# Patient Record
Sex: FEMALE | Race: OTHER | HISPANIC OR LATINO | Employment: UNEMPLOYED | URBAN - METROPOLITAN AREA
[De-identification: names, ages, dates, MRNs, and addresses within clinical notes are randomized per-mention and may not be internally consistent; named-entity substitution may affect disease eponyms.]

---

## 2017-05-26 ENCOUNTER — HOSPITAL ENCOUNTER (EMERGENCY)
Facility: HOSPITAL | Age: 37
Discharge: HOME | End: 2017-05-27
Attending: EMERGENCY MEDICINE

## 2017-05-26 DIAGNOSIS — R10.13 EPIGASTRIC PAIN: Primary | ICD-10-CM

## 2017-05-26 PROCEDURE — 81025 URINE PREGNANCY TEST: CPT | Performed by: EMERGENCY MEDICINE

## 2017-05-26 RX ORDER — ONDANSETRON 2 MG/ML
4 INJECTION INTRAMUSCULAR; INTRAVENOUS ONCE
Status: COMPLETED | OUTPATIENT
Start: 2017-05-27 | End: 2017-05-27

## 2017-05-26 RX ORDER — MAGNESIUM HYDROXIDE/ALUMINUM HYDROXICE/SIMETHICONE 120; 1200; 1200 MG/30ML; MG/30ML; MG/30ML
30 SUSPENSION ORAL ONCE
Status: COMPLETED | OUTPATIENT
Start: 2017-05-27 | End: 2017-05-27

## 2017-05-26 RX ORDER — PANTOPRAZOLE SODIUM 40 MG/1
40 INJECTION, POWDER, FOR SOLUTION INTRAVENOUS ONCE
Status: COMPLETED | OUTPATIENT
Start: 2017-05-27 | End: 2017-05-27

## 2017-05-27 VITALS
SYSTOLIC BLOOD PRESSURE: 113 MMHG | BODY MASS INDEX: 27.38 KG/M2 | HEIGHT: 61 IN | RESPIRATION RATE: 18 BRPM | TEMPERATURE: 98.7 F | OXYGEN SATURATION: 98 % | DIASTOLIC BLOOD PRESSURE: 74 MMHG | WEIGHT: 145 LBS | HEART RATE: 66 BPM

## 2017-05-27 LAB
ALBUMIN SERPL BCP-MCNC: 3.5 G/DL (ref 3.5–5)
ALP SERPL-CCNC: 97 U/L (ref 46–116)
ALT SERPL W P-5'-P-CCNC: 45 U/L (ref 12–78)
ANION GAP SERPL CALCULATED.3IONS-SCNC: 8 MMOL/L (ref 4–13)
AST SERPL W P-5'-P-CCNC: 37 U/L (ref 5–45)
BASOPHILS # BLD AUTO: 0.02 THOUSANDS/ΜL (ref 0–0.1)
BASOPHILS NFR BLD AUTO: 1 % (ref 0–1)
BILIRUB SERPL-MCNC: 0.2 MG/DL (ref 0.2–1)
BILIRUB UR QL STRIP: NEGATIVE
BUN SERPL-MCNC: 15 MG/DL (ref 5–25)
CALCIUM SERPL-MCNC: 8.5 MG/DL (ref 8.3–10.1)
CHLORIDE SERPL-SCNC: 104 MMOL/L (ref 100–108)
CLARITY UR: CLEAR
CO2 SERPL-SCNC: 28 MMOL/L (ref 21–32)
COLOR UR: YELLOW
CREAT SERPL-MCNC: 0.66 MG/DL (ref 0.6–1.3)
EOSINOPHIL # BLD AUTO: 0.27 THOUSAND/ΜL (ref 0–0.61)
EOSINOPHIL NFR BLD AUTO: 6 % (ref 0–6)
ERYTHROCYTE [DISTWIDTH] IN BLOOD BY AUTOMATED COUNT: 19.2 % (ref 11.6–15.1)
GFR SERPL CREATININE-BSD FRML MDRD: >60 ML/MIN/1.73SQ M
GLUCOSE SERPL-MCNC: 101 MG/DL (ref 65–140)
GLUCOSE UR STRIP-MCNC: NEGATIVE MG/DL
HCG UR QL: NEGATIVE
HCT VFR BLD AUTO: 34.4 % (ref 34.8–46.1)
HGB BLD-MCNC: 10.4 G/DL (ref 11.5–15.4)
HGB UR QL STRIP.AUTO: NEGATIVE
KETONES UR STRIP-MCNC: NEGATIVE MG/DL
LEUKOCYTE ESTERASE UR QL STRIP: NEGATIVE
LIPASE SERPL-CCNC: 228 U/L (ref 73–393)
LYMPHOCYTES # BLD AUTO: 1.77 THOUSANDS/ΜL (ref 0.6–4.47)
LYMPHOCYTES NFR BLD AUTO: 42 % (ref 14–44)
MCH RBC QN AUTO: 20.8 PG (ref 26.8–34.3)
MCHC RBC AUTO-ENTMCNC: 30.2 G/DL (ref 31.4–37.4)
MCV RBC AUTO: 69 FL (ref 82–98)
MONOCYTES # BLD AUTO: 0.35 THOUSAND/ΜL (ref 0.17–1.22)
MONOCYTES NFR BLD AUTO: 8 % (ref 4–12)
NEUTROPHILS # BLD AUTO: 1.79 THOUSANDS/ΜL (ref 1.85–7.62)
NEUTS SEG NFR BLD AUTO: 43 % (ref 43–75)
NITRITE UR QL STRIP: NEGATIVE
PH UR STRIP.AUTO: 6.5 [PH] (ref 4.5–8)
PLATELET # BLD AUTO: 365 THOUSANDS/UL (ref 149–390)
PMV BLD AUTO: 9.4 FL (ref 8.9–12.7)
POTASSIUM SERPL-SCNC: 3.5 MMOL/L (ref 3.5–5.3)
PROT SERPL-MCNC: 7.4 G/DL (ref 6.4–8.2)
PROT UR STRIP-MCNC: NEGATIVE MG/DL
RBC # BLD AUTO: 5 MILLION/UL (ref 3.81–5.12)
SODIUM SERPL-SCNC: 140 MMOL/L (ref 136–145)
SP GR UR STRIP.AUTO: 1.02 (ref 1–1.03)
UROBILINOGEN UR QL STRIP.AUTO: 1 E.U./DL
WBC # BLD AUTO: 4.2 THOUSAND/UL (ref 4.31–10.16)

## 2017-05-27 PROCEDURE — 96374 THER/PROPH/DIAG INJ IV PUSH: CPT

## 2017-05-27 PROCEDURE — 83690 ASSAY OF LIPASE: CPT | Performed by: EMERGENCY MEDICINE

## 2017-05-27 PROCEDURE — 80053 COMPREHEN METABOLIC PANEL: CPT | Performed by: EMERGENCY MEDICINE

## 2017-05-27 PROCEDURE — 81003 URINALYSIS AUTO W/O SCOPE: CPT | Performed by: EMERGENCY MEDICINE

## 2017-05-27 PROCEDURE — C9113 INJ PANTOPRAZOLE SODIUM, VIA: HCPCS | Performed by: EMERGENCY MEDICINE

## 2017-05-27 PROCEDURE — 99284 EMERGENCY DEPT VISIT MOD MDM: CPT

## 2017-05-27 PROCEDURE — 36415 COLL VENOUS BLD VENIPUNCTURE: CPT | Performed by: EMERGENCY MEDICINE

## 2017-05-27 PROCEDURE — 85025 COMPLETE CBC W/AUTO DIFF WBC: CPT | Performed by: EMERGENCY MEDICINE

## 2017-05-27 PROCEDURE — 96375 TX/PRO/DX INJ NEW DRUG ADDON: CPT

## 2017-05-27 RX ORDER — ONDANSETRON 4 MG/1
4 TABLET, FILM COATED ORAL EVERY 8 HOURS PRN
Qty: 12 TABLET | Refills: 0 | Status: SHIPPED | OUTPATIENT
Start: 2017-05-27 | End: 2020-02-05

## 2017-05-27 RX ORDER — KETOROLAC TROMETHAMINE 30 MG/ML
30 INJECTION, SOLUTION INTRAMUSCULAR; INTRAVENOUS ONCE
Status: COMPLETED | OUTPATIENT
Start: 2017-05-27 | End: 2017-05-27

## 2017-05-27 RX ORDER — OMEPRAZOLE 20 MG/1
40 CAPSULE, DELAYED RELEASE ORAL DAILY
Qty: 40 CAPSULE | Refills: 0 | Status: SHIPPED | OUTPATIENT
Start: 2017-05-27 | End: 2019-11-26

## 2017-05-27 RX ADMIN — KETOROLAC TROMETHAMINE 30 MG: 30 INJECTION, SOLUTION INTRAMUSCULAR at 02:25

## 2017-05-27 RX ADMIN — ALUMINUM HYDROXIDE, MAGNESIUM HYDROXIDE, AND SIMETHICONE 30 ML: 200; 200; 20 SUSPENSION ORAL at 00:32

## 2017-05-27 RX ADMIN — PANTOPRAZOLE SODIUM 40 MG: 40 INJECTION, POWDER, FOR SOLUTION INTRAVENOUS at 00:34

## 2017-05-27 RX ADMIN — ONDANSETRON 4 MG: 2 INJECTION INTRAMUSCULAR; INTRAVENOUS at 00:33

## 2019-10-21 ENCOUNTER — ULTRASOUND (OUTPATIENT)
Dept: OBGYN CLINIC | Facility: CLINIC | Age: 39
End: 2019-10-21

## 2019-10-21 VITALS
DIASTOLIC BLOOD PRESSURE: 80 MMHG | WEIGHT: 145 LBS | SYSTOLIC BLOOD PRESSURE: 121 MMHG | BODY MASS INDEX: 26.68 KG/M2 | HEIGHT: 62 IN | HEART RATE: 60 BPM

## 2019-10-21 DIAGNOSIS — O21.9 NAUSEA AND VOMITING IN PREGNANCY: ICD-10-CM

## 2019-10-21 DIAGNOSIS — N91.2 AMENORRHEA: Primary | ICD-10-CM

## 2019-10-21 PROCEDURE — 99203 OFFICE O/P NEW LOW 30 MIN: CPT | Performed by: OBSTETRICS & GYNECOLOGY

## 2019-10-21 RX ORDER — PYRIDOXINE HCL (VITAMIN B6) 25 MG
25 TABLET ORAL EVERY 8 HOURS
Qty: 30 TABLET | Refills: 4 | Status: SHIPPED | OUTPATIENT
Start: 2019-10-21 | End: 2019-11-04 | Stop reason: SDUPTHER

## 2019-10-21 NOTE — PROGRESS NOTES
S: 39 y o y o  [id number] who presents for viability scan with LMP of mid-August  She reports feeling well  She denies cramping or vaginal bleeding  This is an unplanned but welcomed pregnancy   She has had 4 previous uncomplicated vaginal deliveries         O:  Vitals:    10/21/19 1019   BP: 121/80   Pulse: 60   Weight: 65 8 kg (145 lb)   Height: 5' 2" (1 575 m)       TVUS: hilton IUP; gestational sac + yolk sac + fetal pole seen; cardiac flicker visualized; unable to assess gestational age due to small size; R corpus luteum        A/P:  - RTC in 2 weeks for repeat viability scan  - Nausea/vomiting: unisom + B6  - Start prenatal vitamins      Carlos Dey MD  OB/GYN PGY-2  10/22/2019  9:05 AM

## 2019-10-21 NOTE — PROGRESS NOTES
Note on use of High Level Disinfection Process (Trophon) for transvaginal probe:     Valentina Lyons    REF: [id number]   SN: [id number]     [facility] [place] #[id number]

## 2019-10-21 NOTE — PATIENT INSTRUCTIONS
[place] y vómito en el embarazo   CUIDADO AMBULATORIO:   La náusea y el vómito en el embarazo  pueden suceder a cualquier hora del día  Estos síntomas usualmente comienzan antes de la semana 9 del embarazo y terminan para la semana 14 (salvador trimestre)  Algunas mujeres pueden tener náusea o vómito por un tiempo prolongado  Estos síntomas pueden afectar a algunas mujeres valeri el embarazo  La náusea y el vómito no dañan a garcia bebé  Estos síntomas pueden dificultarle breanna actividades diarias  Busque atención médica de inmediato si:   · Usted presenta signos de deshidratación  Por ejemplo, orina de color amarillo oscuro, boca y labios resecos, piel reseca, latido cardíaco acelerado y orinar menos de lo normal     · Usted tiene dolor abdominal intenso  · Usted se siente demasiado débil o mareado joseph para ponerse de pie  · Usted nota carmita en garcia vómito o en breanna deposiciones  Pregúntele a garcia Lurene Ruder vitaminas y minerales son adecuados para usted  · Usted vomita más de 4 veces en 1 día  · Usted no ha podido retener líquidos en el estómago por más de 1 día  · Usted pierde más de 2 libras  · Usted tiene fiebre  · Breanna náuseas y vómito continúan por más de 14 semanas  · Usted tiene preguntas o inquietudes acerca de garcia condición o cuidado  El tratamiento  para la náusea y el vómito en el embarazo generalmente no es necesario  Usted puede [employer] alimentos que come y en breanna actividades para ayudar a controlar breanna síntomas  Es posible que usted necesite probar varias cosas para determinar qué funciona mejor para usted  Hable con garcia médico si breanna síntomas no mejoran con los cambios que se recomiendan a continuación  Es posible que usted necesite vitamina B6 y medicamento si estos cambios no ayudan o si breanna síntomas se vuelven graves     Cambios de nutrición que usted puede realizar para controlar la náusea y el vómito:   · Coma porciones pequeñas valeri el día en vez de 3 comidas con porciones grandes  Es más probable que usted tenga náusea y vómito cuando garcia estómago está vacío  Consuma alimentos bajos en grasa y ricos en proteínas  Ejemplos son Avivaamanda Jarquin, frijoles, pavo y danie sin flora Morales, joseph galletas saladas, cereal seco o un sandwich chico antes de WEDGECARRUP  · Coma galletas saladas o pan martha antes de levantarse de garcia cama por la mañana  Levántese de la cama lentamente  Los movimientos repentinos podrían provocarle mareos y [place]  · Consuma alimentos blandos cuando se sienta con náuseas  Ejemplos de alimentos blandos son el pan martha, cereal seco, pasta sin Margauxcathie Le y olivares  Otros alimentos blandos incluyen a las [employer], plátanos, gelatina y pretzels  Evite los alimentos condimentados, grasosos y fritos  Evite otros alimentos que le provoquen náuseas  · [place] líquidos que contengan gengibre  [place] refresco de gengibre hecho con gengibre real o té de gengibre hecho con gengibre fresco rallado  Las [employer] o dulces de gengibre también podrían ayudar a aliviar la náusea y el vómito  · [place] líquidos entre alimentos en vez de tomarlos con los alimentos  [place] al menos 30 minutos después de comer para karen líquidos  [place] cantidades pequeñas de líquidos con frecuencia valeri el día para evitar la deshidratación  Consulte cuál es la cantidad de líquido que usted debería consumir al día  Otros cambios que usted puede realizar para controlar la náusea y el vómito:   · Evite los olores que la [place]  Los olores ruddy podrían provocar que [employer] náuseas y el vómito, o podrían empeorarlo  Camine un poco, prenda un ventilador o trate de dormir con la ventana abierta para respirar aire fresco  Cuando esté cocinando, chucho las ventanas para eliminar el olor que podría provocarle náuseas  · No se cepille antwon dientes inmediatamente después de comer  si eso le provoca náuseas  · Descanse cuando lo necesite    Comience jael actividad lentamente y vuelva a landeros rutina normal conforme se empiece a sentir mejor  · Hable con ladneros médico acerca de las vitaminas prenatales  Las vitaminas prenatales pueden provocar náuseas a algunas mujeres  Trate de tomárselas por la noche o con un bocadillo  Si demetrice cambio no le [facility], landeros médico podría recomendarle un tipo de vitamina diferente  · No use ningún medicamento, vitamina o suplemento para controlar antwon síntomas sin antes consultarlo con landeros médico   Varios medicamentos pueden dañar a landeros bebé que no ha nacido  · El ejercicio de ligero a moderado  podría ayudar a aliviar antwon síntomas  También podría ayudarla a dormir mejor por la noche  Pregunte a landeros médico acerca del mejor plan de ejercicio para usted  Acuda a antwon consultas de control con landeros médico según le indicaron  Anote antwon preguntas para que se acuerde de hacerlas valeri antwon visitas  © 2017 [address] Information is for End User's use only and may not be sold, redistributed or otherwise used for commercial purposes  All illustrations and images included in CareNotes® are the copyrighted property of A D A M , Inc  or Josh Stanford  Esta información es sólo para uso en educación  Landeros intención no es darle un consejo médico sobre enfermedades o tratamientos  Colsulte con landeros Alaina Juwan farmacéutico antes de seguir cualquier régimen médico para saber si es seguro y efectivo para usted

## 2019-11-04 ENCOUNTER — ULTRASOUND (OUTPATIENT)
Dept: OBGYN CLINIC | Facility: CLINIC | Age: 39
End: 2019-11-04

## 2019-11-04 DIAGNOSIS — N91.2 AMENORRHEA: Primary | ICD-10-CM

## 2019-11-04 DIAGNOSIS — O21.9 NAUSEA AND VOMITING IN PREGNANCY: ICD-10-CM

## 2019-11-04 PROCEDURE — 76801 OB US < 14 WKS SINGLE FETUS: CPT | Performed by: OBSTETRICS & GYNECOLOGY

## 2019-11-04 PROCEDURE — 99213 OFFICE O/P EST LOW 20 MIN: CPT | Performed by: OBSTETRICS & GYNECOLOGY

## 2019-11-04 RX ORDER — PYRIDOXINE HCL (VITAMIN B6) 25 MG
25 TABLET ORAL EVERY 8 HOURS
Qty: 30 TABLET | Refills: 4 | Status: SHIPPED | OUTPATIENT
Start: 2019-11-04 | End: 2020-03-03 | Stop reason: ALTCHOICE

## 2019-11-04 NOTE — PROGRESS NOTES
FIRST TRIMESTER OBSTETRIC ULTRASOUND      Rupa Nielsen is a 46yo [id number] who present today for a viability ultrasound  This is an unplanned but welcomed pregnancy  History of irregular menses with LMP "around 8/16/19"  INDICATION: Amenorrhea, viability    TECHNIQUE:  Transvaginal imaging was performed to assess the gestation, myometrial/endometrial architecture and ovarian parenchymal detail  The study includes volumetric sweeps and traditional still imaging technique  FINDINGS:     A single intrauterine gestation is identified  Cardiac activity is detected at 164bpm     YOLK SAC:  Present and normal in size and appearance  GESTATIONAL SAC: Present and normal in size and appearance  MEAN CROWN RUMP LENGTH:  1 54-1  61cm = 8 weeks 0 days   AMNIOTIC FLUID/SAC SHAPE:  Within expected normal range  UTERUS/ADNEXA:   No adnexal mass or pathologic cyst   No free fluid identified  IMPRESSION:     Single intrauterine pregnancy of 8 weeks 0 days gestational age  Fetal cardiac activity detected at 164bpm  Right corpus luteum cyst, otherwise normal appearing adnexa bilaterally  Final [facility], based on today's ultrasound, 6/15/2020  Dr Maykel Zuleta present    - Unisom + B6 sent to the patient's pharmacy for nausea/vomiting  Precautions reviewed  - Prenatal vitamins sent to the patient's pharmacy  Prenatal panel ordered  - Follow up for intake and H&P visit  1st trimester precautions reviewed

## 2019-11-04 NOTE — PATIENT INSTRUCTIONS
Embarazo de la semana 7 a la 10   LO QUE NECESITA SABER:   La hormonas del embarazo podrían provocar que garcia cuerpo pase por varios cambios valeri esta etapa del embarazo  Es posible que usted se sienta más cansado de lo normal y que tenga cambios de humor, náuseas y vómitos, y karishma de [place]  Breanna senos podrían sentirse sensibles e inflamados y usted podría orinar con más frecuencia  INSTRUCCIONES SOBRE EL CYDNEY HOSPITALARIA:   Busque atención médica de inmediato si:   · Usted tiene dolor o cólicos en el abdomen o la parte baja de la espalda  · Usted tiene sangrado vaginal abundante o coágulos  · Le sale un material que parece tejido o coágulos grandes  Recolecte el material y tráigalo con usted  Pregúntele a garcia Vi Kobs vitaminas y minerales son adecuados para usted  · Usted tiene un sangrado leve  · Usted tiene escalofríos o fiebre  · Usted tiene comezón, ardor o dolor vaginal      · Usted tiene jael secreción vaginal amarillenta, verdosa, hawa o de [employer]  · Usted tiene dolor o ardor al Marinell Dredge, orina menos de lo habitual o tiene [place] rosada o sanguinolenta  · Usted tiene preguntas o inquietudes acerca de garcia condición o cuidado  Cómo cuidarse en esta etapa de garcia embarazo:   · Controle la náusea y el vómito  Evite los alimentos grasosos y picantes  Coma comidas pequeñas valeri el día en vez de porciones grandes  El jengibre puede ayudar a [employer]  Consulte con garcia médico acerca de otras formas para disminuir las náuseas y el vómito  · Consuma alimentos saludables y variados  Alimentos saludables incluyen frutas, verduras, panes de kayla integral, alimentos lácteos bajos en grasa, frijoles, deni magras y pescado  [place] líquidos joseph se le haya indicado  Pregunte cuánto líquido debe karen cada día y cuáles líquidos son los más adecuados para usted  Limite el consumo de cafeína a menos de [place] [address]   Limite el consumo de pescado a 2 porciones cada semana  Escoja pescado con concentraciones bajas de meme joseph atún al natural enlatado, camarón, salmón, bacalao o tilapia  No  coma pescado con concentraciones altas de meme joseph pez alan, caballa gigante, pargo rayado y tiburón  · [address]  Garcia necesidad de ciertas vitaminas y [address], joseph el ácido fólico, aumenta valeri el [facility]  Las vitaminas prenatales proporcionan algunas de las vitaminas y minerales adicionales que usted necesita  Las vitaminas prenatales también podrían ayudar a disminuir el riesgo de ciertos defectos de nacimiento  · Pregunte cuánto peso usted debería aumentar cada mes  Demasiado aumento de peso o muy poco puede ser poco saludable para usted y garcia bebé  · No fume  Si usted fuma, nunca es demasiado tarde para dejar de hacerlo  Fumar aumenta el riesgo de aborto espontáneo y otros problemas de savannah valeri garcia [facility]  Fumar puede causar que garcia bebé nazca antes de tiempo o que pese menos al nacer  Solicite información a garcia médico si usted necesita ayuda para dejar de fumar  · No consuma alcohol  El alcohol pasa de garcia cuerpo al bebé a través de la placenta  Puede afectar el desarrollo del cerebro de garcia bebé y provocar el síndrome de alcoholismo fetal (SAF)  FAS es un pacheco de condiciones que causan [address], de comportamiento y de crecimiento  · Consulte con garcia médico antes de karen cualquier medicamento  Muchos medicamentos pueden perjudicar a garcia bebé si usted los tosin [facility] [address]  No tome ningún medicamento, vitaminas, hierbas o suplementos sin david consultar con garcia Kain Dole  use drogas ilegales o de la tena (joseph marihuana o cocaína) mientras está embarazada  Consejos de seguridad valeri el embarazo:   · Evite jacuzzis y saunas  No use un jacuzzi o un sauna mientras usted está embarazada, especialmente valeri el primer trimestre   Los [facility] y los saunas [facility] temperatura de garcia bebé y el riesgo de defectos de nacimiento  · Evite la toxoplasmosis  [place] es jael infección causada por comer carne cruda o estar cerca del excremento de un bebeto infectado  [place] puede causar malformaciones congénitas, aborto espontáneo y [place]  Lávese las ashley después de tocar carne cruda  Asegúrese de que la carne esté monica cocida antes de comerla  Evite los huevos crudos y la Neha Gulling  Use guantes o pida que alguien la ayude a limpiar la caja de arena del bebeto mientras usted Clemon Janes  Cambios que están ocurriendo con garcia bebé:  Para las 10 semanas, garcia bebé medirá alrededor de 2 ½ pulgadas desde la xavier hasta la rabadilla (parte inferior o cóccix del bebé)  Garcia bebé pesa alrededor de ½ onza  Los [employer] del cuerpo, joseph el cerebro, corazón y pulmones se están formando  Las características faciales de garcia bebé también están comenzando a formarse  Lo que necesita saber acerca del cuidado prenatal:  El cuidado prenatal se trata de jael serie de visitas con garcia médico a lo zenon del embarazo  Valeri las primeras 28 semanas de garcia temi Andino tendrá citas mensuales con garcia médico  El cuidado prenatal puede ayudar a evitar problemas valeri el [place] y [place]  Garcia médico le hará preguntas acerca de garcia savannah y de cualquier embarazo previo que usted haya tenido  Él también le preguntará acerca de cualquier medicamento que esté tomando  Es posible que también necesite alguno de los siguientes tratamientos:  · Jael prueba de [place]  se realiza para revisar si garcia shanelle uterino tiene células anormales  El shanelle uterino es la apertura angosta que está en la parte inferior de [place]  El shanelle uterino se junta con la parte superior de la vagina  · Un examen pélvico  le permite a garcia médico observar garcia shanelle uterino (la parte inferior de garcia [place])  Garcia médico utiliza un espéculo para abrir garcia vagina suavemente   Él examinará el tamaño y forma de garcia [place]      · Los análisis de carmita:  podrían realizarse para revisar signos de anemia o el tipo de [place]  Landeros médico también podría ordenarle otros exámenes de carmita para revisar si usted es inmune a ciertas enfermedades joseph la Hepatitis B  También podría recomendarle un examen del VIH  · Análisis de orina  también podrían realizarse para revisar si hay signos de infección  · Landeros presión arterial y peso  será revisado  © 2017 [address] Information is for End User's use only and may not be sold, redistributed or otherwise used for commercial purposes  All illustrations and images included in CareNotes® are the copyrighted property of A D A M , Inc  or Josh Stanford  Esta información es sólo para uso en educación  Landeros intención no es darle un consejo médico sobre enfermedades o tratamientos  Colsulte con landeros Mukund Panchal farmacéutico antes de seguir cualquier régimen médico para saber si es seguro y efectivo para usted  Náusea y vómito en el embarazo   LO QUE NECESITA SABER:   La náusea y el vómito pueden suceder a cualquier hora del día  Estos síntomas usualmente comienzan antes de la semana 9 del embarazo y terminan para la semana 14 (salvador trimestre)  Algunas mujeres pueden tener náusea o vómito por un tiempo prolongado  Estos síntomas pueden afectar a algunas mujeres valeri el embarazo  La náusea y el vómito no dañan a landeros bebé  Estos síntomas pueden dificultarle antwon actividades diarias  INSTRUCCIONES SOBRE EL CYDNEY HOSPITALARIA:   Regrese a la ned de emergencias si:   · Usted presenta signos de deshidratación  Por ejemplo, orina de color amarillo oscuro, boca y labios resecos, piel reseca, latido cardíaco acelerado y orinar menos de lo normal     · Usted tiene dolor abdominal intenso  · Usted se siente demasiado débil o mareado joseph para ponerse de pie  · Usted nota acrmita en landeros vómito o en antwon deposiciones    Pregúntele a landeros médico qué vitaminas y minerales son adecuados para usted  · Usted vomita más de 4 veces en 1 día  · Usted no ha podido retener líquidos en el estómago por más de 1 día  · Usted pierde más de 2 libras  · Usted tiene fiebre  · Breanna náuseas y vómito continúan por más de 14 semanas  · Usted tiene preguntas o inquietudes acerca de garcia condición o cuidado  Cambios de nutrición que usted puede realizar para controlar la náusea y el vómito:   · Coma porciones pequeñas valeri el día en vez de 3 comidas con porciones grandes  Es más probable que usted tenga náusea y vómito cuando garcia estómago está vacío  Consuma alimentos bajos en grasa y ricos en proteínas  Ejemplos son Eric Brenda, frijoles, pavo y danie sin flora Morales, joseph galletas saladas, cereal seco o un sandwich chico antes de WEDG[place]ARRUP  · Coma galletas saladas o pan martha antes de levantarse de garcia cama por la mañana  [place] de la cama lentamente  Los movimientos repentinos podrían provocarle mareos y [place]  · Consuma alimentos blandos cuando se sienta con náuseas  Ejemplos de alimentos blandos son el pan martha, cereal seco, pasta sin Minda Boss y olivares  Otros alimentos blandos incluyen a las [employer], plátanos, gelatina y pretzels  Evite los alimentos condimentados, grasosos y fritos  Evite otros alimentos que le provoquen náuseas  · [place] líquidos que contengan gengibre  [place] refresco de gengibre hecho con gengibre real o té de gengibre hecho con gengibre fresco rallado  Las [employer] o dulces de gengibre también podrían ayudar a aliviar la náusea y el vómito  · [place] líquidos entre alimentos en vez de tomarlos con los alimentos  [place] al menos 30 minutos después de comer para karen líquidos  [place] cantidades pequeñas de líquidos con frecuencia valeri el día para evitar la deshidratación  Consulte cuál es la cantidad de líquido que usted debería consumir al día    Otros cambios que usted puede realizar para controlar la náusea y el vómito:   · Evite los olores que la [place]  Los olores ruddy podrían provocar que [employer] náuseas y el vómito, o podrían empeorarlo  Camine un poco, prenda un ventilador o trate de dormir con la ventana abierta para respirar aire fresco  Cuando esté cocinando, chucho las ventanas para eliminar el olor que podría provocarle náuseas  · No se cepille antwon dientes inmediatamente después de comer  si eso le provoca náuseas  · Descanse cuando lo necesite  Comience jael actividad lentamente y vuelva a landeros rutina normal conforme se empiece a sentir mejor  · Hable con landeros médico acerca de las vitaminas prenatales  Las vitaminas prenatales pueden provocar náuseas a algunas mujeres  Trate de tomárselas por la noche o con un bocadillo  Si demetrice cambio no le [place], landeros médico podría recomendarle un tipo de vitamina diferente  · No use ningún medicamento, vitamina o suplemento para controlar antwon síntomas sin antes consultarlo con landeros médico   Varios medicamentos pueden dañar a landeros bebé que no ha nacido  · El ejercicio de ligero a moderado  podría ayudar a aliviar antwon síntomas  También podría ayudarla a dormir mejor por la noche  Pregunte a landeros médico acerca del mejor plan de ejercicio para usted  Acuda a antwon consultas de control con landeros médico según le indicaron  Anote antwon preguntas para que se acuerde de hacerlas valeri antwon visitas  © 2017 [address] Information is for End User's use only and may not be sold, redistributed or otherwise used for commercial purposes  All illustrations and images included in CareNotes® are the copyrighted property of A D A M , Inc  or Josh Stanford  Esta información es sólo para uso en educación  Landeros intención no es darle un consejo médico sobre enfermedades o tratamientos  Colsulte con landeros Pablo Abdi farmacéutico antes de seguir cualquier régimen médico para saber si es seguro y efectivo para usted

## 2019-11-18 ENCOUNTER — INITIAL PRENATAL (OUTPATIENT)
Dept: OBGYN CLINIC | Facility: CLINIC | Age: 39
End: 2019-11-18

## 2019-11-18 ENCOUNTER — PATIENT OUTREACH (OUTPATIENT)
Dept: OBGYN CLINIC | Facility: CLINIC | Age: 39
End: 2019-11-18

## 2019-11-18 VITALS
DIASTOLIC BLOOD PRESSURE: 76 MMHG | HEART RATE: 72 BPM | WEIGHT: 145 LBS | HEIGHT: 63 IN | SYSTOLIC BLOOD PRESSURE: 114 MMHG | BODY MASS INDEX: 25.69 KG/M2

## 2019-11-18 DIAGNOSIS — O09.521 AMA (ADVANCED MATERNAL AGE) MULTIGRAVIDA 35+, FIRST TRIMESTER: ICD-10-CM

## 2019-11-18 DIAGNOSIS — Z34.91 PRENATAL CARE IN FIRST TRIMESTER: Primary | ICD-10-CM

## 2019-11-18 PROCEDURE — 99211 OFF/OP EST MAY X REQ PHY/QHP: CPT

## 2019-11-18 NOTE — PROGRESS NOTES
RAFA met with 43 y/o- S- G5:P4-  Welsh speaking woman for psychosocial assess  Pt came accompanied by [facility] ( [facility]) and he was present with Pt's permission  Couple resides together with Pt's 4 kids  Both happy and reported pregnancy was planned  Pt denies any usage of drug, alcohol or smoking  No mental health history or DV issues  Pt has Financial Assistance and need to call [phone] for appointment  Pt reported FOB is the only wage earner  Pt states transportation can be a challenge, she most get pn appointment on tuesday  Pt was advice to request appointments on Tuesday at registration  Pt denies financial concerns  FOB denies concern at this time, verbalized is very excited with hs first baby  Pt denies concern at this time  SW explained her role and encouraged Pt to contact her as needed  Pt verbalized understanding

## 2019-11-18 NOTE — LETTER
Proof of Pregnancy Letter    Rupa Avendaño  1980  [address] [facility] [id number]        11/18/19      Rupa Avendaño is a patient at our facility  Rupa Avendaño Estimated Date of Delivery: None noted  Any questions or concerns, please feel free to contact our office      Sincerely,     [facility]

## 2019-11-18 NOTE — PROGRESS NOTES
OB INTAKE INTERVIEW  Pt presents for OB intake  [id number]  OB History    Para Term  AB Living   5 4 3     4   SAB TAB Ectopic Multiple Live Births           4      # Outcome Date GA Lbr Brandt/2nd Weight Sex Delivery Anes PTL Lv   5 Current            4 Para 14 40w0d   M Vag-Spont EPI N CLEO   3 Term 06/10/08 39w0d   M Vag-Spont  N CLEO   2 Term 06 40w0d   F Vag-Spont None N CLEO   1 Term 00 40w0d   M Vag-Spont  N CLEO       Last Menstrual Period:    No LMP recorded (lmp unknown)  Patient is pregnant  Estimated Date of Delivery: 6/15/2020 confirmed by US    H&P visit scheduled  2020  Last pap smear: unknown    Current Issues:  Constipation :   no  Headaches :   no  Cramping:  no  Spotting :   no  PICA cravings :  no  FOB Involved:   yes  Planned pregnancy:  yes    Interview education   [facility] Pregnancy Essentials Book reviewed and discussed    Baby and Me Handout   Baby and Me [facility] Handout   [facility] MFM Handout   Discussed genetic testing, patient declined   Prenatal care in first trimester  - Prenatal Panel  - Glucose, 1H PG  - Hemoglobin Electrophoresis  - Ambulatory referral to social work care management program      - Glucose, 1H PG; Future   Influenza vaccine declined     Discussed Tdap vaccine  Immunizations:   Immunization History   Administered Date(s) Administered    Influenza TIV (IM) 01/15/2014     Depression Screening Follow-up Plan: Patient's depression screening was negative  BMI Counseling: Body mass index is 25 69 kg/m²  Discussed the patient's BMI with her  Substance Abuse Screening 4Ps         Presently:  No         Past:   No         Partner:   No         Parents/Family:  No  Infection Screening: Does the pt have a hx of MRSA?no  Explained to patient how to contact OB Physician during after office hours  The patient was oriented to our practice and all questions were answered    Interviewed by: Aixa Torres RN 19

## 2019-11-18 NOTE — PATIENT INSTRUCTIONS
El Primer Trimestre  (hasta 14 semanas)   CHEEK BEBÉ   · Cheek bebé comienza a desarrollarse cuando el óvulo y un espermatozoide se unen  · Cheek bebé crece dentro de cheek útero (también llamado tu vientre)  Flota dentro de jael bolsa de agua - llamado el saco amniótico  El bebé está conectado a ti por un cordón umbilical que va desde el vientre del bebé a la placenta  La placenta se une a cheek útero y la placenta es donde [place], oxígeno y alimentos cruzan encima de usted a cheek bebé  · Cosas joseph drogas, alcohol y tabaco pueden también cruzar de usted a cheek bebé a través de la placenta  Es importante evitar estas cosas, joseph pueden ser perjudiciales para cómo tu bebé se desarrolla y crece  · Al final del primer mes, late el corazón de cheek bebé  El bebé es aproximadamente del tamaño de un trozo de arroz  · Al final del salvador mes, todos los órganos del bebé (corazón, pulmones, cerebro, cráneo) andres formado completamente  Ahora sólo tienen que seguir madurando y creciendo  El bebé es aproximadamente del tamaño de [place]  · Al final del tercer mes, cheek bebé es de aproximadamente 4 pulgadas de zenon! TU CUERPO   · Cheek período se detiene - esto puede ser la primera señal que tienes que está [place]   · Tus pechos pueden volverse dolorido y [place]  · Se puede sentir muy cansada  · Usted puede tener un malestar estomacal con náuseas o vómitos que pueden ocurrir en cualquier momento del día - o a veces valeri todo el día  · Usted puede sentirse malhumorado y gruñón  También puede sentir miedo o rosales  Rotan es normal y natural    · Usted puede perder o ganar peso  Rotan está monica, siempre y cuando usted no está perdiendo o ganando [employer]          [place] Trimestre  (14-28 semanas)   CHEEK BEBÉ   · 4to mes   · cheek bebé tiene pestañas y rosette   · cheek bebé patea, se mueve y se traga   · cheek bebé es 6 a 7 pulgadas de zenon   · 5to mes   · cheek bebé tiene uñas   · cheek bebé Alek Ce a dormir y despertar ciclos   · bebé Ely Clement a ser Onel Hodgkins Sharion Mark (aunque no siempre sientes lo) girando de lado a lado y [place]   · garcia bebé es de 8 a 12 pulgadas de zenon y pesa en cualquier lugar de ½ a 1 loyda   · 6to mes   · los ojos de garcia bebé están teddy completamente formados y pronto comenzarán a abrir y cerrar   · la piel del bebé es [place] y [place] y [place] con pelo rita y [place] llamado "lanugo"   · bebé sigue siendo muy activo y que debe estar sintiendo muy monica y muy a menudo   · garcia bebé es de 11 a 14 pulgadas de zenon     TU CUERPO   · Nauseas del embarazo usualmente comienza a desaparecer y las mujeres generalmente comienzan a subir de peso más rápidamente  · Puede comenzar a tener estrías en el vientre o senos que pueden ser "picores"  Frotarlas loción sobre ellos para ayudar a aliviar la Nevelyn Grams  · Tu flujo vaginal puede llegar a ser de color blanquecino  · Usted puede ser estreñido  Intente aumentar la [employer], o puede karen jael pastilla de suavizante de heces (joseph Colace) para aliviar el malestar  · Puede comenzar a tener ardor de estómago  Medicamentos joseph Tums o Maalox pueden ayudar a aliviar esto  Trate de permanecer en jael posición sentada por lo menos jael hora después de las comidas para disminuir la acidez estomacal    · Deberías probar a 8 horas de sueño en la noche, además de jael siesta valeri el día si es posible  · No deberías sentarte valeri más de dos horas sin karen un descanso para estirar las piernas  El Tercer Trimestre  (28-42 semanas)   GARCIA BEBÉ   · garcia bebé chupa garcia dedo ahora! · garcia bebé puede oír voces y responder al tacto    habla con Gerrianne Jaxson!!   · el cerebro de garcia bebé crece y se desarrolla más en los últimos 2 meses de embarazo   · princess y [place] del bebé son Linda Bebo y flexibles para que quepan por el canal del parto   · los movimientos del bebé cambian hacia el final del embarazo porque hay menos espacio para patear y estiramientos en tu vientre   · los pulmones del bebé no están completamente desarrollados y totalmente preparados para respirar por antwon propios hasta el último 3-4 semanas antes de garcia fecha de vencimiento     TU CUERPO   · garcia vientre está creciendo mucho ahora   · será más difícil dormir monica de noche o ser tan activos joseph eres generalmente   · puede sudar más de lo habitual   · serás más desequilibrado    tenga cuidado de no caer! · Usted puede desarrollar hemorroides (qué pueden ser dolorosas y hacen difícil sentarse)   · los dos últimos meses del embarazo puede ser muy incómodos con karishma de [place], karishma de [place] y ardor de estómago   · Puedes empezar a tener contracciones    mientras son irregulares y [place] de 5 por hora, esto es jael parte normal de garcia cuerpo a punto de tener un bebé   · el shanelle uterino puede empezar a dilatar y abrir [facility]    para estar listo para el parto   · Usted puede encontrarse que necesitan hacer orinar muy a menudo    porque el bebé está presionando mucho la vejiga   · Usted puede quedarse corta de respiracion más rápido de lo cuate          Mi bebé está desarrollando normalmente? ESTUDIOS GENETICO      Jael de las preocupaciones que muchas mujeres tienen acerca de garcia embarazo es si garcia bebé se está desarrollando normalmente  Existen varias pruebas diferentes que podemos usar para tratar de ayudar y determinar si los bebés están desarrollando normalmente o no  Algunas mujeres tienen un riesgo más alto para que garcia bebé se desarrollan anormalmente (a veces llamado un defecto de nacimiento), kenzie le puede pasar a CUALQUIER  Es por eso que ofrecemos estas pruebas a TODAS las mujeres valeri antwon [place]  Ninguno de [place] exámenes son requerido  Es garcia decisión cuáles puede elegir o NO eligir ninguno valeri garcia embarazo  Algunas de estas pruebas sólo están disponibles en un determinado momento valeri garcia embarazo, así que es importante karen decisiones sobre las pruebas qué desea temprano en el [facility]   Aquí hay información sobre estas pruebas diferentes para ayudarle a karen decisiones sobre si alguna de estas pruebas son [place] para usted  * ANATOMIA ULTRASONIDO : esta ultrasonido se realiza entre 18 a 25 semanas y Mary Alice de cerca a todas partes de garcia bebé y piezas para buscar signos de cualquier desarrollo anormal; el ultrasonido no es perfecto y NO PUEDE detectar TODOS los tipos de defectos de nacimiento (especialmente síndrome de Down) - kenzie es jael prueba [place]  * PROYECCIÓN SECUENCIAL: esta es realmente jael combinación de pruebas que incluyen un ultrasonido que mide la parte posterior del shanelle de garcia bebé [employer] semanas 11-13 y análisis de carmita de usted en que jael y otra vez entre las semanas 15-22; Demetrice examen puede ayudar a decirnos el riesgo para garcia bebé se ve afectada por ciertas anomalías cromosómicas o defectos congénitos  * CUÁDRUPLE PANTALLA: demetrice examen se hace con análisis de carmiat sólo de usted entre 15-22 semanas de [facility]; puede ayudar a decirnos el riesgo para garcia bebé se ve afectada por ciertas anomalías cromosómicas o defectos congénitos  * CELULAR-NIDIA DE ADN : esta prueba se realiza con análisis de carmita sólo de usted cualquier momento después de 10 semanas de embarazo; es muy preciso al decirnos si garcia bebé tiene jael merivn probabilidad de síndrome de Down u otras anomalías del cromosoma kenzie es generalmente reservado para las mujeres que tienen un factor de alto riesgo para un bebé con jael anormalidad del cromosoma  * BRI MATERNAL correction-FETO PROTEINA PANTALLA: demetrice examen se hace con el solo análisis de [place] de entre 15-22 semanas de Thu, nos ayuda a Lassiter Cinnamon idea si garcia ritika esta desarrollando un defecto de nacimiento joseph juancarlos bifida       * AMNIOCENTESIS : Esta prueba implica el uso de jael aguja muy paul que se inserta en el útero para sacar un poco de líquido alrededor de garcia bebé para la prueba; se puede realizar cualquier momento después de 16 semanas de embarazo; [facility] nos dice con certeza si garcia bebé tiene defectos de nacimiento particular (sobre todo anormalidades del cromosoma); hay un pequeño riesgo de aborto espontáneo que [place] cuando temi tiene esta prueba realizada; esta prueba es generalmente reservada para las mujeres que tienen un factor de alto riesgo para un bebé con jael anormalidad  Las mujeres que tienen un riesgo más alto para los bebés que se desarrollan anormalmente (a veces llamado un defecto de nacimiento) incluir a las mujeres con los siguientes:   · 30 años de edad o mayores   · Obesidad en el momento de la shelly   · Diabetes en el momento de la shelly   · Un shane anterior con un defecto de nacimiento   · Tomando medicamentos que pueden causar un defecto de nacimiento     Aquí están algunas preguntas importantes para hacerse al decidir que (eventualmente) de pruebas quiero tener  · ¿Quiero saber antes de nacer si mi bebé tiene un defecto de nacimiento? · ¿Qué haré con esta información si el resultado muestra jael gran oportunidad para un defecto de nacimiento? · ¿Cómo aprender acerca de un defecto de nacimiento me ayudaría a karen decisiones sobre mi embarazo? · ¿Estas pruebas me ayudará sentir más tranquilos o más ansiedad y miedo?              [address] está jael lista de [place] que son seguros para karen valeri el embarazo sin tener que discutir con el médico o enfermera:     ·        Tylenol/Acetaminophen (dolor de princess Uday Vides)   ·        Benadryl/Diphenhydramine (alergias, congestión nasal, insomnio, picazon)   ·        Claritin/Loratidine (alergias, congestión nasal)   ·        Zyrtec/Cetirizine (alergias, congestión nasal)   ·        Robitussin/Guaifenesin regular (tos)   ·        Sudafed/Pseudoephedrine (congestión nasal)   ·        Colace/Docusate sodium (estreñimiento)   ·        Maalox/Magnesium hydroxide (acidez, indigestión)   ·        Zantac/Ranitidine (acidez, indigestión)   · Pepcid/Famotidine (acidez, indigestión)   ·        Tums/Calcium carbonate Elane December, náuseas)   ·        Kaopectate/Bismuth subsalicylate (diarrea)   ·        Immodium/Loperamide (diarrea)   ·        Vitamina B6/Pyrodoxine (náuseas)   ·        Doxylamine/Unisom (náuseas, insomnio)     Debe discutir con nuestra enfermera practicante o chayito de nuestros médicos antes de karen cualquier otro medicamento  NUTRICION EN EL EMBARAZO  [place] nutrición es jael parte importante de tener un embarazo saludable y un bebé carlos  Usted debe seguir jael dieta saludable que incluyen los siguientes:   · Verduras (que son oscuros ashlee y frondoso): al menos 2 raciones cada día   · Proteína (carne, huevos, frijoles, nueces, mantequilla de Emmanuel): 3-4 raciones cada día   · Granos panes/todo (pan, pasta, arroz, tortillas, julia): 3 porciones cada día   · Productos lácteos ([place], yogur, [place]-barre): 3-4 raciones cada día   · De agua: 6-8 vasos por día   · Calorías: aproximadamente 2000 a 2200 calorías por día    AUMENTO DE PESO   Aumento de peso recomendado para usted valeri garcia embarazo se basa en el índice de masa corporal ([address]) en el momento en que usted Darin Alysha  Aumento de peso recomendado de [address] antes del embarazo   Bajo peso [facility] inferior a 18,5) 28 a 40 libras   Normal (IMC 18 5-24 9) de peso 25 a 35 libras   Sobrepeso (IMC 25-29 9) 15 a 25 libras   Misha ([address] de 30 o mayor) 11 a [address] LOS ALIMENTOS   Es muy importante comer sólo alimentos preparados en forma gleason valeri el embarazo joseph usted y garcia bebé tienen un riesgo más alto de lo habitual para ser afectados por enfermedades transmitidas por los alimentos   Siga estos pasos para asegurarse de que usted y garcia bebé estén a alejandro de las enfermedades transmitidas por los alimentos valeri el embarazo:   · porfirio monica las ashley con agua tibia y jabón antes y después de manipular cualquier alimento   · Anup Cocking de cortar, platos, utensilios y mostradores con [place] y jabón antes y después de preparar cualquier alimento   · enjuague de frutas crudas y verduras minuciosamente bajo chorro de agua antes de comer   · [employer] y la carne cruda separada de otros alimentos y usar tableros/utensilios de radha diferentes para manejar carne cruda de otros alimentos   · poner alimentos cocidos en un plato recién limpio   · Cocine todos los alimentos monica   · Deseche los alimentos que se deleon dejado hacia fuera para más de 2 horas   · Refrigere o congele alimentos que pueden echar a perder     Hay desiree alimentarias particulares riesgos que tener en cuenta y evitar ya que pueden causar grave dañan a garcia shane harman  * Listeria (jael bacteria perjudicial)   · no comer salchichas o embutidos (a menos que esté recalentados hasta cocer al vapor caliente)   · no coma quesos blandos (tales joseph Feta, Brie, Camembert) a menos que específicamente se etiquetan joseph siendo "hecho con Mandi Minion"   · no tc leche cruda (sin pasteurizar)   · no comer patés refrigerados o se separa de la carne   · no coma mariscos ahumados refrigerados a menos que sea en un plato cocinado joseph jael cacerola    * Daniela (un metal que se encuentra en ciertos pescados en niveles altos)   · no coma tiburón, lofolátilo, caballa o pez alan   · no coma más de 12 onzas por semana de camarón, salmón, abadejo o bagre   · al comer atún, puede tener hasta 6 onzas por semana de atún enlatado o WATZING a 12 onzas de atún enlatado    * Toxoplasma (parásito dañino)   · carne de cook o antes de comer   · usar guantes jardinería o manipulación de arena del arenero infantil   · si tienes un bebeto, pídale a alguien que cambie la caja de arena valeri el [facility]  Si tienes que limpiar usted mismo, asegúrese de [facility] ashley después con agua tibia y Fatmata     · no conseguir un bebeto nuevo valeri el emb[facility]orence Community Healthcare            EJERCICIO Y [facility]    El ejercicio tiene muchos benficios para ustedHarvy Chau:   · Mejora tu postura y apariencia   · Lucero el dolor de espalda   · Mejora la circulacion   · Aumenta la flexibilidad   · Disminuye el estres   · Maurita Early ayuda a sentirse monica   · Te da energia   · Ayuda a que pueda dormir   · Christobal Meckel y estira tus músculos, que pueden ayudar a aliviar los malestares del embarazo   · Aumenta garcia resistencia y flexibilidad     Más carlos que va en mano de obra, el más rápido y más fácil que será garcia recuperación después del nacimiento  El ejercicio puede ser diaz para garcia bebé [place]  Tanto de se siente tranquilo y rosales después de un entrenamiento  Además, garcia bebé le gusta el movimiento  Cuanto tiempo de ejercicio   Consulte con garcia proveedor de [place] antes de comenzar y asegurar de que usted esta lo suficientemente clint joseph para empezar hacer ejercicio  Si haces ejercicios antes del embarazo, es aceptable para hacer ejercicio moderado de 30 minutos o más cada día  Si no, empezar con 20 minutos al día, 3 veces por semana  Si se activan antes de garcia embaraza, es seguro continuar  Si no estas acustombrada a correr, el embarazo no es un buen momento para empezar  El Primer Trimestre   En los primeros desiree meses puede sentirse cansada y enferma de garcia estómago  Ejercicio sólo cuando se sienta mejor  Sin embargo, incluso un poco de [place] puede aumentar garcia energía  Considere jael caminata, estiramiento o poco de yoga  El OTROUZA Trimestre   Podría tener más [place], así que aprovechar los tiempos cuando se siente monica para hacer actividades para disfrutar  Seguros ejercicios son aerobicos de bajo impacto (que elevan garcia ritmo cardiaco), fácil de bailar, carminar, nadar, ciclismo estacionario, esquí fácil, y yoga  Ir fácil en deportes de "alto impacto" joseph correr, tenis, o deportes que pueden causar caída, joseph el esquí alpino, o paseos a autumn  El Tercer Trimestre   Sentirse mas cansada y Agia Thekla karishma de espalda por el peso extra que Yudi Landon    Tu tercer trimestre es un momento importante para utilizar jael buena postura, doble las rodillas para recoger cosas, y no levante objetos pesados  Normas de Seguridad   · [employer] siempre antes y despues del ejercicio  · Nunca entrenamiento tan deric que no puedes hablar al [employer]  · No ejercer en tiempo muy caliente o muy frio  · Beber mucha agua antes, valeri, y despues del ejercicio  · Ser conscientes de garcia nivel de comodidad, dejar lo que estas hacienda si tienes dolor, si se sientes debil, o si estas agotada  · Evitar acostarse sobre garcia espalda despues de garcia primer trimester, por que esta posicion puede disminuir el flujo de carmita a garcia utero  NÁUSEAS Y VÓMITOS EN EL EMBARAZO    1  comer comidas y meriendas poco a poco   2  comer cada 1-2 horas para evitar el estómago lleno  3  no saltarse las comidas, evitar el estómago vacío  4  comer un bocado antes de levantarse de la cama  5  Evite alimentos y bebidas con un Delfmems  6  evitar la deshidratación: beber suficiente líquido para mantener garcia orina de color amarillo pálido  7  beber líquidos antes de jael comida para minimizar el efecto de un estómago lleno  8  limitar la cantidad de café y bebidas que contienen cafeína  9  eliminar picante, olor, alto de grasa (alimentos fritos), ácido (productos de [place]) y alimentos dulces  10  líquido que contiene kassandra, menta o naranja puede ser generalmente monica tolerado  11  snacks y comidas que contienen proteína baja en grasa (deni magras, pescado, aves de [place] y [place]) junto con comer carbohidratos fácilmente digeribles (frutas, arroz, pan martha, galletas y cereal seco) pueden ser mejor toleradas  12  los alimentos con el jengibre pueden ser [employer]  Trate de usar polvo de raíz de jengibre, [place], o extraer (hasta 1000mg por día)  13  beber líquidos en pequeñas cantidades    14  trate de karen vitamina B6 (50mg al acostarse, 25mg en la mañana y 25mg en la tarde) con Unisom/doxilamina (25mg al [place])  [address] y los vómitos persisten, póngase en contacto con la oftalaa  Jane    1  Es Algie Leap? Las relaciones sexuales regularmente son perfectamente Nam Herter y no le hacen louie a garcia ritika que esta creciendo  Lionel Pancoast y los orgasmos estan monica a menos que usted tenga algun problema medico   Si usted esta preocupada, discutalo con garcia proveedor de sav[place]  2  Jesika Alexx sanabria seguido esta monica tener relaciones sexuales? Tener relaciones sexuales frecuentemente no le hace louie a garcia ritika si usted esta teniendo un embarazo saludable  3  Tener relaciones sexuales puede provocar un aborto involuntario? No hay ninguna informacion que relacione tener un orgasmo con tener un aborto involuntario  Sin embargo, si usted tiene historia de donald tenido un aborto involuntario, garcia proveedor de [place] le puede recomendar que tenga cuidado y que no tenga relaciones sexuales y orgasmos valeri el primer trimestre del [place]  4  Que puede pasar si siento que el ritika se mueve despues de tener relaciones sexuales? No  El ritika esta muy monica protegido en el utero  Y, usualmente el ritika se mueve mucho sin importar lo que usted annika  5  Esta monica que mi yumiko ponga peso sobre mi estomago? No, especialmente cuando garcia estomago empiece a crecer mas  Encuentren otras posiciones para tender relaciones sexuales valeri el embarazo  Traten de tenerrelaciones sexuales acostados de lado o usted puede ponerse encima de garcia yumiko  No se acueste boca arriba  6  Tener relaciones sexuales puede causare un parto prematuro? Normalmente no  Sin embarago, los orgasmos causan que el utero tenga contracciones ligeras si usted esta cerca al [place] de rena a mindy    Si usted tiene historia de haver tenido un parto prematuro, garcia proveedor de [employer] a recomendar que no tenga relaciones sexuales y [place]  Tambien la estimulacion de los senos causa que el utero se contraiga si usted esta cerca del [place] de rena a mindy  Preguentele a garcia proveedor de savannah si esto es seguro para usted  [address] son seguras scott el [facility] No   Nunca deje que garcia yumiko le sople en garcia vagina  No ponga ninjun objeto en garcia vagina que le pueda hacer louie o causar jael infeccion  Si usted tiene sangrado excesivo o si garcia dung se rompe mientras esta teniendo [employer], detengase y llame a garcia proveedor de savannah inmediatamente  8  Que pasa si yo no tengo ganas de tener relaciones sexuales? Hable francamente con garcia yumiko  Al comienzo del [facility] usted puede estar muy cansada o puede sentirse mal del estomago  En las ultimas semanas del [facility] cambio fisicos que usted esta teniendo pueden hacerla sentir muy grand o muy incomoda para tener relaciones sexuales  Para neisha tiempo puede que usted demetrice pensando mas en la maternidad que en tener relaciones sexuales  9  Hay algunas ocasiones en las que debiera evitar tener relaciones sexuales? Si, si:  · tener relaciones sexuales es doloroso  · usted tiene sangrado vaginal  · usted tiene parto prematuro  · Garcia dung se rompio  · usted esta embarazada con gemelos o mas  · usted o garcia yumiko tienen cualquier otra enfermedad venera o jael enfermedad transmitida por relaciones sexuales  · usted no puede encontrar jael posicion comoda          Hugo SCOTT EL EMBARAZO  Llame a Verenice Woo al [phone] para cualquiera de los siguientes:    1  sangrado vaginal  2  Dolor abdominal layla que no desaparece  3  Fiebre (más de 100 4 y no se rubens con Tylenol)  4  Vómitos persistentes que san más de 24 horas  5  Dolor de pecho  6  Dolor o ardor al orinar  7  Dolor de princess severo que no se resuelve con Tylenol  8  Visión borrosa o pedro puntos en garcia visión    9  Hinchazón repentina de garcia elly o ashley  10  Enrojecimiento, hinchazón o dolor en jael pierna  11  Un aumento de peso repentino en pocos días  12  Contar los movimientos fetales del bebé  (después de 28 semanas o el sexto mes de embarazo)  15  Jael pérdida de líquido acuoso de la vagina: puede ser un chorro, un goteo o jael humedad continua  14  Después de 20 semanas de embarazo, calambres rítmicos (más de 4 por hora) o menstruales joseph dolor bajo / Brenden Linker MATERNA     BENEFICIOS PARA LOS BEBÉS   · sistemas inmunológicos más ruddy (menos alergias, eczema, asma y cáncer infantil)   · menos diarrea y estreñimiento u otras enfermedades GI   · menos resfriados e infecciones del oído   · mejor visión y dientes (menos cavidades)   · mejora el IQ   · menores tasas de diabetes y obesidad en la infancia     BENEFICIOS PARA LAS MADRES   · promueve la pérdida de peso más rápida después del parto   · my riesgo para la depresión postparto   · my riesgo para los cánceres Briana, útero y ovario   · my riesgo para la osteoporosis en desarrollo con la edad   · siempre es más fácil que fórmula - correcto, limpio, y la temperatura adecuada   · menos costosa que la fórmula es gratis!!!     CLAVES PARA JAEL LACTANCIA EXITOSA   · mantener bebé piel a piel hasta después de la primera alimentación evento   · tener a bebé en garcia cuarto con usted valeri garcia estadía en el hospital después del parto   · evitar cualquier botella de alimentación (a menos que sea médicamente necesario)   · limitar el uso de chupones y pañales   · Pida ayuda si usted está teniendo problemas    consultores de lactancia (que se especializan en la lactancia) están disponibles para ayudarle a   · jael dieta saludable para mamá    comiendo jael variedad de alimentos y raciones con moderación     COSAS QUE DEBES SABER SOBRE LA LACTANCIA   · mayoría de los medicamentos se considera compatible con la lactancia materna por [address] [facility] consultarlo con garcia médico o en lactancia antes de karen un medicamento nuevo    para estar seguro es seguro   · alcohol (cerveza, vino, licor) puede transmitirse de la madre al bebé a través de la Celi    un ocasional, social bebida es considerado aceptable por [address]    más que eso deben evitarse   · la lactancia materna es NO es un método para evitar embarazo   · nicotina (cigarrillos) puede pasar de la madre al bebé a través de la Celi    sin embargo, para las [employer], es aún más saludable para amamantar que use la fórmula   · cafeína debería limitarse a 1-3 tazas por día    incluye café, refrescos, bebidas energéticas           VACUNAS SCOTT EL EMBARAZO    TDAP  La tos ferina (o tos [place]) puede ser grave para cualquier persona, kenzie para garcia recién nacido, puede ser mortal  Hasta 20 recién nacidos mueren cada año en los [place] debido a la tos [place]  Aproximadamente la mitad de los bebés menores de 1 año de edad que contraen tos ferina necesitan tratamiento en el hospital  Cuanto más joven es el bebé cuando él o sirisha son la tos [place] probable es que él o sirisha tendrá que ser tratado en un hospital  Cuando usted recibe la vacuna contra la tos ferina (Tdap) scott garcia embarazo, garcia cuerpo creará anticuerpos protectores y algunos de ellos pasan a garcia bebé antes de nacer  Estos anticuerpos pueden ayudar a proteger a garcia bebé contraigan la tos ferina hasta que tienen edad suficiente para recibir la vacuna ellos mismos (generalmente alrededor de 6 meses de edad)  INFLUENZA  Cambios en las funciones inmunológica, del corazón y pulmón scott el embarazo te hacen más susceptible a padecer seriamente enfermo de la gripe  Coger la gripe también aumenta las posibilidades de problemas graves para garcia bebé en desarrollo, incluyendo la entrega y el trabajo de [place]   Se recomienda que todas las mujeres que están embarazadas scott la temporada de gripe deben recibir jael vacuna contra la influenza  USO DE CINTURÓN DE SEGURIDAD EN EL EMBARAZO    Si usted está embarazada o no, deben usar el cinturón de seguridad cada vez que estás en un coche  El cinturón de seguridad es la mejor protección para ambos usted y garcia shane harman  Para utilizar correctamente el cinturón de seguridad valeri el embarazo, puede que deba ajustar el asiento delantero varias veces joseph crece de modo que siempre hay por lo menos 10 pulgadas entre el centro de garcia pecho y el reji de manejar o del panel de control, y llegar cómodamente a los pedales  La hadley del hombro deben colocar sobre el pecho (entre antwon senos) y lejos de garcia shanelle  El cinturón de seguridad debe fijarse por debajo de garcia vientre para que se ajuste cómodamente a través de las caderas y la pelvis ósea  NO debe desactivar la bolsas de aire de garcia vehículo  Bolsas de aire y cinturones de seguridad funcionan mejor cuando se utilizan juntos para proteger a garcia shane del unborn

## 2019-11-19 ENCOUNTER — APPOINTMENT (OUTPATIENT)
Dept: LAB | Facility: HOSPITAL | Age: 39
End: 2019-11-19

## 2019-11-19 DIAGNOSIS — O99.810 ABNORMAL GLUCOSE TOLERANCE TEST (GTT) DURING PREGNANCY, ANTEPARTUM: Primary | ICD-10-CM

## 2019-11-19 DIAGNOSIS — O09.521 AMA (ADVANCED MATERNAL AGE) MULTIGRAVIDA 35+, FIRST TRIMESTER: ICD-10-CM

## 2019-11-19 DIAGNOSIS — Z34.91 PRENATAL CARE IN FIRST TRIMESTER: ICD-10-CM

## 2019-11-19 DIAGNOSIS — N91.2 AMENORRHEA: ICD-10-CM

## 2019-11-19 LAB
ABO GROUP BLD: NORMAL
BACTERIA UR QL AUTO: ABNORMAL /HPF
BASOPHILS # BLD AUTO: 0.03 THOUSANDS/ΜL (ref 0–0.1)
BASOPHILS NFR BLD AUTO: 1 % (ref 0–1)
BILIRUB UR QL STRIP: NEGATIVE
BLD GP AB SCN SERPL QL: NEGATIVE
CLARITY UR: ABNORMAL
COLOR UR: YELLOW
EOSINOPHIL # BLD AUTO: 0.2 THOUSAND/ΜL (ref 0–0.61)
EOSINOPHIL NFR BLD AUTO: 3 % (ref 0–6)
ERYTHROCYTE [DISTWIDTH] IN BLOOD BY AUTOMATED COUNT: 19.8 % (ref 11.6–15.1)
GLUCOSE 1H P 50 G GLC PO SERPL-MCNC: 151 MG/DL
GLUCOSE UR STRIP-MCNC: ABNORMAL MG/DL
HBV SURFACE AG SER QL: NORMAL
HCT VFR BLD AUTO: 30.3 % (ref 34.8–46.1)
HGB BLD-MCNC: 8.4 G/DL (ref 11.5–15.4)
HGB UR QL STRIP.AUTO: NEGATIVE
IMM GRANULOCYTES # BLD AUTO: 0.02 THOUSAND/UL (ref 0–0.2)
IMM GRANULOCYTES NFR BLD AUTO: 0 % (ref 0–2)
KETONES UR STRIP-MCNC: NEGATIVE MG/DL
LEUKOCYTE ESTERASE UR QL STRIP: ABNORMAL
LYMPHOCYTES # BLD AUTO: 1.51 THOUSANDS/ΜL (ref 0.6–4.47)
LYMPHOCYTES NFR BLD AUTO: 25 % (ref 14–44)
MCH RBC QN AUTO: 18.4 PG (ref 26.8–34.3)
MCHC RBC AUTO-ENTMCNC: 27.7 G/DL (ref 31.4–37.4)
MCV RBC AUTO: 66 FL (ref 82–98)
MONOCYTES # BLD AUTO: 0.31 THOUSAND/ΜL (ref 0.17–1.22)
MONOCYTES NFR BLD AUTO: 5 % (ref 4–12)
MUCOUS THREADS UR QL AUTO: ABNORMAL
NEUTROPHILS # BLD AUTO: 3.95 THOUSANDS/ΜL (ref 1.85–7.62)
NEUTS SEG NFR BLD AUTO: 66 % (ref 43–75)
NITRITE UR QL STRIP: NEGATIVE
NON-SQ EPI CELLS URNS QL MICRO: ABNORMAL /HPF
NRBC BLD AUTO-RTO: 0 /100 WBCS
PH UR STRIP.AUTO: 5.5 [PH]
PLATELET # BLD AUTO: 426 THOUSANDS/UL (ref 149–390)
PMV BLD AUTO: 8.7 FL (ref 8.9–12.7)
PROT UR STRIP-MCNC: NEGATIVE MG/DL
RBC # BLD AUTO: 4.57 MILLION/UL (ref 3.81–5.12)
RBC #/AREA URNS AUTO: ABNORMAL /HPF
RH BLD: POSITIVE
RUBV IGG SERPL IA-ACNC: >175 IU/ML
SP GR UR STRIP.AUTO: 1.03 (ref 1–1.03)
SPECIMEN EXPIRATION DATE: NORMAL
UROBILINOGEN UR QL STRIP.AUTO: 0.2 E.U./DL
WBC # BLD AUTO: 6.02 THOUSAND/UL (ref 4.31–10.16)
WBC #/AREA URNS AUTO: ABNORMAL /HPF

## 2019-11-19 PROCEDURE — 87147 CULTURE TYPE IMMUNOLOGIC: CPT

## 2019-11-19 PROCEDURE — 87086 URINE CULTURE/COLONY COUNT: CPT

## 2019-11-19 PROCEDURE — 80081 OBSTETRIC PANEL INC HIV TSTG: CPT

## 2019-11-19 PROCEDURE — 83020 HEMOGLOBIN ELECTROPHORESIS: CPT

## 2019-11-19 PROCEDURE — 36415 COLL VENOUS BLD VENIPUNCTURE: CPT

## 2019-11-19 PROCEDURE — 82950 GLUCOSE TEST: CPT

## 2019-11-19 PROCEDURE — 81001 URINALYSIS AUTO W/SCOPE: CPT

## 2019-11-20 ENCOUNTER — PATIENT OUTREACH (OUTPATIENT)
Dept: OBGYN CLINIC | Facility: CLINIC | Age: 39
End: 2019-11-20

## 2019-11-20 ENCOUNTER — APPOINTMENT (OUTPATIENT)
Dept: LAB | Facility: HOSPITAL | Age: 39
End: 2019-11-20

## 2019-11-20 DIAGNOSIS — Z71.89 COMPLEX CARE COORDINATION: Primary | ICD-10-CM

## 2019-11-20 DIAGNOSIS — Z34.91 PRENATAL CARE IN FIRST TRIMESTER: ICD-10-CM

## 2019-11-20 DIAGNOSIS — O99.810 ABNORMAL GLUCOSE TOLERANCE TEST (GTT) DURING PREGNANCY, ANTEPARTUM: Primary | ICD-10-CM

## 2019-11-20 DIAGNOSIS — O99.810 ABNORMAL GLUCOSE TOLERANCE TEST (GTT) DURING PREGNANCY, ANTEPARTUM: ICD-10-CM

## 2019-11-20 DIAGNOSIS — O09.521 AMA (ADVANCED MATERNAL AGE) MULTIGRAVIDA 35+, FIRST TRIMESTER: Primary | ICD-10-CM

## 2019-11-20 LAB
BACTERIA UR CULT: ABNORMAL
GLUCOSE P FAST SERPL-MCNC: 97 MG/DL (ref 65–99)
HIV 1+2 AB+HIV1 P24 AG SERPL QL IA: NORMAL
RPR SER QL: NORMAL

## 2019-11-20 PROCEDURE — 36415 COLL VENOUS BLD VENIPUNCTURE: CPT

## 2019-11-20 PROCEDURE — 82951 GLUCOSE TOLERANCE TEST (GTT): CPT

## 2019-11-20 NOTE — PROGRESS NOTES
Pt is a 42year old  Pt HILLARY is 6/15/2020 but needs to have ultrasound to confirm dates  Pt had a one hour glucose of 151 and pt needs to get 3 hour glucose test  I will follow for results and assist with diabetic education and blood sugar reporting if needed   I call pt next week

## 2019-11-21 LAB
DEPRECATED HGB OTHER BLD-IMP: 0 %
HGB A MFR BLD: 1.6 % (ref 1.8–3.2)
HGB A MFR BLD: 98.4 % (ref 96.4–98.8)
HGB C MFR BLD: 0 %
HGB F MFR BLD: 0 % (ref 0–2)
HGB FRACT BLD-IMP: ABNORMAL
HGB S BLD QL SOLY: NEGATIVE
HGB S MFR BLD: 0 %

## 2019-11-25 NOTE — PROGRESS NOTES
[facility] Clinical             Hi   Thank you for the referral for Diabetes during pregnancy    Pt is setup for tomorrow for GDM testing and education  However she will need a referral for MFM for an ultrasound  Thank you! Order placed

## 2019-11-26 ENCOUNTER — OFFICE VISIT (OUTPATIENT)
Dept: PERINATAL CARE | Facility: CLINIC | Age: 39
End: 2019-11-26

## 2019-11-26 ENCOUNTER — ROUTINE PRENATAL (OUTPATIENT)
Dept: OBGYN CLINIC | Facility: CLINIC | Age: 39
End: 2019-11-26

## 2019-11-26 ENCOUNTER — PATIENT OUTREACH (OUTPATIENT)
Dept: OBGYN CLINIC | Facility: CLINIC | Age: 39
End: 2019-11-26

## 2019-11-26 ENCOUNTER — OFFICE VISIT (OUTPATIENT)
Dept: PERINATAL CARE | Facility: CLINIC | Age: 39
End: 2019-11-26
Payer: COMMERCIAL

## 2019-11-26 VITALS
SYSTOLIC BLOOD PRESSURE: 114 MMHG | HEIGHT: 62 IN | HEART RATE: 71 BPM | BODY MASS INDEX: 26.87 KG/M2 | DIASTOLIC BLOOD PRESSURE: 64 MMHG | WEIGHT: 146 LBS

## 2019-11-26 VITALS
WEIGHT: 146.4 LBS | BODY MASS INDEX: 26.94 KG/M2 | HEART RATE: 90 BPM | HEIGHT: 62 IN | DIASTOLIC BLOOD PRESSURE: 70 MMHG | SYSTOLIC BLOOD PRESSURE: 107 MMHG

## 2019-11-26 VITALS
HEART RATE: 90 BPM | SYSTOLIC BLOOD PRESSURE: 107 MMHG | BODY MASS INDEX: 26.94 KG/M2 | DIASTOLIC BLOOD PRESSURE: 70 MMHG | WEIGHT: 146.39 LBS | HEIGHT: 62 IN

## 2019-11-26 DIAGNOSIS — D50.9 IRON DEFICIENCY ANEMIA, UNSPECIFIED IRON DEFICIENCY ANEMIA TYPE: Primary | ICD-10-CM

## 2019-11-26 DIAGNOSIS — D50.9 IRON DEFICIENCY ANEMIA, UNSPECIFIED IRON DEFICIENCY ANEMIA TYPE: ICD-10-CM

## 2019-11-26 DIAGNOSIS — Z3A.11 11 WEEKS GESTATION OF PREGNANCY: ICD-10-CM

## 2019-11-26 DIAGNOSIS — R73.03 PREDIABETES: ICD-10-CM

## 2019-11-26 DIAGNOSIS — R82.71 GBS BACTERIURIA: ICD-10-CM

## 2019-11-26 DIAGNOSIS — O24.419 GESTATIONAL DIABETES MELLITUS (GDM) IN FIRST TRIMESTER, GESTATIONAL DIABETES METHOD OF CONTROL UNSPECIFIED: ICD-10-CM

## 2019-11-26 DIAGNOSIS — Z11.3 SCREEN FOR STD (SEXUALLY TRANSMITTED DISEASE): ICD-10-CM

## 2019-11-26 DIAGNOSIS — Z01.419 ENCOUNTER FOR GYNECOLOGICAL EXAMINATION WITH PAPANICOLAOU SMEAR OF CERVIX: ICD-10-CM

## 2019-11-26 DIAGNOSIS — E66.3 OVERWEIGHT (BMI 25.0-29.9): ICD-10-CM

## 2019-11-26 DIAGNOSIS — O24.410 DIET CONTROLLED GESTATIONAL DIABETES MELLITUS (GDM) IN FIRST TRIMESTER: Primary | ICD-10-CM

## 2019-11-26 DIAGNOSIS — O99.810 ABNORMAL GLUCOSE TOLERANCE TEST (GTT) DURING PREGNANCY, ANTEPARTUM: Primary | ICD-10-CM

## 2019-11-26 PROCEDURE — 99213 OFFICE O/P EST LOW 20 MIN: CPT | Performed by: OBSTETRICS & GYNECOLOGY

## 2019-11-26 PROCEDURE — 87491 CHLMYD TRACH DNA AMP PROBE: CPT | Performed by: OBSTETRICS & GYNECOLOGY

## 2019-11-26 PROCEDURE — 99214 OFFICE O/P EST MOD 30 MIN: CPT | Performed by: NURSE PRACTITIONER

## 2019-11-26 PROCEDURE — G0145 SCR C/V CYTO,THINLAYER,RESCR: HCPCS | Performed by: OBSTETRICS & GYNECOLOGY

## 2019-11-26 PROCEDURE — 87591 N.GONORRHOEAE DNA AMP PROB: CPT | Performed by: OBSTETRICS & GYNECOLOGY

## 2019-11-26 PROCEDURE — 87624 HPV HI-RISK TYP POOLED RSLT: CPT | Performed by: OBSTETRICS & GYNECOLOGY

## 2019-11-26 PROCEDURE — G0108 DIAB MANAGE TRN  PER INDIV: HCPCS | Performed by: DIETITIAN, REGISTERED

## 2019-11-26 RX ORDER — LANCETS
EACH MISCELLANEOUS
Qty: 102 EACH | Refills: 7 | Status: SHIPPED | OUTPATIENT
Start: 2019-11-26 | End: 2019-12-04 | Stop reason: SDUPTHER

## 2019-11-26 RX ORDER — BLOOD SUGAR DIAGNOSTIC
STRIP MISCELLANEOUS
Qty: 300 EACH | Refills: 3 | Status: SHIPPED | OUTPATIENT
Start: 2019-11-26 | End: 2019-12-04 | Stop reason: SDUPTHER

## 2019-11-26 RX ORDER — AMOXICILLIN 875 MG/1
875 TABLET, COATED ORAL 2 TIMES DAILY
Qty: 14 TABLET | Refills: 0 | Status: SHIPPED | OUTPATIENT
Start: 2019-11-26 | End: 2019-12-03

## 2019-11-26 RX ORDER — BLOOD SUGAR DIAGNOSTIC
STRIP MISCELLANEOUS
Qty: 100 EACH | Refills: 7 | Status: SHIPPED | OUTPATIENT
Start: 2019-11-26 | End: 2019-11-26 | Stop reason: SDUPTHER

## 2019-11-26 NOTE — PROGRESS NOTES
Assessment/Plan:     Diagnoses and all orders for this visit:    Abnormal glucose tolerance test (GTT) during pregnancy, antepartum  -     Ambulatory referral to Diabetic Education  -     ACCU-CHEK GUIDE test strip; Test 4 times a day and as instructed  -     ACCU-CHEK FASTCLIX LANCETS MISC; Use 4 a day and as directed  -     Hemoglobin A1C; Standing  -     Comprehensive metabolic panel; Future  -     TSH, 3rd generation with Free T4 reflex; Future    Gestational diabetes mellitus (GDM) in first trimester, gestational diabetes method of control unspecified  -     ACCU-CHEK GUIDE test strip; Test 4 times a day and as instructed  -     ACCU-CHEK FASTCLIX LANCETS MISC; Use 4 a day and as directed  -     Hemoglobin A1C; Standing  -     Comprehensive metabolic panel; Future  -     TSH, 3rd generation with Free T4 reflex; Future    11 weeks gestation of pregnancy  -     ACCU-CHEK GUIDE test strip; Test 4 times a day and as instructed  -     ACCU-CHEK FASTCLIX LANCETS MISC; Use 4 a day and as directed  -     Hemoglobin A1C; Standing  -     Comprehensive metabolic panel; Future  -     TSH, 3rd generation with Free T4 reflex; Future    Prediabetes    Iron deficiency anemia, unspecified iron deficiency anemia type        Due to anemia, A1c canceled since results may be altered due to iron deficiency anemia  Will rely on SMBG to guide GDM treatment  1  Start self monitoring blood glucose fasting and 2 hours after start of each meal  Keep glucose log  Glucose goals: fasting 60-90 mg/dL, 135 mg/dL or less 1 hour post meals, and 120 mg/dL or less 2 hours post meal    2  Report glucose readings weekly via [employer]  3  Start GDM diet with 3 meals and 3 snacks including recommended combination of carb, protein and fat per meal/snack  4  Please eat meal or snack every 2-3 5 hours while awake  5  No more than 8 to 10 hours of fasting overnight  6  Stay active if no restriction from your OB, walk up to 30 minutes a day    7  Always have glucose available to treat hypoglycemia  Use 15:15 rule  Refer to hypoglycemia patient education sheet  Test blood sugar when experiencing signs and symptoms of hypoglycemia and prior to driving  8  Continue prenatal vitamin as recommended  9  Continue follow-up with your OB and MFM as recommended  10  Follow up in: 1 month  Insulin requirements during pregnancy and basal/bolus concept reviewed  Importance of tight glucose control during pregnancy  Discussed with patient diagnosis of GDM, including review of pertinent lab results, risk factors, pathophysiology, maternal and fetal complications associated with poorly controlled glucose during pregnancy  Complications including fetal macrosomia,  hypoglycemia, polyhydramnios, increased in blood pressure and stillbirth  Discussed with patient the importance of blood glucose monitoring, nutrition, and medication (if necessary) in achieving blood glucose goals  Importance of diabetes screening 6 weeks post delivery  Subjective:      Patient ID: Edvin Lara is a 42 y o  female  [id number], HILLARY 6/15/20, currently 11 1/7 weeks gestation  History of diet controlled GDM in , vaginal term boy, wt  7 lbs 14 oz  Issues with anemia   vag term boy, wt  Unknown;  vag term boy;  vag term girl  Bahamian speaking only  Referred for diabetes management during pregnancy  Diagnosed with pre-diabetes   History of anemia and reports needing to go for iron infusions, followed by OBGYN  SO present during visit  Rupa has no insurance and will be paying for testing supplies out of pocket, Accu-chek discount given         The following portions of the patient's history were reviewed and updated as appropriate: allergies, current medications, past family history, past medical history, past social history, past surgical history and problem list     No Known Allergies  Current Outpatient Medications on File Prior to Visit   Medication Sig Dispense Refill    amoxicillin (AMOXIL) 875 mg tablet Take 1 tablet (875 mg total) by mouth 2 (two) times a day for 7 days 14 tablet 0    doxylamine (UNISON) 25 MG tablet Take 0 5 tablets (12 5 mg total) by mouth daily at bedtime as needed for nausea 30 tablet 2    ondansetron (ZOFRAN) 4 mg tablet Take 1 tablet by mouth every 8 (eight) hours as needed for nausea or vomiting (Patient not taking: Reported on 10/21/2019) 12 tablet 0    Prenatal Multivit-Min-Fe-FA (PRENATAL VITAMINS) 0 8 MG tablet Take 1 tablet by mouth daily 30 tablet 6    Prenatal MV-Min-Fe Fum-FA-DHA (PRENATAL 1) 30-0 975-200 MG CAPS Take 1 tablet by mouth daily 60 capsule 4    pyridoxine (B-6) 25 MG tablet Take 1 tablet (25 mg total) by mouth every 8 (eight) hours 30 tablet 4    [DISCONTINUED] omeprazole (PriLOSEC) 20 mg delayed release capsule Take 2 capsules by mouth daily for 20 days 40 capsule 0     No current facility-administered medications on file prior to visit  Review of Systems   Constitutional: Positive for fatigue  Negative for fever  HENT: Negative for congestion and trouble swallowing  Eyes: Positive for visual disturbance  Respiratory: Negative for cough, chest tightness and shortness of breath  Cardiovascular: Negative for chest pain, palpitations and leg swelling  Gastrointestinal: Positive for nausea and vomiting  Endocrine: Positive for polydipsia and polyuria  Negative for cold intolerance, heat intolerance and polyphagia  Genitourinary: Negative for difficulty urinating and vaginal bleeding  Musculoskeletal: Negative for arthralgias and myalgias  Skin: Negative for rash  Neurological: Positive for light-headedness and headaches  Negative for dizziness and numbness  Psychiatric/Behavioral: Negative for sleep disturbance  The patient is nervous/anxious  Objective:  1 hour   FBS 97        Component Value Date/Time    HGBA1C 6 1 (H) 03/31/2015 1040      /70   Pulse 90 Ht 5' 2" (1 575 m)   Wt 66 4 kg (146 lb 6 2 oz)   LMP  (LMP Unknown)   BMI 26 77 kg/m²      Physical Exam   Constitutional: She is oriented to person, place, and time  She appears well-developed and well-nourished  She is cooperative  HENT:   Head: Normocephalic  Eyes: Conjunctivae and lids are normal    Neck: No thyromegaly present  Cardiovascular: Normal rate, regular rhythm, S1 normal and S2 normal    Pulmonary/Chest: Effort normal and breath sounds normal    Musculoskeletal: Normal range of motion  Neurological: She is alert and oriented to person, place, and time  Skin: Skin is warm, dry and intact  Psychiatric: She has a normal mood and affect   Her speech is normal and behavior is normal  Thought content normal          Time in:2:05 PM  Time out:3:00 PM

## 2019-11-26 NOTE — PATIENT INSTRUCTIONS
1  Start self monitoring blood glucose fasting and 2 hours after start of each meal  Keep glucose log  Glucose goals: fasting 60-90 mg/dL, 135 mg/dL or less 1 hour post meals, and 120 mg/dL or less 2 hours post meal    2  Report glucose readings weekly via [employer]  3  Start GDM diet with 3 meals and 3 snacks including recommended combination of carb, protein and fat per meal/snack  4  Please eat meal or snack every 2-3 5 hours while awake  5  No more than 8 to 10 hours of fasting overnight  6  Stay active if no restriction from your OB, walk up to 30 minutes a day  7  Always have glucose available to treat hypoglycemia  Use 15:15 rule  Refer to hypoglycemia patient education sheet  Test blood sugar when experiencing signs and symptoms of hypoglycemia and prior to driving  8  Continue prenatal vitamin as recommended  9  Continue follow-up with your OB and MFM as recommended  10  Follow up in: 1 month

## 2019-11-26 NOTE — PROGRESS NOTES
Assessment     42year old  with a Pregnancy at 6 and 1/7 weeks      Plan    Prenatal panel reviewed  -significant for GBS bacteriuria, anemia with Hb of 8 4, and failure of both 1 hour glucola and 3 hour GTT  =patienrt will need PCN in labor, please do not collect GBS swab at 36 weeks   =patient requires treatment for asymptomatic GBS bacteriuria of >100,000  =patient has diabetes education today with Radha Julian  =patient to be sent for anemia studies and if ferritin is low, would recommend IV iron  Pap and GC chlamydia collected today  Prenatal vitamins  Problem list reviewed and updated  AFP3 discussed: declined  Role of ultrasound in pregnancy discussed; fetal survey: declined  Amniocentesis discussed: not indicated  Follow up in 4 weeks  Subjective     Rupa Avendaño is being seen today for her first obstetrical visit  This is a planned pregnancy  She is at 11w1d gestation  Her obstetrical history is significant for advanced maternal age, group B strep colonizer and GDM, anemia  Relationship with FOB: spouse, living together  Patient does intend to breast feed  Pregnancy history fully reviewed  4 prior term SVDs, last of which was in     Menstrual History:  OB History        5    Para   4    Term   4            AB        Living   4       SAB        TAB        Ectopic        Multiple        Live Births   4                  No LMP recorded (lmp unknown)  Patient is pregnant  The following portions of the patient's history were reviewed and updated as appropriate: allergies, current medications, past family history, past medical history, past social history, past surgical history and problem list     Review of Systems  Pertinent items are noted in HPI        Objective     /64 (BP Location: Left arm, Patient Position: Sitting, Cuff Size: Standard)   Pulse 71   Ht 5' 2" (1 575 m)   Wt 66 2 kg (146 lb)   LMP  (LMP Unknown)   BMI 26 70 kg/m²   General appearance: alert and oriented, in no acute distress  Breasts: normal appearance, no masses or tenderness  Heart: regular rate and rhythm, S1, S2 normal, no murmur, click, rub or gallop  Abdomen: soft, non-tender; bowel sounds normal; no masses,  no organomegaly  Pelvic: external genitalia normal, vagina normal without discharge, uterus normal size, shape, and consistency, no cervical motion tenderness, cervix normal in appearance, no adnexal masses or tenderness and urethral meatus      FHR: 160bpm

## 2019-11-26 NOTE — PROGRESS NOTES
19  Rupa DONIS Jarocho   1980  Estimated Date of Delivery: 6/15/20   EGA: 11w1d    Dear Drs at [facility]:    Thank you for referring your patient to the Diabetes and Pregnancy Program at [address]  The patient attended class 1 and patient received the following education in Hebrew with SO translating:     Pathophysiology of diabetes and pregnancy  This includes maternal-fetal complications such as fetal macrosomia,  hypoglycemia, polyhydramnios, increased incidence of  section, pre-term labor and in severe cases, fetal demise and stillbirth   Instruction on diet and glucometer use was provided  Self-monitoring of blood glucose levels: fasting (goal 60mg/dl to 90mg/dl) and two hours after the start of the meal less (goal less than 120mg/dl)  The patient was provided with an  Accu-Chek Guide blood glucose meter and supplies  Blood glucose during demonstration was 171 after just eating of a high carbohydrate meal    Medical Nutrition Therapy for diabetes and pregnancy  The patient was provided with a 1700 calorie for 1st trimester & 1900 calories for 2nd/3rd trimesters meal plan and the following was reviewed:     o Basic review of macronutrients   o Meal pattern should consist of three small meals and three snacks daily  Meal plan was adapted to her present eating habits  Reports she likes to eat 5-6 torilla at 1 time  o Carbohydrate gram amounts per meal   o Appropriate serving sizes for carbohydrates and proteins  o Incorporating protein at each meal and snack  o Maintain a three day food diary and bring to class 2    Report blood glucose levels to the [address] [place] [address] Way weekly or as directed:  o Phone : [phone]  If no response in 24 hours, call [phone]   o Fax: [phone]  o Email: [email or web address]  The patient will be scheduled to attend class 2 in 1 month    Additionally, fetal ultrasound evaluation by the Perinatologist has been scheduled to assure continuity of care  Please contact the Diabetes and Pregnancy Program at [phone] if you have any questions  Time spent with patient 12:55-2 PM; time spent face to face counseling greater than 50% of the appointment      Sincerely,   Mary Sherman  Diabetes Educator   Diabetes and Pregnancy Program

## 2019-11-26 NOTE — PROGRESS NOTES
RAFA met with Pt and FOB for follow up  Pt new gestational diabetic  Pt cooping well and has appointment with diabetic team today  Pt has good support from FOB  SW assisted with interpretation during prenatal visit  Pt was referred to apply for HR MA  Emergency Medical Verification Form completed and provider signed  Pt will contact RAFA as needed

## 2019-11-27 LAB
C TRACH DNA SPEC QL NAA+PROBE: NEGATIVE
N GONORRHOEA DNA SPEC QL NAA+PROBE: NEGATIVE

## 2019-12-01 LAB
HPV HR 12 DNA CVX QL NAA+PROBE: NEGATIVE
HPV16 DNA CVX QL NAA+PROBE: NEGATIVE
HPV18 DNA CVX QL NAA+PROBE: NEGATIVE

## 2019-12-02 ENCOUNTER — TELEPHONE (OUTPATIENT)
Dept: PERINATAL CARE | Facility: CLINIC | Age: 39
End: 2019-12-02

## 2019-12-02 LAB
LAB AP GYN PRIMARY INTERPRETATION: NORMAL
Lab: NORMAL

## 2019-12-02 NOTE — TELEPHONE ENCOUNTER
----- Message from Jenna Burdick sent at 11/26/2019  2:32 PM EST -----  Regarding: Schedule Class 2  Hi Nerissa,  Please call this lady to schedule Class 2/FU with this lady in 1 month  Thanks!   Pipo Tyson

## 2019-12-03 ENCOUNTER — TELEPHONE (OUTPATIENT)
Dept: PERINATAL CARE | Facility: CLINIC | Age: 39
End: 2019-12-03

## 2019-12-03 ENCOUNTER — OFFICE VISIT (OUTPATIENT)
Dept: PERINATAL CARE | Facility: CLINIC | Age: 39
End: 2019-12-03

## 2019-12-03 ENCOUNTER — DOCUMENTATION (OUTPATIENT)
Dept: PERINATAL CARE | Facility: CLINIC | Age: 39
End: 2019-12-03

## 2019-12-03 VITALS
SYSTOLIC BLOOD PRESSURE: 125 MMHG | DIASTOLIC BLOOD PRESSURE: 71 MMHG | HEIGHT: 62 IN | BODY MASS INDEX: 26.98 KG/M2 | WEIGHT: 146.6 LBS | HEART RATE: 98 BPM

## 2019-12-03 DIAGNOSIS — O24.410 DIET CONTROLLED GESTATIONAL DIABETES MELLITUS (GDM) IN FIRST TRIMESTER: Primary | ICD-10-CM

## 2019-12-03 DIAGNOSIS — Z3A.12 12 WEEKS GESTATION OF PREGNANCY: ICD-10-CM

## 2019-12-03 PROCEDURE — G0108 DIAB MANAGE TRN  PER INDIV: HCPCS

## 2019-12-03 NOTE — PROGRESS NOTES
Date:  19  RE: Moshe Avendaño    : 1980  Estimated Date of Delivery: 6/15/20  EGA: 12w1d  OB/GYN: [facility]      Date Fasting Post-  breakfast Post-  lunch Post-  dinner Before bedtime Carbs Comments    102 135 125 142       100 102 148        100 130 103 133       99 120 157       11/30          12/1          12/2          12/3              Current regimen:  1700 calorie GDM diet; 3 meals and 3 snacks (1st Trimester); 1900 calorie GDM diet; 3 meals and 3 snacks (2nd & 3rd Trimester)  SMBG 4 times per day with an Accu-Chek Guide blood glucose meter; FBG and 2 hrs pp  Class 2 appointment completed today;patient's  preferred to translate education  Patient reporting poor appetite due to nausea early pregnancy; skipping snacks and last meal of the day is at 3 PM  FBG check is typically 9 PM  Patient has not been testing 2 hrs pp but approximately 15-30 minutes pp  Plan:  Reviewed SMBG 4 x per day (Fasting, 2 hour after start of each meal) using Accu-chek Guide glucose meter  Patient and  have an improved understanding of testing 4 times per day as well as diet following Class 2 today  Reinforced FBG check should follow no longer than a 8-10 hr fast   Continue meal plan consisting of 3 meals and 3 snacks, including protein at each  Patient is agreeable to include a 3rd meal at 8 PM  Patient refuses snacks at present time due to nausea  Patient was encouraged to trial CIB sugar free supplement with cheese cubes for additional protein if unable to eat a meal      If okay by OB, walk 20-30 minutes daily  Patient is aware that medication may need to be added to regimen if FBG continues to be > target range  Patient  reported that they have begun to apply for access card  Date due to report next:  Tuesday, 12/10 at Nurse Practitioner follow up appointment scheduled today      [address]  Diabetes Educator   Diabetes and Pregnancy Program

## 2019-12-03 NOTE — PROGRESS NOTES
DATE:  19  RE: Vivien Avendaño    : 1980    HILLARY: Estimated Date of Delivery: 6/15/20    EGA: Roxie NYE:  Michael  :    Thank you for referring your patient to the Diabetes and Pregnancy Program at [address] [place] [address]  The patient is Tajik speaking only  Patient's  prefers to translate for the patient  The patient's  reported patient is feeling overwhelmed with the diet, which can make her feel anxious about her blood sugars  She is also experiencing nausea affecting her food intake  She does not eat after 3 PM and typically will check her fasting blood sugar around 9 AM  She is taking an antibiotic recently for GBS bacteriuria  Patient has not been completing SMBG  4 times per day testing at correct times  Patient has been testing FBS and some premeal bg values as well as about 15 minutes pp values  Therefore bg monitoring guidelines were again reviewed  The patient attended Class 2 received the following education:    Weight gain during in pregnancy  Based on the patients height of 5' 2" (1 575 m) inches, pre-pregnancy weight of 65 8 kg (145 lb) pounds 26 5 BMI we would recommend a total weight gain of 15-25 pounds for the pregnancy   The patients current weight is 66 5 kg (146 lb 9 6 oz) pounds, and her weight gain to date is 1 96 pounds  Based on this, we recommend a weight gain of approximately 23 pounds for the remainder of the pregnancy   Medical Nutrition Therapy for diabetes and pregnancy  The patients three day food diary was reviewed and discussed  The patient was instructed on the following:  o Individualized meal plan    o Use of food diary to maintain a meal plan    o Importance of protein as it relates to blood glucose control   Review of blood glucose log  Reinforcement of blood glucose goals and reporting guidelines   Ultrasounds every four weeks in the [address] to evaluate fetal growth       Exercise Guidelines:   o Walking up to thirty minutes daily can reduce blood glucose levels  o Monitor for greater than four contractions per hour     o The patient has been instructed not to begin physical activity if she has been instructed not to exercise by your office   Due to language barrier as well as patient requiring review of diet as well as blood glucose testing the following will need to be reviewed at next follow up:   Sick day guidelines and hypoglycemia with treatment   Post-partum guidelines:  o Completion of a 75 gram glucose tolerance test at 6 weeks post-partum to check for type 2 diabetes  o 20% weight loss and 30 minutes of exercise 5 times per week reduces the risk of type 2 diabetes   Breastfeeding guidelines   Report blood glucose levels to [address] weekly or as directed  o Phone: [phone]  If no response in 24 hours, call [phone]    o Fax: [phone]  o Email: [email or web address]    Please contact the Diabetes and Pregnancy Program at [phone] if you have questions  The patient was also scheduled for a follow up appointment for Saundra Conti for review of blood glucose values and possible need of medication initiation  Time spent with patient 10:30-11:30 AM; time spent face to face counseling greater than 50% of the appointment        Robb Zapata RD,LDN,CDE  Diabetes Educator  Diabetes and Pregnancy Program

## 2019-12-04 DIAGNOSIS — Z3A.11 11 WEEKS GESTATION OF PREGNANCY: ICD-10-CM

## 2019-12-04 DIAGNOSIS — O24.419 GESTATIONAL DIABETES MELLITUS (GDM) IN FIRST TRIMESTER, GESTATIONAL DIABETES METHOD OF CONTROL UNSPECIFIED: ICD-10-CM

## 2019-12-04 DIAGNOSIS — O99.810 ABNORMAL GLUCOSE TOLERANCE TEST (GTT) DURING PREGNANCY, ANTEPARTUM: ICD-10-CM

## 2019-12-04 RX ORDER — LANCETS
EACH MISCELLANEOUS
Qty: 102 EACH | Refills: 6 | Status: SHIPPED | OUTPATIENT
Start: 2019-12-04 | End: 2019-12-26 | Stop reason: SDUPTHER

## 2019-12-04 RX ORDER — BLOOD SUGAR DIAGNOSTIC
STRIP MISCELLANEOUS
Qty: 300 EACH | Refills: 6 | Status: SHIPPED | OUTPATIENT
Start: 2019-12-04 | End: 2019-12-26 | Stop reason: SDUPTHER

## 2019-12-04 NOTE — RESULT ENCOUNTER NOTE
Please call patient to let her know that her pap was normal  She won't need another pap for 5 years   Czech speaking    thanks

## 2019-12-10 ENCOUNTER — TELEPHONE (OUTPATIENT)
Dept: PERINATAL CARE | Facility: CLINIC | Age: 39
End: 2019-12-10

## 2019-12-10 NOTE — TELEPHONE ENCOUNTER
----- Message from January Richardson sent at 12/10/2019  9:24 AM EST -----  Regarding: reschedule patient please  Maninder Multani,  Patient's significant other called that he is in [place] and won't be able to bring Iris to her appointments today  His number is [phone]  He would like you to call him to reschedule    Thanks  Natalya

## 2019-12-17 ENCOUNTER — OFFICE VISIT (OUTPATIENT)
Dept: PERINATAL CARE | Facility: CLINIC | Age: 39
End: 2019-12-17

## 2019-12-17 VITALS
SYSTOLIC BLOOD PRESSURE: 108 MMHG | WEIGHT: 148 LBS | BODY MASS INDEX: 27.23 KG/M2 | HEART RATE: 86 BPM | HEIGHT: 62 IN | DIASTOLIC BLOOD PRESSURE: 68 MMHG

## 2019-12-17 DIAGNOSIS — O24.410 DIET CONTROLLED GESTATIONAL DIABETES MELLITUS (GDM) IN FIRST TRIMESTER: Primary | ICD-10-CM

## 2019-12-17 DIAGNOSIS — R11.0 NAUSEA: ICD-10-CM

## 2019-12-17 DIAGNOSIS — Z3A.14 14 WEEKS GESTATION OF PREGNANCY: ICD-10-CM

## 2019-12-17 DIAGNOSIS — D50.9 IRON DEFICIENCY ANEMIA, UNSPECIFIED IRON DEFICIENCY ANEMIA TYPE: ICD-10-CM

## 2019-12-17 DIAGNOSIS — O24.410 DIET CONTROLLED GESTATIONAL DIABETES MELLITUS (GDM) IN SECOND TRIMESTER: Primary | ICD-10-CM

## 2019-12-17 DIAGNOSIS — R82.71 GBS BACTERIURIA: ICD-10-CM

## 2019-12-17 PROCEDURE — G0108 DIAB MANAGE TRN  PER INDIV: HCPCS | Performed by: DIETITIAN, REGISTERED

## 2019-12-17 PROCEDURE — 99214 OFFICE O/P EST MOD 30 MIN: CPT | Performed by: NURSE PRACTITIONER

## 2019-12-17 NOTE — PATIENT INSTRUCTIONS
1  Continue self monitoring blood glucose fasting and 2 hours after start of each meal  Keep glucose log  Glucose goals: fasting 60-90 mg/dL, 135 mg/dL or less 1 hour post meals, and 120 mg/dL or less 2 hours post meal    2  Report glucose readings weekly via [employer] every Tuesday or as recommended  Please send in up to date glucose readings  3  Try to follow GDM diet with 3 meals and 3 snacks including recommended combination of carb, protein and fat per meal/snack  If unable to eat a snack, please try sugar free [employer] Instant Breakfast    4  Please eat meal or snack every 2-3 5 hours while awake  5  No more than 8 to 10 hours of fasting overnight  6  Stay active if no restriction from your OB, walk up to 30 minutes a day  7  Always have glucose available to treat hypoglycemia  Use 15:15 rule  Refer to hypoglycemia patient education sheet  Test blood sugar when experiencing signs and symptoms of hypoglycemia and prior to driving  8  Continue prenatal vitamin as recommended  9  Continue follow-up with MFM as recommended  10  Continue anti-nausea medications as ordered by your OBGYN    11  Follow up OBGYN regarding anemia  12  Please check CMP, TSH with free T4 and ferritin as ordered by OBGYN    13  Follow up in: 1-2 weeks  14  Schedule ultrasound with MFM

## 2019-12-17 NOTE — PROGRESS NOTES
DATE: 19   RE: Westley Avendaño   : 1980  HILLARY: Estimated Date of Delivery: 6/15/20  EGA:  14w1d    Dear Drs  At [facility] THE VINTAGE:     Thank you for referring your patient to the Diabetes and Pregnancy Program at [address] [place] [address]  The patient& SO was seen today for medical nutrition therapy re-evaluation for the treatment of gestational diabetes during pregnancy  In addition to diabetes, the nutrition status is complicated by nausea, vomiting with pregnancy & language barrier  The following was reviewed with the patient via the SO translating for the patient (Romansh speaking only):      Weight gain during in pregnancy  Based on the patients height of 62  inches, pre-pregnancy weight of 145  pounds (BMI 26 5), we would recommend a total weight gain of 15-25 pounds for the pregnancy  o The patients current weight is 148  pounds, and her weight gain to date is 3 pounds   Basic review of macronutrients   Meal pattern should consist of three small meals and three snacks daily  Patient is eating only 3 meals & 1 snack  States she cannot eat after 6 PM dinner or vomits   Carbohydrate gram amounts per meal    Appropriate serving size of foods   Incorporating protein at each meal and snack in the importance of protein in relationship to blood glucose control  Missing protein at her 1 snack   Individualized meal plan: 1700 calories gestational diabetes diet  Diet recall indicates she is under eating by at least 400 calories daily  Agreed to try a supplement such as [place] Instant Breakfast Light Start or Glucerna or Boost Glucose Control  Recommend at least 4 oz at between meal snacks & 8 oz at bedtime   Use of food diary to maintain a meal plan   Report blood glucose levels to [address] weekly or as directed  o Phone: [phone]  If no response in 24 hours, call [phone]   o Fax: [phone]  o Email: [email or web address]   Follow up: 1 month    Thank you for the opportunity to participate in the care of this patient  I can be reached at [phone] should you have any questions  Time spent with patient 11:30-12 Noon; time spent face to face counseling greater than 50% of the appointment      Sincerely,     Genesis Chang  Diabetes Educator  Diabetes and Pregnancy Program

## 2019-12-17 NOTE — PROGRESS NOTES
Assessment/Plan:     Diagnoses and all orders for this visit:    Diet controlled gestational diabetes mellitus (GDM) in second trimester  -     [place] glucose flowsheet; Standing    14 weeks gestation of pregnancy    Iron deficiency anemia, unspecified iron deficiency anemia type    Nausea      1  Continue self monitoring blood glucose fasting and 2 hours after start of each meal  Keep glucose log  Glucose goals: fasting 60-90 mg/dL, 135 mg/dL or less 1 hour post meals, and 120 mg/dL or less 2 hours post meal    2  Report glucose readings weekly via [employer] every Tuesday or as recommended  Please send in up to date glucose readings  3  Try to follow GDM diet with 3 meals and 3 snacks including recommended combination of carb, protein and fat per meal/snack  If unable to eat a snack, please try sugar free [place] Instant Breakfast    4  Please eat meal or snack every 2-3 5 hours while awake  5  No more than 8 to 10 hours of fasting overnight  6  Stay active if no restriction from your OB, walk up to 30 minutes a day  7  Always have glucose available to treat hypoglycemia  Use 15:15 rule  Refer to hypoglycemia patient education sheet  Test blood sugar when experiencing signs and symptoms of hypoglycemia and prior to driving  8  Continue prenatal vitamin as recommended  9  Continue follow-up with MFM as recommended  10  Continue anti-nausea medications as ordered by your OBGYN    11  Follow up OBGYN regarding anemia  12  Please check CMP, TSH with free T4 and ferritin as ordered by OBGYN    13  Follow up in: 1-2 weeks  14  Schedule ultrasound with MFM  May need to start bolus insulin pending follow up visit post further dietary recommendations  Subjective:      Patient ID: Hector Nobles is a 42 y o  female  Hungarian speaking and accompanied by significant other today who speaks [place]  Here for follow up GDM, did not bring glucose readings or glucose meter to office visit   Reports testing fasting and 2 hours post start of meals  Fasting glucose ranges from 93 to 102 and 2 hours post start of meal 120 or less  Still having an issue with nausea and vomiting, is on Unisom, B6 and Zofran  Reports only eating 2 to 3 meals a day and some snacks  Iris is unable to eat bedtime snack as recommended  Labs have not been completed and encouraged to have lab work completed  Has dietitian appointment today for further diet recommendations  The following portions of the patient's history were reviewed and updated as appropriate: allergies, current medications, past family history, past medical history, past social history, past surgical history and problem list     No Known Allergies  Current Outpatient Medications on File Prior to Visit   Medication Sig Dispense Refill    ACCU-CHEK FASTCLIX LANCETS MISC Use 4 a day and as directed  102 each 6    ACCU-CHEK GUIDE test strip Test 4 times a day and as instructed 300 each 6    doxylamine (UNISON) 25 MG tablet Take 0 5 tablets (12 5 mg total) by mouth daily at bedtime as needed for nausea 30 tablet 2    ondansetron (ZOFRAN) 4 mg tablet Take 1 tablet by mouth every 8 (eight) hours as needed for nausea or vomiting 12 tablet 0    Prenatal Multivit-Min-Fe-FA (PRENATAL VITAMINS) 0 8 MG tablet Take 1 tablet by mouth daily 30 tablet 6    pyridoxine (B-6) 25 MG tablet Take 1 tablet (25 mg total) by mouth every 8 (eight) hours 30 tablet 4    [DISCONTINUED] Prenatal MV-Min-Fe Fum-FA-DHA (PRENATAL 1) 30-0 975-200 MG CAPS Take 1 tablet by mouth daily (Patient not taking: Reported on 12/3/2019) 60 capsule 4     No current facility-administered medications on file prior to visit  Review of Systems   Constitutional: Positive for fatigue  Negative for fever  HENT: Negative for trouble swallowing  Eyes: Positive for visual disturbance  Respiratory: Positive for shortness of breath (with activity)  Negative for cough      Cardiovascular: Negative for chest pain, palpitations and leg swelling  Gastrointestinal: Positive for nausea and vomiting  Endocrine: Positive for polydipsia and polyuria  Negative for polyphagia  Genitourinary: Negative for difficulty urinating and vaginal bleeding  Skin: Negative for rash  Neurological: Positive for headaches  Psychiatric/Behavioral: The patient is nervous/anxious  Objective:        Component Value Date/Time    HGBA1C 6 1 (H) 03/31/2015 1040      /68 (BP Location: Right arm, Patient Position: Sitting, Cuff Size: Standard)   Pulse 86   Ht 5' 2" (1 575 m)   Wt 67 1 kg (148 lb)   LMP  (LMP Unknown)   BMI 27 07 kg/m²      Physical Exam   Constitutional: She is oriented to person, place, and time  She appears well-developed and well-nourished  She is cooperative  HENT:   Head: Normocephalic  Eyes: Conjunctivae and lids are normal    Neck: No thyromegaly present  Cardiovascular: Normal rate, regular rhythm, S1 normal and S2 normal    Murmur heard  Pulmonary/Chest: Effort normal and breath sounds normal    Musculoskeletal: Normal range of motion  Neurological: She is alert and oriented to person, place, and time  Skin: Skin is warm and dry  Psychiatric: She has a normal mood and affect   Her speech is normal and behavior is normal  Thought content normal          Time in:11:00 AM  Time out:11:27 AM

## 2019-12-24 ENCOUNTER — ROUTINE PRENATAL (OUTPATIENT)
Dept: OBGYN CLINIC | Facility: CLINIC | Age: 39
End: 2019-12-24

## 2019-12-24 ENCOUNTER — APPOINTMENT (OUTPATIENT)
Dept: LAB | Facility: HOSPITAL | Age: 39
End: 2019-12-24

## 2019-12-24 VITALS
BODY MASS INDEX: 26.68 KG/M2 | SYSTOLIC BLOOD PRESSURE: 112 MMHG | DIASTOLIC BLOOD PRESSURE: 74 MMHG | HEIGHT: 62 IN | HEART RATE: 76 BPM | WEIGHT: 145 LBS

## 2019-12-24 DIAGNOSIS — O24.410 DIET CONTROLLED GESTATIONAL DIABETES MELLITUS (GDM) IN FIRST TRIMESTER: ICD-10-CM

## 2019-12-24 DIAGNOSIS — Z3A.11 11 WEEKS GESTATION OF PREGNANCY: ICD-10-CM

## 2019-12-24 DIAGNOSIS — D50.9 IRON DEFICIENCY ANEMIA, UNSPECIFIED IRON DEFICIENCY ANEMIA TYPE: ICD-10-CM

## 2019-12-24 DIAGNOSIS — Z23 NEED FOR INFLUENZA VACCINATION: ICD-10-CM

## 2019-12-24 DIAGNOSIS — O99.810 ABNORMAL GLUCOSE TOLERANCE TEST (GTT) DURING PREGNANCY, ANTEPARTUM: ICD-10-CM

## 2019-12-24 DIAGNOSIS — O24.419 GESTATIONAL DIABETES MELLITUS (GDM) IN FIRST TRIMESTER, GESTATIONAL DIABETES METHOD OF CONTROL UNSPECIFIED: ICD-10-CM

## 2019-12-24 DIAGNOSIS — Z3A.15 15 WEEKS GESTATION OF PREGNANCY: Primary | ICD-10-CM

## 2019-12-24 DIAGNOSIS — R82.71 GBS BACTERIURIA: ICD-10-CM

## 2019-12-24 LAB
ALBUMIN SERPL BCP-MCNC: 3 G/DL (ref 3.5–5)
ALP SERPL-CCNC: 57 U/L (ref 46–116)
ALT SERPL W P-5'-P-CCNC: 17 U/L (ref 12–78)
ANION GAP SERPL CALCULATED.3IONS-SCNC: 5 MMOL/L (ref 4–13)
AST SERPL W P-5'-P-CCNC: 7 U/L (ref 5–45)
BILIRUB SERPL-MCNC: 0.22 MG/DL (ref 0.2–1)
BUN SERPL-MCNC: 11 MG/DL (ref 5–25)
CALCIUM SERPL-MCNC: 8.5 MG/DL (ref 8.3–10.1)
CHLORIDE SERPL-SCNC: 108 MMOL/L (ref 100–108)
CO2 SERPL-SCNC: 25 MMOL/L (ref 21–32)
CREAT SERPL-MCNC: 0.47 MG/DL (ref 0.6–1.3)
FERRITIN SERPL-MCNC: 3 NG/ML (ref 8–388)
GFR SERPL CREATININE-BSD FRML MDRD: 127 ML/MIN/1.73SQ M
GLUCOSE P FAST SERPL-MCNC: 90 MG/DL (ref 65–99)
POTASSIUM SERPL-SCNC: 3.3 MMOL/L (ref 3.5–5.3)
PROT SERPL-MCNC: 7 G/DL (ref 6.4–8.2)
SL AMB  POCT GLUCOSE, UA: NORMAL
SL AMB POCT KETONES,UA: NORMAL
SL AMB POCT URINE PROTEIN: NORMAL
SODIUM SERPL-SCNC: 138 MMOL/L (ref 136–145)
TSH SERPL DL<=0.05 MIU/L-ACNC: 0.68 UIU/ML (ref 0.36–3.74)

## 2019-12-24 PROCEDURE — 36415 COLL VENOUS BLD VENIPUNCTURE: CPT

## 2019-12-24 PROCEDURE — 99213 OFFICE O/P EST LOW 20 MIN: CPT | Performed by: OBSTETRICS & GYNECOLOGY

## 2019-12-24 PROCEDURE — 81002 URINALYSIS NONAUTO W/O SCOPE: CPT | Performed by: OBSTETRICS & GYNECOLOGY

## 2019-12-24 PROCEDURE — 90471 IMMUNIZATION ADMIN: CPT | Performed by: OBSTETRICS & GYNECOLOGY

## 2019-12-24 PROCEDURE — 80053 COMPREHEN METABOLIC PANEL: CPT

## 2019-12-24 PROCEDURE — 84443 ASSAY THYROID STIM HORMONE: CPT

## 2019-12-24 PROCEDURE — 90686 IIV4 VACC NO PRSV 0.5 ML IM: CPT | Performed by: OBSTETRICS & GYNECOLOGY

## 2019-12-24 PROCEDURE — 82728 ASSAY OF FERRITIN: CPT

## 2019-12-24 NOTE — PROGRESS NOTES
OB/GYN  PN Visit  Lopez Guajardo Argos  [id number]  2019  10:53 AM       S: 42 y o  [id number] 15w1d here for PN visit  She denies contractions  She denies leakage of fluid and vaginal bleeding  She reports feeling some fetal movement Her pregnancy is complicated by: GDM, anemia, GBS bacteruria  Has been following w/  and diabetic educator  O:  Vitals:    19 0900   BP: 112/74   Pulse: 76     Physical Exam   Constitutional: She is oriented to person, place, and time  She appears well-developed and well-nourished  Cardiovascular: Normal rate  Pulmonary/Chest: Effort normal  No respiratory distress  Musculoskeletal: She exhibits no edema  Neurological: She is alert and oriented to person, place, and time  Skin: Skin is warm and dry  FHT: 145 by TAUS       A/P:  Problem List Items Addressed This Visit        Endocrine    GDM (gestational diabetes mellitus)     Reports BGs ranging 90s-120s  Follows up w/  and diabetic educator  Glucose goals: fasting 60-90 mg/dL, 135 mg/dL or less 1 hour post meals, and 120 mg/dL or less 2 hours post meal              Other    15 weeks gestation of pregnancy - Primary     No OB complaints  RTC in 2 weeks  Relevant Orders    POCT urine dip (Completed)    GBS bacteriuria     Will need PCN in labor, patient aware  Iron deficiency anemia     Getting lab work drawn today  Declines PO iron, says she does not tolerate  RTC in 2 weeks to set up venofer infusions             Other Visit Diagnoses     Need for influenza vaccination        Relevant Orders    FLUZONE: influenza vaccine, quadrivalent, 0 5 mL (Completed)            Future Appointments   Date Time Provider Chris Pike   2020 10:00 AM Shari Wmoack MD  [facility]   1/15/2020  9:30 AM   [address]         Musa Simpson MD  OB/GYN PGY-2  2019  10:53 AM

## 2019-12-24 NOTE — PATIENT INSTRUCTIONS
El embarazo de la semana 15 a la 18   CUIDADO AMBULATORIO:   Qué cambios están ocurriendo en garcia cuerpo: Ahora que usted está en garcia salvador trimestre, tiene [place]  Es posible que también sienta más [place] de lo normal  Usted podría comenzar a experimentar otros síntomas, joseph acidez o mareos  Es posible que usted esté aumentando de ½ a 1 loyda por semana, y que garcia embarazo se empiece a notar  Posiblemente usted necesite comenzar a usar ropa de maternidad  Busque atención médica de inmediato si:   · Usted tiene dolor o cólicos en el abdomen o la parte baja de la espalda  · Usted tiene sangrado vaginal abundante o coágulos  · Le sale un material que parece tejido o coágulos grandes  Recolecte el material y tráigalo con usted  Pregúntele a garcia Jerelyn Jimbo vitaminas y minerales son adecuados para usted  · Usted no puede retener alimentos ni líquidos y está perdiendo [place]  · Usted tiene un sangrado leve  · Usted tiene escalofríos o fiebre  · Usted tiene comezón, ardor o dolor vaginal      · Usted tiene jael secreción vaginal amarillenta, verdosa, hawa o de [employer]  · Usted tiene dolor o ardor al [place], orina menos de lo habitual o tiene [place] rosada o sanguinolenta  · Usted tiene preguntas o inquietudes acerca de garcia condición o cuidado  Cómo cuidarse en esta etapa de garcia embarazo:   · Controle la acidez  comiendo 4 o 5 comidas pequeñas cada día en vez de comidas grandes  Evite los alimentos picantes  Evite comer jean antes de irse a la cama  · Controle la náusea y el vómito  Evite los alimentos grasosos y picantes  Coma comidas pequeñas valeri el día en vez de porciones grandes  El jengibre puede ayudar a [employer]  Consulte con garcia médico acerca de otras formas para disminuir las náuseas y el vómito  · Consuma alimentos saludables y variados    Alimentos saludables incluyen frutas, verduras, panes de kayla integral, alimentos lácteos bajos en grasa, frijoles, Cecilia Eliazar y pescado  [place] líquidos joseph se le haya indicado  Pregunte cuánto líquido debe karen cada día y cuáles líquidos son los más adecuados para usted  Limite el consumo de cafeína a menos de [place] [address]  Limite el consumo de pescado a 2 porciones cada semana  Escoja pescado con concentraciones bajas de meme joseph atún al natural enlatado, camarón, salmón, bacalao o tilapia  No  coma pescado con concentraciones altas de meme joseph pez alan, caballa gigante, pargo rayado y tiburón  · [address] [place]  Garcia necesidad de ciertas vitaminas y [facility], joseph el ácido fólico, aumenta valeri el [facility]  Las vitaminas prenatales proporcionan algunas de las vitaminas y minerales adicionales que usted necesita  Las vitaminas prenatales también podrían ayudar a disminuir el riesgo de ciertos defectos de nacimiento  · No fume  Si usted fuma, nunca es demasiado tarde para dejar de hacerlo  Fumar aumenta el riesgo de aborto espontáneo y otros problemas de savannah valeri garcia [facility]  Fumar puede causar que garcia bebé nazca antes de tiempo o que pese menos al nacer  Solicite información a garcia médico si usted necesita ayuda para dejar de fumar  · No consuma alcohol  El alcohol pasa de garcia cuerpo al bebé a través de la placenta  Puede afectar el desarrollo del cerebro de garcia bebé y provocar el síndrome de alcoholismo fetal (SAF)  FAS es un pacheco de condiciones que causan [address], de comportamiento y de crecimiento  · Consulte con garcia médico antes de karen cualquier medicamento  Muchos medicamentos pueden perjudicar a garcia bebé si usted los tosin [facility]  No tome ningún medicamento, vitaminas, hierbas o suplementos sin david consultar con garcia Marisol Robes  use drogas ilegales o de la tena (joseph marihuana o cocaína) mientras está embarazada  Consejos de seguridad valeri el embarazo:   · Evite jacuzzis y saunas    No use un jacuzzi o un sauna mientras usted está embarazada, especialmente valeri el primer trimestre  Los [employer] y los saunas aumentan la temperatura de garcia bebé y el riesgo de defectos de nacimiento  · Evite la toxoplasmosis  [place] es jael infección causada por comer carne cruda o estar cerca del excremento de un bebeto infectado  [place] puede causar malformaciones congénitas, aborto espontáneo y Go Schein  Lávese las ashley después de tocar carne cruda  Asegúrese de que la carne esté monica cocida antes de comerla  Evite los huevos crudos y la Laverda Cosier  Use guantes o pida que alguien la ayude a limpiar la caja de arena del bebeto mientras usted Mariely Knows  Cambios que están ocurriendo con garcia bebé:  Para las 18 semanas, garcia bebé podría medir alrededor de 6 pulgadas de zenon desde la xavier hasta la rabadilla (el cóccix)  Es posible que garcia bebé pese alrededor de 11 onzas  Usted podría sentir los movimientos de garcia bebé aproximadamente a las 18 semanas o después  Podría ser que los primeros movimientos no mohit tan notorios  Los movimientos podrían sentirse joseph jael sensación de revoloteo  Garcia bebé también hace movimientos de succión y puede escuchar ciertos sonidos  Lo que necesita saber acerca del cuidado prenatal:  Valeri las primeras 29 semanas de garcia embarazo, usted tendrá citas mensuales con garcia médico  Garcia médico le revisará garcia presión arterial y peso  Es posible que también necesite alguno de los siguientes tratamientos:  · Un examen de orina  también podría realizarse para revisarle el azúcar y la proteína  Estas son señales de diabetes gestacional o de infección  · Los análisis de carmita  se pueden realizar para revisar si tiene signos de anemia (nivel bajo del jacqui)  · La revisión de la altura del fondo uterino  es jael medición del útero para controlar el desarrollo de garcia bebé  [employer] por lo general es igual al número de [address] que usted tiene de embarazo      · Jael ecografía  podría realizarse para revisar el desarrollo de landeros bebé  Es posible que landeros médico pueda decirle cuál es el sexo de landeros bebé valeri la ecografía  · El ritmo cardíaco de landeros bebé  será revisado  © 2017 [address] Information is for End User's use only and may not be sold, redistributed or otherwise used for commercial purposes  All illustrations and images included in CareNotes® are the copyrighted property of A D A M , Inc  or Josh Stanford  Esta información es sólo para uso en educación  Landeros intención no es darle un consejo médico sobre enfermedades o tratamientos  Colsulte con landeros Viva Hint farmacéutico antes de seguir cualquier régimen médico para saber si es seguro y efectivo para usted

## 2019-12-24 NOTE — ASSESSMENT & PLAN NOTE
Reports BGs ranging 90s-120s  Follows up w/  and diabetic educator    Glucose goals: fasting 60-90 mg/dL, 135 mg/dL or less 1 hour post meals, and 120 mg/dL or less 2 hours post meal

## 2019-12-24 NOTE — ASSESSMENT & PLAN NOTE
Getting lab work drawn today  Declines PO iron, says she does not tolerate  RTC in 2 weeks to set up venofer infusions

## 2019-12-26 DIAGNOSIS — O24.419 GESTATIONAL DIABETES MELLITUS (GDM) IN FIRST TRIMESTER, GESTATIONAL DIABETES METHOD OF CONTROL UNSPECIFIED: ICD-10-CM

## 2019-12-26 DIAGNOSIS — D50.9 IRON DEFICIENCY ANEMIA: Primary | ICD-10-CM

## 2019-12-26 DIAGNOSIS — Z3A.11 11 WEEKS GESTATION OF PREGNANCY: ICD-10-CM

## 2019-12-26 DIAGNOSIS — O99.810 ABNORMAL GLUCOSE TOLERANCE TEST (GTT) DURING PREGNANCY, ANTEPARTUM: ICD-10-CM

## 2019-12-26 RX ORDER — FERROUS SULFATE TAB EC 324 MG (65 MG FE EQUIVALENT) 324 (65 FE) MG
324 TABLET DELAYED RESPONSE ORAL
Qty: 60 TABLET | Refills: 0 | Status: SHIPPED | OUTPATIENT
Start: 2019-12-26 | End: 2020-04-06 | Stop reason: SDUPTHER

## 2019-12-30 RX ORDER — LANCETS
EACH MISCELLANEOUS
Qty: 102 EACH | Refills: 3 | Status: SHIPPED | OUTPATIENT
Start: 2019-12-30 | End: 2020-03-03 | Stop reason: SDUPTHER

## 2019-12-30 RX ORDER — BLOOD SUGAR DIAGNOSTIC
STRIP MISCELLANEOUS
Qty: 300 EACH | Refills: 1 | Status: SHIPPED | OUTPATIENT
Start: 2019-12-30 | End: 2020-03-03 | Stop reason: SDUPTHER

## 2020-01-08 ENCOUNTER — TELEPHONE (OUTPATIENT)
Dept: OBGYN CLINIC | Facility: CLINIC | Age: 40
End: 2020-01-08

## 2020-01-08 NOTE — TELEPHONE ENCOUNTER
----- Message from Gerry Wills MD sent at 1/8/2020  4:07 PM EST -----  Hello! Patient did not show up for her appointment yesterday  Please help patient reschedule  Thank you!      ----- Message -----  From: Yovanny Wilkes MD  Sent: 12/26/2019   7:46 AM EST  To: Leatha Veloz RN, Gerry Wills MD    Please call Iris and let her know that her ferritin was low, which confirms anemia from iron deficiency  I sent iron to her pharmacy to take daily  She will likely need IV iron throughout the pregnancy  Her next appointment is with Karina Danielson, so maybe she can start that process after speaking to her in the office      Thanks  Nancy Michelle

## 2020-01-15 ENCOUNTER — ROUTINE PRENATAL (OUTPATIENT)
Dept: PERINATAL CARE | Facility: CLINIC | Age: 40
End: 2020-01-15

## 2020-01-15 VITALS
BODY MASS INDEX: 27.79 KG/M2 | SYSTOLIC BLOOD PRESSURE: 115 MMHG | HEIGHT: 62 IN | WEIGHT: 151 LBS | DIASTOLIC BLOOD PRESSURE: 74 MMHG | HEART RATE: 81 BPM

## 2020-01-15 DIAGNOSIS — D50.9 MATERNAL IRON DEFICIENCY ANEMIA AFFECTING PREGNANCY, ANTEPARTUM, SECOND TRIMESTER: ICD-10-CM

## 2020-01-15 DIAGNOSIS — O99.012 MATERNAL IRON DEFICIENCY ANEMIA AFFECTING PREGNANCY, ANTEPARTUM, SECOND TRIMESTER: ICD-10-CM

## 2020-01-15 DIAGNOSIS — O09.522 ELDERLY MULTIGRAVIDA, SECOND TRIMESTER: Primary | ICD-10-CM

## 2020-01-15 DIAGNOSIS — Z3A.18 18 WEEKS GESTATION OF PREGNANCY: ICD-10-CM

## 2020-01-15 DIAGNOSIS — Z34.91 PRENATAL CARE IN FIRST TRIMESTER: ICD-10-CM

## 2020-01-15 DIAGNOSIS — O24.410 DIET CONTROLLED GESTATIONAL DIABETES MELLITUS (GDM) IN SECOND TRIMESTER: ICD-10-CM

## 2020-01-15 DIAGNOSIS — Z36.86 ENCOUNTER FOR ANTENATAL SCREENING FOR CERVICAL LENGTH: ICD-10-CM

## 2020-01-15 PROCEDURE — 76811 OB US DETAILED SNGL FETUS: CPT | Performed by: OBSTETRICS & GYNECOLOGY

## 2020-01-15 PROCEDURE — 76817 TRANSVAGINAL US OBSTETRIC: CPT | Performed by: OBSTETRICS & GYNECOLOGY

## 2020-01-15 PROCEDURE — 99241 PR OFFICE CONSULTATION NEW/ESTAB PATIENT 15 MIN: CPT | Performed by: OBSTETRICS & GYNECOLOGY

## 2020-01-15 NOTE — PROGRESS NOTES
A transvaginal ultrasound was performed  Sonographer note on use of High Level Disinfection Process (Trophon) for transvaginal probe# 3 used, serial # [id number]    Josh Conte RDMS

## 2020-01-15 NOTE — PROGRESS NOTES
Thank you very much for your kind referral of Jose Gastelum for fetal ultrasound evaluation and MFM consult in [place] on January 15 2020  Rupa is a 61-year-old  female [id number] who is currently at 25 and 2/7 weeks gestation by an estimated due date of Sienna 15, 2020 which is based upon first-trimester ultrasound dating  She presents for the indication of advanced maternal age  Rupa will be 47years old at her estimated due date  She is Pashto-speaking  Rupa was offered use of the telephone  service for the visit today, which she declined  Instead, she preferred that her partner perform translation  She has no complaints  She reports fetal movement and denies vaginal bleeding  Rupa was recently diagnosed with gestational diabetes, which is currently diet controlled  She did not have genetic testing performed for the indication of advanced maternal age earlier in the pregnancy  Rupa was diagnosed with microcytic, hypochromic anemia in late September, with a hematocrit of 28 5%  A hemoglobin electrophoresis obtained on November 19 revealed normal adult hemoglobin  Further evaluation revealed a very low serum ferritin level of 3 ng/ml in late December  She is currently treated with oral iron replacement therapy   A repeat CBC and indices has not been obtained since late September  Rupa has a history of four prior vaginal deliveries at term between 2002 and 1014  She denies any problems during those pregnancies  Her smallest baby weighed about 7 lb  She is uncertain as to the weight of her largest baby  Each of her children is currently healthy  Her past medical and surgical histories are otherwise unremarkable  She denies tobacco, alcohol, or illicit drug use during the pregnancy  The family genetic history is negative with respect to genetic abnormalities, birth defects, or mental retardation    The family medical history is negative with respect to first-degree relatives with diabetes or hypertension  No fetal structural abnormality or ultrasound marker for aneuploidy is identified on the Level II ultrasound study today  Several anatomic targets are suboptimally imaged secondary to unfavorable fetal position  Fetal growth and amniotic fluid volume are normal   The placenta is normal in appearance  The cervix is normal in appearance by transvaginal sonography  The cervical length is normal   Cervical debris is not present  Cervical funneling is not present  Neither provocative nor dynamic change is appreciated  There is no suspicion of vasa previa using color Doppler evaluation  Today's ultrasound findings and suggested follow-up were discussed in detail with Rupa  We discussed that prenatal ultrasound cannot rule out all congenital abnormalities  At age 47, her risk for a live-born baby with Down syndrome is one in 80, with a risk for a live-born baby with any chromosomal abnormality of one in 80  Definitive prenatal diagnosis at this stage of the pregnancy is possible only with genetic amniocentesis  Genetic screening using cell free DNA analysis is not diagnostic, but has a sensitivity for identification of Down syndrome of 99%  Rupa does not wish to pursue genetic testing at this time  Iris will return in six weeks to assess interval growth and evaluate anatomic targets not imaged well today  Serial interval fetal growth scans are recommended during the second half of the pregnancy for the indications of gestational diabetes, advanced maternal age, and iron deficiency anemia, each associated with an increased risk for fetal growth abnormalities  A repeat CBC and indices should be obtained soon  Iris should maintain contact with the Diabetes and Pregnancy program in the [facility], [facility]  at least once a week for review of her blood glucose values and adjustment of insulin doses   Her diagnosis of gestational diabetes during this pregnancy is associated with an increased risk for the development of overt, Type II diabetes later in her life  Her risk for developing Type II diabetes is as high as 70%  A 75 gram, two-hour, OGTT is recommended as initial follow up 4-12 weeks following delivery  The face to face time, in addition to time spent discussing ultrasound results, was approximately 15 minutes, greater than 50% of which was spent during counseling and coordination of care

## 2020-01-15 NOTE — LETTER
January 15, 2020     [address] PROVIDER    Patient: Zena Soni   YOB: 1980   Date of Visit: 1/15/2020       Dear   Provider: Thank you for referring Rupa Avendaño to me for evaluation  Below are my notes for this consultation  If you have questions, please do not hesitate to call me  I look forward to following your patient along with you           Sincerely,        Drew Rush MD        CC: No Recipients

## 2020-01-21 ENCOUNTER — ROUTINE PRENATAL (OUTPATIENT)
Dept: OBGYN CLINIC | Facility: CLINIC | Age: 40
End: 2020-01-21

## 2020-01-21 VITALS
SYSTOLIC BLOOD PRESSURE: 107 MMHG | HEIGHT: 62 IN | WEIGHT: 151 LBS | BODY MASS INDEX: 27.79 KG/M2 | HEART RATE: 66 BPM | DIASTOLIC BLOOD PRESSURE: 58 MMHG

## 2020-01-21 DIAGNOSIS — Z3A.19 19 WEEKS GESTATION OF PREGNANCY: Primary | ICD-10-CM

## 2020-01-21 DIAGNOSIS — R82.71 GBS BACTERIURIA: ICD-10-CM

## 2020-01-21 DIAGNOSIS — O99.012 MATERNAL IRON DEFICIENCY ANEMIA AFFECTING PREGNANCY, ANTEPARTUM, SECOND TRIMESTER: ICD-10-CM

## 2020-01-21 DIAGNOSIS — D50.9 MATERNAL IRON DEFICIENCY ANEMIA AFFECTING PREGNANCY, ANTEPARTUM, SECOND TRIMESTER: ICD-10-CM

## 2020-01-21 DIAGNOSIS — O99.810 ABNORMAL GLUCOSE TOLERANCE TEST (GTT) DURING PREGNANCY, ANTEPARTUM: ICD-10-CM

## 2020-01-21 PROCEDURE — 99213 OFFICE O/P EST LOW 20 MIN: CPT | Performed by: OBSTETRICS & GYNECOLOGY

## 2020-01-21 RX ORDER — SODIUM CHLORIDE 9 MG/ML
20 INJECTION, SOLUTION INTRAVENOUS ONCE
Status: CANCELLED | OUTPATIENT
Start: 2020-01-28

## 2020-01-21 NOTE — PROGRESS NOTES
Assessment & Plan  42 y o  [id number] at 19w1d presenting for routine prenatal visit  1  IUP at 19w1d   -continue prenatal vitamins   -GBS bacteriuria - will need penicillin in labor   -discussed Quad screening, but patient declined  2  A1GDM   -patient recently ran out of accucheck strip but has refill in pharmacy  Discussed with patient importance of checking sugar and if run out, can call clinic for refill  3  Iron deficiency anemia   -hgb: 8 4   -will recheck repeat CBC per [facility] recommendation   -Patient is set up for IV venofer infusion starting Tuesday 1/28, twice weekly for 6 doses  4  RTO in four weeks    Problem List Items Addressed This Visit        Other    Abnormal glucose tolerance test (GTT) during pregnancy, antepartum    GBS bacteriuria    Maternal iron deficiency anemia affecting pregnancy, antepartum, second trimester    19 weeks gestation of pregnancy - Primary        ____________________________________________________________  Subjective  She is complaining of headache and right abdominal pain  She denies contractions, loss of fluid, or vaginal bleeding  She feels regular fetal movements       Objective  /58 (BP Location: Left arm, Patient Position: Sitting, Cuff Size: Standard)   Pulse 66   Ht 5' 2" (1 575 m)   Wt 68 5 kg (151 lb)   LMP  (LMP Unknown)   BMI 27 62 kg/m²     Fetal Heart Rate: 145    Patient's Active Problem List  Patient Active Problem List   Diagnosis    Abnormal glucose tolerance test (GTT) during pregnancy, antepartum    GBS bacteriuria    Iron deficiency anemia    Prediabetes    Nausea    Maternal iron deficiency anemia affecting pregnancy, antepartum, second trimester    Elderly multigravida, second trimester    19 weeks gestation of pregnancy

## 2020-01-22 ENCOUNTER — TELEPHONE (OUTPATIENT)
Dept: OBGYN CLINIC | Facility: CLINIC | Age: 40
End: 2020-01-22

## 2020-01-22 PROBLEM — Z3A.19 19 WEEKS GESTATION OF PREGNANCY: Status: ACTIVE | Noted: 2020-01-22

## 2020-01-22 NOTE — TELEPHONE ENCOUNTER
----- Message from Marbin Santo MD sent at 1/22/2020  3:24 PM EST -----  Regarding: Needs to get CBC rechecked  Maninder Coombs,     This patient needs to get her CBC rechecked as recommended by MFALEJANDRINA  I discussed with her and her  that she is going to be set up for IV iron infusion this Tuesday, but would like for her to get a CBC in the meantime  She only speaks [address]

## 2020-01-22 NOTE — TELEPHONE ENCOUNTER
Spoke to pt in regards to her outstanding CBC order  Pt agreed that she will go some time this week  Pt understood she must have blood work done prior to her infusion appt

## 2020-01-22 NOTE — PATIENT INSTRUCTIONS
El embarazo de la semana 19 a la 22   CUIDADO AMBULATORIO:   Qué cambios están ocurriendo en garcia cuerpo: Ahora que usted está en garcia salvador trimestre, tiene [place]  Es posible que también sienta más [place] de lo normal  Es posible que usted esté aumentando de ½ a 1 loyda por [place], y que garcia embarazo se empiece a notar  Posiblemente usted necesite comenzar a usar ropa de maternidad  Conforme garcia bebé está creciendo, usted podría presentar otros síntomas  Estos podrían incluir dolor corporal o estrías en garcia abdomen, senos, muslos y glúteos  Busque atención médica de inmediato si:   · Usted presenta un marilia dolor de princess que no desaparece  · Usted tiene cambios en la visión nuevos o en aumento, joseph visión borrosa o con manchas  · Usted tiene inflamación nueva o creciente en garcia elly o ashley  · Usted tiene manchado o sangrado vaginal     · Usted rompe dung o siente un chorro o gotas de agua tibia que le está bajando por garcia vagina  Pregúntele a garcia [place] vitaminas y minerales son adecuados para usted  · Usted tiene calambres, presión o tensión abdominal     · Usted tiene un cambio en la secreción vaginal     · Usted no puede retener alimentos ni líquidos y está perdiendo [place]  · Usted tiene escalofríos o fiebre  · Usted tiene comezón, ardor o dolor vaginal      · Usted tiene jael secreción vaginal amarillenta, verdosa, hawa o de [employer]  · Usted tiene dolor o ardor al Janeasophia Ru, orina menos de lo habitual o tiene [place] rosada o sanguinolenta  · Usted tiene preguntas o inquietudes acerca de garcia condición o cuidado  Cómo cuidarse en esta etapa de garcia embarazo:   · Consuma alimentos saludables y variados  Alimentos saludables incluyen frutas, verduras, panes de kayla integral, alimentos lácteos bajos en grasa, frijoles, deni magras y pescado  [place] líquidos joseph se le haya indicado   Pregunte cuánto líquido debe karen cada día y cuáles líquidos son los más adecuados para usted  Limite el consumo de cafeína a menos de 200 miligramos cada día  Limite el consumo de pescado a 2 porciones cada semana  Escoja pescado con concentraciones bajas de meme joseph atún al natural enlatado, camarón, salmón, bacalao o tilapia  No  coma pescado con concentraciones altas de meme joseph pez alan, caballa gigante, pargo rayado y tiburón  · [id number]  Garcia necesidad de ciertas vitaminas y [address], joseph el ácido fólico, aumenta valeri el [facility]  Las vitaminas prenatales proporcionan algunas de las vitaminas y minerales adicionales que usted necesita  Las vitaminas prenatales también podrían ayudar a disminuir el riesgo de ciertos defectos de nacimiento  · Consulte con garcia médico acerca de hacer ejercicio  El ejercicio moderado puede ayudarla a mantenerse en forma  Garcia médico la ayudará a planear un programa de ejercicios que sea seguro para usted valeri garcia [facility]  · No fume  Si usted fuma, nunca es demasiado tarde para dejar de hacerlo  Fumar aumenta el riesgo de aborto espontáneo y otros problemas de savannah valeri garcia [facility]  Fumar puede causar que garcia bebé nazca antes de tiempo o que pese menos al nacer  Solicite información a garcia médico si usted necesita ayuda para dejar de fumar  · No consuma alcohol  El alcohol pasa de garcia cuerpo al bebé a través de la placenta  Puede afectar el desarrollo del cerebro de garcia bebé y provocar el síndrome de alcoholismo fetal (SAF)  FAS es un pacheco de condiciones que causan [address], de comportamiento y de crecimiento  · Consulte con garcia médico antes de karen cualquier medicamento  Muchos medicamentos pueden perjudicar a garcia bebé si usted los tosin [address]  No tome ningún medicamento, vitaminas, hierbas o suplementos sin david consultar con garcia Kain Dole  use drogas ilegales o de la tena (joseph marihuana o cocaína) mientras está embarazada    Consejos de seguridad valeri el embarazo:   · Evite jacuzzis y saunas  No use un jacuzzi o un sauna mientras usted está embarazada, especialmente valeri el primer trimestre  Los [facility] y los saunas aumentan la temperatura de landeros bebé y el riesgo de defectos de nacimiento  · Evite la toxoplasmosis  [place] es jael infección causada por comer carne cruda o estar cerca del excremento de un bebeto infectado  [place] puede causar malformaciones congénitas, aborto espontáneo y Go Schein  Lávese las ashley después de tocar carne cruda  Asegúrese de que la carne esté monica cocida antes de comerla  Evite los huevos crudos y la [place]  Use guantes o pida que alguien la ayude a limpiar la caja de arena del bebeto mientras usted [place]  Cambios que están ocurriendo con landeros bebé:  Para las 22 semanas, landeros bebé mide alrededor de 8 pulgadas de zenon desde la punta de la princess hasta la rabadilla (parte inferior del bebé)  Landeros bebé también pesa alrededor de 1 loyda  Landeros bebé se está volviendo [address]  Es posible que pueda sentir a landeros bebé moviéndose dentro de usted para [employer]  Podría ser que los primeros movimientos no mohit tan notorios  Los movimientos podrían sentirse joseph jael sensación de revoloteo  Conforme pasa el tiempo, los movimientos de landeros bebé podrían volverse más ruddy y notorios  Lo que necesita saber acerca del cuidado prenatal:  Valeri las primeras 29 semanas de landeros embarazo, usted tendrá citas mensuales con landeros médico  Landeros médico le revisará landeros presión arterial y peso  Es posible que también necesite lo siguiente:  · Un examen de orina  también podría realizarse para revisarle el azúcar y la proteína  Estas son señales de diabetes gestacional o de infección  La proteína en landeros orina también podría ser jael señal de preeclampsia  La preeclampsia es jael condición que puede desarrollarse valeri la semana 21 o después en landeros embarazo   Esta provoca presión arterial mervin y puede provocar problemas con antwon riñones y otros órganos  · La altura uterina  es jael medición del útero para controlar el desarrollo de landeros bebé  [employer] por lo general es igual al número de [employer] que usted tiene de embarazo  · [place] ecografía del feto  [place] imágenes del bebé dentro de landeros [place]  Muestra el desarrollo de landeros bebé  El movimiento y posición del bebé también se pueden observar  Es posible que landeros médico pueda decirle cuál es el sexo de landeros bebé valeri la ecografía  · El ritmo cardíaco de landeros bebé  será revisado  © 2017 [address] Information is for End User's use only and may not be sold, redistributed or otherwise used for commercial purposes  All illustrations and images included in CareNotes® are the copyrighted property of A D A M , Inc  or Josh Stanford  Esta información es sólo para uso en educación  Landeros intención no es darle un consejo médico sobre enfermedades o tratamientos  Colsulte con landeros Geo Gabriel farmacéutico antes de seguir cualquier régimen médico para saber si es seguro y efectivo para usted

## 2020-01-23 ENCOUNTER — APPOINTMENT (OUTPATIENT)
Dept: LAB | Facility: HOSPITAL | Age: 40
End: 2020-01-23

## 2020-01-23 DIAGNOSIS — O99.012 MATERNAL IRON DEFICIENCY ANEMIA AFFECTING PREGNANCY, ANTEPARTUM, SECOND TRIMESTER: ICD-10-CM

## 2020-01-23 DIAGNOSIS — D50.9 MATERNAL IRON DEFICIENCY ANEMIA AFFECTING PREGNANCY, ANTEPARTUM, SECOND TRIMESTER: ICD-10-CM

## 2020-01-23 LAB
BASOPHILS # BLD AUTO: 0.01 THOUSANDS/ΜL (ref 0–0.1)
BASOPHILS NFR BLD AUTO: 0 % (ref 0–1)
EOSINOPHIL # BLD AUTO: 0.14 THOUSAND/ΜL (ref 0–0.61)
EOSINOPHIL NFR BLD AUTO: 2 % (ref 0–6)
ERYTHROCYTE [DISTWIDTH] IN BLOOD BY AUTOMATED COUNT: 20.7 % (ref 11.6–15.1)
HCT VFR BLD AUTO: 27.3 % (ref 34.8–46.1)
HGB BLD-MCNC: 7.4 G/DL (ref 11.5–15.4)
IMM GRANULOCYTES # BLD AUTO: 0.03 THOUSAND/UL (ref 0–0.2)
IMM GRANULOCYTES NFR BLD AUTO: 0 % (ref 0–2)
LYMPHOCYTES # BLD AUTO: 1.45 THOUSANDS/ΜL (ref 0.6–4.47)
LYMPHOCYTES NFR BLD AUTO: 18 % (ref 14–44)
MCH RBC QN AUTO: 18.1 PG (ref 26.8–34.3)
MCHC RBC AUTO-ENTMCNC: 27.1 G/DL (ref 31.4–37.4)
MCV RBC AUTO: 67 FL (ref 82–98)
MONOCYTES # BLD AUTO: 0.6 THOUSAND/ΜL (ref 0.17–1.22)
MONOCYTES NFR BLD AUTO: 7 % (ref 4–12)
NEUTROPHILS # BLD AUTO: 5.83 THOUSANDS/ΜL (ref 1.85–7.62)
NEUTS SEG NFR BLD AUTO: 73 % (ref 43–75)
NRBC BLD AUTO-RTO: 0 /100 WBCS
PLATELET # BLD AUTO: 446 THOUSANDS/UL (ref 149–390)
PMV BLD AUTO: 9.4 FL (ref 8.9–12.7)
RBC # BLD AUTO: 4.08 MILLION/UL (ref 3.81–5.12)
WBC # BLD AUTO: 8.06 THOUSAND/UL (ref 4.31–10.16)

## 2020-01-23 PROCEDURE — 85025 COMPLETE CBC W/AUTO DIFF WBC: CPT

## 2020-01-23 PROCEDURE — 36415 COLL VENOUS BLD VENIPUNCTURE: CPT

## 2020-01-28 ENCOUNTER — HOSPITAL ENCOUNTER (OUTPATIENT)
Dept: INFUSION CENTER | Facility: HOSPITAL | Age: 40
Discharge: HOME | End: 2020-01-28

## 2020-01-28 VITALS
SYSTOLIC BLOOD PRESSURE: 102 MMHG | HEART RATE: 84 BPM | RESPIRATION RATE: 18 BRPM | TEMPERATURE: 97.1 F | DIASTOLIC BLOOD PRESSURE: 56 MMHG

## 2020-01-28 DIAGNOSIS — D50.9 MATERNAL IRON DEFICIENCY ANEMIA AFFECTING PREGNANCY, ANTEPARTUM, SECOND TRIMESTER: Primary | ICD-10-CM

## 2020-01-28 DIAGNOSIS — O99.012 MATERNAL IRON DEFICIENCY ANEMIA AFFECTING PREGNANCY, ANTEPARTUM, SECOND TRIMESTER: Primary | ICD-10-CM

## 2020-01-28 PROCEDURE — 96365 THER/PROPH/DIAG IV INF INIT: CPT

## 2020-01-28 RX ORDER — SODIUM CHLORIDE 9 MG/ML
20 INJECTION, SOLUTION INTRAVENOUS ONCE
Status: COMPLETED | OUTPATIENT
Start: 2020-01-28 | End: 2020-01-28

## 2020-01-28 RX ORDER — SODIUM CHLORIDE 9 MG/ML
20 INJECTION, SOLUTION INTRAVENOUS ONCE
Status: CANCELLED | OUTPATIENT
Start: 2020-01-31

## 2020-01-28 RX ADMIN — IRON SUCROSE 200 MG: 20 INJECTION, SOLUTION INTRAVENOUS at 10:19

## 2020-01-28 RX ADMIN — SODIUM CHLORIDE 20 ML/HR: 0.9 INJECTION, SOLUTION INTRAVENOUS at 10:21

## 2020-01-28 NOTE — PLAN OF CARE
Problem: Potential for Falls  Goal: Patient will remain free of falls  Description  INTERVENTIONS:  - Assess patient frequently for physical needs  -  Identify cognitive and physical deficits and behaviors that affect risk of falls    -  [place] fall precautions as indicated by assessment   - Educate patient/family on patient safety including physical limitations  - Instruct patient to call for assistance with activity based on assessment  - Modify environment to reduce risk of injury  - Consider OT/PT consult to assist with strengthening/mobility  Outcome: Progressing

## 2020-02-04 ENCOUNTER — HOSPITAL ENCOUNTER (OUTPATIENT)
Dept: INFUSION CENTER | Facility: HOSPITAL | Age: 40
Discharge: HOME | End: 2020-02-04

## 2020-02-05 ENCOUNTER — ROUTINE PRENATAL (OUTPATIENT)
Dept: OBGYN CLINIC | Facility: CLINIC | Age: 40
End: 2020-02-05

## 2020-02-05 ENCOUNTER — PATIENT OUTREACH (OUTPATIENT)
Dept: OBGYN CLINIC | Facility: CLINIC | Age: 40
End: 2020-02-05

## 2020-02-05 VITALS
WEIGHT: 151 LBS | TEMPERATURE: 97.2 F | SYSTOLIC BLOOD PRESSURE: 108 MMHG | DIASTOLIC BLOOD PRESSURE: 73 MMHG | HEIGHT: 62 IN | BODY MASS INDEX: 27.79 KG/M2 | HEART RATE: 89 BPM

## 2020-02-05 DIAGNOSIS — J00 COMMON COLD VIRUS: ICD-10-CM

## 2020-02-05 DIAGNOSIS — O24.410 DIET CONTROLLED GESTATIONAL DIABETES MELLITUS (GDM) IN SECOND TRIMESTER: ICD-10-CM

## 2020-02-05 DIAGNOSIS — D50.9 MATERNAL IRON DEFICIENCY ANEMIA AFFECTING PREGNANCY, ANTEPARTUM, SECOND TRIMESTER: Primary | ICD-10-CM

## 2020-02-05 DIAGNOSIS — Z3A.21 21 WEEKS GESTATION OF PREGNANCY: ICD-10-CM

## 2020-02-05 DIAGNOSIS — O99.012 MATERNAL IRON DEFICIENCY ANEMIA AFFECTING PREGNANCY, ANTEPARTUM, SECOND TRIMESTER: Primary | ICD-10-CM

## 2020-02-05 DIAGNOSIS — O09.522 ELDERLY MULTIGRAVIDA, SECOND TRIMESTER: ICD-10-CM

## 2020-02-05 LAB
FLUAV RNA NPH QL NAA+PROBE: NORMAL
FLUBV RNA NPH QL NAA+PROBE: NORMAL
RSV RNA NPH QL NAA+PROBE: NORMAL

## 2020-02-05 PROCEDURE — 99214 OFFICE O/P EST MOD 30 MIN: CPT | Performed by: NURSE PRACTITIONER

## 2020-02-05 PROCEDURE — 87631 RESP VIRUS 3-5 TARGETS: CPT | Performed by: NURSE PRACTITIONER

## 2020-02-05 PROCEDURE — T1015 CLINIC SERVICE: HCPCS | Performed by: NURSE PRACTITIONER

## 2020-02-05 NOTE — PATIENT INSTRUCTIONS
El embarazo de la semana 23 a la 26   LO QUE NECESITA SABER:   ¿Qué cambios están ocurriendo en mi cuerpo? Ahora usted está cerca o al principio del tercer trimestre  El tercer trimestre comienza a las 24 semanas y concluye al momento del [place]  Conforme garcia bebé crece más, usted podría desarrollar ciertos síntomas  Estos podrían incluir dolor en garcia espalda o en la parte inferior de los costados de garcia abdomen  Es posible que también se le formen estrías en garcia abdomen, senos, muslos o glúteos  Usted también podría presentar estreñimiento  ¿Cómo me tulio cuidar en esta etapa de mi embarazo? · Consuma alimentos saludables y variados  Alimentos saludables incluyen frutas, verduras, panes de kayla integral, alimentos lácteos bajos en grasa, frijoles, deni magras y pescado  [place] líquidos joseph se le haya indicado  Pregunte cuánto líquido debe karen cada día y cuáles líquidos son los más adecuados para usted  Limite el consumo de cafeína a menos de [place] [address]  Limite el consumo de pescado a 2 porciones cada semana  Escoja pescado con concentraciones bajas de meme joseph atún al natural enlatado, camarón, salmón, bacalao o tilapia  No  coma pescado con concentraciones altas de meme joseph pez alan, caballa gigante, pargo rayado y tiburón  · Controle garcia dolor de espalda  No esté de pie por largos periodos de tiempo ni levante objetos pesados  Use jael buena postura mientras esté de pie, se agache o se doble  Use zapatos de tacón bajo con un buen soporte  Descansar puede también ayudarla a aliviar el dolor de espalda  Pregunte a garcia médico acerca de ejercicios que usted pueda hacer para fortalecer los músculos de garcia espalda  · [address] [place]  Garcia necesidad de ciertas vitaminas y [address], joseph el ácido fólico, aumenta valeri el [facility]  Las vitaminas prenatales proporcionan algunas de las vitaminas y minerales adicionales que usted necesita   [address] [place] [address] prenatales también podrían ayudar a disminuir el riesgo de ciertos defectos de nacimiento  · Consulte con garcia médico acerca de hacer ejercicio  El ejercicio moderado puede ayudarla a mantenerse en forma  Garcia médico la ayudará a planear un programa de ejercicios que sea seguro para usted valeri garcia [place]  · No fume  Si usted fuma, nunca es demasiado tarde para dejar de hacerlo  Fumar aumenta el riesgo de aborto espontáneo y otros problemas de savannah valeri garcia [place]  Fumar puede causar que garcia bebé nazca antes de tiempo o que pese menos al nacer  Solicite información a garcia médico si usted necesita ayuda para dejar de fumar  · No consuma alcohol  El alcohol pasa de garcia cuerpo al bebé a través de la placenta  Puede afectar el desarrollo del cerebro de garcia bebé y provocar el síndrome de alcoholismo fetal (SAF)  FAS es un pacheco de condiciones que causan [address], de comportamiento y de crecimiento  · Consulte con garcia médico antes de karen cualquier medicamento  Muchos medicamentos pueden perjudicar a garcia bebé si usted los tosin [address]  No tome ningún medicamento, vitaminas, hierbas o suplementos sin david consultar con garcia Delbra Nora  use drogas ilegales o de la tena (joseph marihuana o cocaína) mientras está embarazada  ¿Cuáles son Brody Grumbles consejos de seguridad valeri el embarazo? · Evite jacuzzis y saunas  No use un jacuzzi o un sauna mientras usted está embarazada, especialmente valeri el primer trimestre  [facility] y los saunas aumentan la temperatura de garcia bebé y el riesgo de defectos de nacimiento  · Evite la toxoplasmosis  [place] es jael infección causada por comer carne cruda o estar cerca del excremento de un bebeto infectado  [place] puede causar malformaciones congénitas, aborto espontáneo y Go [place]  Lávese las ashley después de tocar carne cruda  Asegúrese de que la carne esté monica cocida antes de comerla   Evite los huevos crudos y [place] despasteurizada  Use guantes o pida que alguien la ayude a limpiar la caja de arena del bebeto mientras usted Arvel Gasman  ¿Qué cambios están ocurriendo con mi bebé? Para las 26 semanas, garcia bebé pesará alrededor de 2 libras  Garcia bebé medirá alrededor de 10 pulgadas de zenon desde el subhash de la princess hasta la rabadilla (parte inferior del bebé)  Los movimientos de garcia bebé son mucho más ruddy en esta etapa  Los ojos de garcia bebé teddy están completamente formados y pueden abrirse parcialmente  Garcia bebé también duerme y se despierta  ¿Qué necesito saber acerca del cuidado prenatal?  Garcia médico le revisará garcia presión arterial y [place]  Es posible que también necesite lo siguiente:  · Un examen de orina  también podría realizarse para revisarle el azúcar y la proteína  Estas son señales de diabetes gestacional o de infección  La proteína en garcia orina también podría ser jael señal de preeclampsia  La preeclampsia es jael condición que puede desarrollarse valeri la semana 21 o después en garcia embarazo  Esta provoca presión arterial mervin y [employer] con antwon riñones y [place]  · La altura uterina  es jael medición del útero para controlar el desarrollo de garcia bebé  [employer] por lo general es igual al número de [facility] que usted tiene de embarazo  · El ritmo cardíaco de garcia bebé  será revisado  ¿Cuándo tulio buscar atención inmediata? · Usted presenta un marilia dolor de princess que no desaparece  · Usted tiene cambios en la visión nuevos o en aumento, joseph visión borrosa o con manchas  · Usted tiene inflamación nueva o creciente en garcia elly o ashley  · Usted tiene manchado o sangrado vaginal     · Usted rompe dung o siente un chorro o gotas de agua tibia que le está bajando por garcia vagina  ¿Cuándo utlio comunicarme con mi médico?   · Usted tiene calambres, presión o tensión abdominal     · Usted tiene un cambio en la secreción vaginal     · Usted tiene un sangrado leve       · Usted tiene escalofríos o fiebre  · Usted tiene comezón, ardor o dolor vaginal      · Usted tiene jael secreción vaginal amarillenta, verdosa, hawa o de [employer]  · Usted tiene dolor o ardor al [place], orina menos de lo habitual o tiene [place] rosada o sanguinolenta  · Usted tiene preguntas o inquietudes acerca de cheek condición o cuidado  ACUERDOS SOBRE HCEEK CUIDADO:   Usted tiene el derecho de ayudar a planear cheek cuidado  Aprenda todo lo que pueda sobre cheek condición y joseph darle tratamiento  Discuta antwon opciones de tratamiento con antwon médicos para decidir el cuidado que usted desea recibir  Usted siempre tiene el derecho de rechazar el tratamiento  Esta información es sólo para uso en educación  Cheek intención no es darle un consejo médico sobre enfermedades o tratamientos  Colsulte con cheek Kermitt Console farmacéutico antes de seguir cualquier régimen médico para saber si es seguro y efectivo para usted  © 2017 [address] Information is for End User's use only and may not be sold, redistributed or otherwise used for commercial purposes  All illustrations and images included in CareNotes® are the copyrighted property of A ARY A M , Inc  or Josh Stanford

## 2020-02-05 NOTE — PROGRESS NOTES
RN outpatient care keith called patient at [phone] using language line [address] [facility]    RN received in basket message to remind patient to report blood sugars  RN spoke with patient and she was at the doctor today and is not feeling well with cold symptoms   Pt was told to take robitussin and sudafed   Pt states she is feeling dizzy from medication  Pt awaiting flu swab results  I asked patient if she checked her blood sugar and she said it was 101  Pt states her fastings are between  and 2 hour postprandial in the 120's   Pt states she is sick and not eating much   RN [facility]  Pt states understanding     Rn will continue to follow weekly

## 2020-02-05 NOTE — PROGRESS NOTES
Patient is primarily Maori-speaking A  Edin Steiner at bedside to assist translation    Denies loss of fluid, vaginal bleeding and abdominal pain  Confirms fetal movement  Patient complains today of nasal congestion, sore throat, headaches, nonproductive cough and chills for the past 2 weeks  Denies fever, myalgia  Positive sick contacts-her son  Patient has not tried any over-the-counter remedies  Patient had flu vaccine  Fasting blood sugars ; 2 hour postprandial in the 120s  Patient is currently receiving Venofer infusions for anemia infusion 2- Scheduled for tomorrow  PE:  Lungs CTA, no shortness of breath  BP: 108/73  Weight:  No change since last visit  Urine:  Unable to provide specimen  Plan:  1  Encouraged to continue prenatal vitamin daily  2  Anemia-encouraged to continue Venofer infusions as scheduled  3  Gestational diabetes-reviewed goal blood sugars fasting less than 90 and 2 hour postprandial less than 120  Encouraged to call diabetes in pregnancy program   Next appointment is 2020  Encouraged to follow diet  4  Nasal congestion/sore throat/headache/chills/cough-non productive     - encouraged comfort measures including rest, hot liquids     -given onset of symptoms approximately 2 weeks ago will not offer antivirals at this time     - flu swab obtained  Will call with results     - encouraged to call office with worsening symptoms or onset of fever     -approved medications for pregnancy list provided and reviewed  Patient is encouraged to take Robitussin and Sudafed  5  Follow-up  Center as scheduled  6   Common discomforts of pregnancy and precautions reviewed  RTO as scheduled

## 2020-02-17 ENCOUNTER — TELEPHONE (OUTPATIENT)
Dept: PERINATAL CARE | Facility: CLINIC | Age: 40
End: 2020-02-17

## 2020-02-17 NOTE — TELEPHONE ENCOUNTER
Called pt to ask if she's reporting her blood sugars, she said her  has been doing that  I gave her jaydon number so she can call her and schedule appt with us plus report numbers

## 2020-02-18 ENCOUNTER — APPOINTMENT (OUTPATIENT)
Dept: LAB | Facility: HOSPITAL | Age: 40
End: 2020-02-18

## 2020-02-18 ENCOUNTER — HOSPITAL ENCOUNTER (OUTPATIENT)
Dept: INFUSION CENTER | Facility: HOSPITAL | Age: 40
Discharge: HOME | End: 2020-02-18

## 2020-02-18 ENCOUNTER — ROUTINE PRENATAL (OUTPATIENT)
Dept: OBGYN CLINIC | Facility: CLINIC | Age: 40
End: 2020-02-18

## 2020-02-18 VITALS
SYSTOLIC BLOOD PRESSURE: 105 MMHG | WEIGHT: 153 LBS | DIASTOLIC BLOOD PRESSURE: 69 MMHG | HEART RATE: 75 BPM | HEIGHT: 62 IN | BODY MASS INDEX: 28.16 KG/M2

## 2020-02-18 DIAGNOSIS — O99.012 ANEMIA DURING PREGNANCY IN SECOND TRIMESTER: Primary | ICD-10-CM

## 2020-02-18 DIAGNOSIS — Z34.92 PRENATAL CARE IN SECOND TRIMESTER: ICD-10-CM

## 2020-02-18 PROBLEM — Z3A.23 23 WEEKS GESTATION OF PREGNANCY: Status: ACTIVE | Noted: 2020-01-22

## 2020-02-18 LAB
BASOPHILS # BLD AUTO: 0.02 THOUSANDS/ΜL (ref 0–0.1)
BASOPHILS NFR BLD AUTO: 0 % (ref 0–1)
EOSINOPHIL # BLD AUTO: 0.17 THOUSAND/ΜL (ref 0–0.61)
EOSINOPHIL NFR BLD AUTO: 2 % (ref 0–6)
ERYTHROCYTE [DISTWIDTH] IN BLOOD BY AUTOMATED COUNT: 24 % (ref 11.6–15.1)
HCT VFR BLD AUTO: 28.7 % (ref 34.8–46.1)
HGB BLD-MCNC: 8.1 G/DL (ref 11.5–15.4)
IMM GRANULOCYTES # BLD AUTO: 0.04 THOUSAND/UL (ref 0–0.2)
IMM GRANULOCYTES NFR BLD AUTO: 1 % (ref 0–2)
LYMPHOCYTES # BLD AUTO: 1.54 THOUSANDS/ΜL (ref 0.6–4.47)
LYMPHOCYTES NFR BLD AUTO: 21 % (ref 14–44)
MCH RBC QN AUTO: 19.8 PG (ref 26.8–34.3)
MCHC RBC AUTO-ENTMCNC: 28.2 G/DL (ref 31.4–37.4)
MCV RBC AUTO: 70 FL (ref 82–98)
MONOCYTES # BLD AUTO: 0.53 THOUSAND/ΜL (ref 0.17–1.22)
MONOCYTES NFR BLD AUTO: 7 % (ref 4–12)
NEUTROPHILS # BLD AUTO: 5.09 THOUSANDS/ΜL (ref 1.85–7.62)
NEUTS SEG NFR BLD AUTO: 69 % (ref 43–75)
NRBC BLD AUTO-RTO: 0 /100 WBCS
PLATELET # BLD AUTO: 487 THOUSANDS/UL (ref 149–390)
PMV BLD AUTO: 9 FL (ref 8.9–12.7)
RBC # BLD AUTO: 4.1 MILLION/UL (ref 3.81–5.12)
WBC # BLD AUTO: 7.39 THOUSAND/UL (ref 4.31–10.16)

## 2020-02-18 PROCEDURE — 99213 OFFICE O/P EST LOW 20 MIN: CPT | Performed by: OBSTETRICS & GYNECOLOGY

## 2020-02-18 PROCEDURE — T1015 CLINIC SERVICE: HCPCS | Performed by: OBSTETRICS & GYNECOLOGY

## 2020-02-18 PROCEDURE — 36415 COLL VENOUS BLD VENIPUNCTURE: CPT | Performed by: OBSTETRICS & GYNECOLOGY

## 2020-02-18 PROCEDURE — 85025 COMPLETE CBC W/AUTO DIFF WBC: CPT | Performed by: OBSTETRICS & GYNECOLOGY

## 2020-02-18 PROCEDURE — 86592 SYPHILIS TEST NON-TREP QUAL: CPT | Performed by: OBSTETRICS & GYNECOLOGY

## 2020-02-18 NOTE — PROGRESS NOTES
Pt is a 48yo  at 23w1d presenting for routine prenatal visit  She denies vaginal bleeding, loss of fluid, ctx  Reports fetal movement  Pt was complaining of cold symptoms at last visit, flu swab was negative  Pt reports feeling much better  She is going for IV iron infusions  Next infusion on Tuesday  Pt with A1GDM and monitor bood sugars  Reports fasting <95  2hr postprandial <120   She is scheduled to see MFM on 2020 and Kalina    1) Anemia during pregnancy  Last CBC : Hgb 7 2, currently receiving IV venofer infusions  CBC ordered today   Pt asymptomatic    2) A1GDM  Pt reports sugars mostly controlled, aside from one fasting this AM of 95  2hr postprandials have been <120  F/u with diabetic educator and send glucose logs as instructed    3) IUP at 23w1d  AMA: declined genetic screening  RPR ordered   Will need Tdap at next visit  F/u MFM US for fetal growth and missed anatomy on 2020  RTO in 4 weeks    Janelle Ngo MD  OBGYN, PGY-3  2020 11:24 AM

## 2020-02-19 LAB — RPR SER QL: NORMAL

## 2020-02-25 ENCOUNTER — ULTRASOUND (OUTPATIENT)
Dept: PERINATAL CARE | Facility: CLINIC | Age: 40
End: 2020-02-25
Payer: COMMERCIAL

## 2020-02-25 ENCOUNTER — HOSPITAL ENCOUNTER (OUTPATIENT)
Dept: INFUSION CENTER | Facility: HOSPITAL | Age: 40
Discharge: HOME | End: 2020-02-25
Payer: COMMERCIAL

## 2020-02-25 VITALS
HEIGHT: 62 IN | BODY MASS INDEX: 28.89 KG/M2 | SYSTOLIC BLOOD PRESSURE: 121 MMHG | HEART RATE: 89 BPM | WEIGHT: 157 LBS | DIASTOLIC BLOOD PRESSURE: 66 MMHG

## 2020-02-25 DIAGNOSIS — D50.9 MATERNAL IRON DEFICIENCY ANEMIA AFFECTING PREGNANCY, ANTEPARTUM, SECOND TRIMESTER: ICD-10-CM

## 2020-02-25 DIAGNOSIS — O99.012 MATERNAL IRON DEFICIENCY ANEMIA AFFECTING PREGNANCY, ANTEPARTUM, SECOND TRIMESTER: ICD-10-CM

## 2020-02-25 DIAGNOSIS — Z3A.23 23 WEEKS GESTATION OF PREGNANCY: Primary | ICD-10-CM

## 2020-02-25 DIAGNOSIS — O09.522 ELDERLY MULTIGRAVIDA, SECOND TRIMESTER: ICD-10-CM

## 2020-02-25 DIAGNOSIS — Z3A.24 24 WEEKS GESTATION OF PREGNANCY: ICD-10-CM

## 2020-02-25 DIAGNOSIS — O24.410 DIET CONTROLLED GESTATIONAL DIABETES MELLITUS (GDM) IN SECOND TRIMESTER: ICD-10-CM

## 2020-02-25 DIAGNOSIS — O24.410 DIET CONTROLLED GESTATIONAL DIABETES MELLITUS (GDM) IN SECOND TRIMESTER: Primary | ICD-10-CM

## 2020-02-25 PROCEDURE — 76816 OB US FOLLOW-UP PER FETUS: CPT | Performed by: OBSTETRICS & GYNECOLOGY

## 2020-02-25 PROCEDURE — 99213 OFFICE O/P EST LOW 20 MIN: CPT | Performed by: OBSTETRICS & GYNECOLOGY

## 2020-02-25 PROCEDURE — 96365 THER/PROPH/DIAG IV INF INIT: CPT

## 2020-02-25 RX ORDER — SODIUM CHLORIDE 9 MG/ML
20 INJECTION, SOLUTION INTRAVENOUS ONCE
Status: COMPLETED | OUTPATIENT
Start: 2020-02-25 | End: 2020-02-25

## 2020-02-25 RX ORDER — SODIUM CHLORIDE 9 MG/ML
20 INJECTION, SOLUTION INTRAVENOUS ONCE
Status: CANCELLED | OUTPATIENT
Start: 2020-02-28

## 2020-02-25 RX ADMIN — SODIUM CHLORIDE 20 ML/HR: 0.9 INJECTION, SOLUTION INTRAVENOUS at 11:42

## 2020-02-25 RX ADMIN — IRON SUCROSE 200 MG: 20 INJECTION, SOLUTION INTRAVENOUS at 11:41

## 2020-02-25 NOTE — LETTER
February 27, 2020     Zoe Casarez MD  [address] [facility] [address]    Patient: Sherrie Whitney   YOB: 1980   Date of Visit: 2/25/2020       Dear Dr Yadira Chapman: Thank you for referring Rupa Avendaño to me for evaluation  Below are my notes for this consultation  If you have questions, please do not hesitate to call me  I look forward to following your patient along with you  Sincerely,        Gem Polo MD        CC: No Recipients  Gem Polo MD  2/27/2020  2:53 PM  Sign at close encounter  The patient has no complaints today  Partner with her for translation  Offered language line which she declined while partner in the room  She reports fetal movements and denies vaginal bleeding  She is here today for growth scan in missed anatomy of the palate, left ventricular outflow tract and 3 vessel view  Problem list:  1  Advanced maternal age-declined genetic screening  2  GDM- is not reporting her blood sugars  Partner states he takes pictures of her blood sugar log and sends copy by [employer]  I asked to see his phone with the pictures of the blood sugar log book but he states he has a new phone and does not have the prior logs on this phone  Patient did not have her blood sugar book on her today  3  History of microcytic hypochromic anemia currently being treated by IV iron replacement therapy for hgb of 8 1    Ultrasound findings: The ultrasound today shows normal interval fetal growth and fluid, normal cervical length, and no malformations were detected  The prior missed views of the left ventricular outflow tract, three-vessel view and palate were seen today and appear normal     Pregnancy ultrasound has limitations and is unable to detect all forms of fetal congenital abnormalities  Recommend a follow-up ultrasound in 4 weeks for growth along with visit with diabetes education   Encouraged partner to discuss with Diabetes education about difficulty sending in her sugars  Patient brought her son to her appointment so the partner had to stay with her son  He saw pictures of his baby but not live  He will get a  for his son so he can be present for her scan next visit  Stressed the importance of us receiving her blood sugars  The majority of time ( greater than 50 percent) was spent on counseling and coordination of care with the patient and her family member  Approximately physician face-to-face time was 15 minutes      Jeremy Beck MD

## 2020-02-25 NOTE — PLAN OF CARE
Problem: Potential for Falls  Goal: Patient will remain free of falls  Description  INTERVENTIONS:  - Assess patient frequently for physical needs  -  Identify cognitive and physical deficits and behaviors that affect risk of falls    -  [place] fall precautions as indicated by assessment   - Educate patient/family on patient safety including physical limitations  - Instruct patient to call for assistance with activity based on assessment  - Modify environment to reduce risk of injury  - Consider OT/PT consult to assist with strengthening/mobility  Outcome: Progressing

## 2020-02-25 NOTE — PROGRESS NOTES
The patient has no complaints today  Partner with her for translation  Offered language line which she declined while partner in the room  She reports fetal movements and denies vaginal bleeding  She is here today for growth scan in missed anatomy of the palate, left ventricular outflow tract and 3 vessel view  Problem list:  1  Advanced maternal age-declined genetic screening  2  GDM- is not reporting her blood sugars  Partner states he takes pictures of her blood sugar log and sends copy by [employer]  I asked to see his phone with the pictures of the blood sugar log book but he states he has a new phone and does not have the prior logs on this phone  Patient did not have her blood sugar book on her today  3  History of microcytic hypochromic anemia currently being treated by IV iron replacement therapy for hgb of 8 1    Ultrasound findings: The ultrasound today shows normal interval fetal growth and fluid, normal cervical length, and no malformations were detected  The prior missed views of the left ventricular outflow tract, three-vessel view and palate were seen today and appear normal     Pregnancy ultrasound has limitations and is unable to detect all forms of fetal congenital abnormalities  Recommend a follow-up ultrasound in 4 weeks for growth along with visit with diabetes education  Encouraged partner to discuss with Diabetes education about difficulty sending in her sugars  Patient brought her son to her appointment so the partner had to stay with her son  He saw pictures of his baby but not live  He will get a  for his son so he can be present for her scan next visit  Stressed the importance of us receiving her blood sugars  The majority of time ( greater than 50 percent) was spent on counseling and coordination of care with the patient and her family member  Approximately physician face-to-face time was 15 minutes      Jesse Bateman MD

## 2020-03-03 ENCOUNTER — HOSPITAL ENCOUNTER (OUTPATIENT)
Dept: INFUSION CENTER | Facility: HOSPITAL | Age: 40
Discharge: HOME | End: 2020-03-03
Payer: COMMERCIAL

## 2020-03-03 ENCOUNTER — OFFICE VISIT (OUTPATIENT)
Dept: PERINATAL CARE | Facility: CLINIC | Age: 40
End: 2020-03-03
Payer: COMMERCIAL

## 2020-03-03 VITALS
TEMPERATURE: 97.7 F | DIASTOLIC BLOOD PRESSURE: 61 MMHG | RESPIRATION RATE: 18 BRPM | HEART RATE: 86 BPM | SYSTOLIC BLOOD PRESSURE: 120 MMHG

## 2020-03-03 VITALS
BODY MASS INDEX: 27.71 KG/M2 | HEART RATE: 74 BPM | HEIGHT: 63 IN | WEIGHT: 156.4 LBS | SYSTOLIC BLOOD PRESSURE: 110 MMHG | DIASTOLIC BLOOD PRESSURE: 72 MMHG

## 2020-03-03 DIAGNOSIS — O24.414 INSULIN CONTROLLED GESTATIONAL DIABETES MELLITUS (GDM) IN SECOND TRIMESTER: ICD-10-CM

## 2020-03-03 DIAGNOSIS — O24.410 DIET CONTROLLED GESTATIONAL DIABETES MELLITUS (GDM) IN SECOND TRIMESTER: ICD-10-CM

## 2020-03-03 DIAGNOSIS — O99.012 MATERNAL IRON DEFICIENCY ANEMIA AFFECTING PREGNANCY, ANTEPARTUM, SECOND TRIMESTER: ICD-10-CM

## 2020-03-03 DIAGNOSIS — Z3A.25 25 WEEKS GESTATION OF PREGNANCY: ICD-10-CM

## 2020-03-03 DIAGNOSIS — O24.419 GESTATIONAL DIABETES MELLITUS (GDM) IN FIRST TRIMESTER, GESTATIONAL DIABETES METHOD OF CONTROL UNSPECIFIED: ICD-10-CM

## 2020-03-03 DIAGNOSIS — Z3A.11 11 WEEKS GESTATION OF PREGNANCY: ICD-10-CM

## 2020-03-03 DIAGNOSIS — Z71.89 ENCOUNTER FOR INJECTION EDUCATION: ICD-10-CM

## 2020-03-03 DIAGNOSIS — Z3A.23 23 WEEKS GESTATION OF PREGNANCY: Primary | ICD-10-CM

## 2020-03-03 DIAGNOSIS — O99.810 ABNORMAL GLUCOSE TOLERANCE TEST (GTT) DURING PREGNANCY, ANTEPARTUM: ICD-10-CM

## 2020-03-03 DIAGNOSIS — O09.522 ELDERLY MULTIGRAVIDA, SECOND TRIMESTER: ICD-10-CM

## 2020-03-03 DIAGNOSIS — O99.810 HYPERGLYCEMIA DURING PREGNANCY: Primary | ICD-10-CM

## 2020-03-03 DIAGNOSIS — D50.9 MATERNAL IRON DEFICIENCY ANEMIA AFFECTING PREGNANCY, ANTEPARTUM, SECOND TRIMESTER: ICD-10-CM

## 2020-03-03 PROCEDURE — 99214 OFFICE O/P EST MOD 30 MIN: CPT | Performed by: NURSE PRACTITIONER

## 2020-03-03 PROCEDURE — 96365 THER/PROPH/DIAG IV INF INIT: CPT

## 2020-03-03 RX ORDER — SODIUM CHLORIDE 9 MG/ML
20 INJECTION, SOLUTION INTRAVENOUS ONCE
Status: CANCELLED | OUTPATIENT
Start: 2020-03-05

## 2020-03-03 RX ORDER — SODIUM CHLORIDE 9 MG/ML
20 INJECTION, SOLUTION INTRAVENOUS ONCE
Status: COMPLETED | OUTPATIENT
Start: 2020-03-03 | End: 2020-03-03

## 2020-03-03 RX ORDER — BLOOD SUGAR DIAGNOSTIC
STRIP MISCELLANEOUS
Qty: 100 EACH | Refills: 3 | Status: SHIPPED | OUTPATIENT
Start: 2020-03-03 | End: 2020-06-11 | Stop reason: HOSPADM

## 2020-03-03 RX ORDER — LANCETS
EACH MISCELLANEOUS
Qty: 102 EACH | Refills: 3 | Status: SHIPPED | OUTPATIENT
Start: 2020-03-03 | End: 2022-08-04

## 2020-03-03 RX ADMIN — IRON SUCROSE 200 MG: 20 INJECTION, SOLUTION INTRAVENOUS at 13:33

## 2020-03-03 RX ADMIN — SODIUM CHLORIDE 20 ML/HR: 0.9 INJECTION, SOLUTION INTRAVENOUS at 13:23

## 2020-03-03 NOTE — PROGRESS NOTES
Assessment/Plan:       Diagnoses and all orders for this visit:    Hyperglycemia during pregnancy  -     insulin glargine (Lantus SoloStar) 100 units/mL injection pen; Inject 10 Units under the skin daily at bedtime At 10 PM daily  To be titrated  -     Insulin Pen Needle 31G X 5 MM MISC; Inject under the skin daily at bedtime Use one a day or as directed  -     Accu-Chek FastClix Lancets MISC; Use 4 a day and as directed  -     ACCU-CHEK GUIDE test strip; Test 4 times a day and as instructed    Insulin controlled gestational diabetes mellitus (GDM) in second trimester  -     insulin glargine (Lantus SoloStar) 100 units/mL injection pen; Inject 10 Units under the skin daily at bedtime At 10 PM daily  To be titrated  -     Insulin Pen Needle 31G X 5 MM MISC; Inject under the skin daily at bedtime Use one a day or as directed  -     Accu-Chek FastClix Lancets MISC; Use 4 a day and as directed  -     ACCU-CHEK GUIDE test strip; Test 4 times a day and as instructed    25 weeks gestation of pregnancy  -     insulin glargine (Lantus SoloStar) 100 units/mL injection pen; Inject 10 Units under the skin daily at bedtime At 10 PM daily  To be titrated  -     Insulin Pen Needle 31G X 5 MM MISC; Inject under the skin daily at bedtime Use one a day or as directed  -     Accu-Chek FastClix Lancets MISC; Use 4 a day and as directed  -     ACCU-CHEK GUIDE test strip; Test 4 times a day and as instructed    Encounter for injection education  -     insulin glargine (Lantus SoloStar) 100 units/mL injection pen; Inject 10 Units under the skin daily at bedtime At 10 PM daily  To be titrated  -     Insulin Pen Needle 31G X 5 MM MISC; Inject under the skin daily at bedtime Use one a day or as directed  -     Accu-Chek FastClix Lancets MISC; Use 4 a day and as directed  -     ACCU-CHEK GUIDE test strip;  Test 4 times a day and as instructed    Abnormal glucose tolerance test (GTT) during pregnancy, antepartum    Gestational diabetes mellitus (GDM) in first trimester, gestational diabetes method of control unspecified    11 weeks gestation of pregnancy      1  Due to FBS>90 and 2 hours PP>120, start Lantus 10 units at 10 PM daily  Add 3 AM glucose check for next 3 days  2  Continue testing fasting, 2 hours post meal, before driving and with hypoglycemia  3  Always have glucose available to treat hypoglycemia  4  Try to follow GDM diet with 3 meals and 3 snacks including recommended combination of carb, protein and fat per meal/snack  5  No more than 8 to 10 hours of fasting overnight  6  Report on Friday, 3/6/20 or sooner if needed  7  Starting at 32 weeks gestation, NST twice a week and LENORE weekly  8  Continue follow up with your OBGYN and MFM as recommended  9  Follow up in 2 to 3 weeks  In Greenlandic discussed importance of maintaining glucose readings within recommended goals to decrease risk factors such as macrosomia,  hypoglycemia, increase in blood pressure, increase risk of , shoulder dystocia and stillbirth  Insulin pen education provided to  with returned demonstration without difficulty  · Insulin administration times, insulin action  · Hypoglycemia signs, symptoms and treatment  · Increase in maternal-fetal surveillance with insulin initiation        Subjective:      Patient ID: Sherrie Whitney is a 47 y o  female  [id number], HILLARY 6/15/20  Here for follow up GDM and to have glucose meter downloaded for review  Per [employer] is testing 0 to 4 times a day over the last 30 days  Reports trying to eat as recommended but at times having difficulties  Higher glucose readings noted on download  First seen by diabetes education team in November and last glucose report in December  Discussion with Iris and  regarding importance of testing glucose and reporting  Basal insulin discussed and Rupa has a fear of injecting self    will be provided with education per patient request         The following portions of the patient's history were reviewed and updated as appropriate: allergies, current medications and problem list     No Known Allergies  Current Outpatient Medications on File Prior to Visit   Medication Sig Dispense Refill    Prenatal Multivit-Min-Fe-FA (PRENATAL VITAMINS) 0 8 MG tablet Take 1 tablet by mouth daily 30 tablet 6    ferrous sulfate 324 (65 Fe) mg Take 1 tablet (324 mg total) by mouth 2 (two) times a day before meals (Patient not taking: Reported on 3/3/2020) 60 tablet 0     No current facility-administered medications on file prior to visit  Review of Systems   Constitutional: Positive for appetite change and fatigue  HENT: Negative for trouble swallowing  Eyes: Negative for visual disturbance  Respiratory: Negative for cough, shortness of breath and wheezing  Cardiovascular: Negative for chest pain, palpitations and leg swelling  Gastrointestinal: Positive for constipation  Negative for nausea and vomiting  Endocrine: Positive for polydipsia and polyuria  Negative for polyphagia  Genitourinary: Negative for difficulty urinating and vaginal bleeding  Skin: Negative for rash  Psychiatric/Behavioral: Positive for sleep disturbance  Objective:  Refer to attachment for glucose readings  Will need to rely on SMBG to guide treatment given iron deficiency anemia with current IV infusion  Unable to obtain A1c  Component Value Date/Time    HGBA1C 6 1 (H) 03/31/2015 1040      /72   Pulse 74   Ht 5' 3" (1 6 m)   Wt 70 9 kg (156 lb 6 4 oz)   LMP  (LMP Unknown)   Breastfeeding No   BMI 27 71 kg/m²        Physical Exam   Constitutional: She is oriented to person, place, and time  She appears well-developed and well-nourished  HENT:   Head: Normocephalic  Neck: Normal range of motion     Cardiovascular: Normal rate, regular rhythm, S1 normal and S2 normal    Pulmonary/Chest: Effort normal and breath sounds normal  Musculoskeletal: Normal range of motion  Neurological: She is alert and oriented to person, place, and time  Psychiatric: Her speech is normal and behavior is normal  Thought content normal  Her mood appears anxious  Time in:3:00 to 3:15 PM Counseled on importance of tight glucose control during pregnancy, discussed palmer with Dr Alejandro Torres  Time out:3:40 to 4:10 PM Insulin pen education provided

## 2020-03-03 NOTE — PROGRESS NOTES
Pt here for Venofer  Pt states (through ) that she has been getting lower back pain the day after Venofer infusions  No other symptoms noted, suggested pt call doctor tomorrow if she gets back pain again    Pt in agreement

## 2020-03-03 NOTE — PLAN OF CARE
Problem: Potential for Falls  Goal: Patient will remain free of falls  Description  INTERVENTIONS:  - Assess patient frequently for physical needs  -  Identify cognitive and physical deficits and behaviors that affect risk of falls    -  [place] fall precautions as indicated by assessment   - Educate patient/family on patient safety including physical limitations  - Instruct patient to call for assistance with activity based on assessment  - Modify environment to reduce risk of injury  - Consider OT/PT consult to assist with strengthening/mobility  Outcome: Progressing

## 2020-03-03 NOTE — PROGRESS NOTES
Pt tolerated treatment today without incident  Pt states that they come weekly vs what is ordered 3x/week based on work schedule   Appt made for next week, declined AVS

## 2020-03-03 NOTE — PATIENT INSTRUCTIONS
1  Due to FBS>90 and 2 hours PP>120, start Lantus 10 units at 10 PM daily  Add 3 AM glucose check for next 3 days  2  Continue testing fasting, 2 hours post meal, before driving and with hypoglycemia  3  Always have glucose available to treat hypoglycemia  4  Try to follow GDM diet with 3 meals and 3 snacks including recommended combination of carb, protein and fat per meal/snack  5  No more than 8 to 10 hours of fasting overnight  6  Report on Friday, 3/6/20 or sooner if needed  7  Starting at 32 weeks gestation, NST twice a week and LENORE weekly  8  Continue follow up with your OBGYN and MFM as recommended  9  Follow up in 2 to 3 weeks

## 2020-03-06 ENCOUNTER — TELEPHONE (OUTPATIENT)
Dept: PERINATAL CARE | Facility: CLINIC | Age: 40
End: 2020-03-06

## 2020-03-06 DIAGNOSIS — O24.414 INSULIN CONTROLLED GESTATIONAL DIABETES MELLITUS (GDM) IN SECOND TRIMESTER: Primary | ICD-10-CM

## 2020-03-06 NOTE — TELEPHONE ENCOUNTER
Ordered Basaglar insulin for this patient due to insurance unable to cover Lantus  Discontinued Lantus  Script sent to patients preferred pharmacy  Asked patient to go online and apply to [employer] for discounted insulin rate for the Basaglar

## 2020-03-09 DIAGNOSIS — O99.810 HYPERGLYCEMIA DURING PREGNANCY: Primary | ICD-10-CM

## 2020-03-09 DIAGNOSIS — O24.414 INSULIN CONTROLLED GESTATIONAL DIABETES MELLITUS (GDM) IN SECOND TRIMESTER: ICD-10-CM

## 2020-03-10 ENCOUNTER — HOSPITAL ENCOUNTER (OUTPATIENT)
Dept: INFUSION CENTER | Facility: HOSPITAL | Age: 40
Discharge: HOME | End: 2020-03-10
Payer: COMMERCIAL

## 2020-03-10 VITALS
RESPIRATION RATE: 18 BRPM | SYSTOLIC BLOOD PRESSURE: 120 MMHG | DIASTOLIC BLOOD PRESSURE: 70 MMHG | HEART RATE: 68 BPM | TEMPERATURE: 98.4 F

## 2020-03-10 DIAGNOSIS — O99.012 MATERNAL IRON DEFICIENCY ANEMIA AFFECTING PREGNANCY, ANTEPARTUM, SECOND TRIMESTER: ICD-10-CM

## 2020-03-10 DIAGNOSIS — Z71.89 ENCOUNTER FOR INJECTION EDUCATION: ICD-10-CM

## 2020-03-10 DIAGNOSIS — O24.414 INSULIN CONTROLLED GESTATIONAL DIABETES MELLITUS (GDM) IN SECOND TRIMESTER: ICD-10-CM

## 2020-03-10 DIAGNOSIS — O09.522 ELDERLY MULTIGRAVIDA, SECOND TRIMESTER: ICD-10-CM

## 2020-03-10 DIAGNOSIS — D50.9 MATERNAL IRON DEFICIENCY ANEMIA AFFECTING PREGNANCY, ANTEPARTUM, SECOND TRIMESTER: ICD-10-CM

## 2020-03-10 DIAGNOSIS — O99.810 HYPERGLYCEMIA DURING PREGNANCY: ICD-10-CM

## 2020-03-10 DIAGNOSIS — Z3A.25 25 WEEKS GESTATION OF PREGNANCY: Primary | ICD-10-CM

## 2020-03-10 PROCEDURE — 96365 THER/PROPH/DIAG IV INF INIT: CPT

## 2020-03-10 RX ORDER — SODIUM CHLORIDE 9 MG/ML
20 INJECTION, SOLUTION INTRAVENOUS ONCE
Status: CANCELLED | OUTPATIENT
Start: 2020-03-12

## 2020-03-10 RX ORDER — SODIUM CHLORIDE 9 MG/ML
20 INJECTION, SOLUTION INTRAVENOUS ONCE
Status: COMPLETED | OUTPATIENT
Start: 2020-03-10 | End: 2020-03-10

## 2020-03-10 RX ADMIN — SODIUM CHLORIDE 20 ML/HR: 0.9 INJECTION, SOLUTION INTRAVENOUS at 14:12

## 2020-03-10 RX ADMIN — IRON SUCROSE 200 MG: 20 INJECTION, SOLUTION INTRAVENOUS at 14:28

## 2020-03-10 NOTE — PLAN OF CARE
Problem: Potential for Falls  Goal: Patient will remain free of falls  Description  INTERVENTIONS:  - Assess patient frequently for physical needs  -  Identify cognitive and physical deficits and behaviors that affect risk of falls    -  [place] fall precautions as indicated by assessment   - Educate patient/family on patient safety including physical limitations  - Instruct patient to call for assistance with activity based on assessment  - Modify environment to reduce risk of injury  - Consider OT/PT consult to assist with strengthening/mobility  Outcome: Progressing

## 2020-03-10 NOTE — PROGRESS NOTES
Pt tolerated venofer infusion well  Declined AVS, directed to  for future appointment scheduling  Ambulated off unit independently

## 2020-03-17 ENCOUNTER — HOSPITAL ENCOUNTER (OUTPATIENT)
Dept: INFUSION CENTER | Facility: HOSPITAL | Age: 40
Discharge: HOME | End: 2020-03-17
Payer: COMMERCIAL

## 2020-03-17 ENCOUNTER — ROUTINE PRENATAL (OUTPATIENT)
Dept: OBGYN CLINIC | Facility: CLINIC | Age: 40
End: 2020-03-17

## 2020-03-17 VITALS
DIASTOLIC BLOOD PRESSURE: 68 MMHG | HEART RATE: 76 BPM | TEMPERATURE: 97.6 F | SYSTOLIC BLOOD PRESSURE: 102 MMHG | RESPIRATION RATE: 18 BRPM

## 2020-03-17 VITALS
BODY MASS INDEX: 29.44 KG/M2 | HEIGHT: 62 IN | SYSTOLIC BLOOD PRESSURE: 118 MMHG | HEART RATE: 74 BPM | WEIGHT: 160 LBS | DIASTOLIC BLOOD PRESSURE: 77 MMHG

## 2020-03-17 DIAGNOSIS — Z34.92 SECOND TRIMESTER PREGNANCY: ICD-10-CM

## 2020-03-17 DIAGNOSIS — Z71.89 ENCOUNTER FOR INJECTION EDUCATION: ICD-10-CM

## 2020-03-17 DIAGNOSIS — O09.522 ELDERLY MULTIGRAVIDA, SECOND TRIMESTER: ICD-10-CM

## 2020-03-17 DIAGNOSIS — Z3A.27 27 WEEKS GESTATION OF PREGNANCY: ICD-10-CM

## 2020-03-17 DIAGNOSIS — R82.71 GBS BACTERIURIA: Primary | ICD-10-CM

## 2020-03-17 DIAGNOSIS — O09.522 ADVANCED MATERNAL AGE IN MULTIGRAVIDA, SECOND TRIMESTER: ICD-10-CM

## 2020-03-17 DIAGNOSIS — Z3A.25 25 WEEKS GESTATION OF PREGNANCY: Primary | ICD-10-CM

## 2020-03-17 DIAGNOSIS — D50.9 MATERNAL IRON DEFICIENCY ANEMIA AFFECTING PREGNANCY, ANTEPARTUM, SECOND TRIMESTER: ICD-10-CM

## 2020-03-17 DIAGNOSIS — O99.012 ANEMIA AFFECTING PREGNANCY IN SECOND TRIMESTER: ICD-10-CM

## 2020-03-17 DIAGNOSIS — O99.012 MATERNAL IRON DEFICIENCY ANEMIA AFFECTING PREGNANCY, ANTEPARTUM, SECOND TRIMESTER: ICD-10-CM

## 2020-03-17 DIAGNOSIS — O99.810 HYPERGLYCEMIA DURING PREGNANCY: ICD-10-CM

## 2020-03-17 DIAGNOSIS — O24.414 INSULIN CONTROLLED GESTATIONAL DIABETES MELLITUS (GDM) IN SECOND TRIMESTER: ICD-10-CM

## 2020-03-17 PROCEDURE — 99213 OFFICE O/P EST LOW 20 MIN: CPT | Performed by: OBSTETRICS & GYNECOLOGY

## 2020-03-17 PROCEDURE — 96365 THER/PROPH/DIAG IV INF INIT: CPT

## 2020-03-17 RX ORDER — SODIUM CHLORIDE 9 MG/ML
20 INJECTION, SOLUTION INTRAVENOUS ONCE
Status: CANCELLED | OUTPATIENT
Start: 2020-03-19

## 2020-03-17 RX ORDER — SODIUM CHLORIDE 9 MG/ML
20 INJECTION, SOLUTION INTRAVENOUS ONCE
Status: COMPLETED | OUTPATIENT
Start: 2020-03-17 | End: 2020-03-17

## 2020-03-17 RX ADMIN — IRON SUCROSE 200 MG: 20 INJECTION, SOLUTION INTRAVENOUS at 15:16

## 2020-03-17 RX ADMIN — SODIUM CHLORIDE 20 ML/HR: 0.9 INJECTION, SOLUTION INTRAVENOUS at 15:16

## 2020-03-17 NOTE — PLAN OF CARE
Problem: Potential for Falls  Goal: Patient will remain free of falls  Description  INTERVENTIONS:  - Assess patient frequently for physical needs  -  Identify cognitive and physical deficits and behaviors that affect risk of falls    -  [place] fall precautions as indicated by assessment   - Educate patient/family on patient safety including physical limitations  - Instruct patient to call for assistance with activity based on assessment  - Modify environment to reduce risk of injury  - Consider OT/PT consult to assist with strengthening/mobility  Outcome: Progressing

## 2020-03-17 NOTE — PROGRESS NOTES
OB/GYN  PN Visit  Mercedes Carrasco  [id number]  3/17/2020  1:41 PM  Dr Stacie Nogueira MD    S: 47 y o  [id number] 27w1d here for PN visit  She reports that she is doing well  She is here today with her FOB [facility]   She denies regular contractions, loss of fluid, vaginal bleeding or decrease fetal movement  O:  Vitals:    20 1322   BP: 118/77   Pulse: 74       Fundal Height: 28  FHR: 140s    A/P:  1  Pregnancy 27w1d Gestation  -Continue routine prenatal care  -Labor precautions reviewed  -Fetal kick counts reviewed  -RTC in 3 weeks  2  A1GDM   - [facility]   -Fasting BS have been in the 90s, patient states that she will likely be started on insulin, however she can not afford it at this time because she is waiting for insurance approval   -Will need  surveillance between 34-36 weeks  3  Contraception   -patient would like to use OCPs after delivery  She had an IUD previously and states that it came out and she does not want another one  She has also tried Depo but did not like it because of the affects on her menstrual cycle  4  Iron deficiency Anemia   -currently receiving weekly Venofer infusions, last hemoglobin was 8 1 on     Repeat CBC slip provided      Future Appointments   Date Time Provider Chris Pike   3/17/2020  2:00 PM BE INF CHAIR [facility]   3/24/2020  2:00 PM BE INF CHAIR 5 BE Infusion BE [facility]   3/31/2020 11:00 AM   [address]   3/31/2020  2:00 PM BE INF CHAIR 2 [facility] Infusion BE H[facility]           Stacie Nogueira MD  3/17/2020  1:41 PM

## 2020-03-24 ENCOUNTER — HOSPITAL ENCOUNTER (OUTPATIENT)
Dept: INFUSION CENTER | Facility: HOSPITAL | Age: 40
Discharge: HOME | End: 2020-03-24
Payer: COMMERCIAL

## 2020-03-24 VITALS
TEMPERATURE: 98 F | HEART RATE: 82 BPM | SYSTOLIC BLOOD PRESSURE: 110 MMHG | RESPIRATION RATE: 16 BRPM | DIASTOLIC BLOOD PRESSURE: 57 MMHG

## 2020-03-24 DIAGNOSIS — O09.522 ELDERLY MULTIGRAVIDA, SECOND TRIMESTER: ICD-10-CM

## 2020-03-24 DIAGNOSIS — O99.810 HYPERGLYCEMIA DURING PREGNANCY: ICD-10-CM

## 2020-03-24 DIAGNOSIS — O24.414 INSULIN CONTROLLED GESTATIONAL DIABETES MELLITUS (GDM) IN SECOND TRIMESTER: ICD-10-CM

## 2020-03-24 DIAGNOSIS — D50.9 MATERNAL IRON DEFICIENCY ANEMIA AFFECTING PREGNANCY, ANTEPARTUM, SECOND TRIMESTER: ICD-10-CM

## 2020-03-24 DIAGNOSIS — Z3A.25 25 WEEKS GESTATION OF PREGNANCY: Primary | ICD-10-CM

## 2020-03-24 DIAGNOSIS — Z71.89 ENCOUNTER FOR INJECTION EDUCATION: ICD-10-CM

## 2020-03-24 DIAGNOSIS — O99.012 MATERNAL IRON DEFICIENCY ANEMIA AFFECTING PREGNANCY, ANTEPARTUM, SECOND TRIMESTER: ICD-10-CM

## 2020-03-24 PROCEDURE — 96365 THER/PROPH/DIAG IV INF INIT: CPT

## 2020-03-24 RX ORDER — SODIUM CHLORIDE 9 MG/ML
20 INJECTION, SOLUTION INTRAVENOUS ONCE
Status: CANCELLED | OUTPATIENT
Start: 2020-03-26

## 2020-03-24 RX ORDER — SODIUM CHLORIDE 9 MG/ML
20 INJECTION, SOLUTION INTRAVENOUS ONCE
Status: COMPLETED | OUTPATIENT
Start: 2020-03-24 | End: 2020-03-24

## 2020-03-24 RX ADMIN — SODIUM CHLORIDE 20 ML/HR: 0.9 INJECTION, SOLUTION INTRAVENOUS at 14:20

## 2020-03-24 RX ADMIN — IRON SUCROSE 200 MG: 20 INJECTION, SOLUTION INTRAVENOUS at 14:38

## 2020-03-30 ENCOUNTER — TELEPHONE (OUTPATIENT)
Dept: PERINATAL CARE | Facility: CLINIC | Age: 40
End: 2020-03-30

## 2020-03-30 NOTE — TELEPHONE ENCOUNTER
L/M WITH COLLETTE REGARDING APPT TOMORROW  GAVE INSTRUCTIONS AND HE GWENDOLYN HE WILL CALL AND LET US KNOW IF THEY ARE COMING TO THE APPT

## 2020-03-31 ENCOUNTER — ULTRASOUND (OUTPATIENT)
Dept: PERINATAL CARE | Facility: CLINIC | Age: 40
End: 2020-03-31
Payer: COMMERCIAL

## 2020-03-31 ENCOUNTER — DOCUMENTATION (OUTPATIENT)
Dept: PERINATAL CARE | Facility: CLINIC | Age: 40
End: 2020-03-31

## 2020-03-31 VITALS
SYSTOLIC BLOOD PRESSURE: 111 MMHG | HEART RATE: 76 BPM | BODY MASS INDEX: 29.63 KG/M2 | HEIGHT: 62 IN | DIASTOLIC BLOOD PRESSURE: 74 MMHG | WEIGHT: 161 LBS

## 2020-03-31 DIAGNOSIS — Z3A.29 29 WEEKS GESTATION OF PREGNANCY: Primary | ICD-10-CM

## 2020-03-31 DIAGNOSIS — O24.419 GDM, CLASS A2: ICD-10-CM

## 2020-03-31 DIAGNOSIS — O99.810 HYPERGLYCEMIA DURING PREGNANCY: ICD-10-CM

## 2020-03-31 PROCEDURE — 76816 OB US FOLLOW-UP PER FETUS: CPT | Performed by: OBSTETRICS & GYNECOLOGY

## 2020-03-31 PROCEDURE — 99212 OFFICE O/P EST SF 10 MIN: CPT | Performed by: OBSTETRICS & GYNECOLOGY

## 2020-03-31 NOTE — PROGRESS NOTES
[employer] interpretor Camille Carrel # [id number]) used for check in process, including allergies and medication

## 2020-03-31 NOTE — PROGRESS NOTES
Date:  20  RE: Sebastián Avendaño    : 1980  Estimated Date of Delivery: 6/15/20  EGA: 29w1d  OB/GYN: [facility]  Discussed case with Dr Paul Robertson today following ultrasound  Patient has gained 16 pounds to date and was advised to gain no more than 9 pounds based on prepregnancy BMI and IOM guidelines  Patient has not reported blood sugars  Patient stated she left blood sugars at home and did not have her meter to review memory  Overall patient stated her fasting blood glucose was 114 mg/dl this morning following an 8-10 hr fast; and is unsure of after meal blood sugars  She is testing SMBG 4 times per day;fasting and 2 hrs pp  Patient reports she was unable to start insulin due to cost  Patient has applied for insurance but at this point is self-pay  Current regimen:  1900 calorie GDM diet; 3 meals and 3 snacks   SMBG 4 times per day with an Accu-Chek Guide blood glucose meter; FBG and 2 hrs pp  Patient did not initiate Lantus- 10 units at 10 PM as advised on 3/3/20  Noted isnulin order change to Basaglar on 3/6/20  Plan:  Insulin vial and syringe education was provided today with  #[id number]  Education materials were in [address] [facility]  Bengali hypoglycemia s/s and treatment was also reviewed  Insulin dose was not provided today because patient has not reported blood sugars  Advised patient to call Melina Levin to report blood sugars in Azeri  Nurse practitioner to instruct patient of starting dose  Metformin was also discussed  Continue SMBG 4 times per day  Continue meal plan consisting of 3 meals and 3 snacks, including protein at each  Nausea has improved  If okay by OB, walk 20-30 minutes daily  Patient is aware that medication may need to be added to regimen  Patient reported that they have now completed Access paper work and will hand this in today    3/31/20 US: Fetal growth appeared normal; AC 95th%tile for age and EFW 89th% for age, LENORE normal  Patient to be scheduled for follow up US in 3-4 weeks  Patient will also need to complete HbA1c,CMP  Date due to report next:  Patient agreed to report blood sugars in Welsh to ISIDRO Kaufman at [phone] when she returns home today  Patient understands that insulin starting dose will provided following report        [address] [place] [address]  Diabetes Educator   [facility]

## 2020-04-01 ENCOUNTER — TELEPHONE (OUTPATIENT)
Dept: PERINATAL CARE | Facility: CLINIC | Age: 40
End: 2020-04-01

## 2020-04-01 DIAGNOSIS — Z3A.29 29 WEEKS GESTATION OF PREGNANCY: ICD-10-CM

## 2020-04-01 DIAGNOSIS — O99.810 HYPERGLYCEMIA IN PREGNANCY: Primary | ICD-10-CM

## 2020-04-01 RX ORDER — SYRINGE-NEEDLE,INSULIN,0.5 ML 28GX1/2"
SYRINGE, EMPTY DISPOSABLE MISCELLANEOUS
Qty: 100 EACH | Refills: 0 | Status: SHIPPED | OUTPATIENT
Start: 2020-04-01 | End: 2020-06-03 | Stop reason: SDUPTHER

## 2020-04-02 ENCOUNTER — TELEPHONE (OUTPATIENT)
Dept: PERINATAL CARE | Facility: CLINIC | Age: 40
End: 2020-04-02

## 2020-04-06 ENCOUNTER — TELEMEDICINE (OUTPATIENT)
Dept: OBGYN CLINIC | Facility: CLINIC | Age: 40
End: 2020-04-06

## 2020-04-06 DIAGNOSIS — Z3A.30 30 WEEKS GESTATION OF PREGNANCY: Primary | ICD-10-CM

## 2020-04-06 DIAGNOSIS — D50.9 MATERNAL IRON DEFICIENCY ANEMIA AFFECTING PREGNANCY, ANTEPARTUM, SECOND TRIMESTER: ICD-10-CM

## 2020-04-06 DIAGNOSIS — O99.012 MATERNAL IRON DEFICIENCY ANEMIA AFFECTING PREGNANCY, ANTEPARTUM, SECOND TRIMESTER: ICD-10-CM

## 2020-04-06 DIAGNOSIS — O24.419 GDM, CLASS A2: ICD-10-CM

## 2020-04-06 DIAGNOSIS — D50.9 IRON DEFICIENCY ANEMIA, UNSPECIFIED IRON DEFICIENCY ANEMIA TYPE: ICD-10-CM

## 2020-04-06 PROCEDURE — G2012 BRIEF CHECK IN BY MD/QHP: HCPCS | Performed by: OBSTETRICS & GYNECOLOGY

## 2020-04-06 RX ORDER — FERROUS SULFATE TAB EC 324 MG (65 MG FE EQUIVALENT) 324 (65 FE) MG
324 TABLET DELAYED RESPONSE ORAL
Qty: 60 TABLET | Refills: 0 | Status: SHIPPED | OUTPATIENT
Start: 2020-04-06 | End: 2022-06-01 | Stop reason: SDUPTHER

## 2020-04-06 RX ORDER — FERROUS SULFATE TAB EC 324 MG (65 MG FE EQUIVALENT) 324 (65 FE) MG
324 TABLET DELAYED RESPONSE ORAL
Qty: 30 TABLET | Refills: 1 | Status: SHIPPED | OUTPATIENT
Start: 2020-04-06 | End: 2020-04-09 | Stop reason: SDUPTHER

## 2020-04-07 ENCOUNTER — APPOINTMENT (OUTPATIENT)
Dept: LAB | Facility: HOSPITAL | Age: 40
End: 2020-04-07
Payer: COMMERCIAL

## 2020-04-07 LAB
BASOPHILS # BLD AUTO: 0.02 THOUSANDS/ΜL (ref 0–0.1)
BASOPHILS NFR BLD AUTO: 0 % (ref 0–1)
EOSINOPHIL # BLD AUTO: 0.12 THOUSAND/ΜL (ref 0–0.61)
EOSINOPHIL NFR BLD AUTO: 2 % (ref 0–6)
ERYTHROCYTE [DISTWIDTH] IN BLOOD BY AUTOMATED COUNT: 28.6 % (ref 11.6–15.1)
HCT VFR BLD AUTO: 34.6 % (ref 34.8–46.1)
HGB BLD-MCNC: 10.6 G/DL (ref 11.5–15.4)
IMM GRANULOCYTES # BLD AUTO: 0.02 THOUSAND/UL (ref 0–0.2)
IMM GRANULOCYTES NFR BLD AUTO: 0 % (ref 0–2)
LYMPHOCYTES # BLD AUTO: 1.6 THOUSANDS/ΜL (ref 0.6–4.47)
LYMPHOCYTES NFR BLD AUTO: 20 % (ref 14–44)
MCH RBC QN AUTO: 24.1 PG (ref 26.8–34.3)
MCHC RBC AUTO-ENTMCNC: 30.6 G/DL (ref 31.4–37.4)
MCV RBC AUTO: 79 FL (ref 82–98)
MONOCYTES # BLD AUTO: 0.58 THOUSAND/ΜL (ref 0.17–1.22)
MONOCYTES NFR BLD AUTO: 7 % (ref 4–12)
NEUTROPHILS # BLD AUTO: 5.68 THOUSANDS/ΜL (ref 1.85–7.62)
NEUTS SEG NFR BLD AUTO: 71 % (ref 43–75)
NRBC BLD AUTO-RTO: 0 /100 WBCS
PLATELET # BLD AUTO: 391 THOUSANDS/UL (ref 149–390)
PMV BLD AUTO: 9.3 FL (ref 8.9–12.7)
RBC # BLD AUTO: 4.39 MILLION/UL (ref 3.81–5.12)
WBC # BLD AUTO: 8.02 THOUSAND/UL (ref 4.31–10.16)

## 2020-04-07 PROCEDURE — 36415 COLL VENOUS BLD VENIPUNCTURE: CPT | Performed by: OBSTETRICS & GYNECOLOGY

## 2020-04-07 PROCEDURE — 85025 COMPLETE CBC W/AUTO DIFF WBC: CPT | Performed by: OBSTETRICS & GYNECOLOGY

## 2020-04-09 ENCOUNTER — DOCUMENTATION (OUTPATIENT)
Dept: PERINATAL CARE | Facility: CLINIC | Age: 40
End: 2020-04-09

## 2020-04-09 ENCOUNTER — TELEMEDICINE (OUTPATIENT)
Dept: PERINATAL CARE | Facility: CLINIC | Age: 40
End: 2020-04-09

## 2020-04-09 DIAGNOSIS — Z3A.30 30 WEEKS GESTATION OF PREGNANCY: ICD-10-CM

## 2020-04-09 DIAGNOSIS — O99.810 HYPERGLYCEMIA DURING PREGNANCY: Primary | ICD-10-CM

## 2020-04-09 DIAGNOSIS — O99.013 ANEMIA AFFECTING PREGNANCY IN THIRD TRIMESTER: ICD-10-CM

## 2020-04-09 DIAGNOSIS — O09.523 ELDERLY MULTIGRAVIDA IN THIRD TRIMESTER: ICD-10-CM

## 2020-04-09 DIAGNOSIS — O24.414 INSULIN CONTROLLED GESTATIONAL DIABETES MELLITUS (GDM) IN THIRD TRIMESTER: ICD-10-CM

## 2020-04-09 PROCEDURE — G2012 BRIEF CHECK IN BY MD/QHP: HCPCS | Performed by: NURSE PRACTITIONER

## 2020-04-14 ENCOUNTER — DOCUMENTATION (OUTPATIENT)
Dept: PERINATAL CARE | Facility: CLINIC | Age: 40
End: 2020-04-14

## 2020-04-30 ENCOUNTER — ROUTINE PRENATAL (OUTPATIENT)
Dept: OBGYN CLINIC | Facility: CLINIC | Age: 40
End: 2020-04-30

## 2020-04-30 VITALS
TEMPERATURE: 98.1 F | WEIGHT: 165 LBS | DIASTOLIC BLOOD PRESSURE: 79 MMHG | BODY MASS INDEX: 30.36 KG/M2 | SYSTOLIC BLOOD PRESSURE: 118 MMHG | HEIGHT: 62 IN | HEART RATE: 78 BPM

## 2020-04-30 DIAGNOSIS — R82.71 GBS BACTERIURIA: ICD-10-CM

## 2020-04-30 DIAGNOSIS — O99.013 ANEMIA DURING PREGNANCY IN THIRD TRIMESTER: ICD-10-CM

## 2020-04-30 DIAGNOSIS — O24.414 INSULIN CONTROLLED GESTATIONAL DIABETES MELLITUS (GDM) IN THIRD TRIMESTER: Primary | ICD-10-CM

## 2020-04-30 DIAGNOSIS — O09.523 ELDERLY MULTIGRAVIDA IN THIRD TRIMESTER: ICD-10-CM

## 2020-04-30 DIAGNOSIS — Z3A.33 33 WEEKS GESTATION OF PREGNANCY: ICD-10-CM

## 2020-04-30 PROBLEM — Z34.92 SECOND TRIMESTER PREGNANCY: Status: RESOLVED | Noted: 2020-03-17 | Resolved: 2020-04-30

## 2020-04-30 PROBLEM — Z3A.27 27 WEEKS GESTATION OF PREGNANCY: Status: RESOLVED | Noted: 2020-03-17 | Resolved: 2020-04-30

## 2020-04-30 LAB
SL AMB  POCT GLUCOSE, UA: NEGATIVE
SL AMB POCT URINE PROTEIN: NORMAL

## 2020-04-30 PROCEDURE — 81002 URINALYSIS NONAUTO W/O SCOPE: CPT | Performed by: NURSE PRACTITIONER

## 2020-04-30 PROCEDURE — 99213 OFFICE O/P EST LOW 20 MIN: CPT | Performed by: NURSE PRACTITIONER

## 2020-04-30 RX ORDER — SYRINGE-NEEDLE,INSULIN,0.5 ML 31 GX5/16"
SYRINGE, EMPTY DISPOSABLE MISCELLANEOUS
COMMUNITY
Start: 2020-04-01 | End: 2020-06-03 | Stop reason: SDUPTHER

## 2020-05-02 DIAGNOSIS — Z3A.29 29 WEEKS GESTATION OF PREGNANCY: ICD-10-CM

## 2020-05-02 DIAGNOSIS — O99.810 HYPERGLYCEMIA IN PREGNANCY: ICD-10-CM

## 2020-05-04 ENCOUNTER — TELEPHONE (OUTPATIENT)
Dept: PERINATAL CARE | Facility: CLINIC | Age: 40
End: 2020-05-04

## 2020-05-04 DIAGNOSIS — K21.9 GASTROESOPHAGEAL REFLUX DISEASE WITHOUT ESOPHAGITIS: Primary | ICD-10-CM

## 2020-05-04 RX ORDER — FAMOTIDINE 20 MG/1
20 TABLET, FILM COATED ORAL 2 TIMES DAILY
Qty: 60 TABLET | Refills: 1 | Status: SHIPPED | OUTPATIENT
Start: 2020-05-04 | End: 2020-05-07 | Stop reason: SDUPTHER

## 2020-05-05 ENCOUNTER — ULTRASOUND (OUTPATIENT)
Dept: PERINATAL CARE | Facility: CLINIC | Age: 40
End: 2020-05-05
Payer: COMMERCIAL

## 2020-05-05 ENCOUNTER — DOCUMENTATION (OUTPATIENT)
Dept: PERINATAL CARE | Facility: CLINIC | Age: 40
End: 2020-05-05

## 2020-05-05 VITALS
SYSTOLIC BLOOD PRESSURE: 127 MMHG | HEIGHT: 62 IN | DIASTOLIC BLOOD PRESSURE: 78 MMHG | HEART RATE: 85 BPM | BODY MASS INDEX: 31.21 KG/M2 | WEIGHT: 169.6 LBS | TEMPERATURE: 98.3 F

## 2020-05-05 DIAGNOSIS — R11.0 NAUSEA: ICD-10-CM

## 2020-05-05 DIAGNOSIS — Z3A.34 34 WEEKS GESTATION OF PREGNANCY: ICD-10-CM

## 2020-05-05 DIAGNOSIS — D50.9 IRON DEFICIENCY ANEMIA, UNSPECIFIED IRON DEFICIENCY ANEMIA TYPE: ICD-10-CM

## 2020-05-05 DIAGNOSIS — O24.414 INSULIN CONTROLLED GESTATIONAL DIABETES MELLITUS (GDM) IN THIRD TRIMESTER: ICD-10-CM

## 2020-05-05 DIAGNOSIS — Z36.89 ENCOUNTER FOR ULTRASOUND TO ASSESS FETAL GROWTH: Primary | ICD-10-CM

## 2020-05-05 DIAGNOSIS — O09.523 ELDERLY MULTIGRAVIDA IN THIRD TRIMESTER: ICD-10-CM

## 2020-05-05 DIAGNOSIS — R82.71 GBS BACTERIURIA: ICD-10-CM

## 2020-05-05 DIAGNOSIS — Z71.89 ENCOUNTER FOR INJECTION EDUCATION: ICD-10-CM

## 2020-05-05 DIAGNOSIS — O99.810 HYPERGLYCEMIA DURING PREGNANCY: ICD-10-CM

## 2020-05-05 DIAGNOSIS — O24.419 GDM, CLASS A2: ICD-10-CM

## 2020-05-05 PROCEDURE — 59025 FETAL NON-STRESS TEST: CPT | Performed by: OBSTETRICS & GYNECOLOGY

## 2020-05-05 PROCEDURE — 99214 OFFICE O/P EST MOD 30 MIN: CPT | Performed by: OBSTETRICS & GYNECOLOGY

## 2020-05-05 PROCEDURE — 76816 OB US FOLLOW-UP PER FETUS: CPT | Performed by: OBSTETRICS & GYNECOLOGY

## 2020-05-06 ENCOUNTER — DOCUMENTATION (OUTPATIENT)
Dept: PERINATAL CARE | Facility: CLINIC | Age: 40
End: 2020-05-06

## 2020-05-07 DIAGNOSIS — K21.9 GASTROESOPHAGEAL REFLUX DISEASE WITHOUT ESOPHAGITIS: ICD-10-CM

## 2020-05-07 RX ORDER — FAMOTIDINE 20 MG/1
20 TABLET, FILM COATED ORAL 2 TIMES DAILY
Qty: 60 TABLET | Refills: 0 | Status: SHIPPED | OUTPATIENT
Start: 2020-05-07 | End: 2020-06-03 | Stop reason: SDUPTHER

## 2020-05-11 ENCOUNTER — TELEPHONE (OUTPATIENT)
Dept: PERINATAL CARE | Facility: OTHER | Age: 40
End: 2020-05-11

## 2020-05-11 ENCOUNTER — TELEPHONE (OUTPATIENT)
Dept: PERINATAL CARE | Facility: CLINIC | Age: 40
End: 2020-05-11

## 2020-05-12 ENCOUNTER — ROUTINE PRENATAL (OUTPATIENT)
Dept: PERINATAL CARE | Facility: CLINIC | Age: 40
End: 2020-05-12
Payer: COMMERCIAL

## 2020-05-12 ENCOUNTER — ULTRASOUND (OUTPATIENT)
Dept: PERINATAL CARE | Facility: CLINIC | Age: 40
End: 2020-05-12
Payer: COMMERCIAL

## 2020-05-12 ENCOUNTER — DOCUMENTATION (OUTPATIENT)
Dept: PERINATAL CARE | Facility: CLINIC | Age: 40
End: 2020-05-12

## 2020-05-12 VITALS — BODY MASS INDEX: 31.02 KG/M2 | HEIGHT: 62 IN

## 2020-05-12 VITALS
TEMPERATURE: 97.2 F | SYSTOLIC BLOOD PRESSURE: 124 MMHG | HEIGHT: 62 IN | BODY MASS INDEX: 31.27 KG/M2 | DIASTOLIC BLOOD PRESSURE: 72 MMHG | HEART RATE: 87 BPM | WEIGHT: 169.9 LBS

## 2020-05-12 DIAGNOSIS — O09.523 ELDERLY MULTIGRAVIDA IN THIRD TRIMESTER: ICD-10-CM

## 2020-05-12 DIAGNOSIS — Z3A.35 35 WEEKS GESTATION OF PREGNANCY: ICD-10-CM

## 2020-05-12 DIAGNOSIS — O24.414 INSULIN CONTROLLED GESTATIONAL DIABETES MELLITUS (GDM) IN THIRD TRIMESTER: ICD-10-CM

## 2020-05-12 DIAGNOSIS — O99.810 HYPERGLYCEMIA DURING PREGNANCY: Primary | ICD-10-CM

## 2020-05-12 DIAGNOSIS — O24.414 INSULIN CONTROLLED GESTATIONAL DIABETES MELLITUS (GDM) IN THIRD TRIMESTER: Primary | ICD-10-CM

## 2020-05-12 DIAGNOSIS — O24.419 GDM, CLASS A2: Primary | ICD-10-CM

## 2020-05-12 PROCEDURE — 76815 OB US LIMITED FETUS(S): CPT | Performed by: OBSTETRICS & GYNECOLOGY

## 2020-05-12 PROCEDURE — 59025 FETAL NON-STRESS TEST: CPT | Performed by: OBSTETRICS & GYNECOLOGY

## 2020-05-12 PROCEDURE — NC001 PR NO CHARGE: Performed by: OBSTETRICS & GYNECOLOGY

## 2020-05-18 DIAGNOSIS — O99.810 HYPERGLYCEMIA IN PREGNANCY: ICD-10-CM

## 2020-05-18 DIAGNOSIS — Z3A.29 29 WEEKS GESTATION OF PREGNANCY: ICD-10-CM

## 2020-05-19 ENCOUNTER — ULTRASOUND (OUTPATIENT)
Dept: PERINATAL CARE | Facility: CLINIC | Age: 40
End: 2020-05-19
Payer: COMMERCIAL

## 2020-05-19 ENCOUNTER — DOCUMENTATION (OUTPATIENT)
Dept: PERINATAL CARE | Facility: CLINIC | Age: 40
End: 2020-05-19

## 2020-05-19 VITALS
BODY MASS INDEX: 31.47 KG/M2 | HEART RATE: 92 BPM | DIASTOLIC BLOOD PRESSURE: 89 MMHG | HEIGHT: 62 IN | TEMPERATURE: 96.4 F | SYSTOLIC BLOOD PRESSURE: 139 MMHG | WEIGHT: 171 LBS

## 2020-05-19 DIAGNOSIS — Z3A.36 36 WEEKS GESTATION OF PREGNANCY: ICD-10-CM

## 2020-05-19 DIAGNOSIS — D50.8 IRON DEFICIENCY ANEMIA SECONDARY TO INADEQUATE DIETARY IRON INTAKE: ICD-10-CM

## 2020-05-19 DIAGNOSIS — O24.414 INSULIN CONTROLLED GESTATIONAL DIABETES MELLITUS (GDM) IN THIRD TRIMESTER: Primary | ICD-10-CM

## 2020-05-19 DIAGNOSIS — O09.523 ELDERLY MULTIGRAVIDA IN THIRD TRIMESTER: ICD-10-CM

## 2020-05-19 PROCEDURE — 59025 FETAL NON-STRESS TEST: CPT | Performed by: OBSTETRICS & GYNECOLOGY

## 2020-05-19 PROCEDURE — 76815 OB US LIMITED FETUS(S): CPT | Performed by: OBSTETRICS & GYNECOLOGY

## 2020-05-22 ENCOUNTER — TELEPHONE (OUTPATIENT)
Dept: PERINATAL CARE | Facility: CLINIC | Age: 40
End: 2020-05-22

## 2020-05-23 DIAGNOSIS — O99.810 HYPERGLYCEMIA IN PREGNANCY: ICD-10-CM

## 2020-05-23 DIAGNOSIS — Z3A.29 29 WEEKS GESTATION OF PREGNANCY: ICD-10-CM

## 2020-05-25 PROBLEM — Z3A.37 37 WEEKS GESTATION OF PREGNANCY: Status: ACTIVE | Noted: 2020-01-22

## 2020-05-26 ENCOUNTER — ULTRASOUND (OUTPATIENT)
Dept: PERINATAL CARE | Facility: CLINIC | Age: 40
End: 2020-05-26
Payer: COMMERCIAL

## 2020-05-26 ENCOUNTER — ROUTINE PRENATAL (OUTPATIENT)
Dept: OBGYN CLINIC | Facility: CLINIC | Age: 40
End: 2020-05-26

## 2020-05-26 ENCOUNTER — TELEPHONE (OUTPATIENT)
Dept: OBGYN CLINIC | Facility: CLINIC | Age: 40
End: 2020-05-26

## 2020-05-26 ENCOUNTER — DOCUMENTATION (OUTPATIENT)
Dept: PERINATAL CARE | Facility: CLINIC | Age: 40
End: 2020-05-26

## 2020-05-26 VITALS
DIASTOLIC BLOOD PRESSURE: 82 MMHG | WEIGHT: 174.8 LBS | TEMPERATURE: 97.1 F | SYSTOLIC BLOOD PRESSURE: 138 MMHG | BODY MASS INDEX: 31.97 KG/M2 | HEART RATE: 102 BPM

## 2020-05-26 VITALS
HEART RATE: 72 BPM | DIASTOLIC BLOOD PRESSURE: 82 MMHG | WEIGHT: 175 LBS | BODY MASS INDEX: 32.2 KG/M2 | TEMPERATURE: 95.3 F | SYSTOLIC BLOOD PRESSURE: 129 MMHG | HEIGHT: 62 IN

## 2020-05-26 DIAGNOSIS — Z3A.37 37 WEEKS GESTATION OF PREGNANCY: ICD-10-CM

## 2020-05-26 DIAGNOSIS — O24.414 INSULIN CONTROLLED GESTATIONAL DIABETES MELLITUS (GDM) IN THIRD TRIMESTER: Primary | ICD-10-CM

## 2020-05-26 DIAGNOSIS — Z3A.37 37 WEEKS GESTATION OF PREGNANCY: Primary | ICD-10-CM

## 2020-05-26 DIAGNOSIS — R82.71 GBS BACTERIURIA: ICD-10-CM

## 2020-05-26 DIAGNOSIS — K21.9 GASTROESOPHAGEAL REFLUX DISEASE WITHOUT ESOPHAGITIS: ICD-10-CM

## 2020-05-26 DIAGNOSIS — O24.414 INSULIN CONTROLLED GESTATIONAL DIABETES MELLITUS (GDM) IN THIRD TRIMESTER: ICD-10-CM

## 2020-05-26 DIAGNOSIS — O99.810 HYPERGLYCEMIA DURING PREGNANCY: ICD-10-CM

## 2020-05-26 PROCEDURE — 76815 OB US LIMITED FETUS(S): CPT | Performed by: OBSTETRICS & GYNECOLOGY

## 2020-05-26 PROCEDURE — 59025 FETAL NON-STRESS TEST: CPT | Performed by: OBSTETRICS & GYNECOLOGY

## 2020-05-26 PROCEDURE — 99213 OFFICE O/P EST LOW 20 MIN: CPT | Performed by: OBSTETRICS & GYNECOLOGY

## 2020-05-26 RX ORDER — FAMOTIDINE 20 MG/1
20 TABLET, FILM COATED ORAL 2 TIMES DAILY
Qty: 30 TABLET | Refills: 1 | Status: SHIPPED | OUTPATIENT
Start: 2020-05-26 | End: 2020-06-11 | Stop reason: HOSPADM

## 2020-06-02 ENCOUNTER — TELEPHONE (OUTPATIENT)
Dept: OBGYN CLINIC | Facility: CLINIC | Age: 40
End: 2020-06-02

## 2020-06-02 ENCOUNTER — TELEPHONE (OUTPATIENT)
Dept: PERINATAL CARE | Facility: CLINIC | Age: 40
End: 2020-06-02

## 2020-06-03 ENCOUNTER — ROUTINE PRENATAL (OUTPATIENT)
Dept: OBGYN CLINIC | Facility: CLINIC | Age: 40
End: 2020-06-03

## 2020-06-03 ENCOUNTER — ROUTINE PRENATAL (OUTPATIENT)
Dept: PERINATAL CARE | Facility: CLINIC | Age: 40
End: 2020-06-03
Payer: COMMERCIAL

## 2020-06-03 ENCOUNTER — ULTRASOUND (OUTPATIENT)
Dept: PERINATAL CARE | Facility: CLINIC | Age: 40
End: 2020-06-03
Payer: COMMERCIAL

## 2020-06-03 ENCOUNTER — DOCUMENTATION (OUTPATIENT)
Dept: PERINATAL CARE | Facility: CLINIC | Age: 40
End: 2020-06-03

## 2020-06-03 VITALS
BODY MASS INDEX: 31.83 KG/M2 | DIASTOLIC BLOOD PRESSURE: 79 MMHG | WEIGHT: 173 LBS | HEART RATE: 93 BPM | TEMPERATURE: 98 F | SYSTOLIC BLOOD PRESSURE: 129 MMHG | HEIGHT: 62 IN

## 2020-06-03 VITALS
DIASTOLIC BLOOD PRESSURE: 84 MMHG | HEIGHT: 62 IN | WEIGHT: 174.2 LBS | SYSTOLIC BLOOD PRESSURE: 136 MMHG | TEMPERATURE: 97.9 F | HEART RATE: 80 BPM | BODY MASS INDEX: 32.06 KG/M2

## 2020-06-03 DIAGNOSIS — Z33.1 PREGNANT STATE, INCIDENTAL: Primary | ICD-10-CM

## 2020-06-03 DIAGNOSIS — O24.414 INSULIN CONTROLLED GESTATIONAL DIABETES MELLITUS (GDM) IN THIRD TRIMESTER: Primary | ICD-10-CM

## 2020-06-03 DIAGNOSIS — Z3A.38 38 WEEKS GESTATION OF PREGNANCY: Primary | ICD-10-CM

## 2020-06-03 DIAGNOSIS — O99.810 HYPERGLYCEMIA DURING PREGNANCY: Primary | ICD-10-CM

## 2020-06-03 DIAGNOSIS — O09.523 ELDERLY MULTIGRAVIDA IN THIRD TRIMESTER: ICD-10-CM

## 2020-06-03 DIAGNOSIS — O24.414 INSULIN CONTROLLED GESTATIONAL DIABETES MELLITUS (GDM) IN THIRD TRIMESTER: ICD-10-CM

## 2020-06-03 DIAGNOSIS — O99.013 ANEMIA DURING PREGNANCY IN THIRD TRIMESTER: ICD-10-CM

## 2020-06-03 DIAGNOSIS — Z3A.38 38 WEEKS GESTATION OF PREGNANCY: ICD-10-CM

## 2020-06-03 DIAGNOSIS — R82.71 GBS BACTERIURIA: ICD-10-CM

## 2020-06-03 LAB
SL AMB  POCT GLUCOSE, UA: ABNORMAL
SL AMB POCT URINE PROTEIN: 1

## 2020-06-03 PROCEDURE — 99212 OFFICE O/P EST SF 10 MIN: CPT | Performed by: OBSTETRICS & GYNECOLOGY

## 2020-06-03 PROCEDURE — NC001 PR NO CHARGE

## 2020-06-03 PROCEDURE — 76816 OB US FOLLOW-UP PER FETUS: CPT | Performed by: OBSTETRICS & GYNECOLOGY

## 2020-06-03 PROCEDURE — 81002 URINALYSIS NONAUTO W/O SCOPE: CPT | Performed by: NURSE PRACTITIONER

## 2020-06-03 PROCEDURE — 59025 FETAL NON-STRESS TEST: CPT | Performed by: OBSTETRICS & GYNECOLOGY

## 2020-06-03 PROCEDURE — 99213 OFFICE O/P EST LOW 20 MIN: CPT | Performed by: NURSE PRACTITIONER

## 2020-06-03 RX ORDER — SYRINGE-NEEDLE,INSULIN,0.5 ML 31 GX5/16"
SYRINGE, EMPTY DISPOSABLE MISCELLANEOUS 2 TIMES DAILY
Qty: 30 EACH | Refills: 0 | Status: SHIPPED | OUTPATIENT
Start: 2020-06-03 | End: 2022-08-04

## 2020-06-05 ENCOUNTER — NURSE TRIAGE (OUTPATIENT)
Dept: OTHER | Facility: OTHER | Age: 40
End: 2020-06-05

## 2020-06-07 ENCOUNTER — NURSE TRIAGE (OUTPATIENT)
Dept: OTHER | Facility: OTHER | Age: 40
End: 2020-06-07

## 2020-06-08 ENCOUNTER — HOSPITAL ENCOUNTER (INPATIENT)
Facility: HOSPITAL | Age: 40
LOS: 3 days | Discharge: HOME | DRG: 542 | End: 2020-06-11
Attending: OBSTETRICS & GYNECOLOGY | Admitting: OBSTETRICS & GYNECOLOGY
Payer: COMMERCIAL

## 2020-06-08 ENCOUNTER — HOSPITAL ENCOUNTER (OUTPATIENT)
Dept: LABOR AND DELIVERY | Facility: HOSPITAL | Age: 40
Discharge: HOME | DRG: 542 | End: 2020-06-08
Payer: COMMERCIAL

## 2020-06-08 DIAGNOSIS — Z3A.39 39 WEEKS GESTATION OF PREGNANCY: ICD-10-CM

## 2020-06-08 LAB
BASOPHILS # BLD AUTO: 0.02 THOUSANDS/ΜL (ref 0–0.1)
BASOPHILS NFR BLD AUTO: 0 % (ref 0–1)
BILIRUB UR QL STRIP: NEGATIVE
CLARITY UR: ABNORMAL
COLOR UR: ABNORMAL
EOSINOPHIL # BLD AUTO: 0.01 THOUSAND/ΜL (ref 0–0.61)
EOSINOPHIL NFR BLD AUTO: 0 % (ref 0–6)
ERYTHROCYTE [DISTWIDTH] IN BLOOD BY AUTOMATED COUNT: 19.1 % (ref 11.6–15.1)
GLUCOSE SERPL-MCNC: 71 MG/DL (ref 65–140)
GLUCOSE SERPL-MCNC: 72 MG/DL (ref 65–140)
GLUCOSE SERPL-MCNC: 74 MG/DL (ref 65–140)
GLUCOSE UR STRIP-MCNC: NEGATIVE MG/DL
HCT VFR BLD AUTO: 32.3 % (ref 34.8–46.1)
HGB BLD-MCNC: 9.9 G/DL (ref 11.5–15.4)
HGB UR QL STRIP.AUTO: NEGATIVE
IMM GRANULOCYTES # BLD AUTO: 0.03 THOUSAND/UL (ref 0–0.2)
IMM GRANULOCYTES NFR BLD AUTO: 1 % (ref 0–2)
KETONES UR STRIP-MCNC: NEGATIVE MG/DL
LEUKOCYTE ESTERASE UR QL STRIP: NEGATIVE
LYMPHOCYTES # BLD AUTO: 0.93 THOUSANDS/ΜL (ref 0.6–4.47)
LYMPHOCYTES NFR BLD AUTO: 14 % (ref 14–44)
MCH RBC QN AUTO: 23.6 PG (ref 26.8–34.3)
MCHC RBC AUTO-ENTMCNC: 30.7 G/DL (ref 31.4–37.4)
MCV RBC AUTO: 77 FL (ref 82–98)
MONOCYTES # BLD AUTO: 0.69 THOUSAND/ΜL (ref 0.17–1.22)
MONOCYTES NFR BLD AUTO: 11 % (ref 4–12)
NEUTROPHILS # BLD AUTO: 4.8 THOUSANDS/ΜL (ref 1.85–7.62)
NEUTS SEG NFR BLD AUTO: 74 % (ref 43–75)
NITRITE UR QL STRIP: NEGATIVE
NRBC BLD AUTO-RTO: 0 /100 WBCS
PH UR STRIP.AUTO: 6 [PH]
PLATELET # BLD AUTO: 339 THOUSANDS/UL (ref 149–390)
PMV BLD AUTO: 9.4 FL (ref 8.9–12.7)
PROT UR STRIP-MCNC: ABNORMAL MG/DL
RBC # BLD AUTO: 4.2 MILLION/UL (ref 3.81–5.12)
SP GR UR STRIP.AUTO: >=1.03 (ref 1–1.03)
UROBILINOGEN UR QL STRIP.AUTO: 0.2 E.U./DL
WBC # BLD AUTO: 6.48 THOUSAND/UL (ref 4.31–10.16)

## 2020-06-08 PROCEDURE — 82570 ASSAY OF URINE CREATININE: CPT | Performed by: STUDENT IN AN ORGANIZED HEALTH CARE EDUCATION/TRAINING PROGRAM

## 2020-06-08 PROCEDURE — 85025 COMPLETE CBC W/AUTO DIFF WBC: CPT | Performed by: OBSTETRICS & GYNECOLOGY

## 2020-06-08 PROCEDURE — NC001 PR NO CHARGE: Performed by: FAMILY MEDICINE

## 2020-06-08 PROCEDURE — 86592 SYPHILIS TEST NON-TREP QUAL: CPT | Performed by: OBSTETRICS & GYNECOLOGY

## 2020-06-08 PROCEDURE — 86901 BLOOD TYPING SEROLOGIC RH(D): CPT | Performed by: OBSTETRICS & GYNECOLOGY

## 2020-06-08 PROCEDURE — 81001 URINALYSIS AUTO W/SCOPE: CPT | Performed by: STUDENT IN AN ORGANIZED HEALTH CARE EDUCATION/TRAINING PROGRAM

## 2020-06-08 PROCEDURE — 80053 COMPREHEN METABOLIC PANEL: CPT | Performed by: STUDENT IN AN ORGANIZED HEALTH CARE EDUCATION/TRAINING PROGRAM

## 2020-06-08 PROCEDURE — 86900 BLOOD TYPING SEROLOGIC ABO: CPT | Performed by: OBSTETRICS & GYNECOLOGY

## 2020-06-08 PROCEDURE — 82948 REAGENT STRIP/BLOOD GLUCOSE: CPT

## 2020-06-08 PROCEDURE — 84156 ASSAY OF PROTEIN URINE: CPT | Performed by: STUDENT IN AN ORGANIZED HEALTH CARE EDUCATION/TRAINING PROGRAM

## 2020-06-08 PROCEDURE — 86850 RBC ANTIBODY SCREEN: CPT | Performed by: OBSTETRICS & GYNECOLOGY

## 2020-06-08 RX ORDER — ONDANSETRON 2 MG/ML
4 INJECTION INTRAMUSCULAR; INTRAVENOUS EVERY 6 HOURS PRN
Status: DISCONTINUED | OUTPATIENT
Start: 2020-06-08 | End: 2020-06-09 | Stop reason: SDUPTHER

## 2020-06-08 RX ORDER — FAMOTIDINE 20 MG/1
20 TABLET, FILM COATED ORAL 2 TIMES DAILY
Status: DISCONTINUED | OUTPATIENT
Start: 2020-06-08 | End: 2020-06-11 | Stop reason: HOSPADM

## 2020-06-08 RX ORDER — LORATADINE 10 MG/1
10 TABLET ORAL DAILY
Status: DISCONTINUED | OUTPATIENT
Start: 2020-06-08 | End: 2020-06-11 | Stop reason: HOSPADM

## 2020-06-08 RX ORDER — OXYTOCIN/RINGER'S LACTATE 30/500 ML
1-30 PLASTIC BAG, INJECTION (ML) INTRAVENOUS
Status: DISCONTINUED | OUTPATIENT
Start: 2020-06-08 | End: 2020-06-09

## 2020-06-08 RX ORDER — GUAIFENESIN 600 MG
600 TABLET, EXTENDED RELEASE 12 HR ORAL EVERY 12 HOURS SCHEDULED
Status: DISCONTINUED | OUTPATIENT
Start: 2020-06-08 | End: 2020-06-11 | Stop reason: HOSPADM

## 2020-06-08 RX ORDER — SODIUM CHLORIDE 9 MG/ML
50 INJECTION, SOLUTION INTRAVENOUS CONTINUOUS
Status: DISCONTINUED | OUTPATIENT
Start: 2020-06-08 | End: 2020-06-10

## 2020-06-08 RX ADMIN — INSULIN HUMAN 14 UNITS: 100 INJECTION, SUSPENSION SUBCUTANEOUS at 22:29

## 2020-06-08 RX ADMIN — SODIUM CHLORIDE 5 MILLION UNITS: 0.9 INJECTION, SOLUTION INTRAVENOUS at 22:00

## 2020-06-08 RX ADMIN — LORATADINE 10 MG: 10 TABLET ORAL at 22:28

## 2020-06-08 RX ADMIN — GUAIFENESIN 600 MG: 600 TABLET, EXTENDED RELEASE ORAL at 22:29

## 2020-06-08 RX ADMIN — Medication 2 MILLI-UNITS/MIN: at 22:42

## 2020-06-08 RX ADMIN — SODIUM CHLORIDE 125 ML/HR: 0.9 INJECTION, SOLUTION INTRAVENOUS at 21:56

## 2020-06-09 LAB
ABO GROUP BLD: NORMAL
ALBUMIN SERPL BCP-MCNC: 2.2 G/DL (ref 3.5–5)
ALBUMIN SERPL BCP-MCNC: 2.6 G/DL (ref 3.5–5)
ALP SERPL-CCNC: 144 U/L (ref 46–116)
ALP SERPL-CCNC: 172 U/L (ref 46–116)
ALT SERPL W P-5'-P-CCNC: 15 U/L (ref 12–78)
ALT SERPL W P-5'-P-CCNC: 9 U/L (ref 12–78)
ANION GAP SERPL CALCULATED.3IONS-SCNC: 11 MMOL/L (ref 4–13)
ANION GAP SERPL CALCULATED.3IONS-SCNC: 7 MMOL/L (ref 4–13)
AST SERPL W P-5'-P-CCNC: 21 U/L (ref 5–45)
AST SERPL W P-5'-P-CCNC: 24 U/L (ref 5–45)
BACTERIA UR QL AUTO: ABNORMAL /HPF
BASE EXCESS BLDCOA CALC-SCNC: -5 MMOL/L (ref 3–11)
BASE EXCESS BLDCOV CALC-SCNC: -6.2 MMOL/L (ref 1–9)
BASOPHILS # BLD AUTO: 0.01 THOUSANDS/ΜL (ref 0–0.1)
BASOPHILS NFR BLD AUTO: 0 % (ref 0–1)
BILIRUB SERPL-MCNC: 0.18 MG/DL (ref 0.2–1)
BILIRUB SERPL-MCNC: 0.18 MG/DL (ref 0.2–1)
BLD GP AB SCN SERPL QL: NEGATIVE
BUN SERPL-MCNC: 10 MG/DL (ref 5–25)
BUN SERPL-MCNC: 6 MG/DL (ref 5–25)
CALCIUM SERPL-MCNC: 6.1 MG/DL (ref 8.3–10.1)
CALCIUM SERPL-MCNC: 8.1 MG/DL (ref 8.3–10.1)
CHLORIDE SERPL-SCNC: 102 MMOL/L (ref 100–108)
CHLORIDE SERPL-SCNC: 104 MMOL/L (ref 100–108)
CO2 SERPL-SCNC: 21 MMOL/L (ref 21–32)
CO2 SERPL-SCNC: 22 MMOL/L (ref 21–32)
CREAT SERPL-MCNC: 0.56 MG/DL (ref 0.6–1.3)
CREAT SERPL-MCNC: 0.77 MG/DL (ref 0.6–1.3)
CREAT UR-MCNC: 191 MG/DL
EOSINOPHIL # BLD AUTO: 0 THOUSAND/ΜL (ref 0–0.61)
EOSINOPHIL NFR BLD AUTO: 0 % (ref 0–6)
ERYTHROCYTE [DISTWIDTH] IN BLOOD BY AUTOMATED COUNT: 19.1 % (ref 11.6–15.1)
GFR SERPL CREATININE-BSD FRML MDRD: 119 ML/MIN/1.73SQ M
GFR SERPL CREATININE-BSD FRML MDRD: 98 ML/MIN/1.73SQ M
GLUCOSE SERPL-MCNC: 125 MG/DL (ref 65–140)
GLUCOSE SERPL-MCNC: 67 MG/DL (ref 65–140)
GLUCOSE SERPL-MCNC: 70 MG/DL (ref 65–140)
GLUCOSE SERPL-MCNC: 74 MG/DL (ref 65–140)
GLUCOSE SERPL-MCNC: 77 MG/DL (ref 65–140)
GLUCOSE SERPL-MCNC: 81 MG/DL (ref 65–140)
GLUCOSE SERPL-MCNC: 87 MG/DL (ref 65–140)
HCO3 BLDCOA-SCNC: 20.2 MMOL/L (ref 17.3–27.3)
HCO3 BLDCOV-SCNC: 18.4 MMOL/L (ref 12.2–28.6)
HCT VFR BLD AUTO: 27.1 % (ref 34.8–46.1)
HGB BLD-MCNC: 8.4 G/DL (ref 11.5–15.4)
IMM GRANULOCYTES # BLD AUTO: 0.03 THOUSAND/UL (ref 0–0.2)
IMM GRANULOCYTES NFR BLD AUTO: 0 % (ref 0–2)
LYMPHOCYTES # BLD AUTO: 0.86 THOUSANDS/ΜL (ref 0.6–4.47)
LYMPHOCYTES NFR BLD AUTO: 10 % (ref 14–44)
MAGNESIUM SERPL-MCNC: 5.3 MG/DL (ref 1.6–2.6)
MCH RBC QN AUTO: 23.5 PG (ref 26.8–34.3)
MCHC RBC AUTO-ENTMCNC: 31 G/DL (ref 31.4–37.4)
MCV RBC AUTO: 76 FL (ref 82–98)
MONOCYTES # BLD AUTO: 0.51 THOUSAND/ΜL (ref 0.17–1.22)
MONOCYTES NFR BLD AUTO: 6 % (ref 4–12)
MUCOUS THREADS UR QL AUTO: ABNORMAL
NEUTROPHILS # BLD AUTO: 7.4 THOUSANDS/ΜL (ref 1.85–7.62)
NEUTS SEG NFR BLD AUTO: 84 % (ref 43–75)
NON-SQ EPI CELLS URNS QL MICRO: ABNORMAL /HPF
NRBC BLD AUTO-RTO: 0 /100 WBCS
O2 CT VFR BLDCOA CALC: 17.2 ML/DL
OXYHGB MFR BLDCOA: 70.2 %
OXYHGB MFR BLDCOV: 66.6 %
PCO2 BLDCOA: 38.3 MM[HG] (ref 30–60)
PCO2 BLDCOV: 34.6 MM HG (ref 27–43)
PH BLDCOA: 7.34 [PH] (ref 7.23–7.43)
PH BLDCOV: 7.34 [PH] (ref 7.19–7.49)
PLATELET # BLD AUTO: 302 THOUSANDS/UL (ref 149–390)
PMV BLD AUTO: 9 FL (ref 8.9–12.7)
PO2 BLDCOA: 29.2 MM HG (ref 5–25)
PO2 BLDCOV: 28.4 MM HG (ref 15–45)
POTASSIUM SERPL-SCNC: 3.6 MMOL/L (ref 3.5–5.3)
POTASSIUM SERPL-SCNC: 3.8 MMOL/L (ref 3.5–5.3)
PROT SERPL-MCNC: 5.7 G/DL (ref 6.4–8.2)
PROT SERPL-MCNC: 6.6 G/DL (ref 6.4–8.2)
PROT UR-MCNC: 233 MG/DL
PROT/CREAT UR: 1.22 MG/G{CREAT} (ref 0–0.1)
RBC # BLD AUTO: 3.58 MILLION/UL (ref 3.81–5.12)
RBC #/AREA URNS AUTO: ABNORMAL /HPF
RH BLD: POSITIVE
RPR SER QL: NORMAL
SAO2 % BLDCOV: 16 ML/DL
SODIUM SERPL-SCNC: 131 MMOL/L (ref 136–145)
SODIUM SERPL-SCNC: 136 MMOL/L (ref 136–145)
SPECIMEN EXPIRATION DATE: NORMAL
WBC # BLD AUTO: 8.81 THOUSAND/UL (ref 4.31–10.16)
WBC #/AREA URNS AUTO: ABNORMAL /HPF

## 2020-06-09 PROCEDURE — 10907ZC DRAINAGE OF AMNIOTIC FLUID, THERAPEUTIC FROM PRODUCTS OF CONCEPTION, VIA NATURAL OR ARTIFICIAL OPENING: ICD-10-PCS | Performed by: OBSTETRICS & GYNECOLOGY

## 2020-06-09 PROCEDURE — 80053 COMPREHEN METABOLIC PANEL: CPT | Performed by: STUDENT IN AN ORGANIZED HEALTH CARE EDUCATION/TRAINING PROGRAM

## 2020-06-09 PROCEDURE — 10D17Z9 MANUAL EXTRACTION OF PRODUCTS OF CONCEPTION, RETAINED, VIA NATURAL OR ARTIFICIAL OPENING: ICD-10-PCS | Performed by: OBSTETRICS & GYNECOLOGY

## 2020-06-09 PROCEDURE — 99024 POSTOP FOLLOW-UP VISIT: CPT | Performed by: OBSTETRICS & GYNECOLOGY

## 2020-06-09 PROCEDURE — 3E033VJ INTRODUCTION OF OTHER HORMONE INTO PERIPHERAL VEIN, PERCUTANEOUS APPROACH: ICD-10-PCS | Performed by: OBSTETRICS & GYNECOLOGY

## 2020-06-09 PROCEDURE — 83735 ASSAY OF MAGNESIUM: CPT | Performed by: STUDENT IN AN ORGANIZED HEALTH CARE EDUCATION/TRAINING PROGRAM

## 2020-06-09 PROCEDURE — 0W3R7ZZ CONTROL BLEEDING IN GENITOURINARY TRACT, VIA NATURAL OR ARTIFICIAL OPENING: ICD-10-PCS | Performed by: OBSTETRICS & GYNECOLOGY

## 2020-06-09 PROCEDURE — 4A1HXCZ MONITORING OF PRODUCTS OF CONCEPTION, CARDIAC RATE, EXTERNAL APPROACH: ICD-10-PCS | Performed by: OBSTETRICS & GYNECOLOGY

## 2020-06-09 PROCEDURE — 82805 BLOOD GASES W/O2 SATURATION: CPT | Performed by: OBSTETRICS & GYNECOLOGY

## 2020-06-09 PROCEDURE — 88307 TISSUE EXAM BY PATHOLOGIST: CPT | Performed by: PATHOLOGY

## 2020-06-09 PROCEDURE — 82948 REAGENT STRIP/BLOOD GLUCOSE: CPT

## 2020-06-09 PROCEDURE — 85025 COMPLETE CBC W/AUTO DIFF WBC: CPT | Performed by: STUDENT IN AN ORGANIZED HEALTH CARE EDUCATION/TRAINING PROGRAM

## 2020-06-09 PROCEDURE — NC001 PR NO CHARGE: Performed by: OBSTETRICS & GYNECOLOGY

## 2020-06-09 RX ORDER — CARBOPROST TROMETHAMINE 250 UG/ML
INJECTION, SOLUTION INTRAMUSCULAR
Status: DISPENSED
Start: 2020-06-09 | End: 2020-06-09

## 2020-06-09 RX ORDER — ACETAMINOPHEN 325 MG/1
650 TABLET ORAL EVERY 6 HOURS PRN
Status: DISCONTINUED | OUTPATIENT
Start: 2020-06-09 | End: 2020-06-11 | Stop reason: HOSPADM

## 2020-06-09 RX ORDER — BUTORPHANOL TARTRATE 1 MG/ML
1 INJECTION, SOLUTION INTRAMUSCULAR; INTRAVENOUS ONCE
Status: COMPLETED | OUTPATIENT
Start: 2020-06-09 | End: 2020-06-09

## 2020-06-09 RX ORDER — ACETAMINOPHEN 325 MG/1
650 TABLET ORAL EVERY 6 HOURS PRN
Status: DISCONTINUED | OUTPATIENT
Start: 2020-06-09 | End: 2020-06-09

## 2020-06-09 RX ORDER — ECHINACEA PURPUREA EXTRACT 125 MG
1 TABLET ORAL 3 TIMES DAILY
Status: DISCONTINUED | OUTPATIENT
Start: 2020-06-09 | End: 2020-06-09

## 2020-06-09 RX ORDER — IBUPROFEN 600 MG/1
600 TABLET ORAL EVERY 6 HOURS PRN
Status: DISCONTINUED | OUTPATIENT
Start: 2020-06-09 | End: 2020-06-11

## 2020-06-09 RX ORDER — METHYLERGONOVINE MALEATE 0.2 MG/ML
INJECTION INTRAVENOUS
Status: DISCONTINUED
Start: 2020-06-09 | End: 2020-06-09 | Stop reason: WASHOUT

## 2020-06-09 RX ORDER — MISOPROSTOL 200 UG/1
1000 TABLET ORAL ONCE
Status: COMPLETED | OUTPATIENT
Start: 2020-06-09 | End: 2020-06-09

## 2020-06-09 RX ORDER — ONDANSETRON 2 MG/ML
4 INJECTION INTRAMUSCULAR; INTRAVENOUS EVERY 8 HOURS PRN
Status: DISCONTINUED | OUTPATIENT
Start: 2020-06-09 | End: 2020-06-11 | Stop reason: HOSPADM

## 2020-06-09 RX ORDER — HYDROMORPHONE HCL/PF 1 MG/ML
1 SYRINGE (ML) INJECTION ONCE
Status: DISCONTINUED | OUTPATIENT
Start: 2020-06-09 | End: 2020-06-09

## 2020-06-09 RX ORDER — HYDROMORPHONE HCL/PF 1 MG/ML
1 SYRINGE (ML) INJECTION ONCE
Status: COMPLETED | OUTPATIENT
Start: 2020-06-09 | End: 2020-06-09

## 2020-06-09 RX ORDER — CALCIUM CARBONATE 200(500)MG
1000 TABLET,CHEWABLE ORAL DAILY PRN
Status: DISCONTINUED | OUTPATIENT
Start: 2020-06-09 | End: 2020-06-11 | Stop reason: HOSPADM

## 2020-06-09 RX ORDER — MAGNESIUM SULFATE HEPTAHYDRATE 40 MG/ML
4 INJECTION, SOLUTION INTRAVENOUS ONCE
Status: COMPLETED | OUTPATIENT
Start: 2020-06-09 | End: 2020-06-09

## 2020-06-09 RX ORDER — LABETALOL 20 MG/4 ML (5 MG/ML) INTRAVENOUS SYRINGE
20 ONCE
Status: COMPLETED | OUTPATIENT
Start: 2020-06-09 | End: 2020-06-09

## 2020-06-09 RX ORDER — OXYTOCIN/RINGER'S LACTATE 30/500 ML
62.5 PLASTIC BAG, INJECTION (ML) INTRAVENOUS CONTINUOUS
Status: DISPENSED | OUTPATIENT
Start: 2020-06-09 | End: 2020-06-09

## 2020-06-09 RX ORDER — CARBOPROST TROMETHAMINE 250 UG/ML
INJECTION, SOLUTION INTRAMUSCULAR
Status: COMPLETED
Start: 2020-06-09 | End: 2020-06-09

## 2020-06-09 RX ORDER — LOPERAMIDE HYDROCHLORIDE 2 MG/1
2 CAPSULE ORAL ONCE
Status: COMPLETED | OUTPATIENT
Start: 2020-06-09 | End: 2020-06-09

## 2020-06-09 RX ORDER — MAGNESIUM SULFATE HEPTAHYDRATE 40 MG/ML
2 INJECTION, SOLUTION INTRAVENOUS ONCE
Status: COMPLETED | OUTPATIENT
Start: 2020-06-09 | End: 2020-06-09

## 2020-06-09 RX ORDER — DIAPER,BRIEF,INFANT-TODD,DISP
1 EACH MISCELLANEOUS 4 TIMES DAILY PRN
Status: DISCONTINUED | OUTPATIENT
Start: 2020-06-09 | End: 2020-06-11 | Stop reason: HOSPADM

## 2020-06-09 RX ORDER — MAGNESIUM SULFATE HEPTAHYDRATE 40 MG/ML
2 INJECTION, SOLUTION INTRAVENOUS CONTINUOUS
Status: DISCONTINUED | OUTPATIENT
Start: 2020-06-09 | End: 2020-06-11

## 2020-06-09 RX ORDER — DOCUSATE SODIUM 100 MG/1
100 CAPSULE, LIQUID FILLED ORAL 2 TIMES DAILY
Status: DISCONTINUED | OUTPATIENT
Start: 2020-06-09 | End: 2020-06-11 | Stop reason: HOSPADM

## 2020-06-09 RX ORDER — CALCIUM GLUCONATE 94 MG/ML
1 INJECTION, SOLUTION INTRAVENOUS ONCE AS NEEDED
Status: DISCONTINUED | OUTPATIENT
Start: 2020-06-09 | End: 2020-06-11 | Stop reason: HOSPADM

## 2020-06-09 RX ORDER — LABETALOL 20 MG/4 ML (5 MG/ML) INTRAVENOUS SYRINGE
Status: COMPLETED
Start: 2020-06-09 | End: 2020-06-09

## 2020-06-09 RX ADMIN — ACETAMINOPHEN 650 MG: 325 TABLET, FILM COATED ORAL at 21:44

## 2020-06-09 RX ADMIN — LABETALOL 20 MG/4 ML (5 MG/ML) INTRAVENOUS SYRINGE 20 MG: at 09:31

## 2020-06-09 RX ADMIN — FAMOTIDINE 20 MG: 20 TABLET ORAL at 17:16

## 2020-06-09 RX ADMIN — MAGNESIUM SULFATE HEPTAHYDRATE 4 G: 40 INJECTION, SOLUTION INTRAVENOUS at 00:53

## 2020-06-09 RX ADMIN — LABETALOL 20 MG/4 ML (5 MG/ML) INTRAVENOUS SYRINGE 20 MG: at 00:23

## 2020-06-09 RX ADMIN — SALINE NASAL SPRAY 1 SPRAY: 1.5 SOLUTION NASAL at 03:15

## 2020-06-09 RX ADMIN — SODIUM CHLORIDE 2.5 MILLION UNITS: 9 INJECTION, SOLUTION INTRAVENOUS at 06:04

## 2020-06-09 RX ADMIN — MAGNESIUM SULFATE HEPTAHYDRATE 2 G: 40 INJECTION, SOLUTION INTRAVENOUS at 01:20

## 2020-06-09 RX ADMIN — SODIUM CHLORIDE 50 ML/HR: 0.9 INJECTION, SOLUTION INTRAVENOUS at 19:43

## 2020-06-09 RX ADMIN — Medication 1000 MG: at 10:15

## 2020-06-09 RX ADMIN — Medication 62.5 MILLI-UNITS/MIN: at 12:11

## 2020-06-09 RX ADMIN — BUTORPHANOL TARTRATE 1 MG: 1 INJECTION, SOLUTION INTRAMUSCULAR; INTRAVENOUS at 06:11

## 2020-06-09 RX ADMIN — MISOPROSTOL 1000 MCG: 200 TABLET ORAL at 10:12

## 2020-06-09 RX ADMIN — LORATADINE 10 MG: 10 TABLET ORAL at 08:09

## 2020-06-09 RX ADMIN — HYDROMORPHONE HYDROCHLORIDE 1 MG: 1 INJECTION, SOLUTION INTRAMUSCULAR; INTRAVENOUS; SUBCUTANEOUS at 10:16

## 2020-06-09 RX ADMIN — MAGNESIUM SULFATE IN WATER 2 G/HR: 40 INJECTION, SOLUTION INTRAVENOUS at 12:05

## 2020-06-09 RX ADMIN — DOCUSATE SODIUM 100 MG: 100 CAPSULE, LIQUID FILLED ORAL at 17:16

## 2020-06-09 RX ADMIN — IBUPROFEN 600 MG: 600 TABLET ORAL at 21:30

## 2020-06-09 RX ADMIN — GUAIFENESIN 600 MG: 600 TABLET, EXTENDED RELEASE ORAL at 08:09

## 2020-06-09 RX ADMIN — MAGNESIUM SULFATE IN WATER 2 G/HR: 40 INJECTION, SOLUTION INTRAVENOUS at 01:42

## 2020-06-09 RX ADMIN — CARBOPROST TROMETHAMINE 250 MCG: 250 INJECTION, SOLUTION INTRAMUSCULAR at 10:03

## 2020-06-09 RX ADMIN — MAGNESIUM SULFATE IN WATER 2 G/HR: 40 INJECTION, SOLUTION INTRAVENOUS at 19:46

## 2020-06-09 RX ADMIN — SODIUM CHLORIDE 2.5 MILLION UNITS: 9 INJECTION, SOLUTION INTRAVENOUS at 02:02

## 2020-06-09 RX ADMIN — IBUPROFEN 600 MG: 600 TABLET ORAL at 15:40

## 2020-06-09 RX ADMIN — LOPERAMIDE HYDROCHLORIDE 2 MG: 2 CAPSULE ORAL at 10:22

## 2020-06-10 ENCOUNTER — APPOINTMENT (INPATIENT)
Dept: RADIOLOGY | Facility: HOSPITAL | Age: 40
DRG: 542 | End: 2020-06-10
Payer: COMMERCIAL

## 2020-06-10 LAB
ALBUMIN SERPL BCP-MCNC: 2.3 G/DL (ref 3.5–5)
ALP SERPL-CCNC: 136 U/L (ref 46–116)
ALT SERPL W P-5'-P-CCNC: 17 U/L (ref 12–78)
ANION GAP SERPL CALCULATED.3IONS-SCNC: 6 MMOL/L (ref 4–13)
AST SERPL W P-5'-P-CCNC: 25 U/L (ref 5–45)
BACTERIA UR QL AUTO: ABNORMAL /HPF
BASOPHILS # BLD AUTO: 0.01 THOUSANDS/ΜL (ref 0–0.1)
BASOPHILS # BLD AUTO: 0.02 THOUSANDS/ΜL (ref 0–0.1)
BASOPHILS NFR BLD AUTO: 0 % (ref 0–1)
BASOPHILS NFR BLD AUTO: 0 % (ref 0–1)
BILIRUB SERPL-MCNC: 0.16 MG/DL (ref 0.2–1)
BILIRUB UR QL STRIP: NEGATIVE
BUN SERPL-MCNC: 11 MG/DL (ref 5–25)
CALCIUM SERPL-MCNC: 6.9 MG/DL (ref 8.3–10.1)
CHLORIDE SERPL-SCNC: 103 MMOL/L (ref 100–108)
CLARITY UR: CLEAR
CO2 SERPL-SCNC: 24 MMOL/L (ref 21–32)
COLOR UR: YELLOW
CREAT SERPL-MCNC: 0.94 MG/DL (ref 0.6–1.3)
EOSINOPHIL # BLD AUTO: 0 THOUSAND/ΜL (ref 0–0.61)
EOSINOPHIL # BLD AUTO: 0 THOUSAND/ΜL (ref 0–0.61)
EOSINOPHIL NFR BLD AUTO: 0 % (ref 0–6)
EOSINOPHIL NFR BLD AUTO: 0 % (ref 0–6)
ERYTHROCYTE [DISTWIDTH] IN BLOOD BY AUTOMATED COUNT: 19.3 % (ref 11.6–15.1)
ERYTHROCYTE [DISTWIDTH] IN BLOOD BY AUTOMATED COUNT: 19.4 % (ref 11.6–15.1)
GFR SERPL CREATININE-BSD FRML MDRD: 77 ML/MIN/1.73SQ M
GLUCOSE SERPL-MCNC: 118 MG/DL (ref 65–140)
GLUCOSE UR STRIP-MCNC: NEGATIVE MG/DL
HCT VFR BLD AUTO: 24.7 % (ref 34.8–46.1)
HCT VFR BLD AUTO: 28 % (ref 34.8–46.1)
HGB BLD-MCNC: 7.7 G/DL (ref 11.5–15.4)
HGB BLD-MCNC: 8.5 G/DL (ref 11.5–15.4)
HGB UR QL STRIP.AUTO: ABNORMAL
IMM GRANULOCYTES # BLD AUTO: 0.04 THOUSAND/UL (ref 0–0.2)
IMM GRANULOCYTES # BLD AUTO: 0.06 THOUSAND/UL (ref 0–0.2)
IMM GRANULOCYTES NFR BLD AUTO: 1 % (ref 0–2)
IMM GRANULOCYTES NFR BLD AUTO: 1 % (ref 0–2)
KETONES UR STRIP-MCNC: NEGATIVE MG/DL
LEUKOCYTE ESTERASE UR QL STRIP: ABNORMAL
LYMPHOCYTES # BLD AUTO: 0.91 THOUSANDS/ΜL (ref 0.6–4.47)
LYMPHOCYTES # BLD AUTO: 0.91 THOUSANDS/ΜL (ref 0.6–4.47)
LYMPHOCYTES NFR BLD AUTO: 11 % (ref 14–44)
LYMPHOCYTES NFR BLD AUTO: 9 % (ref 14–44)
MCH RBC QN AUTO: 23.3 PG (ref 26.8–34.3)
MCH RBC QN AUTO: 24.1 PG (ref 26.8–34.3)
MCHC RBC AUTO-ENTMCNC: 30.4 G/DL (ref 31.4–37.4)
MCHC RBC AUTO-ENTMCNC: 31.2 G/DL (ref 31.4–37.4)
MCV RBC AUTO: 77 FL (ref 82–98)
MCV RBC AUTO: 77 FL (ref 82–98)
MONOCYTES # BLD AUTO: 0.62 THOUSAND/ΜL (ref 0.17–1.22)
MONOCYTES # BLD AUTO: 0.62 THOUSAND/ΜL (ref 0.17–1.22)
MONOCYTES NFR BLD AUTO: 6 % (ref 4–12)
MONOCYTES NFR BLD AUTO: 8 % (ref 4–12)
NEUTROPHILS # BLD AUTO: 6.58 THOUSANDS/ΜL (ref 1.85–7.62)
NEUTROPHILS # BLD AUTO: 8.55 THOUSANDS/ΜL (ref 1.85–7.62)
NEUTS SEG NFR BLD AUTO: 80 % (ref 43–75)
NEUTS SEG NFR BLD AUTO: 84 % (ref 43–75)
NITRITE UR QL STRIP: NEGATIVE
NON-SQ EPI CELLS URNS QL MICRO: ABNORMAL /HPF
NRBC BLD AUTO-RTO: 0 /100 WBCS
NRBC BLD AUTO-RTO: 0 /100 WBCS
PH UR STRIP.AUTO: 6 [PH]
PLATELET # BLD AUTO: 321 THOUSANDS/UL (ref 149–390)
PLATELET # BLD AUTO: 336 THOUSANDS/UL (ref 149–390)
PMV BLD AUTO: 9.2 FL (ref 8.9–12.7)
PMV BLD AUTO: 9.8 FL (ref 8.9–12.7)
POTASSIUM SERPL-SCNC: 3.6 MMOL/L (ref 3.5–5.3)
PROT SERPL-MCNC: 6 G/DL (ref 6.4–8.2)
PROT UR STRIP-MCNC: ABNORMAL MG/DL
RBC # BLD AUTO: 3.19 MILLION/UL (ref 3.81–5.12)
RBC # BLD AUTO: 3.65 MILLION/UL (ref 3.81–5.12)
RBC #/AREA URNS AUTO: ABNORMAL /HPF
SARS-COV-2 RNA RESP QL NAA+PROBE: POSITIVE
SODIUM SERPL-SCNC: 133 MMOL/L (ref 136–145)
SP GR UR STRIP.AUTO: 1.01 (ref 1–1.03)
UROBILINOGEN UR QL STRIP.AUTO: 0.2 E.U./DL
WBC # BLD AUTO: 10.15 THOUSAND/UL (ref 4.31–10.16)
WBC # BLD AUTO: 8.17 THOUSAND/UL (ref 4.31–10.16)
WBC #/AREA URNS AUTO: ABNORMAL /HPF

## 2020-06-10 PROCEDURE — NC001 PR NO CHARGE: Performed by: OBSTETRICS & GYNECOLOGY

## 2020-06-10 PROCEDURE — 85025 COMPLETE CBC W/AUTO DIFF WBC: CPT | Performed by: STUDENT IN AN ORGANIZED HEALTH CARE EDUCATION/TRAINING PROGRAM

## 2020-06-10 PROCEDURE — 87635 SARS-COV-2 COVID-19 AMP PRB: CPT | Performed by: STUDENT IN AN ORGANIZED HEALTH CARE EDUCATION/TRAINING PROGRAM

## 2020-06-10 PROCEDURE — 87086 URINE CULTURE/COLONY COUNT: CPT | Performed by: STUDENT IN AN ORGANIZED HEALTH CARE EDUCATION/TRAINING PROGRAM

## 2020-06-10 PROCEDURE — 71045 X-RAY EXAM CHEST 1 VIEW: CPT

## 2020-06-10 PROCEDURE — 85025 COMPLETE CBC W/AUTO DIFF WBC: CPT | Performed by: OBSTETRICS & GYNECOLOGY

## 2020-06-10 PROCEDURE — 81001 URINALYSIS AUTO W/SCOPE: CPT | Performed by: STUDENT IN AN ORGANIZED HEALTH CARE EDUCATION/TRAINING PROGRAM

## 2020-06-10 PROCEDURE — 80053 COMPREHEN METABOLIC PANEL: CPT | Performed by: STUDENT IN AN ORGANIZED HEALTH CARE EDUCATION/TRAINING PROGRAM

## 2020-06-10 RX ORDER — SODIUM CHLORIDE 9 MG/ML
100 INJECTION, SOLUTION INTRAVENOUS CONTINUOUS
Status: DISCONTINUED | OUTPATIENT
Start: 2020-06-10 | End: 2020-06-11

## 2020-06-10 RX ORDER — ACETAMINOPHEN AND CODEINE PHOSPHATE 120; 12 MG/5ML; MG/5ML
1 SOLUTION ORAL DAILY
Qty: 90 TABLET | Refills: 1 | Status: SHIPPED | OUTPATIENT
Start: 2020-06-10 | End: 2022-08-04

## 2020-06-10 RX ADMIN — FAMOTIDINE 20 MG: 20 TABLET ORAL at 17:55

## 2020-06-10 RX ADMIN — DOCUSATE SODIUM 100 MG: 100 CAPSULE, LIQUID FILLED ORAL at 17:56

## 2020-06-10 RX ADMIN — SODIUM CHLORIDE 1000 ML: 0.9 INJECTION, SOLUTION INTRAVENOUS at 21:49

## 2020-06-10 RX ADMIN — LORATADINE 10 MG: 10 TABLET ORAL at 09:50

## 2020-06-10 RX ADMIN — ACETAMINOPHEN 650 MG: 325 TABLET, FILM COATED ORAL at 12:24

## 2020-06-10 RX ADMIN — GUAIFENESIN 600 MG: 600 TABLET, EXTENDED RELEASE ORAL at 09:51

## 2020-06-10 RX ADMIN — ACETAMINOPHEN 650 MG: 325 TABLET, FILM COATED ORAL at 20:13

## 2020-06-10 RX ADMIN — FAMOTIDINE 20 MG: 20 TABLET ORAL at 09:48

## 2020-06-10 RX ADMIN — DOCUSATE SODIUM 100 MG: 100 CAPSULE, LIQUID FILLED ORAL at 09:47

## 2020-06-10 RX ADMIN — IBUPROFEN 600 MG: 600 TABLET ORAL at 20:13

## 2020-06-11 ENCOUNTER — PATIENT OUTREACH (OUTPATIENT)
Dept: OBGYN CLINIC | Facility: CLINIC | Age: 40
End: 2020-06-11

## 2020-06-11 VITALS
RESPIRATION RATE: 18 BRPM | OXYGEN SATURATION: 97 % | SYSTOLIC BLOOD PRESSURE: 136 MMHG | HEART RATE: 104 BPM | BODY MASS INDEX: 31.83 KG/M2 | HEIGHT: 62 IN | WEIGHT: 173 LBS | TEMPERATURE: 99.9 F | DIASTOLIC BLOOD PRESSURE: 81 MMHG

## 2020-06-11 PROBLEM — U07.1 COVID-19 VIRUS DETECTED: Status: ACTIVE | Noted: 2020-06-11

## 2020-06-11 LAB
BACTERIA UR QL AUTO: ABNORMAL /HPF
BILIRUB UR QL STRIP: NEGATIVE
CLARITY UR: CLEAR
COLOR UR: YELLOW
GLUCOSE UR STRIP-MCNC: NEGATIVE MG/DL
HGB UR QL STRIP.AUTO: ABNORMAL
KETONES UR STRIP-MCNC: NEGATIVE MG/DL
LEUKOCYTE ESTERASE UR QL STRIP: NEGATIVE
NITRITE UR QL STRIP: NEGATIVE
NON-SQ EPI CELLS URNS QL MICRO: ABNORMAL /HPF
PH UR STRIP.AUTO: 7 [PH]
PROT UR STRIP-MCNC: NEGATIVE MG/DL
RBC #/AREA URNS AUTO: ABNORMAL /HPF
SP GR UR STRIP.AUTO: 1.01 (ref 1–1.03)
UROBILINOGEN UR QL STRIP.AUTO: 0.2 E.U./DL
WBC #/AREA URNS AUTO: ABNORMAL /HPF

## 2020-06-11 PROCEDURE — 81001 URINALYSIS AUTO W/SCOPE: CPT | Performed by: OBSTETRICS & GYNECOLOGY

## 2020-06-11 PROCEDURE — 94762 N-INVAS EAR/PLS OXIMTRY CONT: CPT

## 2020-06-11 PROCEDURE — NC001 PR NO CHARGE: Performed by: OBSTETRICS & GYNECOLOGY

## 2020-06-11 PROCEDURE — 94760 N-INVAS EAR/PLS OXIMETRY 1: CPT

## 2020-06-11 PROCEDURE — 99024 POSTOP FOLLOW-UP VISIT: CPT | Performed by: OBSTETRICS & GYNECOLOGY

## 2020-06-11 RX ORDER — HEPARIN SODIUM 5000 [USP'U]/ML
5000 INJECTION, SOLUTION INTRAVENOUS; SUBCUTANEOUS 3 TIMES DAILY
Status: DISCONTINUED | OUTPATIENT
Start: 2020-06-11 | End: 2020-06-11

## 2020-06-11 RX ORDER — SODIUM CHLORIDE, SODIUM LACTATE, POTASSIUM CHLORIDE, CALCIUM CHLORIDE 600; 310; 30; 20 MG/100ML; MG/100ML; MG/100ML; MG/100ML
125 INJECTION, SOLUTION INTRAVENOUS CONTINUOUS
Status: DISCONTINUED | OUTPATIENT
Start: 2020-06-11 | End: 2020-06-11 | Stop reason: HOSPADM

## 2020-06-11 RX ADMIN — DOCUSATE SODIUM 100 MG: 100 CAPSULE, LIQUID FILLED ORAL at 07:48

## 2020-06-11 RX ADMIN — FAMOTIDINE 20 MG: 20 TABLET ORAL at 07:48

## 2020-06-11 RX ADMIN — SODIUM CHLORIDE 100 ML/HR: 0.9 INJECTION, SOLUTION INTRAVENOUS at 07:46

## 2020-06-11 RX ADMIN — SODIUM CHLORIDE 100 ML/HR: 0.9 INJECTION, SOLUTION INTRAVENOUS at 00:10

## 2020-06-11 RX ADMIN — ACETAMINOPHEN 650 MG: 325 TABLET, FILM COATED ORAL at 01:32

## 2020-06-11 RX ADMIN — SODIUM CHLORIDE 500 ML: 0.9 INJECTION, SOLUTION INTRAVENOUS at 11:20

## 2020-06-12 ENCOUNTER — PATIENT OUTREACH (OUTPATIENT)
Dept: OBGYN CLINIC | Facility: CLINIC | Age: 40
End: 2020-06-12

## 2020-06-12 ENCOUNTER — TELEPHONE (OUTPATIENT)
Dept: LABOR AND DELIVERY | Facility: HOSPITAL | Age: 40
End: 2020-06-12

## 2020-06-12 LAB — BACTERIA UR CULT: NORMAL

## 2020-06-12 RX ORDER — IBUPROFEN 200 MG
600 TABLET ORAL EVERY 6 HOURS PRN
Qty: 30 TABLET | Refills: 0 | Status: SHIPPED | OUTPATIENT
Start: 2020-06-12 | End: 2022-08-04

## 2020-06-12 RX ORDER — ACETAMINOPHEN 325 MG/1
650 TABLET ORAL EVERY 6 HOURS PRN
Qty: 30 TABLET | Refills: 0 | Status: SHIPPED | OUTPATIENT
Start: 2020-06-12 | End: 2022-08-04

## 2020-06-12 RX ORDER — FAMOTIDINE 10 MG
10 TABLET ORAL 2 TIMES DAILY
Qty: 60 TABLET | Refills: 0 | Status: SHIPPED | OUTPATIENT
Start: 2020-06-12 | End: 2022-06-02 | Stop reason: SDUPTHER

## 2020-06-16 ENCOUNTER — TELEMEDICINE (OUTPATIENT)
Dept: OBGYN CLINIC | Facility: CLINIC | Age: 40
End: 2020-06-16

## 2020-06-16 PROCEDURE — 99213 OFFICE O/P EST LOW 20 MIN: CPT | Performed by: OBSTETRICS & GYNECOLOGY

## 2020-06-19 ENCOUNTER — PATIENT OUTREACH (OUTPATIENT)
Dept: OBGYN CLINIC | Facility: CLINIC | Age: 40
End: 2020-06-19

## 2022-05-15 ENCOUNTER — TELEPHONE (OUTPATIENT)
Dept: OTHER | Facility: OTHER | Age: 42
End: 2022-05-15

## 2022-05-19 ENCOUNTER — ULTRASOUND (OUTPATIENT)
Dept: OBGYN CLINIC | Facility: CLINIC | Age: 42
End: 2022-05-19

## 2022-05-19 VITALS — HEIGHT: 62 IN | RESPIRATION RATE: 18 BRPM | WEIGHT: 160 LBS | BODY MASS INDEX: 29.44 KG/M2

## 2022-05-19 DIAGNOSIS — Z32.00 ENCOUNTER FOR PREGNANCY TEST, RESULT UNKNOWN: ICD-10-CM

## 2022-05-19 DIAGNOSIS — Z01.89 ENCOUNTER FOR URINE TEST: Primary | ICD-10-CM

## 2022-05-19 DIAGNOSIS — Z3A.01 LESS THAN 8 WEEKS GESTATION OF PREGNANCY: ICD-10-CM

## 2022-05-19 LAB
AMORPH URATE CRY URNS QL MICRO: ABNORMAL
BACTERIA UR QL AUTO: ABNORMAL /HPF
BILIRUB UR QL STRIP: NEGATIVE
CLARITY UR: ABNORMAL
COLOR UR: YELLOW
GLUCOSE UR STRIP-MCNC: ABNORMAL MG/DL
HGB UR QL STRIP.AUTO: NEGATIVE
HYALINE CASTS #/AREA URNS LPF: ABNORMAL /LPF
KETONES UR STRIP-MCNC: ABNORMAL MG/DL
LEUKOCYTE ESTERASE UR QL STRIP: NEGATIVE
MUCOUS THREADS UR QL AUTO: ABNORMAL
NITRITE UR QL STRIP: NEGATIVE
NON-SQ EPI CELLS URNS QL MICRO: ABNORMAL /HPF
PH UR STRIP.AUTO: 6 [PH]
PROT UR STRIP-MCNC: ABNORMAL MG/DL
RBC #/AREA URNS AUTO: ABNORMAL /HPF
SL AMB POCT URINE HCG: NORMAL
SP GR UR STRIP.AUTO: 1.03 (ref 1–1.03)
UROBILINOGEN UR STRIP-ACNC: <2 MG/DL
WBC #/AREA URNS AUTO: ABNORMAL /HPF

## 2022-05-19 PROCEDURE — 76815 OB US LIMITED FETUS(S): CPT | Performed by: OBSTETRICS & GYNECOLOGY

## 2022-05-19 PROCEDURE — 87086 URINE CULTURE/COLONY COUNT: CPT

## 2022-05-19 PROCEDURE — 81001 URINALYSIS AUTO W/SCOPE: CPT

## 2022-05-19 PROCEDURE — 81025 URINE PREGNANCY TEST: CPT | Performed by: OBSTETRICS & GYNECOLOGY

## 2022-05-19 RX ORDER — PNV NO.95/FERROUS FUM/FOLIC AC 28MG-0.8MG
1 TABLET ORAL DAILY
Qty: 30 TABLET | Refills: 9 | Status: SHIPPED | OUTPATIENT
Start: 2022-05-19 | End: 2022-08-04

## 2022-05-19 NOTE — PROGRESS NOTES
[address] [address]   Viability scan  Name: Dony Burr  MRN: [id number]  : 1980      ASSESSMENT/PLAN:  Problem   Less Than 8 Weeks Gestation of Pregnancy    Viability scan with single IUP identified, GA 6w4d  Cardiac activity detected at 215bpm  Corpus luteum visualized on identification of left ovary  H/o A2GDM and PPH  Prenatal panel and 1h glucose challenge test ordered  Urine incidentally noted to be turbid in character, UA and UCx ordered; pt asymptomatic  Script sent for prenatal vitamins  RTC in 2w for further imaging given discrepancy in dating     Covid-19 Virus Detected (Resolved)    (Spontaneous Vaginal Delivery) (Resolved)   Postpartum Hemorrhage, Delivered, Current Hospitalization (Resolved)   Encounter for Injection Education (Resolved)   Hyperglycemia During Pregnancy (Resolved)   39 Weeks Gestation of Pregnancy (Resolved)    IOL - 2020 @8pm [facility]   Flu vaccine 19     Anemia During Pregnancy in Third Trimester (Resolved)   Elderly Multigravida in Third Trimester (Resolved)   Gbs Bacteriuria (Resolved)    Amoxicillin sent to patient's pharmacy   Will need PCN in labor  DO NOT collect GBS swab at 36 weeks!!     Iron Deficiency Anemia (Resolved)    F/u Ferritin (Hb 8 4)  Will likely need IV iron     Insulin Controlled Gestational Diabetes Mellitus (Gdm) in Third Trimester (Resolved)    1 hour ; 3 hour GTT ordered 19  3 hour stopped fasting 97- referral placed to diabetes in pregnancy program 19           SUBJECTIVE:  Patient presents for viability scan with LMP 3/15/22 with suspected GA 9w2d  Patient denies nausea/vomiting  History significant for A2GDM and postpartum hemorrhage  Patient denies urinary complaints  Urine noted to be turbid and pale yellow in character, sent for testing  OBJECTIVE:  Vitals:    22 1505   Resp: 18       Physical Exam  Constitutional:       General: She is not in acute distress    HENT: Head: Normocephalic  Right Ear: External ear normal       Left Ear: External ear normal    Eyes:      General: No scleral icterus  Right eye: No discharge  Left eye: No discharge  Conjunctiva/sclera: Conjunctivae normal    Cardiovascular:      Rate and Rhythm: Normal rate and regular rhythm  Pulses: Normal pulses  Heart sounds: Normal heart sounds  Pulmonary:      Effort: Pulmonary effort is normal  No respiratory distress  Breath sounds: Normal breath sounds  Abdominal:      General: Abdomen is flat  There is no distension  Palpations: Abdomen is soft  Tenderness: There is no abdominal tenderness  There is no guarding  Genitourinary:     General: Normal vulva  Musculoskeletal:         General: No swelling or tenderness  Normal range of motion  Cervical back: Normal range of motion  Right lower leg: No edema  Left lower leg: No edema  Skin:     General: Skin is warm and dry  Capillary Refill: Capillary refill takes less than 2 seconds  Neurological:      Mental Status: She is alert and oriented to person, place, and time  Mental status is at baseline  Psychiatric:         Mood and Affect: Mood normal          Behavior: Behavior normal            FIRST TRIMESTER OBSTETRIC ULTRASOUND  05/22/22  Lashae Curry MD     INDICATION: Amenorrhea, viability    COMPARISON: None  TECHNIQUE:   Transvaginal imaging was performed to assess the gestation, myometrial/endometrial architecture and ovarian parenchymal detail  The study includes volumetric sweeps and traditional still imaging technique  FINDINGS:     A single intrauterine gestation is identified  Cardiac activity is detected at 215bpm       YOLK SAC:  Present and normal in size and appearance  MEAN CROWN RUMP LENGTH:  0 72 cm = 6 weeks 4 days   AMNIOTIC FLUID/SAC SHAPE:  Within expected normal range       UTERUS/ADNEXA:   No adnexal mass or pathologic cyst   Corpus luteum identified on left ovary  No free fluid identified  IMPRESSION:    According to , will date based off ultrasound  Final HILLARY: 1/8/2023  Gestational age: 7w2d  Fetal cardiac activity detected    Corpus luteum visualized on left ovary  Recommend repeat imaging                    Yuli Herbert MD  OB/GYN PGY-1  5/19/2022  3:47 PM

## 2022-05-21 LAB — BACTERIA UR CULT: NORMAL

## 2022-05-22 PROBLEM — O09.523 ELDERLY MULTIGRAVIDA IN THIRD TRIMESTER: Status: RESOLVED | Noted: 2020-01-15 | Resolved: 2022-05-22

## 2022-05-22 PROBLEM — D50.9 IRON DEFICIENCY ANEMIA: Status: RESOLVED | Noted: 2019-11-26 | Resolved: 2022-05-22

## 2022-05-22 PROBLEM — R82.71 GBS BACTERIURIA: Status: RESOLVED | Noted: 2019-11-26 | Resolved: 2022-05-22

## 2022-05-22 PROBLEM — U07.1 COVID-19 VIRUS DETECTED: Status: RESOLVED | Noted: 2020-06-11 | Resolved: 2022-05-22

## 2022-05-22 PROBLEM — O24.414 INSULIN CONTROLLED GESTATIONAL DIABETES MELLITUS (GDM) IN THIRD TRIMESTER: Status: RESOLVED | Noted: 2019-11-19 | Resolved: 2022-05-22

## 2022-05-22 PROBLEM — Z3A.39 39 WEEKS GESTATION OF PREGNANCY: Status: RESOLVED | Noted: 2020-01-22 | Resolved: 2022-05-22

## 2022-05-22 PROBLEM — O99.810 HYPERGLYCEMIA DURING PREGNANCY: Status: RESOLVED | Noted: 2020-03-03 | Resolved: 2022-05-22

## 2022-05-22 PROBLEM — Z71.89 ENCOUNTER FOR INJECTION EDUCATION: Status: RESOLVED | Noted: 2020-03-03 | Resolved: 2022-05-22

## 2022-05-22 PROBLEM — O99.013 ANEMIA DURING PREGNANCY IN THIRD TRIMESTER: Status: RESOLVED | Noted: 2020-01-15 | Resolved: 2022-05-22

## 2022-05-25 ENCOUNTER — TELEPHONE (OUTPATIENT)
Dept: OBGYN CLINIC | Facility: CLINIC | Age: 42
End: 2022-05-25

## 2022-05-25 NOTE — TELEPHONE ENCOUNTER
----- Message from Robert Posadas MD sent at 5/25/2022  3:16 PM EDT -----  Regarding: Urine results and lab work  Please let patient know we had completed a test on her urine that came back negative for infection  There is concern for excess glucose (sugar) in her urine so she should get the lab work we ordered, the glucola and prenatal panel, done as soon as she can      Thanks,  Silvana Odom  ----- Message -----  From: Srikanth Walker MA  Sent: 5/19/2022   3:09 PM EDT  To: Robert Posadas MD

## 2022-05-25 NOTE — TELEPHONE ENCOUNTER
Spoke to pt's spouse regarding results  Stated understanding the results and that she would be going to the lab again to complete the new order of labs   Given call back number if they had any questions

## 2022-05-26 ENCOUNTER — APPOINTMENT (OUTPATIENT)
Dept: LAB | Facility: HOSPITAL | Age: 42
End: 2022-05-26
Payer: COMMERCIAL

## 2022-05-26 DIAGNOSIS — Z32.00 ENCOUNTER FOR PREGNANCY TEST, RESULT UNKNOWN: ICD-10-CM

## 2022-05-26 LAB
ABO GROUP BLD: NORMAL
BACTERIA UR QL AUTO: ABNORMAL /HPF
BASOPHILS # BLD AUTO: 0.03 THOUSANDS/ΜL (ref 0–0.1)
BASOPHILS NFR BLD AUTO: 1 % (ref 0–1)
BILIRUB UR QL STRIP: NEGATIVE
BLD GP AB SCN SERPL QL: NEGATIVE
CLARITY UR: CLEAR
COLOR UR: ABNORMAL
EOSINOPHIL # BLD AUTO: 0.26 THOUSAND/ΜL (ref 0–0.61)
EOSINOPHIL NFR BLD AUTO: 5 % (ref 0–6)
ERYTHROCYTE [DISTWIDTH] IN BLOOD BY AUTOMATED COUNT: 21.3 % (ref 11.6–15.1)
GLUCOSE 1H P 50 G GLC PO SERPL-MCNC: 100 MG/DL (ref 40–134)
GLUCOSE UR STRIP-MCNC: NEGATIVE MG/DL
HBV SURFACE AG SER QL: NORMAL
HCT VFR BLD AUTO: 28.9 % (ref 34.8–46.1)
HGB BLD-MCNC: 7.6 G/DL (ref 11.5–15.4)
HGB UR QL STRIP.AUTO: NEGATIVE
IMM GRANULOCYTES # BLD AUTO: 0.01 THOUSAND/UL (ref 0–0.2)
IMM GRANULOCYTES NFR BLD AUTO: 0 % (ref 0–2)
KETONES UR STRIP-MCNC: NEGATIVE MG/DL
LEUKOCYTE ESTERASE UR QL STRIP: ABNORMAL
LYMPHOCYTES # BLD AUTO: 2.12 THOUSANDS/ΜL (ref 0.6–4.47)
LYMPHOCYTES NFR BLD AUTO: 38 % (ref 14–44)
MCH RBC QN AUTO: 16.3 PG (ref 26.8–34.3)
MCHC RBC AUTO-ENTMCNC: 26.3 G/DL (ref 31.4–37.4)
MCV RBC AUTO: 62 FL (ref 82–98)
MONOCYTES # BLD AUTO: 0.35 THOUSAND/ΜL (ref 0.17–1.22)
MONOCYTES NFR BLD AUTO: 6 % (ref 4–12)
MUCOUS THREADS UR QL AUTO: ABNORMAL
NEUTROPHILS # BLD AUTO: 2.85 THOUSANDS/ΜL (ref 1.85–7.62)
NEUTS SEG NFR BLD AUTO: 50 % (ref 43–75)
NITRITE UR QL STRIP: NEGATIVE
NON-SQ EPI CELLS URNS QL MICRO: ABNORMAL /HPF
NRBC BLD AUTO-RTO: 0 /100 WBCS
PH UR STRIP.AUTO: 6 [PH]
PLATELET # BLD AUTO: 464 THOUSANDS/UL (ref 149–390)
PMV BLD AUTO: 8.8 FL (ref 8.9–12.7)
PROT UR STRIP-MCNC: NEGATIVE MG/DL
RBC # BLD AUTO: 4.67 MILLION/UL (ref 3.81–5.12)
RBC #/AREA URNS AUTO: ABNORMAL /HPF
RH BLD: POSITIVE
RPR SER QL: NORMAL
RUBV IGG SERPL IA-ACNC: >175 IU/ML
SP GR UR STRIP.AUTO: 1.02 (ref 1–1.03)
SPECIMEN EXPIRATION DATE: NORMAL
UROBILINOGEN UR STRIP-ACNC: <2 MG/DL
WBC # BLD AUTO: 5.62 THOUSAND/UL (ref 4.31–10.16)
WBC #/AREA URNS AUTO: ABNORMAL /HPF

## 2022-05-26 PROCEDURE — 81001 URINALYSIS AUTO W/SCOPE: CPT

## 2022-05-26 PROCEDURE — 87086 URINE CULTURE/COLONY COUNT: CPT

## 2022-05-26 PROCEDURE — 36415 COLL VENOUS BLD VENIPUNCTURE: CPT

## 2022-05-26 PROCEDURE — 82950 GLUCOSE TEST: CPT

## 2022-05-26 PROCEDURE — 80081 OBSTETRIC PANEL INC HIV TSTG: CPT

## 2022-05-27 LAB
BACTERIA UR CULT: NORMAL
HIV 1+2 AB+HIV1 P24 AG SERPL QL IA: NORMAL

## 2022-06-01 DIAGNOSIS — O99.011 ANEMIA AFFECTING PREGNANCY IN FIRST TRIMESTER: Primary | ICD-10-CM

## 2022-06-01 RX ORDER — FERROUS SULFATE TAB EC 324 MG (65 MG FE EQUIVALENT) 324 (65 FE) MG
324 TABLET DELAYED RESPONSE ORAL
Qty: 60 TABLET | Refills: 3 | Status: SHIPPED | OUTPATIENT
Start: 2022-06-01 | End: 2022-08-04 | Stop reason: SDUPTHER

## 2022-06-02 ENCOUNTER — ULTRASOUND (OUTPATIENT)
Dept: OBGYN CLINIC | Facility: CLINIC | Age: 42
End: 2022-06-02

## 2022-06-02 ENCOUNTER — APPOINTMENT (OUTPATIENT)
Dept: LAB | Facility: HOSPITAL | Age: 42
End: 2022-06-02
Payer: COMMERCIAL

## 2022-06-02 VITALS
BODY MASS INDEX: 29.55 KG/M2 | DIASTOLIC BLOOD PRESSURE: 86 MMHG | SYSTOLIC BLOOD PRESSURE: 125 MMHG | HEIGHT: 62 IN | WEIGHT: 160.6 LBS

## 2022-06-02 DIAGNOSIS — Z3A.01 LESS THAN 8 WEEKS GESTATION OF PREGNANCY: ICD-10-CM

## 2022-06-02 DIAGNOSIS — Z3A.01 LESS THAN 8 WEEKS GESTATION OF PREGNANCY: Primary | ICD-10-CM

## 2022-06-02 LAB — B-HCG SERPL-ACNC: ABNORMAL MIU/ML

## 2022-06-02 PROCEDURE — 36415 COLL VENOUS BLD VENIPUNCTURE: CPT

## 2022-06-02 PROCEDURE — 84702 CHORIONIC GONADOTROPIN TEST: CPT

## 2022-06-02 RX ORDER — FAMOTIDINE 10 MG
10 TABLET ORAL 2 TIMES DAILY
Qty: 60 TABLET | Refills: 0 | Status: SHIPPED | OUTPATIENT
Start: 2022-06-02 | End: 2022-07-20 | Stop reason: SDUPTHER

## 2022-06-03 NOTE — PROGRESS NOTES
[address]   Repeat Viability Scan  Name: Francis Larios  MRN: [id number]  : 1980      ASSESSMENT/PLAN:  Problem   Less Than 8 Weeks Gestation of Pregnancy    Repeat viability scan with fetus smaller than expected  Based off of prior dating imaging, should be 8w4d  Fetus measuring with CRL 1 41cm, 7w5d  Cardiac activity detected at 181bpm  Concern for intramural uterine gestation with endometrial stripe visualized anterior to gestational sac  Serial hcg quants ordered  TVUS order placed for further assessment  Pepcid 10mg BID PRN ordered for management of heartburn  RTC in 1w after completion of TVUS           SUBJECTIVE:  Patient presents for repeat viability scan  She denies vaginal bleeding or abdominal pain  Patient reports continued heartburn previously managed with Pepcid  Script refilled for management  No other acute concerns addressed  OBJECTIVE:  Vitals:    22 1351   BP: 125/86       Physical Exam  Vitals reviewed  Exam conducted with a chaperone present  Constitutional:       General: She is not in acute distress  HENT:      Head: Normocephalic  Right Ear: External ear normal       Left Ear: External ear normal    Eyes:      General: No scleral icterus  Right eye: No discharge  Left eye: No discharge  Conjunctiva/sclera: Conjunctivae normal    Cardiovascular:      Rate and Rhythm: Normal rate and regular rhythm  Pulses: Normal pulses  Heart sounds: Normal heart sounds  Pulmonary:      Effort: Pulmonary effort is normal  No respiratory distress  Breath sounds: Normal breath sounds  Abdominal:      General: Abdomen is flat  There is no distension  Palpations: Abdomen is soft  Tenderness: There is no abdominal tenderness  There is no guarding  Genitourinary:     General: Normal vulva  Musculoskeletal:         General: No swelling or tenderness  Normal range of motion        Cervical back: Normal range of motion  Right lower leg: No edema  Left lower leg: No edema  Skin:     General: Skin is warm and dry  Capillary Refill: Capillary refill takes less than 2 seconds  Neurological:      Mental Status: She is alert and oriented to person, place, and time  Mental status is at baseline  Psychiatric:         Mood and Affect: Mood normal          Behavior: Behavior normal          FIRST TRIMESTER OBSTETRIC ULTRASOUND  6/2/2022  Taras Opitz, MD     INDICATION: Repeat viability scan    COMPARISON: Transvaginal ultrasound performed 5/19/22     TECHNIQUE:   Transvaginal imaging was performed to assess the gestation, myometrial/endometrial architecture and ovarian parenchymal detail  The study includes volumetric sweeps and traditional still imaging technique  FINDINGS:     A single uterine gestation is identified  Concern for gestational sac lying posterior to the endometrial cavity with endometrial stripe visualized anterior to the gestational sac  Cardiac activity is detected at 181 bpm       YOLK SAC:  Present and normal in size and appearance  MEAN CROWN RUMP LENGTH:  1 41 cm = 7 weeks 5 days   AMNIOTIC FLUID/SAC SHAPE:  Within expected normal range  UTERUS/ADNEXA:   Stable left ovarian cyst identified consistent with corpus luteum  No free fluid identified  IMPRESSION:  Gestational age: 5w11d  Fetal cardiac activity detected    Left ovarian corpus luteal cyst  Smaller than expected fetal growth with concern for intramural uterine gestation  Recommend transvaginal ultrasound imaging with radiology for further assessment                         Taras Opitz, MD  OB/GYN PGY-1  6/2/2022  9:31 PM

## 2022-06-04 ENCOUNTER — APPOINTMENT (OUTPATIENT)
Dept: LAB | Facility: HOSPITAL | Age: 42
End: 2022-06-04
Payer: COMMERCIAL

## 2022-06-04 DIAGNOSIS — Z3A.01 LESS THAN 8 WEEKS GESTATION OF PREGNANCY: ICD-10-CM

## 2022-06-04 LAB — B-HCG SERPL-ACNC: ABNORMAL MIU/ML

## 2022-06-04 PROCEDURE — 84702 CHORIONIC GONADOTROPIN TEST: CPT

## 2022-06-04 PROCEDURE — 36415 COLL VENOUS BLD VENIPUNCTURE: CPT

## 2022-06-06 ENCOUNTER — HOSPITAL ENCOUNTER (OUTPATIENT)
Dept: RADIOLOGY | Age: 42
Discharge: HOME | End: 2022-06-06
Payer: COMMERCIAL

## 2022-06-06 DIAGNOSIS — Z3A.01 LESS THAN 8 WEEKS GESTATION OF PREGNANCY: ICD-10-CM

## 2022-06-06 PROCEDURE — 76801 OB US < 14 WKS SINGLE FETUS: CPT

## 2022-06-23 ENCOUNTER — ULTRASOUND (OUTPATIENT)
Dept: OBGYN CLINIC | Facility: CLINIC | Age: 42
End: 2022-06-23

## 2022-06-23 VITALS
HEART RATE: 62 BPM | BODY MASS INDEX: 29.26 KG/M2 | HEIGHT: 62 IN | SYSTOLIC BLOOD PRESSURE: 122 MMHG | WEIGHT: 159 LBS | DIASTOLIC BLOOD PRESSURE: 70 MMHG

## 2022-06-23 DIAGNOSIS — Z3A.01 LESS THAN 8 WEEKS GESTATION OF PREGNANCY: Primary | ICD-10-CM

## 2022-06-23 DIAGNOSIS — Z3A.10 10 WEEKS GESTATION OF PREGNANCY: ICD-10-CM

## 2022-06-23 PROCEDURE — 99213 OFFICE O/P EST LOW 20 MIN: CPT | Performed by: STUDENT IN AN ORGANIZED HEALTH CARE EDUCATION/TRAINING PROGRAM

## 2022-06-23 NOTE — PROGRESS NOTES
Subjective    Rupa Hamm is a 38year old  who presents today to discuss her ultrasound dating  There was a concern with her earlier dating ultrasounds for being inconsistent with her LMP and possibly implanting intramurally  However, on a formal dating ultrasound on 22, the fetus measured 8w3d by [address] with a normal heart rate  She denies any cramping, bleeding, or other complaints  OB History        6    Para   5    Term   5            AB        Living   5       SAB        IAB        Ectopic        Multiple   0    Live Births   5                    Objective    Vitals:    22 1018   BP: 122/70   Pulse: 62   Weight: 72 1 kg (159 lb)   Height: 5' 2" (1 575 m)        Physical Exam  Constitutional:       Appearance: Normal appearance  HENT:      Head: Normocephalic and atraumatic  Cardiovascular:      Rate and Rhythm: Normal rate  Pulmonary:      Effort: Pulmonary effort is normal  No respiratory distress  Abdominal:      Palpations: Abdomen is soft  Tenderness: There is no abdominal tenderness  Musculoskeletal:         General: Normal range of motion  Neurological:      Mental Status: She is alert  Skin:     General: Skin is warm and dry           bpm today by TAUS  Patient declined TVUS today    Assessment    Patient Active Problem List   Diagnosis    10 weeks gestation of pregnancy        Plan  - Due date by first trimester ultrsound on 22 = 23, she is 10w6d today  - Schedule prenatal H and P, nurse intake  - Referral to [facility] for first trimester genetic screening

## 2022-06-28 ENCOUNTER — TELEPHONE (OUTPATIENT)
Dept: PERINATAL CARE | Facility: CLINIC | Age: 42
End: 2022-06-28

## 2022-06-29 NOTE — PROGRESS NOTES
Pt  Offers no complaints 
Venofer given without incident   AVS declined
I have personally seen and examined the patient. I have collaborated with and supervised the

## 2022-07-11 ENCOUNTER — PATIENT OUTREACH (OUTPATIENT)
Dept: OBGYN CLINIC | Facility: CLINIC | Age: 42
End: 2022-07-11

## 2022-07-13 ENCOUNTER — PATIENT OUTREACH (OUTPATIENT)
Dept: OBGYN CLINIC | Facility: CLINIC | Age: 42
End: 2022-07-13

## 2022-07-13 NOTE — PROGRESS NOTES
RAFA SOTO spoke with 37 y/o- S- G6:P5-  Irish speaking woman for pre  assessment  Pt resides with her 5 kids  Reported was not intended but welcome  FOB is her 3 y/o daughter's dad and is very supportive  Pt states they are looking to move in together in the near future  Pt denies any usage of drug, alcohol or smoking  No mental health or domestic violence history  Pt has MA and need to call [facility] for appointment  Pt is a   Pt denies transportation issues  Pt claimed her main support come from FOB and oldest daughter  Pt was upset with practice stating was not sure why they did so many US and was seen by different doctors  RAFA SOTO was able to read chart to patient and she verbalized understanding  Pt was reminded of pre  appointment needed  Pt was transfer to make it  Pt denies other concern at this  RAFA SOTO reminded Pt of her role and gave phone number again  Pt will call at any time needed

## 2022-07-15 ENCOUNTER — INITIAL PRENATAL (OUTPATIENT)
Dept: OBGYN CLINIC | Facility: CLINIC | Age: 42
End: 2022-07-15

## 2022-07-15 DIAGNOSIS — Z34.92 PRENATAL CARE IN SECOND TRIMESTER: Primary | ICD-10-CM

## 2022-07-15 PROCEDURE — T1001 NURSING ASSESSMENT/EVALUATN: HCPCS

## 2022-07-15 NOTE — PROGRESS NOTES
OB INTAKE INTERVIEW  Pt presents for OB intake  [id number]  OB History    Para Term  AB Living   6 5 5     5   SAB IAB Ectopic Multiple Live Births         0 5      # Outcome Date GA Lbr Brandt/2nd Weight Sex Delivery Anes PTL Lv   6 Current            5 Term 20 39w1d / 00:03 3525 g (7 lb 12 3 oz) F Vag-Spont None N CLEO      Complications: Other Excessive Bleeding   4 Term 14 40w0d   M Vag-Spont EPI N CLEO      Birth Comments: GDM noncompliant    3 Term 06/10/08 39w0d  3118 g (6 lb 14 oz) M Vag-Spont  N CLEO      Birth Comments: oligo induced   2 Term 06 40w0d  3374 g (7 lb 7 oz) F Vag-Spont None N CLEO   1 Term 00 40w0d   M Vag-Spont  N CLEO     Hx of  delivery prior to 36 weeks 6 days:  No   If yes, place a referral for cervical surveillance at 16 weeks  Last Menstrual Period:    Patient's last menstrual period was 2022  Ultrasound date: 2022  8 weeks 3 days (Maternal Fetal Medicine)  Estimated Date of Delivery: 23   by US  H&P visit scheduled  2022 @ 0900  with Dr Alyssa Bedoya     Last pap smear: 2019  Findings; lab pap smear results: no abnormalities    Current Issues:  Constipation :   No  Headaches :   No  Cramping:  No  Spotting :   No  PICA cravings :  Yes, Ice  FOB Involved:   Yes  Planned pregnancy:  Yes  I have these concerns about this prenatal patient:   Belarusian speaking, pt opted to use partner as  for today's visit  Advanced maternal age  Low iron  Hemoglobin 7 6 on 2022  Reports she is not taking an iron supplement or the prenatal vitamin because she throws them up  Additionally reports nose bleeds and occasional episodes of dizziness  Hx of gestational diabetes and postpartum hemorrhage in prior pregnancies  Referral for dentist placed  Pt reports a prior issue with dental health in pregnancy causing her to have several teeth removed   Minor disagreement in office between pt and partner regarding partner's alcohol use   Pt is concerned, but partner states he only drinks 2-3/day  Interview education   Marcelina Gomeztommy's Pregnancy Essentials reviewed and discussed    [facility] Handout   [facility] M[facility] Handout   Discussed genetic testing   Prenatal lab work: Scripts printed and given to pt   Influenza vaccine given today: No   Discussed Tdap vaccine  Immunizations:   Immunization History   Administered Date(s) Administered    Influenza, injectable, quadrivalent, preservative free 0 5 mL 12/24/2019    Influenza, seasonal, injectable 01/15/2014     Depression Screening Follow-up Plan: Patient's depression screening was negative with an [place] score of  5  Clinically patient does not have depression  No treatment is required     Nurse/Family Partnership- referral placed:  No   If yes, place referral for nurse family partnership  BMI Counseling:  Tobacco Cessation Counseling: non-smoker  Infection Screening: Does the pt have a hx of MRSA? No  If yes- please follow MRSA protocol and obtain a nasal swab for MRSA culture  The patient was oriented to our practice and all questions were answered    Interviewed by: Anny Hendrickson RN 07/15/22

## 2022-07-15 NOTE — LETTER
[facility] Letter    Jeannie Bruce  1980  [address]  [address] [address]       07/15/22          Rupa Castillokristina is a patient and under our care in our office  Rupa Yung Estimated Date of Delivery: 1/13/23  Any questions or concerns feel free to contact our office       Thank you,  [address]  [id number] [facility]/Dominga Shahid [address]  [address] [facility]/Michael [address]  [facility]/[address]  [phone]

## 2022-07-15 NOTE — LETTER
Work Letter    Rupa Perryores Certain  1980  [address] [address]    Dear Froy Willson,      07/15/22        Your employee is a patient at [facility]  We recommend that all pregnant women:    1  Have a well-ventilated workspace  2  Wear low-heeled shoes  3  Work no more than 40 hours per week  4  Have a 15 minute break every 2 hours and at least 30 minutes for a meal break  5  Use good body mechanics by bending at your knees to avoid back strain and lift no more than 20 pounds without assistance  Will need assistance with lifting over 20 lbs  6  Have ready access to bathrooms and water  She may continue to work until her due date unless medical complications arise  We anticipate she may return to work in 6-8 weeks after delivery       Sincerely,  1 Olinda Lambert

## 2022-07-15 NOTE — LETTER
Dentist Letter    Genesis Benítez  1980  [address]  [address] [address]          07/15/22    We have had several requests from local dentist requesting permission to perform procedures on our patients who are pregnant  We wish to respond with this letter regarding some of the more routine procedures that we have been asked about  The following procedures may be performed on our obstetric patients:   1  Administration of local anesthesia   2  Administration of antibiotics such as PCN, Ampicillin, and Erythromycin  3  Administration of pain medications such as Tylenol, Tylenol with codeine, and if needed Percocet  4  Shielded X-rays    Should you have any questions, please do not hesitate to contact at [phone]          Sincerely,    [facility]    [phone]

## 2022-07-15 NOTE — LETTER
Proof of Pregnancy Letter    Carlos Tyson  1980  [address]  [address] [place] [id number]        07/15/22      Rupa Calix is a patient at our facility  Rupa Calix Estimated Date of Delivery: 1/13/23       Any questions or concerns, please feel free to contact our office  Sincerely,    1 [facility], [facility]

## 2022-07-20 DIAGNOSIS — O21.9 NAUSEA AND VOMITING DURING PREGNANCY: Primary | ICD-10-CM

## 2022-07-20 RX ORDER — PYRIDOXINE HCL (VITAMIN B6) 25 MG
25 TABLET ORAL 3 TIMES DAILY
Qty: 90 TABLET | Refills: 3 | Status: SHIPPED | OUTPATIENT
Start: 2022-07-20 | End: 2022-08-04

## 2022-07-20 RX ORDER — FAMOTIDINE 10 MG
10 TABLET ORAL 2 TIMES DAILY
Qty: 60 TABLET | Refills: 0 | Status: SHIPPED | OUTPATIENT
Start: 2022-07-20 | End: 2022-08-04 | Stop reason: SDUPTHER

## 2022-07-27 PROBLEM — Z86.32 HISTORY OF INSULIN CONTROLLED GESTATIONAL DIABETES MELLITUS (GDM): Status: ACTIVE | Noted: 2019-11-19

## 2022-07-27 PROBLEM — O09.42 GRAND MULTIPARITY WITH CURRENT PREGNANCY IN SECOND TRIMESTER: Status: ACTIVE | Noted: 2022-07-27

## 2022-07-27 PROBLEM — Z87.59 HISTORY OF SEVERE PRE-ECLAMPSIA: Status: ACTIVE | Noted: 2022-07-27

## 2022-07-27 PROBLEM — O09.299 HISTORY OF POSTPARTUM HEMORRHAGE, CURRENTLY PREGNANT: Status: ACTIVE | Noted: 2022-07-27

## 2022-07-27 PROBLEM — Z3A.15 15 WEEKS GESTATION OF PREGNANCY: Status: ACTIVE | Noted: 2022-05-19

## 2022-08-03 ENCOUNTER — TELEPHONE (OUTPATIENT)
Dept: PERINATAL CARE | Facility: CLINIC | Age: 42
End: 2022-08-03

## 2022-08-03 NOTE — TELEPHONE ENCOUNTER
WANG to confirm pt's appt with Maryann for genetics on 8/4  Told pt to call us back if she does prefer an  for [place] so we can reschedule her to an individual consult

## 2022-08-03 NOTE — PROGRESS NOTES
OB/GYN  PRENATAL H&P VISIT  Elvia Flight  2022  Dr Geo Thornton MD    Patient presents for initial OB H&P  Accompanied by: her  and 2 of her children   Unplanned but desired pregnancy, FOB involved and supportive  Occupation: stay at home mom  [id number]    Hx of  delivery prior to 36 weeks 6 days: no  Hx of hypertension:  Yes, preeclampsia with SF in last pregnancy  Patient's last menstrual period was 2022  Estimated Date of Delivery: 23  Hx of STD/STI: no  Hx of recent travel or travel planned in the near future: no    Signs and Symptoms of pregnancy:  - fatigue, morning sickness and nausea  - Constipation: no  - Headaches: no  - Cramping/spotting: no  - PICA cravings: yes  - Diabetes: If you answer yes, please order 1 hr gtt testing, 50grams   History of gestational diabetes yes   BMI >35  no   Advance maternal age >35 yes   First degree relative with type 2 diabetes yes   History of PCOS no   Current metformin use no   Prior history of macrosomia or LGA no      Prenatal labs collected including: prenatal panel, varicella immunity yes  Last Pap:  2019, normal  Tdap - counseled to be given after 27 weeks  Rh negative (RhoGam needed): no  Influenza vaccine discussed and information sheet given  Vaccinated: no  Immunization Record  Immunization History   Administered Date(s) Administered    Influenza, injectable, quadrivalent, preservative free 0 5 mL 2019    Influenza, seasonal, injectable 01/15/2014     Hx of MRSA: no  Dental visit with last 6 months - yes  If no, recommendations discussed  Depression: history or current symptoms: no  Depression Screening: Patient's depression screening was negative with a PHQ-2 score of 1  Alcohol use in pregnancy: no  Drug use in past 12 months: no  Use of nicotine or recreational drug use: no    Review of Systems   Constitutional: Positive for fatigue  Respiratory: Negative  Cardiovascular: Negative  Gastrointestinal: Positive for constipation  Genitourinary: Negative  Musculoskeletal: Negative  Neurological: Negative  Psychiatric/Behavioral: Negative  All other systems reviewed and are negative        Past Medical History:   Diagnosis Date    39 weeks gestation of pregnancy 2020    IOL - 2020 @8pm [address] [employer]  Flu vaccine 19    Anemia     Anemia during pregnancy in third trimester 1/15/2020    COVID-19 06/10/2020    COVID-19 virus detected 2020    Elderly multigravida in third trimester 1/15/2020    Encounter for injection education 3/3/2020    GBS bacteriuria 2019    Amoxicillin sent to patient's pharmacy  Will need PCN in labor DO NOT collect GBS swab at 36 weeks!!    Hyperglycemia during pregnancy 3/3/2020    Iron deficiency anemia 2019    F/u Ferritin (Hb 8 4) Will likely need IV iron    Postpartum hemorrhage, delivered, current hospitalization 2020    Prediabetes 2015     (spontaneous vaginal delivery) 2020       Past Surgical History:   Procedure Laterality Date    TOOTH EXTRACTION         Social History     Socioeconomic History    Marital status: Single     Spouse name: Not on file    Number of children: 11    Years of education: 9    Highest education level: 9th grade   Occupational History    Occupation: Unemployed   Tobacco Use    Smoking status: Never Smoker    Smokeless tobacco: Never Used   Vaping Use    Vaping Use: Never used   Substance and Sexual Activity    Alcohol use: No     Comment: pt reports social drinking once per month prior to pregnancy    Drug use: No    Sexual activity: Yes     Partners: Male     Birth control/protection: None   Other Topics Concern    Not on file   Social History Narrative    Not on file     Social Determinants of Health     Financial Resource Strain: Low Risk     Difficulty of Paying Living Expenses: Not hard at all   Food Insecurity: No Food Insecurity    Worried About Running Out of Food in the Last Year: Never true    Ran Out of Food in the Last Year: Never true   Transportation Needs: No Transportation Needs    Lack of Transportation (Medical): No    Lack of Transportation (Non-Medical): No   Physical Activity: Inactive    Days of Exercise per Week: 0 days    Minutes of Exercise per Session: 0 min   Stress: No Stress Concern Present    Feeling of Stress : Not at all   Social Connections: Moderately Integrated    Frequency of Communication with Friends and Family: More than three times a week    Frequency of Social Gatherings with Friends and Family: More than three times a week    Attends Buddhism Services: More than 4 times per year    Active Member of [employer] or Organizations: No    Attends Club or Organization Meetings: Never    Marital Status: Living with partner   Intimate Partner Violence: Not At Risk    Fear of Current or Ex-Partner: No    Emotionally Abused: No    Physically Abused: No    Sexually Abused: No   Housing Stability: Unknown    Unable to Pay for Housing in the Last Year: No    Number of [place] in the Last Year: Not on file    Unstable Housing in the Last Year: No       OBJECTIVE  Vitals:    08/04/22 0855   BP: 117/71   Pulse: 89     FHR 140s    Physical Exam  Constitutional:       Appearance: Normal appearance  She is normal weight  Genitourinary:      Right Labia: No rash or tenderness  Left Labia: No tenderness or rash  No vaginal discharge or bleeding  Right Adnexa: not tender and no mass present  Left Adnexa: not tender and no mass present  Cervical exam comments: Multiparous, normal appearing  HENT:      Mouth/Throat:      Mouth: Mucous membranes are moist       Pharynx: Oropharynx is clear  Eyes:      Conjunctiva/sclera: Conjunctivae normal       Pupils: Pupils are equal, round, and reactive to light  Cardiovascular:      Rate and Rhythm: Normal rate and regular rhythm        Pulses: Normal pulses  Heart sounds: Normal heart sounds  Pulmonary:      Effort: Pulmonary effort is normal       Breath sounds: Normal breath sounds  Abdominal:      Tenderness: There is no abdominal tenderness  Comments: gravid   Neurological:      General: No focal deficit present  Mental Status: She is alert and oriented to person, place, and time  Mental status is at baseline  Skin:     General: Skin is warm and dry  Vitals reviewed  Exam conducted with a chaperone present  ASSESSMENT AND PLAN    38 y o , [id number], with /71   Pulse 89   Ht 5' 2" (1 575 m)   Wt 70 8 kg (156 lb)   LMP 03/31/2022 , at 16w6d here for her prenatal H&P  Problem List        Other    History of insulin controlled gestational diabetes mellitus (GDM)    Overview     Induced at 39 weeks for this         Current Assessment & Plan     Early GTT wnl on 5/26/22         16 weeks gestation of pregnancy    Overview     Concern for intramural uterine gestation with endometrial stripe visualized anterior to gestational sac -> resolved on repeat TAUS  Pepcid 10mg BID PRN ordered for management of heartburn  Prenatal labs [x]  NIPT [ ]   28 week labs [ ]  Tdap [ ]   Contraception: possibly interval IUD         Current Assessment & Plan     No OB complaints  RTC in 1 month         Padmini Nieto multiparity with current pregnancy in second trimester    History of postpartum hemorrhage, currently pregnant    Overview     1 4 L QBL 2 2 retained placental membranes   Hgb 9 9 --> 8 5  Received cytotec, hemabate, pitocin, TXA         History of severe pre-eclampsia    Current Assessment & Plan     Diagnosed intrapartum, received IV magnesium  Continue to monitor BP closely  Will order baseline preE labs with repeat CBC          Anemia    Current Assessment & Plan     Hgb 7 6 on prenatal panel  Patient reports fatigue and craving ice frequently  Outpatient venofer treatment ordered         Anemia affecting pregnancy in second trimester Other Visit Diagnoses     Nausea and vomiting during pregnancy        Spontaneous vaginal delivery        Encounter for pregnancy test, result unknown        Screening for STD (sexually transmitted disease)              1  Pregnancy: PN Labs reviewed today  Final dating via ultrasound on 22  2  Screening: Pap smear due in November, will collect postpartum  GC/CT collected  Patient has telemedicine appt with the  office later today to discuss genetic testing options  3  Postpartum contraception: Different methods of contraception were discussed with patient, including progesterone only oral pills, depo provera, nexplanon, mirena, and paragard  Patient might want to use IUD during postpartum phase  4  Follow up: RTC in 4 weeks  Precautions regarding labor, leakage, bleeding, and fetal movement reviewed      D/w Dr Ashley Rodríguez MD  2022  12:48 PM

## 2022-08-04 ENCOUNTER — TELEMEDICINE (OUTPATIENT)
Dept: PERINATAL CARE | Facility: CLINIC | Age: 42
End: 2022-08-04
Payer: COMMERCIAL

## 2022-08-04 ENCOUNTER — ROUTINE PRENATAL (OUTPATIENT)
Dept: OBGYN CLINIC | Facility: CLINIC | Age: 42
End: 2022-08-04

## 2022-08-04 VITALS
SYSTOLIC BLOOD PRESSURE: 117 MMHG | HEIGHT: 62 IN | WEIGHT: 156 LBS | HEART RATE: 89 BPM | BODY MASS INDEX: 28.71 KG/M2 | DIASTOLIC BLOOD PRESSURE: 71 MMHG

## 2022-08-04 DIAGNOSIS — Z32.00 ENCOUNTER FOR PREGNANCY TEST, RESULT UNKNOWN: ICD-10-CM

## 2022-08-04 DIAGNOSIS — O99.012 ANEMIA AFFECTING PREGNANCY IN SECOND TRIMESTER: ICD-10-CM

## 2022-08-04 DIAGNOSIS — Z87.59 HISTORY OF SEVERE PRE-ECLAMPSIA: ICD-10-CM

## 2022-08-04 DIAGNOSIS — O09.529 ANTEPARTUM MULTIGRAVIDA OF ADVANCED MATERNAL AGE: Primary | ICD-10-CM

## 2022-08-04 DIAGNOSIS — O09.299 HISTORY OF POSTPARTUM HEMORRHAGE, CURRENTLY PREGNANT: ICD-10-CM

## 2022-08-04 DIAGNOSIS — Z31.5 ENCOUNTER FOR PROCREATIVE GENETIC COUNSELING: ICD-10-CM

## 2022-08-04 DIAGNOSIS — D50.9 IRON DEFICIENCY ANEMIA, UNSPECIFIED IRON DEFICIENCY ANEMIA TYPE: ICD-10-CM

## 2022-08-04 DIAGNOSIS — O09.42 GRAND MULTIPARITY WITH CURRENT PREGNANCY IN SECOND TRIMESTER: ICD-10-CM

## 2022-08-04 DIAGNOSIS — Z3A.16 16 WEEKS GESTATION OF PREGNANCY: Primary | ICD-10-CM

## 2022-08-04 DIAGNOSIS — Z86.32 HISTORY OF INSULIN CONTROLLED GESTATIONAL DIABETES MELLITUS (GDM): ICD-10-CM

## 2022-08-04 DIAGNOSIS — Z11.3 SCREENING FOR STD (SEXUALLY TRANSMITTED DISEASE): ICD-10-CM

## 2022-08-04 DIAGNOSIS — O21.9 NAUSEA AND VOMITING DURING PREGNANCY: ICD-10-CM

## 2022-08-04 PROCEDURE — 87491 CHLMYD TRACH DNA AMP PROBE: CPT

## 2022-08-04 PROCEDURE — 99214 OFFICE O/P EST MOD 30 MIN: CPT | Performed by: OBSTETRICS & GYNECOLOGY

## 2022-08-04 PROCEDURE — 87591 N.GONORRHOEAE DNA AMP PROB: CPT

## 2022-08-04 PROCEDURE — NC001 PR NO CHARGE: Performed by: GENETIC COUNSELOR, MS

## 2022-08-04 RX ORDER — FERROUS SULFATE TAB EC 324 MG (65 MG FE EQUIVALENT) 324 (65 FE) MG
324 TABLET DELAYED RESPONSE ORAL
Qty: 60 TABLET | Refills: 3 | Status: SHIPPED | OUTPATIENT
Start: 2022-08-04 | End: 2022-08-04

## 2022-08-04 RX ORDER — FAMOTIDINE 10 MG
10 TABLET ORAL 2 TIMES DAILY
Qty: 60 TABLET | Refills: 0 | Status: SHIPPED | OUTPATIENT
Start: 2022-08-04 | End: 2022-09-03

## 2022-08-04 RX ORDER — PYRIDOXINE HCL (VITAMIN B6) 25 MG
25 TABLET ORAL DAILY
Qty: 90 TABLET | Refills: 1 | Status: SHIPPED | OUTPATIENT
Start: 2022-08-04

## 2022-08-04 RX ORDER — PNV NO.95/FERROUS FUM/FOLIC AC 28MG-0.8MG
1 TABLET ORAL DAILY
Qty: 30 TABLET | Refills: 9 | Status: SHIPPED | OUTPATIENT
Start: 2022-08-04

## 2022-08-04 RX ORDER — FERROUS SULFATE TAB EC 324 MG (65 MG FE EQUIVALENT) 324 (65 FE) MG
324 TABLET DELAYED RESPONSE ORAL
Qty: 60 TABLET | Refills: 3 | Status: SHIPPED | OUTPATIENT
Start: 2022-08-04

## 2022-08-04 RX ORDER — ONDANSETRON 4 MG/1
4 TABLET, FILM COATED ORAL EVERY 8 HOURS PRN
Qty: 20 TABLET | Refills: 0 | Status: SHIPPED | OUTPATIENT
Start: 2022-08-04

## 2022-08-04 RX ORDER — SODIUM CHLORIDE 9 MG/ML
20 INJECTION, SOLUTION INTRAVENOUS ONCE
Status: CANCELLED | OUTPATIENT
Start: 2022-08-08

## 2022-08-04 NOTE — PROGRESS NOTES
Patient attended the Genetic Screening Education Session via [employer]  The group reviewed basic chromosome information and the increasing risk for aneuploidy based on patient age  Copy Number variants (microdeletions/duplications) were also reviewed with a general population risk of 0 4% in any pregnancy  The group discussed the options for prenatal screening and diagnosis for chromosomal abnormalities  The benefits and limitations of fetal anatomy ultrasound at 20 weeks gestation were reviewed  Quad screening consists of second trimester biochemical analysis  Results provide a numerical risk for Down syndrome, Trisomy 18, and open neural tube defects  Group attendees were instructed to contact their OB office if they desired Quad screen  Cell-free fetal DNA (NIPT) screening analyzes placental DNA in maternal serum and can assess the risk for Down syndrome, trisomy 25, trisomy 15, and sex chromosome anomalies  Results report as screen negative or screen positive, and fetal sex information is provided  The possibility of no-result and increased risk result was addressed  Maternal serum AFP screening is recommended at 16-18 weeks to screen for open neural tube defects  The benefits and limitations of these screens, including detection rates and false positive rates, were reviewed  Prenatal diagnostic testing is available via amniocentesis  The timing, risks (including their associated risks of miscarriage) and benefits were reviewed  Lastly, we reviewed that all pregnancies have a 3-6% risk of birth defect, genetic condition, or intellectual disability regardless of age or personal/family history  There is no available testing to rule out all conditions  We reveiwed potential costs associated with cell-free fetal DNA (NIPT) screen and provided resources, including information to check out of pocket cost with their insurance  The self pay price of $99 was also discussed    Group attendees were instructed to contact [phone] [phone] (phone number provided) if they were interested in pursuing NIPT

## 2022-08-05 LAB
C TRACH DNA SPEC QL NAA+PROBE: NEGATIVE
N GONORRHOEA DNA SPEC QL NAA+PROBE: NEGATIVE

## 2022-08-05 NOTE — ASSESSMENT & PLAN NOTE
Hgb 7 6 on prenatal panel  Patient reports fatigue and craving ice frequently  Outpatient venofer treatment ordered

## 2022-08-05 NOTE — ASSESSMENT & PLAN NOTE
Diagnosed intrapartum, received IV magnesium  Continue to monitor BP closely  Will order baseline preE labs with repeat CBC

## 2022-08-17 ENCOUNTER — TELEPHONE (OUTPATIENT)
Dept: OBGYN CLINIC | Facility: CLINIC | Age: 42
End: 2022-08-17

## 2022-08-17 NOTE — TELEPHONE ENCOUNTER
Spoke with pt to help schedule infusions ordered by provider  She is now scheduled for 8/25/22 at 1:30 at the [address]  Pt was advised that next appointment will be made following that visit to complete the series ordered  Pt stated understanding and had no further questions

## 2022-08-23 DIAGNOSIS — O99.012 ANEMIA AFFECTING PREGNANCY IN SECOND TRIMESTER: Primary | ICD-10-CM

## 2022-08-23 RX ORDER — SODIUM CHLORIDE 9 MG/ML
20 INJECTION, SOLUTION INTRAVENOUS ONCE
Status: CANCELLED | OUTPATIENT
Start: 2022-08-25

## 2022-08-25 ENCOUNTER — HOSPITAL ENCOUNTER (OUTPATIENT)
Dept: INFUSION CENTER | Facility: HOSPITAL | Age: 42
Discharge: HOME | End: 2022-08-25
Payer: COMMERCIAL

## 2022-08-25 VITALS
TEMPERATURE: 97.6 F | DIASTOLIC BLOOD PRESSURE: 63 MMHG | RESPIRATION RATE: 18 BRPM | SYSTOLIC BLOOD PRESSURE: 104 MMHG | HEART RATE: 69 BPM

## 2022-08-25 DIAGNOSIS — O99.012 ANEMIA AFFECTING PREGNANCY IN SECOND TRIMESTER: Primary | ICD-10-CM

## 2022-08-25 PROCEDURE — 96365 THER/PROPH/DIAG IV INF INIT: CPT

## 2022-08-25 RX ORDER — SODIUM CHLORIDE 9 MG/ML
20 INJECTION, SOLUTION INTRAVENOUS ONCE
Status: COMPLETED | OUTPATIENT
Start: 2022-08-25 | End: 2022-08-25

## 2022-08-25 RX ORDER — SODIUM CHLORIDE 9 MG/ML
20 INJECTION, SOLUTION INTRAVENOUS ONCE
Status: CANCELLED | OUTPATIENT
Start: 2022-09-02

## 2022-08-25 RX ADMIN — IRON SUCROSE 200 MG: 20 INJECTION, SOLUTION INTRAVENOUS at 13:50

## 2022-08-25 RX ADMIN — SODIUM CHLORIDE 20 ML/HR: 0.9 INJECTION, SOLUTION INTRAVENOUS at 13:50

## 2022-09-01 ENCOUNTER — ROUTINE PRENATAL (OUTPATIENT)
Dept: OBGYN CLINIC | Facility: CLINIC | Age: 42
End: 2022-09-01

## 2022-09-01 ENCOUNTER — ROUTINE PRENATAL (OUTPATIENT)
Dept: PERINATAL CARE | Facility: CLINIC | Age: 42
End: 2022-09-01
Payer: COMMERCIAL

## 2022-09-01 VITALS
SYSTOLIC BLOOD PRESSURE: 112 MMHG | HEART RATE: 72 BPM | HEIGHT: 62 IN | BODY MASS INDEX: 30.4 KG/M2 | DIASTOLIC BLOOD PRESSURE: 66 MMHG | WEIGHT: 165.2 LBS

## 2022-09-01 VITALS
BODY MASS INDEX: 30.18 KG/M2 | HEIGHT: 62 IN | HEART RATE: 61 BPM | SYSTOLIC BLOOD PRESSURE: 124 MMHG | WEIGHT: 164 LBS | DIASTOLIC BLOOD PRESSURE: 58 MMHG

## 2022-09-01 DIAGNOSIS — O99.012 ANEMIA AFFECTING PREGNANCY IN SECOND TRIMESTER: ICD-10-CM

## 2022-09-01 DIAGNOSIS — Z3A.20 20 WEEKS GESTATION OF PREGNANCY: Primary | ICD-10-CM

## 2022-09-01 DIAGNOSIS — Z86.32 HISTORY OF INSULIN CONTROLLED GESTATIONAL DIABETES MELLITUS (GDM): ICD-10-CM

## 2022-09-01 DIAGNOSIS — O09.522 MULTIGRAVIDA OF ADVANCED MATERNAL AGE IN SECOND TRIMESTER: Primary | ICD-10-CM

## 2022-09-01 DIAGNOSIS — O09.299 HISTORY OF POSTPARTUM HEMORRHAGE, CURRENTLY PREGNANT: ICD-10-CM

## 2022-09-01 DIAGNOSIS — Z36.3 ENCOUNTER FOR ANTENATAL SCREENING FOR MALFORMATIONS: ICD-10-CM

## 2022-09-01 DIAGNOSIS — O09.42 GRAND MULTIPARITY WITH CURRENT PREGNANCY IN SECOND TRIMESTER: ICD-10-CM

## 2022-09-01 DIAGNOSIS — Z3A.01 LESS THAN 8 WEEKS GESTATION OF PREGNANCY: ICD-10-CM

## 2022-09-01 DIAGNOSIS — Z36.86 ENCOUNTER FOR ANTENATAL SCREENING FOR CERVICAL LENGTH: ICD-10-CM

## 2022-09-01 DIAGNOSIS — Z87.59 HISTORY OF SEVERE PRE-ECLAMPSIA: ICD-10-CM

## 2022-09-01 DIAGNOSIS — Z3A.20 20 WEEKS GESTATION OF PREGNANCY: ICD-10-CM

## 2022-09-01 DIAGNOSIS — O09.292 HISTORY OF GESTATIONAL DIABETES IN PRIOR PREGNANCY, CURRENTLY PREGNANT IN SECOND TRIMESTER: ICD-10-CM

## 2022-09-01 DIAGNOSIS — Z86.32 HISTORY OF GESTATIONAL DIABETES IN PRIOR PREGNANCY, CURRENTLY PREGNANT IN SECOND TRIMESTER: ICD-10-CM

## 2022-09-01 DIAGNOSIS — O09.292 HX OF PREECLAMPSIA, PRIOR PREGNANCY, CURRENTLY PREGNANT, SECOND TRIMESTER: ICD-10-CM

## 2022-09-01 PROCEDURE — 76811 OB US DETAILED SNGL FETUS: CPT | Performed by: OBSTETRICS & GYNECOLOGY

## 2022-09-01 PROCEDURE — 76817 TRANSVAGINAL US OBSTETRIC: CPT | Performed by: OBSTETRICS & GYNECOLOGY

## 2022-09-01 PROCEDURE — 99213 OFFICE O/P EST LOW 20 MIN: CPT | Performed by: OBSTETRICS & GYNECOLOGY

## 2022-09-01 RX ORDER — ASPIRIN 81 MG/1
162 TABLET, CHEWABLE ORAL DAILY
Qty: 180 TABLET | Refills: 3 | Status: CANCELLED | OUTPATIENT
Start: 2022-09-01

## 2022-09-01 NOTE — LETTER
September 1, 2022     [id number] [facility] PROVIDER    Patient: Carlos Robin   YOB: 1980   Date of Visit: 9/1/2022       Dear   Provider: Thank you for referring Roque Felix to me for evaluation  Below are my notes for this consultation  If you have questions, please do not hesitate to call me  I look forward to following your patient along with you  Sincerely,        Wing Ho MD        CC: No Recipients  Wing Ho MD  9/1/2022  6:09 PM  Sign when Signing Visit  Please refer to the [facility] ultrasound report in Ob Procedures for additional information regarding today's visit

## 2022-09-01 NOTE — PROGRESS NOTES
Please refer to the [facility] ultrasound report in Ob Procedures for additional information regarding today's visit

## 2022-09-01 NOTE — PROGRESS NOTES
[address]   PRENATAL VISIT  Francisco Robbins  [id number]  1980        A/P:  Problem List        Other    History of insulin controlled gestational diabetes mellitus (GDM)    Overview     Induced at 39 weeks for this         Current Assessment & Plan     Early GTT wnl on 5/26/22         20 weeks gestation of pregnancy    Overview     Concern for intramural uterine gestation with endometrial stripe visualized anterior to gestational sac -> resolved on repeat TAUS  Pepcid 10mg BID PRN ordered for management of heartburn  Prenatal labs [x]  NIPT declined  28 week labs [ ]  Tdap [ ]   Contraception: possibly interval IUD         Current Assessment & Plan     RTO in 4 weeks         [address] multiparity with current pregnancy in second trimester    History of postpartum hemorrhage, currently pregnant    Overview     1 4 L QBL 2 2 retained placental membranes  Hgb 9 9 --> 8 5  Received cytotec, hemabate, pitocin, TXA  Will plan to have 2u pRBCs on hold when admitted for delivery         History of severe pre-eclampsia    Current Assessment & Plan     Normotensive today         Anemia affecting pregnancy in second trimester    Current Assessment & Plan     Hgb 7 6 on prenatal panel  Patient reports fatigue and craving ice frequently  Outpatient venofer infusions ongoing                   S: 38 y o  [id number] 20w6d here for PN visit  She denies contractions  She denies leakage of fluid and vaginal bleeding  She has felt good fetal movement  O:  Vitals:    09/01/22 0900   BP: 124/58   Pulse: 61     Physical Exam  GEN: The patient was alert and oriented x3, pleasant well-appearing female in no acute distress     CV: Regular rate  PULM: Non-labored respirations  MSK: Normal gait  Skin: Warm, dry  Neuro: No focal deficits  Psych: Normal affect and judgement, cooperative    Fetal Heart Rate: 155       D/w Dr Reji Verde MD  OB/GYN PGY-2  9/2/2022  4:09 PM

## 2022-09-01 NOTE — PROGRESS NOTES
Ultrasound Probe Disinfection    A transvaginal ultrasound was performed  Prior to use, disinfection was performed with High Level Disinfection Process (sMedio[employer])  Probe serial number B3: [id number] was used        Lyndsay Ramirez  09/01/22  2:07 PM

## 2022-09-02 ENCOUNTER — HOSPITAL ENCOUNTER (OUTPATIENT)
Dept: INFUSION CENTER | Facility: HOSPITAL | Age: 42
End: 2022-09-02
Payer: COMMERCIAL

## 2022-09-02 VITALS — TEMPERATURE: 97.8 F | HEART RATE: 68 BPM | SYSTOLIC BLOOD PRESSURE: 107 MMHG | DIASTOLIC BLOOD PRESSURE: 62 MMHG

## 2022-09-02 DIAGNOSIS — O99.012 ANEMIA AFFECTING PREGNANCY IN SECOND TRIMESTER: Primary | ICD-10-CM

## 2022-09-02 PROCEDURE — 96365 THER/PROPH/DIAG IV INF INIT: CPT

## 2022-09-02 RX ORDER — SODIUM CHLORIDE 9 MG/ML
20 INJECTION, SOLUTION INTRAVENOUS ONCE
Status: COMPLETED | OUTPATIENT
Start: 2022-09-02 | End: 2022-09-02

## 2022-09-02 RX ORDER — SODIUM CHLORIDE 9 MG/ML
20 INJECTION, SOLUTION INTRAVENOUS ONCE
Status: CANCELLED | OUTPATIENT
Start: 2022-09-08

## 2022-09-02 RX ADMIN — SODIUM CHLORIDE 20 ML/HR: 0.9 INJECTION, SOLUTION INTRAVENOUS at 09:04

## 2022-09-02 RX ADMIN — IRON SUCROSE 200 MG: 20 INJECTION, SOLUTION INTRAVENOUS at 09:04

## 2022-09-02 NOTE — ASSESSMENT & PLAN NOTE
Hgb 7 6 on prenatal panel  Patient reports fatigue and craving ice frequently  Outpatient venofer infusions ongoing

## 2022-09-08 ENCOUNTER — HOSPITAL ENCOUNTER (OUTPATIENT)
Dept: INFUSION CENTER | Facility: HOSPITAL | Age: 42
Discharge: HOME | End: 2022-09-08
Payer: COMMERCIAL

## 2022-09-08 VITALS
HEART RATE: 71 BPM | RESPIRATION RATE: 18 BRPM | SYSTOLIC BLOOD PRESSURE: 106 MMHG | DIASTOLIC BLOOD PRESSURE: 68 MMHG | TEMPERATURE: 97.3 F

## 2022-09-08 DIAGNOSIS — O99.012 ANEMIA AFFECTING PREGNANCY IN SECOND TRIMESTER: Primary | ICD-10-CM

## 2022-09-08 PROCEDURE — 96365 THER/PROPH/DIAG IV INF INIT: CPT

## 2022-09-08 RX ORDER — SODIUM CHLORIDE 9 MG/ML
20 INJECTION, SOLUTION INTRAVENOUS ONCE
Status: COMPLETED | OUTPATIENT
Start: 2022-09-08 | End: 2022-09-08

## 2022-09-08 RX ORDER — SODIUM CHLORIDE 9 MG/ML
20 INJECTION, SOLUTION INTRAVENOUS ONCE
Status: CANCELLED | OUTPATIENT
Start: 2022-09-15

## 2022-09-08 RX ADMIN — IRON SUCROSE 200 MG: 20 INJECTION, SOLUTION INTRAVENOUS at 15:04

## 2022-09-08 RX ADMIN — SODIUM CHLORIDE 20 ML/HR: 9 INJECTION, SOLUTION INTRAVENOUS at 15:04

## 2022-09-16 DIAGNOSIS — O99.012 ANEMIA AFFECTING PREGNANCY IN SECOND TRIMESTER: Primary | ICD-10-CM

## 2022-09-19 ENCOUNTER — NURSE TRIAGE (OUTPATIENT)
Dept: OTHER | Facility: OTHER | Age: 42
End: 2022-09-19

## 2022-09-19 NOTE — TELEPHONE ENCOUNTER
Regarding: Pregnant pain on left side  ----- Message from Марина Nickerson sent at 9/19/2022  4:12 PM EDT -----  "Since yesterday I have pain on my left side and I'm 23 weeks pregnant "

## 2022-09-19 NOTE — TELEPHONE ENCOUNTER
Patient reports severe left lower abdomen pain "above leg" yesterday after she walked through metal detector at work  She states she has been feeling intermittent pain for last 2 weeks  She reports "baby is moving a lot " There is no pain today  She reports thick fluid when she uses bathroom for last two days  Yesterday she took an advil for pain as she did not have tylenol-patient advised not to use advil and she verbalized understanding  She would also like a note for work to avoid metal detector  Reason for Disposition   Round ligament pain (previously diagnosed by physician), questions about    Answer Assessment - Initial Assessment Questions  1  LOCATION: "Where does it hurt?"       Left lower side   2  RADIATION: "Does the pain shoot anywhere else?" (e g , chest, back)      No   3  ONSET: "When did the pain begin?" (Minutes, hours or days ago)      Pain for last week but worse since yesterday   4  ONSET: "Gradual or sudden onset?"      Gradual   5  PATTERN: "Does the pain come and go, or has it been constant since it started?"       Intermittent   6  SEVERITY: "How bad is the pain?" "What does it keep you from doing?"  (e g , Scale 1-10; mild, moderate, or severe)    - MILD (1-3): doesn't interfere with normal activities, abdomen soft and not tender to touch     - MODERATE (4-7): interferes with normal activities or awakens from sleep, tender to touch     - SEVERE (8-10): excruciating pain, doubled over, unable to do any normal activities      Severe when pain comes; denies pain right now states it was in this am   7  RECURRENT SYMPTOM: "Have you ever had this type of stomach pain before?" If Yes, ask: "When was the last time?" and "What happened that time?"       Ongoing for two weeks   8  CAUSE: "What do you think is causing the stomach pain?      unsure  9   RELIEVING/AGGRAVATING FACTORS: "What makes it better or worse?" (e g , antacids, bowel movement, movement)      advil yesterday because she did not have tylenol -patient was instructed not to take again and only use tylenol-patient verbalized understanding   10  FETAL MOVEMENT: "Has the baby's movement decreased or changed significantly from normal?"        Baby is "moving a lot"   11  OTHER SYMPTOMS: "Has there been any vaginal bleeding, fever, vomiting, diarrhea, or urine problems?"        No vaginal bleeding, is leaking thick fluids every time she urinates for last two days   12   HILLARY: "What date are you expecting to deliver?"        Jan 13, 2023    Protocols used: PREGNANCY - ABDOMINAL PAIN GREATER THAN 20 WEEKS EGA-ADULT-OH

## 2022-09-20 DIAGNOSIS — O99.012 ANEMIA AFFECTING PREGNANCY IN SECOND TRIMESTER: Primary | ICD-10-CM

## 2022-09-20 RX ORDER — SODIUM CHLORIDE 9 MG/ML
20 INJECTION, SOLUTION INTRAVENOUS ONCE
Status: CANCELLED | OUTPATIENT
Start: 2022-09-22

## 2022-09-22 ENCOUNTER — HOSPITAL ENCOUNTER (OUTPATIENT)
Dept: INFUSION CENTER | Facility: HOSPITAL | Age: 42
Discharge: HOME | End: 2022-09-22
Payer: COMMERCIAL

## 2022-09-22 VITALS
RESPIRATION RATE: 16 BRPM | TEMPERATURE: 97 F | DIASTOLIC BLOOD PRESSURE: 52 MMHG | HEART RATE: 66 BPM | SYSTOLIC BLOOD PRESSURE: 107 MMHG

## 2022-09-22 DIAGNOSIS — O99.012 ANEMIA AFFECTING PREGNANCY IN SECOND TRIMESTER: Primary | ICD-10-CM

## 2022-09-22 PROCEDURE — 96365 THER/PROPH/DIAG IV INF INIT: CPT

## 2022-09-22 RX ORDER — SODIUM CHLORIDE 9 MG/ML
20 INJECTION, SOLUTION INTRAVENOUS ONCE
Status: CANCELLED | OUTPATIENT
Start: 2022-09-29

## 2022-09-22 RX ORDER — SODIUM CHLORIDE 9 MG/ML
20 INJECTION, SOLUTION INTRAVENOUS ONCE
Status: COMPLETED | OUTPATIENT
Start: 2022-09-22 | End: 2022-09-22

## 2022-09-22 RX ADMIN — SODIUM CHLORIDE 20 ML/HR: 0.9 INJECTION, SOLUTION INTRAVENOUS at 09:23

## 2022-09-22 RX ADMIN — IRON SUCROSE 200 MG: 20 INJECTION, SOLUTION INTRAVENOUS at 09:23

## 2022-09-28 PROBLEM — Z3A.24 24 WEEKS GESTATION OF PREGNANCY: Status: ACTIVE | Noted: 2022-05-19

## 2022-09-28 NOTE — ASSESSMENT & PLAN NOTE
Hgb 7 6 on prenatal panel -> repeat CBC ordered  Patient reports fatigue and craving ice frequently  Outpatient venofer infusions ongoing  Per MFM, additional testing for heavy metal/lead exposure ordered, patient reminded to obtain

## 2022-09-28 NOTE — PROGRESS NOTES
[address]   PRENATAL VISIT  Roosevelt Hernandez Hence  [id number]  1980        A/P:  Problem List        Other    History of insulin controlled gestational diabetes mellitus (GDM)    Overview     Induced at 44 weeks for this         24 weeks gestation of pregnancy    Overview     Concern for intramural uterine gestation with endometrial stripe visualized anterior to gestational sac -> resolved on repeat TAUS  Pepcid 10mg BID PRN ordered for management of heartburn  Prenatal labs [x]  NIPT declined  28 week labs [ ]  Tdap [ ]   Contraception: possibly interval IUD         [address] multiparity with current pregnancy in second trimester    History of postpartum hemorrhage, currently pregnant    Overview     1 4 L QBL 2 2 retained placental membranes  Hgb 9 9 --> 8 5  Received cytotec, hemabate, pitocin, TXA  Will plan to have 2u pRBCs on hold when admitted for delivery         History of severe pre-eclampsia    Current Assessment & Plan     Normotensive today  Starting 162 mg ASA for ppx  Baseline preE labs ordered         Anemia affecting pregnancy in second trimester    Current Assessment & Plan     Hgb 7 6 on prenatal panel -> repeat CBC ordered  Patient reports fatigue and craving ice frequently  Outpatient venofer infusions ongoing  Per MFM, additional testing for heavy metal/lead exposure ordered, patient reminded to obtain           Other Visit Diagnoses     Cough                  S: 38 y o  [id number] 24w6d here for PN visit  She denies contractions  She denies leakage of fluid and vaginal bleeding  She has felt good fetal movement  O:  Vitals:    09/29/22 1100   BP: 133/68   Pulse: 61     Physical Exam  GEN: The patient was alert and oriented x3, pleasant well-appearing female in no acute distress     CV: Regular rate  PULM: Non-labored respirations  MSK: Normal gait  Skin: Warm, dry  Neuro: No focal deficits  Psych: Normal affect and judgement, cooperative    Fetal Heart Rate: 150       D/w Dr Lisa Waters  OB/GYN PGY-2  9/30/2022  2:06 PM

## 2022-09-29 ENCOUNTER — ROUTINE PRENATAL (OUTPATIENT)
Dept: OBGYN CLINIC | Facility: CLINIC | Age: 42
End: 2022-09-29

## 2022-09-29 ENCOUNTER — HOSPITAL ENCOUNTER (OUTPATIENT)
Dept: INFUSION CENTER | Facility: HOSPITAL | Age: 42
Discharge: HOME | End: 2022-09-29
Payer: COMMERCIAL

## 2022-09-29 VITALS
BODY MASS INDEX: 30.18 KG/M2 | HEIGHT: 62 IN | SYSTOLIC BLOOD PRESSURE: 133 MMHG | DIASTOLIC BLOOD PRESSURE: 68 MMHG | WEIGHT: 164 LBS | HEART RATE: 61 BPM

## 2022-09-29 VITALS
HEART RATE: 62 BPM | SYSTOLIC BLOOD PRESSURE: 119 MMHG | TEMPERATURE: 97.3 F | DIASTOLIC BLOOD PRESSURE: 74 MMHG | RESPIRATION RATE: 18 BRPM

## 2022-09-29 DIAGNOSIS — Z86.32 HISTORY OF INSULIN CONTROLLED GESTATIONAL DIABETES MELLITUS (GDM): ICD-10-CM

## 2022-09-29 DIAGNOSIS — O99.012 ANEMIA AFFECTING PREGNANCY IN SECOND TRIMESTER: ICD-10-CM

## 2022-09-29 DIAGNOSIS — Z87.59 HISTORY OF SEVERE PRE-ECLAMPSIA: ICD-10-CM

## 2022-09-29 DIAGNOSIS — O09.299 HISTORY OF POSTPARTUM HEMORRHAGE, CURRENTLY PREGNANT: ICD-10-CM

## 2022-09-29 DIAGNOSIS — Z3A.24 24 WEEKS GESTATION OF PREGNANCY: Primary | ICD-10-CM

## 2022-09-29 DIAGNOSIS — O99.012 ANEMIA AFFECTING PREGNANCY IN SECOND TRIMESTER: Primary | ICD-10-CM

## 2022-09-29 DIAGNOSIS — R05.9 COUGH: ICD-10-CM

## 2022-09-29 DIAGNOSIS — O09.42 GRAND MULTIPARITY WITH CURRENT PREGNANCY IN SECOND TRIMESTER: ICD-10-CM

## 2022-09-29 PROCEDURE — 96365 THER/PROPH/DIAG IV INF INIT: CPT

## 2022-09-29 PROCEDURE — 99213 OFFICE O/P EST LOW 20 MIN: CPT | Performed by: OBSTETRICS & GYNECOLOGY

## 2022-09-29 RX ORDER — SODIUM CHLORIDE 9 MG/ML
20 INJECTION, SOLUTION INTRAVENOUS ONCE
Status: CANCELLED | OUTPATIENT
Start: 2022-10-06

## 2022-09-29 RX ORDER — SODIUM CHLORIDE 9 MG/ML
20 INJECTION, SOLUTION INTRAVENOUS ONCE
Status: COMPLETED | OUTPATIENT
Start: 2022-09-29 | End: 2022-09-29

## 2022-09-29 RX ORDER — BENZONATATE 100 MG/1
100 CAPSULE ORAL 3 TIMES DAILY PRN
Qty: 20 CAPSULE | Refills: 0 | Status: SHIPPED | OUTPATIENT
Start: 2022-09-29

## 2022-09-29 RX ADMIN — IRON SUCROSE 200 MG: 20 INJECTION, SOLUTION INTRAVENOUS at 09:37

## 2022-09-29 RX ADMIN — SODIUM CHLORIDE 20 ML/HR: 0.9 INJECTION, SOLUTION INTRAVENOUS at 09:37

## 2022-09-29 NOTE — LETTER
September 29, 2022     Patient: Aminah Calderon  YOB: 1980  Date of Visit: 9/29/2022      To Whom it May Concern:    Rupa Jarquin is under my professional care  Iris was seen in my office on 9/29/2022  Iris is currently pregnant and should be allowed to bypass the medical detector  If you have any questions or concerns, please don't hesitate to call           Sincerely,          Shay Nesbitt MD        CC: No Recipients

## 2022-10-06 ENCOUNTER — HOSPITAL ENCOUNTER (OUTPATIENT)
Dept: INFUSION CENTER | Facility: HOSPITAL | Age: 42
Discharge: HOME | End: 2022-10-06

## 2022-10-10 DIAGNOSIS — O99.012 ANEMIA AFFECTING PREGNANCY IN SECOND TRIMESTER: Primary | ICD-10-CM

## 2022-10-10 RX ORDER — SODIUM CHLORIDE 9 MG/ML
20 INJECTION, SOLUTION INTRAVENOUS ONCE
Status: CANCELLED | OUTPATIENT
Start: 2022-10-13

## 2022-10-13 ENCOUNTER — HOSPITAL ENCOUNTER (OUTPATIENT)
Dept: INFUSION CENTER | Facility: HOSPITAL | Age: 42
Discharge: HOME | End: 2022-10-13
Payer: COMMERCIAL

## 2022-10-13 VITALS
DIASTOLIC BLOOD PRESSURE: 70 MMHG | RESPIRATION RATE: 20 BRPM | TEMPERATURE: 96.5 F | SYSTOLIC BLOOD PRESSURE: 110 MMHG | HEART RATE: 68 BPM

## 2022-10-13 DIAGNOSIS — O99.012 ANEMIA AFFECTING PREGNANCY IN SECOND TRIMESTER: Primary | ICD-10-CM

## 2022-10-13 PROCEDURE — 96365 THER/PROPH/DIAG IV INF INIT: CPT

## 2022-10-13 RX ORDER — SODIUM CHLORIDE 9 MG/ML
20 INJECTION, SOLUTION INTRAVENOUS ONCE
Status: COMPLETED | OUTPATIENT
Start: 2022-10-13 | End: 2022-10-13

## 2022-10-13 RX ORDER — SODIUM CHLORIDE 9 MG/ML
20 INJECTION, SOLUTION INTRAVENOUS ONCE
Status: CANCELLED | OUTPATIENT
Start: 2022-10-20

## 2022-10-13 RX ADMIN — SODIUM CHLORIDE 20 ML/HR: 0.9 INJECTION, SOLUTION INTRAVENOUS at 09:15

## 2022-10-13 RX ADMIN — IRON SUCROSE 200 MG: 20 INJECTION, SOLUTION INTRAVENOUS at 09:15

## 2022-10-19 ENCOUNTER — HOSPITAL ENCOUNTER (OUTPATIENT)
Facility: HOSPITAL | Age: 42
Discharge: HOME | End: 2022-10-19
Attending: OBSTETRICS & GYNECOLOGY | Admitting: OBSTETRICS & GYNECOLOGY
Payer: COMMERCIAL

## 2022-10-19 VITALS
TEMPERATURE: 98.3 F | DIASTOLIC BLOOD PRESSURE: 73 MMHG | WEIGHT: 164 LBS | SYSTOLIC BLOOD PRESSURE: 115 MMHG | RESPIRATION RATE: 16 BRPM | BODY MASS INDEX: 30.18 KG/M2 | HEART RATE: 71 BPM | HEIGHT: 62 IN

## 2022-10-19 PROBLEM — O26.899 ABDOMINAL PAIN AFFECTING PREGNANCY, ANTEPARTUM: Status: ACTIVE | Noted: 2022-10-19

## 2022-10-19 PROBLEM — R10.9 ABDOMINAL PAIN AFFECTING PREGNANCY, ANTEPARTUM: Status: ACTIVE | Noted: 2022-10-19

## 2022-10-19 PROCEDURE — NC001 PR NO CHARGE: Performed by: OBSTETRICS & GYNECOLOGY

## 2022-10-19 PROCEDURE — 99203 OFFICE O/P NEW LOW 30 MIN: CPT

## 2022-10-19 NOTE — H&P
OB H&P  Rupa Bray  1980  LD TRIAGE /LD TRIAGE 1*      38 y o  yo  at 27 weeks by us and lmp    Chief complaint:  Abdominal pain    HPI:  Pt presents with vague abdominal pain  since several weeks ago  Pt states sometimes epigastric sometimes bilat LQ, feels like cramping to pt  Last present early this morning until she was here in exam room, then experienced it again      Nurse present during episode states she felt no ctx during that time and none on monitor        Pregnancy Complications:  Patient Active Problem List   Diagnosis   • History of insulin controlled gestational diabetes mellitus (GDM)   • 24 weeks gestation of pregnancy   • [place] multiparity with current pregnancy in second trimester   • History of postpartum hemorrhage, currently pregnant   • History of severe pre-eclampsia   • Anemia affecting pregnancy in second trimester   • Abdominal pain affecting pregnancy, antepartum         Past Medical History:  Past Medical History:   Diagnosis Date   • 39 weeks gestation of pregnancy 2020    IOL - 2020 @8pm [address]  Flu vaccine 19   • Anemia    • Anemia during pregnancy in third trimester 1/15/2020   • COVID-19 06/10/2020   • COVID-19 virus detected 2020   • Elderly multigravida in third trimester 1/15/2020   • Encounter for injection education 3/3/2020   • GBS bacteriuria 2019    Amoxicillin sent to patient's pharmacy  Will need PCN in labor DO NOT collect GBS swab at 36 weeks!!   • Hyperglycemia during pregnancy 3/3/2020   • Iron deficiency anemia 2019    F/u Ferritin (Hb 8 4) Will likely need IV iron   • Postpartum hemorrhage, delivered, current hospitalization 2020   • Prediabetes    •  (spontaneous vaginal delivery) 2020       Past Surgical History:  Past Surgical History:   Procedure Laterality Date   • TOOTH EXTRACTION         Social Hx:  Social History     Socioeconomic History   • Marital status: Single Spouse name: Not on file   • Number of children: 5   • Years of education: 9   • Highest education level: 9th grade   Occupational History   • Occupation: Unemployed   Tobacco Use   • Smoking status: Never Smoker   • Smokeless tobacco: Never Used   Vaping Use   • Vaping Use: Never used   Substance and Sexual Activity   • Alcohol use: No     Comment: pt reports social drinking once per month prior to pregnancy   • Drug use: No   • Sexual activity: Yes     Partners: Male     Birth control/protection: None   Other Topics Concern   • Not on file   Social History Narrative   • Not on file     Social Determinants of Health     Financial Resource Strain: Low Risk    • Difficulty of Paying Living Expenses: Not hard at all   Food Insecurity: No Food Insecurity   • Worried About [address] in the Last Year: Never true   • Ran Out of Food in the Last Year: Never true   Transportation Needs: No Transportation Needs   • Lack of Transportation (Medical): No   • Lack of Transportation (Non-Medical): No   Physical Activity: Inactive   • Days of Exercise per Week: 0 days   • Minutes of Exercise per Session: 0 min   Stress: No Stress Concern Present   • Feeling of Stress : Not at all   Social Connections:  Moderately Integrated   • Frequency of Communication with Friends and Family: More than three times a week   • Frequency of Social Gatherings with Friends and Family: More than three times a week   • Attends [employer] Services: More than 4 times per year   • Active Member of Clubs or Organizations: No   • Attends Club or Organization Meetings: Never   • Marital Status: Living with partner   Intimate Partner Violence: Not At Risk   • Fear of Current or Ex-Partner: No   • Emotionally Abused: No   • Physically Abused: No   • Sexually Abused: No   Housing Stability: Unknown   • Unable to Pay for Housing in the Last Year: No   • Number of Places Lived in the Last Year: Not on file   • Unstable Housing in the Last Year: No Meds:  No current facility-administered medications on file prior to encounter  Current Outpatient Medications on File Prior to Encounter   Medication Sig Dispense Refill   • benzonatate (TESSALON PERLES) 100 mg capsule Take 1 capsule (100 mg total) by mouth 3 (three) times a day as needed for cough 20 capsule 0   • doxylamine (UNISOM) 25 MG tablet Take 0 5 tablets (12 5 mg total) by mouth daily at bedtime (Patient not taking: No sig reported) 30 tablet 0   • famotidine (PEPCID) 10 mg tablet Take 1 tablet (10 mg total) by mouth 2 (two) times a day (Patient not taking: No sig reported) 60 tablet 0   • ferrous sulfate 324 (65 Fe) mg Take 1 tablet (324 mg total) by mouth 2 (two) times a day before meals (Patient not taking: No sig reported) 60 tablet 3   • ondansetron (ZOFRAN) 4 mg tablet Take 1 tablet (4 mg total) by mouth every 8 (eight) hours as needed for nausea or vomiting (Patient not taking: No sig reported) 20 tablet 0   • Prenatal Vit-Fe Fumarate-FA (Prenatal Vitamin) 27-0 8 MG TABS Take 1 tablet by mouth in the morning (Patient not taking: No sig reported) 30 tablet 9   • pyridoxine (B-6) 25 MG tablet Take 1 tablet (25 mg total) by mouth daily (Patient not taking: No sig reported) 90 tablet 1       Allergies:   Allergies   Allergen Reactions   • Dog Epithelium Allergic Rhinitis   • Pollen Extract Allergic Rhinitis       Obstetric History:    G 6  P 7205  5 uncomplicated     Labs:  No results found for: STREPGRPB  Type & Screen:  ABO Grouping   Date Value Ref Range Status   2022 O  Final   2014 CANCELED - Canc (U)  Corrected     Comment:     CANCELED - Cancel notice received from [address] [place] [address]      Rh Factor   Date Value Ref Range Status   2022 Positive  Final   2014 CANCELED - Canc (U)  Corrected     Comment:     CANCELED - Cancel notice received from [address] [place] [address]      Antibody Screen   Date Value Ref Range Status   2014 CANCELED - Canc (U)  Corrected Comment:     CANCELED - Cancel notice received from [facility]       Specimen Expiration Date   Date Value Ref Range Status   05/26/2022 [id number]  Final     HIV-1/HIV-2 Ab   Date Value Ref Range Status   05/26/2022 Non-Reactive Non-Reactive Final     HEPATITIS B SURFACE ANTIGEN   Date Value Ref Range Status   03/01/2014 Non-Reactive (q Nonreactive (NR),Nonreactive- confirmed (NR) Final     Comment:     Non-Reactive (qualifier value)  The above 1 analytes were performed by Zach [address] [id number]       Hepatitis B Surface Ag   Date Value Ref Range Status   05/26/2022 Non-reactive Non-reactive, NonReactive - Confirmed Final     RPR SCREEN   Date Value Ref Range Status   08/21/2014 Non-Reactive (q Nonreactive Final     Comment:     Non-Reactive (qualifier value)  The above 1 analytes were performed by Sherice Harris  [address] [place], [place] [address] [id number]       RPR   Date Value Ref Range Status   05/26/2022 Non-Reactive Non-Reactive Final     RUBELLA IGG QUANTITATION   Date Value Ref Range Status   03/01/2014 >175 0 IU/mL Final     Comment:     Rubella IgG     <5 0 IU/ml       Negative; not immune      5 0-9 9 IU/ml   Equivocal     >9 9 IU/ml       Positive; immune  The above 1 analytes were performed by Zach  [facility] [place] [address]       Rubella IgG Quant   Date Value Ref Range Status   05/26/2022 >175 0 >9 9 IU/mL Final     Comment:     >     GLUCOSE 1 HR 50 GM GLUC CHALLENGE-PREG PTS   Date Value Ref Range Status   06/16/2014 131 mg/dL Final     Comment:     <=134 Normal   135-179 Impaired fasting glucose- perform 3 hour Glucose Tolerance  >=180 Diagnosis Gestational Diabetes Mellitus  The above 1 analytes were performed by Zach  [facility] [address] [place] [address]       Glucose   Date Value Ref Range Status   05/26/2022 100 40 - 134 mg/dL Final     Comment:     Specimen collection should occur prior to Sulfasalazine administration due to the potential for falsely depressed results  Specimen collection should occur prior to Sulfapyridine administration due to the potential for falsely elevated results  Specimen collection should occur prior to Sulfasalazine administration due to the potential for falsely depressed results  Specimen collection should occur prior to Sulfapyridine administration due to the potential for falsely elevated results  Physical Exam:  Vital signs:    Vitals:    10/19/22 1210   BP: 115/73   Pulse: 71   Resp: 16   Temp: 98 3 °F (36 8 °C)       Heart: RRR  Lungs: clear to ausculation   Abdomen: soft, nontender, gravid, 27 weeks size  Extremeties: min edema, no chuy's or geraldine's sign  Presentation: vertex by US  Cervix: long thick closed, 4 1 cm on US  FHR: cat 1  CTXN: none       Assessment:    at 27 weeks     Intermittent abdominal pain that feels like cramping in several different locations  No evidence of labor or complications here on exam or monitoring for over one hour  Anemia in pregnancy, in process of venofer tx  H/o PE with severe features, on ASA daily  H/O GDMA2- - early GTT wnl    Plan:   OK for d/c, to fu in office ASAP

## 2022-10-20 ENCOUNTER — TELEPHONE (OUTPATIENT)
Dept: INFUSION CENTER | Facility: HOSPITAL | Age: 42
End: 2022-10-20

## 2022-10-20 NOTE — TELEPHONE ENCOUNTER
Called patient to advise she had an appt at 8:00am today and did not show and we will not be able to take her for her appt today  Advised she has a next appointment on 10/27

## 2022-10-25 DIAGNOSIS — O99.012 ANEMIA AFFECTING PREGNANCY IN SECOND TRIMESTER: Primary | ICD-10-CM

## 2022-10-25 RX ORDER — SODIUM CHLORIDE 9 MG/ML
20 INJECTION, SOLUTION INTRAVENOUS ONCE
Status: CANCELLED | OUTPATIENT
Start: 2022-10-27

## 2022-10-26 ENCOUNTER — TELEPHONE (OUTPATIENT)
Dept: OBGYN CLINIC | Facility: CLINIC | Age: 42
End: 2022-10-26

## 2022-10-26 DIAGNOSIS — Z3A.28 28 WEEKS GESTATION OF PREGNANCY: Primary | ICD-10-CM

## 2022-10-26 PROBLEM — O09.523 ADVANCED MATERNAL AGE IN MULTIGRAVIDA, THIRD TRIMESTER: Status: ACTIVE | Noted: 2022-10-26

## 2022-10-26 PROBLEM — O26.899 ABDOMINAL PAIN AFFECTING PREGNANCY, ANTEPARTUM: Status: RESOLVED | Noted: 2022-10-19 | Resolved: 2022-10-26

## 2022-10-26 PROBLEM — R10.9 ABDOMINAL PAIN AFFECTING PREGNANCY, ANTEPARTUM: Status: RESOLVED | Noted: 2022-10-19 | Resolved: 2022-10-26

## 2022-10-26 NOTE — TELEPHONE ENCOUNTER
Attempted to contact pt regarding outstanding labs, if pt calls can you please let her know to complete her CBC, RPR, glucola and heavy metal blood tests before her prenatal appointment tomorrow 10/27   Thank you

## 2022-10-27 ENCOUNTER — ROUTINE PRENATAL (OUTPATIENT)
Dept: OBGYN CLINIC | Facility: CLINIC | Age: 42
End: 2022-10-27

## 2022-10-27 ENCOUNTER — LAB (OUTPATIENT)
Dept: LAB | Facility: CLINIC | Age: 42
End: 2022-10-27
Payer: COMMERCIAL

## 2022-10-27 ENCOUNTER — TELEPHONE (OUTPATIENT)
Dept: INFUSION CENTER | Facility: HOSPITAL | Age: 42
End: 2022-10-27

## 2022-10-27 VITALS
HEIGHT: 62 IN | BODY MASS INDEX: 30.91 KG/M2 | SYSTOLIC BLOOD PRESSURE: 125 MMHG | HEART RATE: 63 BPM | WEIGHT: 168 LBS | RESPIRATION RATE: 18 BRPM | DIASTOLIC BLOOD PRESSURE: 80 MMHG

## 2022-10-27 DIAGNOSIS — O09.43 GRAND MULTIPARITY WITH CURRENT PREGNANCY IN THIRD TRIMESTER: ICD-10-CM

## 2022-10-27 DIAGNOSIS — Z3A.28 28 WEEKS GESTATION OF PREGNANCY: ICD-10-CM

## 2022-10-27 DIAGNOSIS — O99.012 ANEMIA AFFECTING PREGNANCY IN SECOND TRIMESTER: ICD-10-CM

## 2022-10-27 DIAGNOSIS — R20.0 BILATERAL HAND NUMBNESS: ICD-10-CM

## 2022-10-27 DIAGNOSIS — Z86.32 HISTORY OF INSULIN CONTROLLED GESTATIONAL DIABETES MELLITUS (GDM): ICD-10-CM

## 2022-10-27 DIAGNOSIS — Z87.59 HISTORY OF SEVERE PRE-ECLAMPSIA: ICD-10-CM

## 2022-10-27 DIAGNOSIS — O09.523 ADVANCED MATERNAL AGE IN MULTIGRAVIDA, THIRD TRIMESTER: ICD-10-CM

## 2022-10-27 DIAGNOSIS — Z34.93 PRENATAL CARE IN THIRD TRIMESTER: Primary | ICD-10-CM

## 2022-10-27 DIAGNOSIS — O09.299 HISTORY OF POSTPARTUM HEMORRHAGE, CURRENTLY PREGNANT: ICD-10-CM

## 2022-10-27 DIAGNOSIS — O99.013 ANEMIA AFFECTING PREGNANCY IN THIRD TRIMESTER: ICD-10-CM

## 2022-10-27 LAB
ALBUMIN SERPL BCP-MCNC: 2.6 G/DL (ref 3.5–5)
ALP SERPL-CCNC: 98 U/L (ref 46–116)
ALT SERPL W P-5'-P-CCNC: 15 U/L (ref 12–78)
ANION GAP SERPL CALCULATED.3IONS-SCNC: 6 MMOL/L (ref 4–13)
AST SERPL W P-5'-P-CCNC: 8 U/L (ref 5–45)
BILIRUB SERPL-MCNC: 0.24 MG/DL (ref 0.2–1)
BUN SERPL-MCNC: 9 MG/DL (ref 5–25)
CALCIUM ALBUM COR SERPL-MCNC: 10.1 MG/DL (ref 8.3–10.1)
CALCIUM SERPL-MCNC: 9 MG/DL (ref 8.3–10.1)
CHLORIDE SERPL-SCNC: 105 MMOL/L (ref 96–108)
CO2 SERPL-SCNC: 22 MMOL/L (ref 21–32)
CREAT SERPL-MCNC: 0.5 MG/DL (ref 0.6–1.3)
CREAT UR-MCNC: 155 MG/DL
ERYTHROCYTE [DISTWIDTH] IN BLOOD BY AUTOMATED COUNT: 26.2 % (ref 11.6–15.1)
FERRITIN SERPL-MCNC: 18 NG/ML (ref 8–388)
GFR SERPL CREATININE-BSD FRML MDRD: 121 ML/MIN/1.73SQ M
GLUCOSE 1H P 50 G GLC PO SERPL-MCNC: 178 MG/DL (ref 40–134)
GLUCOSE P FAST SERPL-MCNC: 171 MG/DL (ref 65–99)
HCT VFR BLD AUTO: 36.6 % (ref 34.8–46.1)
HGB BLD-MCNC: 10.9 G/DL (ref 11.5–15.4)
MCH RBC QN AUTO: 22.7 PG (ref 26.8–34.3)
MCHC RBC AUTO-ENTMCNC: 29.8 G/DL (ref 31.4–37.4)
MCV RBC AUTO: 76 FL (ref 82–98)
PLATELET # BLD AUTO: 413 THOUSANDS/UL (ref 149–390)
PMV BLD AUTO: 8.7 FL (ref 8.9–12.7)
POTASSIUM SERPL-SCNC: 3.6 MMOL/L (ref 3.5–5.3)
PROT SERPL-MCNC: 7 G/DL (ref 6.4–8.4)
PROT UR-MCNC: 30 MG/DL
PROT/CREAT UR: 0.19 MG/G{CREAT} (ref 0–0.1)
RBC # BLD AUTO: 4.8 MILLION/UL (ref 3.81–5.12)
SODIUM SERPL-SCNC: 133 MMOL/L (ref 135–147)
WBC # BLD AUTO: 8.72 THOUSAND/UL (ref 4.31–10.16)

## 2022-10-27 PROCEDURE — 82175 ASSAY OF ARSENIC: CPT

## 2022-10-27 PROCEDURE — 85027 COMPLETE CBC AUTOMATED: CPT

## 2022-10-27 PROCEDURE — 82950 GLUCOSE TEST: CPT

## 2022-10-27 PROCEDURE — 36415 COLL VENOUS BLD VENIPUNCTURE: CPT

## 2022-10-27 PROCEDURE — 83655 ASSAY OF LEAD: CPT

## 2022-10-27 PROCEDURE — 80053 COMPREHEN METABOLIC PANEL: CPT

## 2022-10-27 PROCEDURE — 84156 ASSAY OF PROTEIN URINE: CPT

## 2022-10-27 PROCEDURE — 99213 OFFICE O/P EST LOW 20 MIN: CPT | Performed by: OBSTETRICS & GYNECOLOGY

## 2022-10-27 PROCEDURE — 83825 ASSAY OF MERCURY: CPT

## 2022-10-27 PROCEDURE — 82728 ASSAY OF FERRITIN: CPT

## 2022-10-27 PROCEDURE — 82570 ASSAY OF URINE CREATININE: CPT

## 2022-10-27 PROCEDURE — 86592 SYPHILIS TEST NON-TREP QUAL: CPT

## 2022-10-27 NOTE — PROGRESS NOTES
28 week education packet provided to patient on 10/27/22  Included in packet: Third Trimester paperwork  Delivery consent   Birthing room support person rules and acknowledgment  Birth Plan   Welcome information  Birth certificate worksheet   Consent for Photographers  Perineal/ Vaginal massage   Pediatric practices and locations     Patient refused flu and TDAP  Patient also refused breast pump

## 2022-10-27 NOTE — ASSESSMENT & PLAN NOTE
-Declines Tdap and seasonal influenza vaccines  -Contraception: possibly interval IUD  Declines permanent sterilization  Continue to address  -Daily fetal kick counts  - labor precautions addressed  -Follow up CBC, RPR and glucola pending   Advised to complete asap  -Follow up growth ultrasound ordered  -Follow up in 2 weeks

## 2022-10-27 NOTE — PROGRESS NOTES
[address] Obstetric Visit    Rupa Gray presents today for routine OB visit at 28w6d  S:  She reports bilateral hand numbness that limits her ability to grasp objects  Occasionally feels [place] contractions  Denies vaginal bleeding or leaking of fluid  Reports adequate fetal movement daily  O:  Blood Pressure: 125/80  Wt=76 2 kg (168 lb); Body mass index is 30 73 kg/m²  Fetal Heart Rate: 123; Fundal Height (cm): 27 cm    Pulm: Non-labored breathing  Abdomen: gravid, soft, non-tender  Extremities: non-tender   strength 3-4/5 in bilateral upper extremities  Intact sensation in upper extremities    A/P:  Problem List Items Addressed This Visit        Other    History of insulin controlled gestational diabetes mellitus (GDM)    28 weeks gestation of pregnancy     -Declines Tdap and seasonal influenza vaccines  -Contraception: possibly interval IUD  Declines permanent sterilization  Continue to address  -Daily fetal kick counts  - labor precautions addressed  -Follow up CBC, RPR and glucola pending  Advised to complete asap  -Follow up growth ultrasound ordered  -Follow up in 2 weeks         Relevant Orders    Ambulatory Referral to Maternal Fetal Medicine    [address] multiparity with current pregnancy in second trimester    History of postpartum hemorrhage, currently pregnant    History of severe pre-eclampsia    Anemia affecting pregnancy in third trimester     -Moderate microcytic anemia noted on prenatal panel  -Hemoglobin electrophoresis from 2019 reviewed and did not show any evidence of sickle cell or beta thalassemia  No reports noted from alpha thalassemia testing   -Completed 7 Venofer infusions to date  -Follow up CBC has been ordered in addition to heavy metal testing and ferritin  Patient was advised to go to lab to complete these test asap  -Has Hematology follow up on 22   Advised to keep appointment            Advanced maternal age in multigravida, third trimester     -Did not complete aneuploidy screening this pregnancy  -Early 1-hr GTT wnl  -FU GTT pending          Bilateral hand numbness     -Using hand braces  -PT referral provided 10/27/22         Relevant Orders    Ambulatory Referral to Physical Therapy      Other Visit Diagnoses     Prenatal care in third trimester    -  Primary    Relevant Orders    Ambulatory Referral to Maternal Fetal Medicine          Patient discussed with Dr Kirti Valencia MD  PGY-III, OB/GYN  10/27/2022, 2:00 PM

## 2022-10-27 NOTE — ASSESSMENT & PLAN NOTE
-Moderate microcytic anemia noted on prenatal panel  -Hemoglobin electrophoresis from 2019 reviewed and did not show any evidence of sickle cell or beta thalassemia  No reports noted from alpha thalassemia testing   -Completed 7 Venofer infusions to date  -Follow up CBC has been ordered in addition to heavy metal testing and ferritin  Patient was advised to go to lab to complete these test asap  -Has Hematology follow up on 11/4/22   Advised to keep appointment

## 2022-10-28 DIAGNOSIS — O99.810 ABNORMAL GLUCOSE TOLERANCE AFFECTING PREGNANCY, ANTEPARTUM: Primary | ICD-10-CM

## 2022-10-28 LAB — RPR SER QL: NORMAL

## 2022-10-28 NOTE — RESULT ENCOUNTER NOTE
Iris did not pass her 1hr GTT  A follow up 3-hr GTT has been ordered  Please call her to let her know she needs to complete this as soon as possible     Thanks,  Vidal Musa MD

## 2022-11-02 LAB
ARSENIC BLD-MCNC: 1 UG/L (ref 0–9)
LEAD BLD-MCNC: <1 UG/DL (ref 0–3.4)
MERCURY BLD-MCNC: <1 UG/L (ref 0–14.9)

## 2022-11-03 ENCOUNTER — LAB (OUTPATIENT)
Dept: LAB | Facility: CLINIC | Age: 42
End: 2022-11-03

## 2022-11-03 DIAGNOSIS — O99.810 ABNORMAL GLUCOSE TOLERANCE AFFECTING PREGNANCY, ANTEPARTUM: ICD-10-CM

## 2022-11-03 DIAGNOSIS — O99.810 ABNORMAL GLUCOSE AFFECTING PREGNANCY: Primary | ICD-10-CM

## 2022-11-03 LAB
GLUCOSE P FAST SERPL-MCNC: 114 MG/DL (ref 65–94)
GLUCOSE SERPL-MCNC: 104 MG/DL (ref 65–140)

## 2022-11-03 NOTE — RESULT ENCOUNTER NOTE
Rupa had an abnormal fasting glucose when she went for her 3-hr GTT  She needs to start seeing diabetes education   Can we call her to let her know I put in a referral?    Thanks,  Dayami Cardona MD

## 2022-11-04 ENCOUNTER — CONSULT (OUTPATIENT)
Dept: HEMATOLOGY ONCOLOGY | Facility: CLINIC | Age: 42
End: 2022-11-04

## 2022-11-04 ENCOUNTER — TELEPHONE (OUTPATIENT)
Dept: HEMATOLOGY ONCOLOGY | Facility: CLINIC | Age: 42
End: 2022-11-04

## 2022-11-04 VITALS
WEIGHT: 171 LBS | DIASTOLIC BLOOD PRESSURE: 80 MMHG | HEIGHT: 62 IN | RESPIRATION RATE: 17 BRPM | SYSTOLIC BLOOD PRESSURE: 130 MMHG | BODY MASS INDEX: 31.47 KG/M2 | OXYGEN SATURATION: 98 % | HEART RATE: 60 BPM

## 2022-11-04 DIAGNOSIS — Z86.32 HISTORY OF INSULIN CONTROLLED GESTATIONAL DIABETES MELLITUS (GDM): ICD-10-CM

## 2022-11-04 DIAGNOSIS — O99.013 ANEMIA AFFECTING PREGNANCY IN THIRD TRIMESTER: ICD-10-CM

## 2022-11-04 DIAGNOSIS — D50.8 IRON DEFICIENCY ANEMIA SECONDARY TO INADEQUATE DIETARY IRON INTAKE: ICD-10-CM

## 2022-11-04 DIAGNOSIS — O99.012 ANEMIA AFFECTING PREGNANCY IN SECOND TRIMESTER: Primary | ICD-10-CM

## 2022-11-04 RX ORDER — SODIUM CHLORIDE 9 MG/ML
20 INJECTION, SOLUTION INTRAVENOUS ONCE
OUTPATIENT
Start: 2022-11-04

## 2022-11-04 NOTE — TELEPHONE ENCOUNTER
Patients spouse  Lexi Mojica, called to inform office they will be a few minutes late due to traffic

## 2022-11-06 NOTE — PROGRESS NOTES
Oncology Outpatient Consult Note  Mayra López 38 y o  female MRN: @ Encounter: [id number]        Date:  11/6/2022        CC:  Iron deficiency anemia      HPI:  Rupa López is seen for initial consultation 11/6/2022 regarding her iron deficiency anemia  She is pregnant with her 6th child and is in the 29th week of this pregnancy  Her due date is 1/13/23  She already got 4 infusions of venofer and her ferritin was only 18 after those infusions  She denies any bleeding  She cannot tolerate PO iron because it causes vomiting  She also had a normal hemoglobin electrophoresis in 2019   hgb = 10 9 with an MCV = 76  plt = 413  She also required IV iron for her 4th and 5th pregnancies  She denies any sx of pica  She doesn't have fatigue  She also has concerns about her BG as she was on insulin for her higher pregnancy  She denies any tob or Etoh  Her famhx is remarkable for a mother with breast cancer at age 38  ROS: As stated in history of present illness otherwise her 14 point review of systems today was negative  ECOG PS: 0    Test Results:    Imaging: No results found      Labs:   Lab Results   Component Value Date    WBC 8 72 10/27/2022    HGB 10 9 (L) 10/27/2022    HCT 36 6 10/27/2022    MCV 76 (L) 10/27/2022     (H) 10/27/2022     Lab Results   Component Value Date     03/31/2015    K 3 6 10/27/2022     10/27/2022    CO2 22 10/27/2022    ANIONGAP 7 03/31/2015    BUN 9 10/27/2022    CREATININE 0 50 (L) 10/27/2022    GLUCOSE 104 03/31/2015    GLUF 114 (H) 11/03/2022    CALCIUM 9 0 10/27/2022    CORRECTEDCA 10 1 10/27/2022    AST 8 10/27/2022    ALT 15 10/27/2022    ALKPHOS 98 10/27/2022    PROT 7 8 03/31/2015    BILITOT 0 4 03/31/2015    EGFR 121 10/27/2022         No results found for: SPEP, UPEP    No results found for: PSA    No results found for: CEA    No results found for: AFP    Lab Results   Component Value Date    TIBC 583 (H) 08/04/2014    FERRITIN 18 10/27/2022       No results found for: GHBHOCOQ74            Active Problems:   Patient Active Problem List   Diagnosis   • History of insulin controlled gestational diabetes mellitus (GDM)   • 28 weeks gestation of pregnancy   • Grand multiparity with current pregnancy in second trimester   • History of postpartum hemorrhage, currently pregnant   • History of severe pre-eclampsia   • Anemia affecting pregnancy in third trimester   • Advanced maternal age in multigravida, third trimester   • Bilateral hand numbness   • Abnormal glucose tolerance test (GTT) during pregnancy, antepartum       Past Medical History:   Past Medical History:   Diagnosis Date   • 39 weeks gestation of pregnancy 2020    IOL - 2020 @8pm [address]  Flu vaccine 19   • Anemia    • Anemia during pregnancy in third trimester 1/15/2020   • COVID-19 06/10/2020   • COVID-19 virus detected 2020   • Elderly multigravida in third trimester 1/15/2020   • Encounter for injection education 3/3/2020   • GBS bacteriuria 2019    Amoxicillin sent to patient's pharmacy  Will need PCN in labor DO NOT collect GBS swab at 36 weeks!!   • Hyperglycemia during pregnancy 3/3/2020   • Iron deficiency anemia 2019    F/u Ferritin (Hb 8 4) Will likely need IV iron   • Postpartum hemorrhage, delivered, current hospitalization 2020   • Prediabetes 2015   •  (spontaneous vaginal delivery) 2020       Surgical History:   Past Surgical History:   Procedure Laterality Date   • TOOTH EXTRACTION         Family History:    Family History   Problem Relation Age of Onset   • Cancer Mother    • No Known Problems Father    • No Known Problems Brother    • No Known Problems Daughter    • No Known Problems Son    • Diabetes Maternal Grandmother    • Diabetes Paternal Grandmother        Cancer-related family history includes Cancer in her mother      Social History:   Social History     Socioeconomic History   • Marital status: Single     Spouse name: Not on file   • Number of children: 5   • Years of education: 9   • Highest education level: 9th grade   Occupational History   • Occupation: Unemployed   Tobacco Use   • Smoking status: Never Smoker   • Smokeless tobacco: Never Used   Vaping Use   • Vaping Use: Never used   Substance and Sexual Activity   • Alcohol use: No     Comment: pt reports social drinking once per month prior to pregnancy   • Drug use: No   • Sexual activity: Yes     Partners: Male     Birth control/protection: None   Other Topics Concern   • Not on file   Social History Narrative   • Not on file     Social Determinants of Health     Financial Resource Strain: Low Risk    • Difficulty of Paying Living Expenses: Not hard at all   Food Insecurity: No Food Insecurity   • Worried About [address] in the Last Year: Never true   • Ran Out of Food in the Last Year: Never true   Transportation Needs: No Transportation Needs   • Lack of Transportation (Medical): No   • Lack of Transportation (Non-Medical): No   Physical Activity: Inactive   • Days of Exercise per Week: 0 days   • Minutes of Exercise per Session: 0 min   Stress: No Stress Concern Present   • Feeling of Stress : Not at all   Social Connections:  Moderately Integrated   • Frequency of Communication with Friends and Family: More than three times a week   • Frequency of Social Gatherings with Friends and Family: More than three times a week   • Attends L[employer] Services: More than 4 times per year   • Active Member of Clubs or Organizations: No   • Attends Club or Organization Meetings: Never   • Marital Status: Living with partner   Intimate Partner Violence: Not At Risk   • Fear of Current or Ex-Partner: No   • Emotionally Abused: No   • Physically Abused: No   • Sexually Abused: No   Housing Stability: Low Risk    • Unable to Pay for Housing in the Last Year: No   • Number of [place] in the Last Year: 1   • Unstable Housing in the Last Year: No Current Medications:   Current Outpatient Medications   Medication Sig Dispense Refill   • benzonatate (TESSALON PERLES) 100 mg capsule Take 1 capsule (100 mg total) by mouth 3 (three) times a day as needed for cough (Patient not taking: No sig reported) 20 capsule 0   • doxylamine (UNISOM) 25 MG tablet Take 0 5 tablets (12 5 mg total) by mouth daily at bedtime (Patient not taking: No sig reported) 30 tablet 0   • famotidine (PEPCID) 10 mg tablet Take 1 tablet (10 mg total) by mouth 2 (two) times a day (Patient not taking: No sig reported) 60 tablet 0   • ferrous sulfate 324 (65 Fe) mg Take 1 tablet (324 mg total) by mouth 2 (two) times a day before meals (Patient not taking: No sig reported) 60 tablet 3   • ondansetron (ZOFRAN) 4 mg tablet Take 1 tablet (4 mg total) by mouth every 8 (eight) hours as needed for nausea or vomiting (Patient not taking: No sig reported) 20 tablet 0   • Prenatal Vit-Fe Fumarate-FA (Prenatal Vitamin) 27-0 8 MG TABS Take 1 tablet by mouth in the morning (Patient not taking: No sig reported) 30 tablet 9   • pyridoxine (B-6) 25 MG tablet Take 1 tablet (25 mg total) by mouth daily (Patient not taking: No sig reported) 90 tablet 1     No current facility-administered medications for this visit  Allergies: Allergies   Allergen Reactions   • Dog Epithelium Allergic Rhinitis   • Pollen Extract Allergic Rhinitis         Physical Exam:    Body surface area is 1 79 meters squared      Wt Readings from Last 3 Encounters:   11/04/22 77 6 kg (171 lb)   10/27/22 76 2 kg (168 lb)   10/19/22 74 4 kg (164 lb)        Temp Readings from Last 3 Encounters:   10/19/22 98 3 °F (36 8 °C) (Oral)   10/13/22 (!) 96 5 °F (35 8 °C)   09/29/22 (!) 97 3 °F (36 3 °C)        BP Readings from Last 3 Encounters:   11/04/22 130/80   10/27/22 125/80   10/19/22 115/73         Pulse Readings from Last 3 Encounters:   11/04/22 60   10/27/22 63   10/19/22 71     @LASTSAO2(3)@    Physical Exam     Constitutional General appearance: No acute distress, well appearing and well nourished  Eyes   Conjunctiva and lids: No swelling, erythema or discharge  Pupils and irises: Equal, round and reactive to light  Ears, Nose, Mouth, and Throat   External inspection of ears and nose: Normal     Nasal mucosa, septum, and turbinates: Normal without edema or erythema  Oropharynx: Normal with no erythema, edema, exudate or lesions  Pulmonary   Respiratory effort: No increased work of breathing or signs of respiratory distress  Auscultation of lungs: Clear to auscultation  Cardiovascular   Palpation of heart: Normal PMI, no thrills  Auscultation of heart: Normal rate and rhythm, normal S1 and S2, without murmurs  Examination of extremities for edema and/or varicosities: Normal     Carotid pulses: Normal     Abdomen   Abdomen: Non-tender, no masses  Liver and spleen: No hepatomegaly or splenomegaly  Lymphatic   Palpation of lymph nodes in neck: No lymphadenopathy  Musculoskeletal   Gait and station: Normal     Digits and nails: Normal without clubbing or cyanosis  Inspection/palpation of joints, bones, and muscles: Normal     Skin   Skin and subcutaneous tissue: Normal without rashes or lesions  Neurologic   Cranial nerves: Cranial nerves 2-12 intact  Sensation: No sensory loss  Psychiatric   Orientation to person, place, and time: Normal     Mood and affect: Normal           Assessment/ Plan:  Ferritin is only 18, so she still does need more iron replacement  I set her up for 4 more doses of venofer  I am going to see her 3 mos after delivery with a cbc and iron studies prior  If her hgb doesn't correct, we could consider genetic testing for alpha thalassemia if the patient is will to pay the out of pocket cost that can sometimes happen when doing genetic testing  I will touch base her with her gyn about her concerns about the diabetes she was treated for in her last pregnancy            I spent 30 minutes in chart review, face to face counseling, coordination of care, and documentation

## 2022-11-11 ENCOUNTER — HOSPITAL ENCOUNTER (EMERGENCY)
Facility: HOSPITAL | Age: 42
Discharge: HOME | End: 2022-11-11
Attending: EMERGENCY MEDICINE

## 2022-11-11 VITALS
TEMPERATURE: 98.3 F | SYSTOLIC BLOOD PRESSURE: 138 MMHG | DIASTOLIC BLOOD PRESSURE: 87 MMHG | HEART RATE: 100 BPM | RESPIRATION RATE: 18 BRPM | OXYGEN SATURATION: 97 %

## 2022-11-11 DIAGNOSIS — J10.1 INFLUENZA A: Primary | ICD-10-CM

## 2022-11-11 LAB
FLUAV RNA RESP QL NAA+PROBE: POSITIVE
FLUBV RNA RESP QL NAA+PROBE: NEGATIVE
RSV RNA RESP QL NAA+PROBE: NEGATIVE
SARS-COV-2 RNA RESP QL NAA+PROBE: NEGATIVE

## 2022-11-11 RX ORDER — OSELTAMIVIR PHOSPHATE 6 MG/ML
105 FOR SUSPENSION ORAL 2 TIMES DAILY
Qty: 175 ML | Refills: 0 | Status: SHIPPED | OUTPATIENT
Start: 2022-11-11 | End: 2022-11-16

## 2022-11-11 RX ORDER — OSELTAMIVIR PHOSPHATE 75 MG/1
75 CAPSULE ORAL EVERY 12 HOURS
Qty: 10 CAPSULE | Refills: 0 | Status: CANCELLED | OUTPATIENT
Start: 2022-11-11

## 2022-11-11 NOTE — Clinical Note
Rupa Hamm was seen and treated in our emergency department on 11/11/2022  Diagnosis:     Rupa  may return to work on return date  She may return on this date: 11/18/2022         If you have any questions or concerns, please don't hesitate to call        Brady Brittle, PA-C    ______________________________           _______________          _______________  Hospital Representative                              Date                                Time

## 2022-11-11 NOTE — ED PROVIDER NOTES
History  Chief Complaint   Patient presents with   • Headache          • Nasal Congestion     X2 days, unsure of fevers at home, son is sick at home with similar s/s  31 wks pregnant  Sudafed pm and tylenol taken last      Patient is a 39 y/o F 31 weeks pregnant with h/o gestational diabetes that presents to the ED with nasal congestion and fevers x 2 days  Patient states her son is sick with similar symptoms, but is starting to feel better  Patient denies abdominal pain or vaginal discharge  History provided by:  Patient  Fever - 9 weeks to 74 years  Max temp prior to arrival:  102  Severity:  Moderate  Onset quality:  Gradual  Duration:  2 days  Timing:  Constant  Progression:  Unchanged  Chronicity:  New  Relieved by:  Acetaminophen  Worsened by:  Nothing  Associated symptoms: chills, congestion, cough, headaches, myalgias and sore throat    Associated symptoms: no chest pain, no confusion, no diarrhea, no dysuria, no rash, no rhinorrhea and no vomiting    Risk factors: sick contacts        Prior to Admission Medications   Prescriptions Last Dose Informant Patient Reported? Taking?    Prenatal Vit-Fe Fumarate-FA (Prenatal Vitamin) 27-0 8 MG TABS   No No   Sig: Take 1 tablet by mouth in the morning   Patient not taking: No sig reported   benzonatate (TESSALON PERLES) 100 mg capsule  Self No No   Sig: Take 1 capsule (100 mg total) by mouth 3 (three) times a day as needed for cough   Patient not taking: No sig reported   doxylamine (UNISOM) 25 MG tablet   No No   Sig: Take 0 5 tablets (12 5 mg total) by mouth daily at bedtime   Patient not taking: No sig reported   famotidine (PEPCID) 10 mg tablet   No No   Sig: Take 1 tablet (10 mg total) by mouth 2 (two) times a day   Patient not taking: No sig reported   ferrous sulfate 324 (65 Fe) mg   No No   Sig: Take 1 tablet (324 mg total) by mouth 2 (two) times a day before meals   Patient not taking: No sig reported   ondansetron (ZOFRAN) 4 mg tablet   No No   Sig: Take 1 tablet (4 mg total) by mouth every 8 (eight) hours as needed for nausea or vomiting   Patient not taking: No sig reported   pyridoxine (B-6) 25 MG tablet   No No   Sig: Take 1 tablet (25 mg total) by mouth daily   Patient not taking: No sig reported      Facility-Administered Medications: None       Past Medical History:   Diagnosis Date   • 39 weeks gestation of pregnancy 2020    IOL - 2020 @8pm [facility]  Flu vaccine 19   • Anemia    • Anemia during pregnancy in third trimester 1/15/2020   • COVID-19 06/10/2020   • COVID-19 virus detected 2020   • Elderly multigravida in third trimester 1/15/2020   • Encounter for injection education 3/3/2020   • GBS bacteriuria 2019    Amoxicillin sent to patient's pharmacy  Will need PCN in labor DO NOT collect GBS swab at 36 weeks!!   • Hyperglycemia during pregnancy 3/3/2020   • Iron deficiency anemia 2019    F/u Ferritin (Hb 8 4) Will likely need IV iron   • Postpartum hemorrhage, delivered, current hospitalization 2020   • Prediabetes 2015   •  (spontaneous vaginal delivery) 2020       Past Surgical History:   Procedure Laterality Date   • TOOTH EXTRACTION         Family History   Problem Relation Age of Onset   • Cancer Mother    • No Known Problems Father    • No Known Problems Brother    • No Known Problems Daughter    • No Known Problems Son    • Diabetes Maternal Grandmother    • Diabetes Paternal Grandmother      I have reviewed and agree with the history as documented      E-Cigarette/Vaping   • E-Cigarette Use Never User      E-Cigarette/Vaping Substances   • Nicotine No    • THC No    • CBD No    • Flavoring No    • Other No    • Unknown No      Social History     Tobacco Use   • Smoking status: Never Smoker   • Smokeless tobacco: Never Used   Vaping Use   • Vaping Use: Never used   Substance Use Topics   • Alcohol use: No     Comment: pt reports social drinking once per month prior to pregnancy   • Drug use: No       Review of Systems   Constitutional: Positive for chills, fatigue and fever  HENT: Positive for congestion and sore throat  Negative for rhinorrhea  Respiratory: Positive for cough  Negative for shortness of breath  Cardiovascular: Negative for chest pain, palpitations and leg swelling  Gastrointestinal: Negative for abdominal pain, diarrhea and vomiting  Genitourinary: Negative for dysuria, vaginal bleeding and vaginal discharge  Musculoskeletal: Positive for myalgias  Skin: Negative for color change and rash  Neurological: Positive for headaches  Negative for dizziness and weakness  Psychiatric/Behavioral: Negative for confusion  All other systems reviewed and are negative  Physical Exam  Physical Exam  Vitals and nursing note reviewed  Constitutional:       General: She is not in acute distress  Appearance: Normal appearance  She is well-developed and well-groomed  She is not ill-appearing or diaphoretic  HENT:      Head: Normocephalic and atraumatic  Right Ear: Hearing and tympanic membrane normal       Left Ear: Hearing and tympanic membrane normal       Nose: Nose normal       Mouth/Throat:      Mouth: Mucous membranes are moist       Pharynx: Oropharynx is clear  Eyes:      Conjunctiva/sclera: Conjunctivae normal       Pupils: Pupils are equal    Cardiovascular:      Rate and Rhythm: Normal rate and regular rhythm  Heart sounds: Normal heart sounds  Pulmonary:      Effort: Pulmonary effort is normal       Breath sounds: Normal breath sounds  No wheezing, rhonchi or rales  Abdominal:      Tenderness: There is no abdominal tenderness  Comments: Gravid abdomen   Musculoskeletal:         General: Normal range of motion  Cervical back: Normal range of motion and neck supple  Right lower leg: No edema  Left lower leg: No edema  Lymphadenopathy:      Cervical: No cervical adenopathy  Skin:     General: Skin is warm and dry  Coloration: Skin is not jaundiced or pale  Findings: No rash  Neurological:      General: No focal deficit present  Mental Status: She is alert and oriented to person, place, and time  Motor: No weakness  Psychiatric:         Mood and Affect: Mood normal          Behavior: Behavior is cooperative  Vital Signs  ED Triage Vitals [11/11/22 0917]   Temperature Pulse Respirations Blood Pressure SpO2   98 3 °F (36 8 °C) 97 18 147/92 98 %      Temp Source Heart Rate Source Patient Position - Orthostatic VS BP Location FiO2 (%)   Oral Monitor Sitting Left arm --      Pain Score       --           Vitals:    11/11/22 0917 11/11/22 1145   BP: 147/92 138/87   Pulse: 97 100   Patient Position - Orthostatic VS: Sitting Sitting         Visual Acuity      ED Medications  Medications - No data to display    Diagnostic Studies  Results Reviewed     Procedure Component Value Units Date/Time    FLU/RSV/COVID - if FLU/RSV clinically relevant [[id number]]  (Abnormal) Collected: 11/11/22 0921    Lab Status: Final result Specimen: Nares from Nose Updated: 11/11/22 1003     SARS-CoV-2 Negative     INFLUENZA A PCR Positive     INFLUENZA B PCR Negative     RSV PCR Negative    Narrative:      FOR PEDIATRIC PATIENTS - copy/paste COVID Guidelines URL to browser: [email or web address] [email or web address]  [employer]    SARS-CoV-2 assay is a Nucleic Acid Amplification assay intended for the  qualitative detection of nucleic acid from SARS-CoV-2 in nasopharyngeal  swabs  Results are for the presumptive identification of SARS-CoV-2 RNA  Positive results are indicative of infection with SARS-CoV-2, the virus  causing COVID-19, but do not rule out bacterial infection or co-infection  with other viruses  Laboratories within the [place] and its  territories are required to report all positive results to the appropriate  public health authorities   Negative results do not preclude SARS-CoV-2  infection and should not be used as the sole basis for treatment or other  patient management decisions  Negative results must be combined with  clinical observations, patient history, and epidemiological information  This test has not been FDA cleared or approved  This test has been authorized by FDA under an Emergency Use Authorization  (EUA)  This test is only authorized for the duration of time the  declaration that circumstances exist justifying the authorization of the  emergency use of an in vitro diagnostic tests for detection of SARS-CoV-2  virus and/or diagnosis of COVID-19 infection under section 564(b)(1) of  the Act, 21 U  S C  175QDX-2(G)(0), unless the authorization is terminated  or revoked sooner  The test has been validated but independent review by FDA  and CLIA is pending  Test performed using [employer]: This RT-PCR assay targets N2,  a region unique to SARS-CoV-2  A conserved region in the E-gene was chosen  for pan-Sarbecovirus detection which includes SARS-CoV-2  According to CMS-2020-01-R, this platform meets the definition of high-throughput technology  No orders to display              Procedures  Procedures         ED Course  ED Course as of 11/11/22 1246   Fri Nov 11, 2022   1154 Spoke with Dr Light Friends, she suggested higher dose of tamiflu and monitor BP at home and f/u in the office next week  MDM  Number of Diagnoses or Management Options  Influenza A: new and requires workup  Diagnosis management comments: Patient with fevers, mylagias, headaches, nasal congestion, positive for influenza A   D/w OB/GYN and they suggested higher dose of tamflu  Patient given 105mg BID x 5 days and instructed to take tylenol  BP slightly elevated in the ER  Patient instructed to monitor BP at home and f/u in the office for recheck in 1 week          Amount and/or Complexity of Data Reviewed  Clinical lab tests: ordered and reviewed    Patient Progress  Patient progress: stable      Disposition  Final diagnoses:   Influenza A     Time reflects when diagnosis was documented in both MDM as applicable and the Disposition within this note     Time User Action Codes Description Comment    11/11/2022 12:09 PM Betty Haines Add [J10 1] Influenza A       ED Disposition     ED Disposition   Discharge    Condition   Stable    Date/Time   Fri Nov 11, 2022 12:09 PM    Comment   Marcos Miranda discharge to home/self care                 Follow-up Information     Follow up With Specialties Details Why Contact Info Additional [id number] Obstetrics and Gynecology Schedule an appointment as soon as possible for a visit in 1 week For recheck [address] [id number]  [address], [place], [place], [address], [phone]          Discharge Medication List as of 11/11/2022 12:15 PM      START taking these medications    Details   oseltamivir (TAMIFLU) 6 mg/mL suspension Take 17 5 mL (105 mg total) by mouth 2 (two) times a day for 5 days, Starting Fri 11/11/2022, Until Wed 11/16/2022, Normal         CONTINUE these medications which have NOT CHANGED    Details   benzonatate (TESSALON PERLES) 100 mg capsule Take 1 capsule (100 mg total) by mouth 3 (three) times a day as needed for cough, Starting u 9/29/2022, Normal      doxylamine (UNISOM) 25 MG tablet Take 0 5 tablets (12 5 mg total) by mouth daily at bedtime, Starting Thu 8/4/2022, Normal      famotidine (PEPCID) 10 mg tablet Take 1 tablet (10 mg total) by mouth 2 (two) times a day, Starting Thu 8/4/2022, Until Sat 9/3/2022, Normal      ferrous sulfate 324 (65 Fe) mg Take 1 tablet (324 mg total) by mouth 2 (two) times a day before meals, Starting Thu 8/4/2022, Normal      ondansetron (ZOFRAN) 4 mg tablet Take 1 tablet (4 mg total) by mouth every 8 (eight) hours as needed for nausea or vomiting, Starting Thu 8/4/2022, Normal      Prenatal Vit-Fe Fumarate-FA (Prenatal Vitamin) 27-0 8 MG TABS Take 1 tablet by mouth in the morning, Starting Thu 8/4/2022, Normal      pyridoxine (B-6) 25 MG tablet Take 1 tablet (25 mg total) by mouth daily, Starting Thu 8/4/2022, Normal             No discharge procedures on file      PDMP Review     None          ED Provider  Electronically Signed by           Cassius Haddad PA-C  11/11/22 1797

## 2022-11-11 NOTE — DISCHARGE INSTRUCTIONS
Rest, increase fluids  Tamiflu as directed  Follow up with ob/gyn for recheck in 1 week  Tylenol for fevers  Monitor blood pressure at home  Return to ER if symptoms worsen

## 2022-11-16 ENCOUNTER — PATIENT OUTREACH (OUTPATIENT)
Dept: OBGYN CLINIC | Facility: CLINIC | Age: 42
End: 2022-11-16

## 2022-11-16 ENCOUNTER — TELEPHONE (OUTPATIENT)
Dept: OBGYN CLINIC | Facility: CLINIC | Age: 42
End: 2022-11-16

## 2022-11-16 PROBLEM — O24.419 GESTATIONAL DIABETES MELLITUS (GDM) IN THIRD TRIMESTER: Status: ACTIVE | Noted: 2022-11-16

## 2022-11-16 PROBLEM — O09.299 HX OF PREECLAMPSIA, PRIOR PREGNANCY, CURRENTLY PREGNANT: Status: ACTIVE | Noted: 2022-11-16

## 2022-11-16 NOTE — PROGRESS NOTES
RAFA SOTO called Pt to address her needs  Pt reported she was called to inform her that her appointment for 11/17 needed to be reschedule due to positive flu  Pt stated she felt bad after been called rude since she has not been rude to anyone ever  She became tearful and spouse saw her and called back upset and told MR that the Pt was not coming back  RAFA SOTO apologized for the misunderstanding and and asked Pt if she was transferring her care  Pt was not aware that her spouse said that and stated se will stay with [facility]  Pt requested RAFA SOTO to assist with rescheduling her next pn appointment  Pt will be seen 11/21/22 at 10:45 am Pt will call MR if not able to arrange transportation for that day  Pt denies other concern at this time

## 2022-11-16 NOTE — TELEPHONE ENCOUNTER
Patient was called and advised OB appointment was in need of being rescheduled due to patient being positive for Flu A 11/11 per providers request  Patient was upset appointment needed to be R/S and had  call the office  Per patients  she will no longer be coming to the office for any OB care

## 2022-11-16 NOTE — PATIENT INSTRUCTIONS
Thank you for choosing us for your  care today  If you have any questions about your ultrasound or care, please do not hesitate to contact us or your primary obstetrician  Some general instructions for your pregnancy are:    Protect against coronavirus: get vaccinated - pregnant women are increased risk of severe COVID  Notify your primary care doctor if you have any symptoms  Exercise: Aim for 22 minutes per day (150 minutes per week) of regular exercise  Walking is great! Nutrition: aim for calcium-rich and iron-rich foods as well as healthy sources of protein  Learn about Preeclampsia: preeclampsia is a common, serious high blood pressure complication in pregnancy  A blood pressure of 618XVBG (systolic or top number) or 61KDCM (diastolic or bottom number) is not normal and needs evaluation by your doctor  Aspirin is sometimes prescribed in early pregnancy to prevent preeclampsia in women with risk factors - ask your obstetrician if you should be on this medication  If you smoke, try to reduce how many cigarettes you smoke or try to quit completely  Do not vape  Other warning signs to watch out for in pregnancy or postpartum: chest pain, obstructed breathing or shortness of breath, seizures, thoughts of hurting yourself or your baby, bleeding, a painful or swollen leg, fever, or headache (see [facility] POST-BIRTH Warning Signs campaign)  If these happen call 911  Itching is also not normal in pregnancy and if you experience this, especially over your hands and feet, potentially worse at night, notify your doctors

## 2022-11-17 ENCOUNTER — OFFICE VISIT (OUTPATIENT)
Dept: PERINATAL CARE | Facility: CLINIC | Age: 42
End: 2022-11-17

## 2022-11-17 ENCOUNTER — ULTRASOUND (OUTPATIENT)
Dept: PERINATAL CARE | Facility: CLINIC | Age: 42
End: 2022-11-17

## 2022-11-17 VITALS
HEART RATE: 71 BPM | BODY MASS INDEX: 31.28 KG/M2 | DIASTOLIC BLOOD PRESSURE: 88 MMHG | HEIGHT: 62 IN | WEIGHT: 170 LBS | SYSTOLIC BLOOD PRESSURE: 132 MMHG

## 2022-11-17 DIAGNOSIS — Z3A.28 28 WEEKS GESTATION OF PREGNANCY: ICD-10-CM

## 2022-11-17 DIAGNOSIS — O09.523 ADVANCED MATERNAL AGE IN MULTIGRAVIDA, THIRD TRIMESTER: Primary | ICD-10-CM

## 2022-11-17 DIAGNOSIS — O24.410 DIET CONTROLLED GESTATIONAL DIABETES MELLITUS (GDM) IN THIRD TRIMESTER: Primary | ICD-10-CM

## 2022-11-17 DIAGNOSIS — Z3A.31 31 WEEKS GESTATION OF PREGNANCY: ICD-10-CM

## 2022-11-17 DIAGNOSIS — O09.299 HISTORY OF POSTPARTUM HEMORRHAGE, CURRENTLY PREGNANT: ICD-10-CM

## 2022-11-17 DIAGNOSIS — Z36.89 ENCOUNTER FOR ULTRASOUND TO ASSESS FETAL GROWTH: ICD-10-CM

## 2022-11-17 DIAGNOSIS — O09.299 HX OF PREECLAMPSIA, PRIOR PREGNANCY, CURRENTLY PREGNANT: ICD-10-CM

## 2022-11-17 DIAGNOSIS — Z34.93 PRENATAL CARE IN THIRD TRIMESTER: ICD-10-CM

## 2022-11-17 DIAGNOSIS — Z86.32 HISTORY OF INSULIN CONTROLLED GESTATIONAL DIABETES MELLITUS (GDM): ICD-10-CM

## 2022-11-17 DIAGNOSIS — O24.419 GESTATIONAL DIABETES MELLITUS (GDM) IN THIRD TRIMESTER, GESTATIONAL DIABETES METHOD OF CONTROL UNSPECIFIED: ICD-10-CM

## 2022-11-17 DIAGNOSIS — E66.3 OVERWEIGHT WITH BODY MASS INDEX (BMI) OF 29 TO 29.9 IN ADULT: ICD-10-CM

## 2022-11-17 RX ORDER — LANCETS
EACH MISCELLANEOUS
Qty: 100 EACH | Refills: 4 | Status: SHIPPED | OUTPATIENT
Start: 2022-11-17 | End: 2023-01-13

## 2022-11-17 RX ORDER — PERPHENAZINE 16 MG/1
TABLET, FILM COATED ORAL
Qty: 100 STRIP | Refills: 4 | Status: SHIPPED | OUTPATIENT
Start: 2022-11-17 | End: 2023-01-13

## 2022-11-17 NOTE — PROGRESS NOTES
Thank you for referring your patient to [facility] Maternal Fetal Medicine Diabetes in Pregnancy Program      Moo Burnham is a  38 y o  female who presents today for Class 1  Patient is at [address] gestation, Estimated Date of Delivery: 23  Patient only speaks Latvian; her SO translated  Reviewed and updated the following from patients medical record: PMH, Problem List, Allergies, and Current Medications  Visit Diagnosis:  Diet controlled GDM    Discussed with patient pathophysiology of GDM, untreated hyperglycemia in pregnancy and maternal fetal complications including fetal macrosomia,  hypoglycemia, polyhydramnios, increased incidence of  section,  labor, and in severe cases fetal demise and still birth   Discussed importance of blood glucose monitoring, nutrition, and medication if necessary in achieving BG goals  Additional Pregnancy Complications:  Overweight prior to pregnancy   History of Insulin controlled gestational diabetes [id number] managed by this program    Labs:    Lab Results   Component Value Date    [id number] 178 (H) 10/27/2022       Lab Results   Component Value Date    GLUF 114 (H) 2022    IOAAVKS2FK 158 2014    DPIZGZO1JQ 87 2014        No components found for: HGA1C    Medications:  No diabetes related medications    Anthropometrics:  Ht Readings from Last 3 Encounters:   22 5' 2" (1 575 m)   22 5' 2" (1 575 m)   10/27/22 5' 2" (1 575 m)     Wt Readings from Last 3 Encounters:   22 77 1 kg (170 lb)   22 77 6 kg (171 lb)   10/27/22 76 2 kg (168 lb)     Pre-gravid weight: 159 pounds  Pre-gravid BMI: 29 07  Weight Change: Gained 11 pounds in 31 weeks pregnant  Weight gain recommendations: BMI (25-29 9) 15-25 lbs  Comments: patient may gain 14 more pounds for the remainder of the pregnancy    Recent Ultra Sound Results:  Date: 22  Fetal Growth: Normal but AC-14%  LENORE: Normal  Next US date: LENORE weekly    Blood Glucose Monitoring:   Glucose Meter: Contour Next Gen  Instructed on testing blood sugars: 4 x per day (Fasting, 2 hour after start of each meal)  FBS today at this appointment was 101, but last ate at 4 PM yesterday of 19 hours before  Gave instruction on site selection, skin preparation, loading strips and lancet device, meter activation, obtaining blood sample, test strip and lancet disposal and storage, and recording log book entries  Patient has good understanding of material covered and was able to test their own blood sugar in office today  Instruction for reporting blood sugar results weekly via:  Phone: [phone]   OR  My Chart (Message with image attachment, or Glucose Flowsheet)    Goal Blood Sugar Ranges:   Fastin-90 mg/dL  1 hour after the start of each meal: 140 mg/dL or less  2 hours after start of each meal: 120 mg/dL or less    Meal Plan (daily calorie and protein needs):  Calories: 1900 calorie     Type of Diet:Low Carbohydrate  Additional Nutrition Concerns: Remembers her GDM diet from her last pregnancy & started the GDM meal plan as soon as she became pregnant  Has been having lots of nausea & vomiting throughout this pregnancy  Is sensitive to strong aromas, but is eating cooked foods & cold foods  [place]be to tolerate--fish, strong seasonings & tortillas  Stopped eating sweets as soon as she became pregnant  Arises at 5 AM when has coffee with 7 oz milk & crackers with cheese  Eats breakfast at 9 AM   Advised to take her FBS at 5 AM when arises  Eats dinner at 4 PM & seldom eats a bedtime snack  Stressed importance of eating an afternoon & bedtime snack at 7-8 PM  Will try  Meal Plan Tips:  1  Patient was provided with a meal plan including 3 meals and 3 snacks  2  Discussed appropriate amounts of CHO, PRO, and Fat at each meal and snack  3  Reviewed CHO exchange list, and portion sizes for both CHO and PRO via food models  4   Instruction on how to read a food label  Is not reading food labels  5  Provided suggested meal/snack options to increase nutrition and maintain consistent meal and snack intakes  6  Instructed on how to keep a 3-day food diary to be brought to follow- up appointment  7  Encouraged  patient to eat every 2 0-3 5 hours while awake  8  Encouraged patient to go no longer than 8-10 hours fasting overnight until first meal of the day  Physical Activity:  Discussed benefits of physical activity to optimize blood glucose control, encouraged activity at patient is physically able  Always consult a physician prior to starting an exercise program  Recommend 20-30 minutes daily  Patient Stated Goal: "I will eat 3 meals and 3 snacks each day, including protein at each"    Diabetes Self Management Support Plan outside of ongoing care: Spouse/Family    Learner/s Present:Learners Present: Patient  and Spouse  Barriers to Learning/Change: Cultural, Financial and Language  Expected Compliance: very good    Date to report blood sugars: Weekly  Class 2 (date): Thursday, 12/1/22     Begin Time: 11 AM  End Time: 12:10 PM    It was a pleasure working with them today  Please feel free to call with any questions or concerns      Carlos Dial  Diabetes Educator  [facility] Maternal Fetal Medicine  Diabetes in Pregnancy Program  [address] [facility], [address] [place] [address],[address]  [address]

## 2022-11-17 NOTE — PROGRESS NOTES
[address]: Ms Fidel Knowles was seen today for fetal growth and followup missed anatomy ultrasound  See ultrasound report under "OB Procedures" tab  The time spent on this established patient on the encounter date included 5 minutes previsit service time reviewing records and precharting, 10 minutes face-to-face service time counseling regarding results and coordinating care, and  5 minutes charting, totalling 20 minutes  Please don't hesitate to contact our office with any concerns or questions    Addison Tong MD

## 2022-11-20 PROBLEM — O99.810 ABNORMAL GLUCOSE TOLERANCE TEST (GTT) DURING PREGNANCY, ANTEPARTUM: Status: RESOLVED | Noted: 2022-10-28 | Resolved: 2022-11-20

## 2022-11-20 PROBLEM — O09.299 HX OF PREECLAMPSIA, PRIOR PREGNANCY, CURRENTLY PREGNANT: Status: RESOLVED | Noted: 2022-11-16 | Resolved: 2022-11-20

## 2022-11-20 PROBLEM — Z3A.32 32 WEEKS GESTATION OF PREGNANCY: Status: ACTIVE | Noted: 2022-05-19

## 2022-11-28 ENCOUNTER — PATIENT MESSAGE (OUTPATIENT)
Dept: PERINATAL CARE | Facility: CLINIC | Age: 42
End: 2022-11-28

## 2022-11-29 ENCOUNTER — HOSPITAL ENCOUNTER (INPATIENT)
Facility: HOSPITAL | Age: 42
LOS: 6 days | Discharge: HOME | End: 2022-12-05
Attending: OBSTETRICS & GYNECOLOGY | Admitting: OBSTETRICS & GYNECOLOGY

## 2022-11-29 DIAGNOSIS — O14.90 PRE-ECLAMPSIA AFFECTING PREGNANCY, ANTEPARTUM: Primary | ICD-10-CM

## 2022-11-29 DIAGNOSIS — O24.410 DIET CONTROLLED GESTATIONAL DIABETES MELLITUS (GDM) IN THIRD TRIMESTER: ICD-10-CM

## 2022-11-29 DIAGNOSIS — O09.523 ADVANCED MATERNAL AGE IN MULTIGRAVIDA, THIRD TRIMESTER: ICD-10-CM

## 2022-11-29 DIAGNOSIS — O14.13 PREECLAMPSIA, SEVERE, THIRD TRIMESTER: ICD-10-CM

## 2022-11-29 DIAGNOSIS — O32.0XX1: ICD-10-CM

## 2022-11-29 DIAGNOSIS — O21.9 NAUSEA AND VOMITING DURING PREGNANCY: ICD-10-CM

## 2022-11-29 DIAGNOSIS — R05.9 COUGH: ICD-10-CM

## 2022-11-29 LAB
ABO GROUP BLD: NORMAL
ALBUMIN SERPL BCP-MCNC: 2.3 G/DL (ref 3.5–5)
ALBUMIN SERPL BCP-MCNC: 2.3 G/DL (ref 3.5–5)
ALBUMIN SERPL BCP-MCNC: 2.5 G/DL (ref 3.5–5)
ALP SERPL-CCNC: 135 U/L (ref 46–116)
ALP SERPL-CCNC: 139 U/L (ref 46–116)
ALP SERPL-CCNC: 151 U/L (ref 46–116)
ALT SERPL W P-5'-P-CCNC: 40 U/L (ref 12–78)
ALT SERPL W P-5'-P-CCNC: 42 U/L (ref 12–78)
ALT SERPL W P-5'-P-CCNC: 44 U/L (ref 12–78)
AMORPH URATE CRY URNS QL MICRO: ABNORMAL
ANION GAP SERPL CALCULATED.3IONS-SCNC: 8 MMOL/L (ref 4–13)
ANION GAP SERPL CALCULATED.3IONS-SCNC: 9 MMOL/L (ref 4–13)
ANION GAP SERPL CALCULATED.3IONS-SCNC: 9 MMOL/L (ref 4–13)
AST SERPL W P-5'-P-CCNC: 38 U/L (ref 5–45)
AST SERPL W P-5'-P-CCNC: 42 U/L (ref 5–45)
AST SERPL W P-5'-P-CCNC: 54 U/L (ref 5–45)
BACTERIA UR QL AUTO: ABNORMAL /HPF
BASOPHILS # BLD AUTO: 0.02 THOUSANDS/ÂΜL (ref 0–0.1)
BASOPHILS # BLD AUTO: 0.02 THOUSANDS/ÂΜL (ref 0–0.1)
BASOPHILS NFR BLD AUTO: 0 % (ref 0–1)
BASOPHILS NFR BLD AUTO: 0 % (ref 0–1)
BILIRUB SERPL-MCNC: 0.1 MG/DL (ref 0.2–1)
BILIRUB SERPL-MCNC: 0.2 MG/DL (ref 0.2–1)
BILIRUB SERPL-MCNC: 0.2 MG/DL (ref 0.2–1)
BILIRUB UR QL STRIP: NEGATIVE
BLD GP AB SCN SERPL QL: NEGATIVE
BUN SERPL-MCNC: 12 MG/DL (ref 5–25)
BUN SERPL-MCNC: 13 MG/DL (ref 5–25)
BUN SERPL-MCNC: 14 MG/DL (ref 5–25)
CALCIUM ALBUM COR SERPL-MCNC: 10.5 MG/DL (ref 8.3–10.1)
CALCIUM ALBUM COR SERPL-MCNC: 8.5 MG/DL (ref 8.3–10.1)
CALCIUM ALBUM COR SERPL-MCNC: 9.4 MG/DL (ref 8.3–10.1)
CALCIUM SERPL-MCNC: 7.1 MG/DL (ref 8.3–10.1)
CALCIUM SERPL-MCNC: 8 MG/DL (ref 8.3–10.1)
CALCIUM SERPL-MCNC: 9.3 MG/DL (ref 8.3–10.1)
CHLORIDE SERPL-SCNC: 102 MMOL/L (ref 96–108)
CHLORIDE SERPL-SCNC: 103 MMOL/L (ref 96–108)
CHLORIDE SERPL-SCNC: 104 MMOL/L (ref 96–108)
CLARITY UR: ABNORMAL
CO2 SERPL-SCNC: 20 MMOL/L (ref 21–32)
CO2 SERPL-SCNC: 21 MMOL/L (ref 21–32)
CO2 SERPL-SCNC: 22 MMOL/L (ref 21–32)
COLOR UR: ABNORMAL
CREAT SERPL-MCNC: 0.61 MG/DL (ref 0.6–1.3)
CREAT SERPL-MCNC: 0.65 MG/DL (ref 0.6–1.3)
CREAT SERPL-MCNC: 0.71 MG/DL (ref 0.6–1.3)
CREAT UR-MCNC: 32 MG/DL
EOSINOPHIL # BLD AUTO: 0 THOUSAND/ÂΜL (ref 0–0.61)
EOSINOPHIL # BLD AUTO: 0.14 THOUSAND/ÂΜL (ref 0–0.61)
EOSINOPHIL NFR BLD AUTO: 0 % (ref 0–6)
EOSINOPHIL NFR BLD AUTO: 2 % (ref 0–6)
ERYTHROCYTE [DISTWIDTH] IN BLOOD BY AUTOMATED COUNT: 19.9 % (ref 11.6–15.1)
ERYTHROCYTE [DISTWIDTH] IN BLOOD BY AUTOMATED COUNT: 19.9 % (ref 11.6–15.1)
ERYTHROCYTE [DISTWIDTH] IN BLOOD BY AUTOMATED COUNT: 20.6 % (ref 11.6–15.1)
GFR SERPL CREATININE-BSD FRML MDRD: 106 ML/MIN/1.73SQ M
GFR SERPL CREATININE-BSD FRML MDRD: 111 ML/MIN/1.73SQ M
GFR SERPL CREATININE-BSD FRML MDRD: 113 ML/MIN/1.73SQ M
GLUCOSE SERPL-MCNC: 126 MG/DL (ref 65–140)
GLUCOSE SERPL-MCNC: 130 MG/DL (ref 65–140)
GLUCOSE SERPL-MCNC: 148 MG/DL (ref 65–140)
GLUCOSE SERPL-MCNC: 156 MG/DL (ref 65–140)
GLUCOSE SERPL-MCNC: 172 MG/DL (ref 65–140)
GLUCOSE SERPL-MCNC: 173 MG/DL (ref 65–140)
GLUCOSE SERPL-MCNC: 174 MG/DL (ref 65–140)
GLUCOSE UR STRIP-MCNC: ABNORMAL MG/DL
HCT VFR BLD AUTO: 34.2 % (ref 34.8–46.1)
HCT VFR BLD AUTO: 34.3 % (ref 34.8–46.1)
HCT VFR BLD AUTO: 37 % (ref 34.8–46.1)
HGB BLD-MCNC: 10.1 G/DL (ref 11.5–15.4)
HGB BLD-MCNC: 10.3 G/DL (ref 11.5–15.4)
HGB BLD-MCNC: 11 G/DL (ref 11.5–15.4)
HGB UR QL STRIP.AUTO: ABNORMAL
IMM GRANULOCYTES # BLD AUTO: 0.04 THOUSAND/UL (ref 0–0.2)
IMM GRANULOCYTES # BLD AUTO: 0.06 THOUSAND/UL (ref 0–0.2)
IMM GRANULOCYTES NFR BLD AUTO: 1 % (ref 0–2)
IMM GRANULOCYTES NFR BLD AUTO: 1 % (ref 0–2)
KETONES UR STRIP-MCNC: NEGATIVE MG/DL
LEUKOCYTE ESTERASE UR QL STRIP: NEGATIVE
LYMPHOCYTES # BLD AUTO: 1.03 THOUSANDS/ÂΜL (ref 0.6–4.47)
LYMPHOCYTES # BLD AUTO: 2.66 THOUSANDS/ÂΜL (ref 0.6–4.47)
LYMPHOCYTES NFR BLD AUTO: 32 % (ref 14–44)
LYMPHOCYTES NFR BLD AUTO: 8 % (ref 14–44)
MAGNESIUM SERPL-MCNC: 4.6 MG/DL (ref 1.6–2.6)
MAGNESIUM SERPL-MCNC: 5.4 MG/DL (ref 1.6–2.6)
MCH RBC QN AUTO: 22 PG (ref 26.8–34.3)
MCH RBC QN AUTO: 22 PG (ref 26.8–34.3)
MCH RBC QN AUTO: 22.2 PG (ref 26.8–34.3)
MCHC RBC AUTO-ENTMCNC: 29.5 G/DL (ref 31.4–37.4)
MCHC RBC AUTO-ENTMCNC: 29.7 G/DL (ref 31.4–37.4)
MCHC RBC AUTO-ENTMCNC: 30 G/DL (ref 31.4–37.4)
MCV RBC AUTO: 73 FL (ref 82–98)
MCV RBC AUTO: 74 FL (ref 82–98)
MCV RBC AUTO: 75 FL (ref 82–98)
MONOCYTES # BLD AUTO: 0.15 THOUSAND/ÂΜL (ref 0.17–1.22)
MONOCYTES # BLD AUTO: 0.59 THOUSAND/ÂΜL (ref 0.17–1.22)
MONOCYTES NFR BLD AUTO: 1 % (ref 4–12)
MONOCYTES NFR BLD AUTO: 7 % (ref 4–12)
MUCOUS THREADS UR QL AUTO: ABNORMAL
NEUTROPHILS # BLD AUTO: 11.32 THOUSANDS/ÂΜL (ref 1.85–7.62)
NEUTROPHILS # BLD AUTO: 4.89 THOUSANDS/ÂΜL (ref 1.85–7.62)
NEUTS SEG NFR BLD AUTO: 58 % (ref 43–75)
NEUTS SEG NFR BLD AUTO: 90 % (ref 43–75)
NITRITE UR QL STRIP: NEGATIVE
NON-SQ EPI CELLS URNS QL MICRO: ABNORMAL /HPF
NRBC BLD AUTO-RTO: 0 /100 WBCS
NRBC BLD AUTO-RTO: 0 /100 WBCS
PH UR STRIP.AUTO: 7.5 [PH]
PLATELET # BLD AUTO: 457 THOUSANDS/UL (ref 149–390)
PLATELET # BLD AUTO: 458 THOUSANDS/UL (ref 149–390)
PLATELET # BLD AUTO: 500 THOUSANDS/UL (ref 149–390)
PMV BLD AUTO: 9 FL (ref 8.9–12.7)
PMV BLD AUTO: 9.1 FL (ref 8.9–12.7)
PMV BLD AUTO: 9.3 FL (ref 8.9–12.7)
POTASSIUM SERPL-SCNC: 4.1 MMOL/L (ref 3.5–5.3)
POTASSIUM SERPL-SCNC: 4.1 MMOL/L (ref 3.5–5.3)
POTASSIUM SERPL-SCNC: 4.2 MMOL/L (ref 3.5–5.3)
PROT SERPL-MCNC: 6.8 G/DL (ref 6.4–8.4)
PROT SERPL-MCNC: 6.9 G/DL (ref 6.4–8.4)
PROT SERPL-MCNC: 7.3 G/DL (ref 6.4–8.4)
PROT UR STRIP-MCNC: ABNORMAL MG/DL
PROT UR-MCNC: 1593 MG/DL
PROT/CREAT UR: 49.78 MG/G{CREAT} (ref 0–0.1)
RBC # BLD AUTO: 4.55 MILLION/UL (ref 3.81–5.12)
RBC # BLD AUTO: 4.68 MILLION/UL (ref 3.81–5.12)
RBC # BLD AUTO: 5 MILLION/UL (ref 3.81–5.12)
RBC #/AREA URNS AUTO: ABNORMAL /HPF
RH BLD: POSITIVE
SODIUM SERPL-SCNC: 132 MMOL/L (ref 135–147)
SODIUM SERPL-SCNC: 132 MMOL/L (ref 135–147)
SODIUM SERPL-SCNC: 134 MMOL/L (ref 135–147)
SP GR UR STRIP.AUTO: 1.02 (ref 1–1.03)
SPECIMEN EXPIRATION DATE: NORMAL
URATE SERPL-MCNC: 4.4 MG/DL (ref 2–7.5)
UROBILINOGEN UR STRIP-ACNC: <2 MG/DL
WBC # BLD AUTO: 12.58 THOUSAND/UL (ref 4.31–10.16)
WBC # BLD AUTO: 7.91 THOUSAND/UL (ref 4.31–10.16)
WBC # BLD AUTO: 8.34 THOUSAND/UL (ref 4.31–10.16)
WBC #/AREA URNS AUTO: ABNORMAL /HPF

## 2022-11-29 PROCEDURE — 4A1HXCZ MONITORING OF PRODUCTS OF CONCEPTION, CARDIAC RATE, EXTERNAL APPROACH: ICD-10-PCS | Performed by: OBSTETRICS & GYNECOLOGY

## 2022-11-29 RX ORDER — LABETALOL HYDROCHLORIDE 5 MG/ML
INJECTION, SOLUTION INTRAVENOUS
Status: COMPLETED
Start: 2022-11-29 | End: 2022-11-29

## 2022-11-29 RX ORDER — MAGNESIUM HYDROXIDE/ALUMINUM HYDROXICE/SIMETHICONE 120; 1200; 1200 MG/30ML; MG/30ML; MG/30ML
30 SUSPENSION ORAL EVERY 4 HOURS PRN
Status: DISCONTINUED | OUTPATIENT
Start: 2022-11-29 | End: 2022-12-05 | Stop reason: HOSPADM

## 2022-11-29 RX ORDER — INSULIN LISPRO 100 [IU]/ML
1-5 INJECTION, SOLUTION INTRAVENOUS; SUBCUTANEOUS
Status: DISCONTINUED | OUTPATIENT
Start: 2022-11-29 | End: 2022-12-01

## 2022-11-29 RX ORDER — LABETALOL HYDROCHLORIDE 5 MG/ML
40 INJECTION, SOLUTION INTRAVENOUS ONCE
Status: COMPLETED | OUTPATIENT
Start: 2022-11-29 | End: 2022-11-29

## 2022-11-29 RX ORDER — FAMOTIDINE 10 MG/ML
20 INJECTION, SOLUTION INTRAVENOUS EVERY 12 HOURS SCHEDULED
Status: DISCONTINUED | OUTPATIENT
Start: 2022-11-29 | End: 2022-11-29

## 2022-11-29 RX ORDER — MAGNESIUM HYDROXIDE/ALUMINUM HYDROXICE/SIMETHICONE 120; 1200; 1200 MG/30ML; MG/30ML; MG/30ML
30 SUSPENSION ORAL ONCE
Status: COMPLETED | OUTPATIENT
Start: 2022-11-29 | End: 2022-11-29

## 2022-11-29 RX ORDER — INSULIN LISPRO 100 [IU]/ML
1 INJECTION, SOLUTION INTRAVENOUS; SUBCUTANEOUS ONCE
Status: DISCONTINUED | OUTPATIENT
Start: 2022-11-29 | End: 2022-12-01

## 2022-11-29 RX ORDER — MAGNESIUM SULFATE HEPTAHYDRATE 40 MG/ML
2 INJECTION, SOLUTION INTRAVENOUS ONCE
Status: COMPLETED | OUTPATIENT
Start: 2022-11-29 | End: 2022-11-29

## 2022-11-29 RX ORDER — MAGNESIUM SULFATE HEPTAHYDRATE 40 MG/ML
2 INJECTION, SOLUTION INTRAVENOUS CONTINUOUS
Status: DISCONTINUED | OUTPATIENT
Start: 2022-11-29 | End: 2022-12-01

## 2022-11-29 RX ORDER — INSULIN LISPRO 100 [IU]/ML
1 INJECTION, SOLUTION INTRAVENOUS; SUBCUTANEOUS ONCE
Status: COMPLETED | OUTPATIENT
Start: 2022-11-29 | End: 2022-11-29

## 2022-11-29 RX ORDER — LABETALOL HYDROCHLORIDE 5 MG/ML
20 INJECTION, SOLUTION INTRAVENOUS ONCE
Status: COMPLETED | OUTPATIENT
Start: 2022-11-29 | End: 2022-11-29

## 2022-11-29 RX ORDER — SODIUM CHLORIDE, SODIUM LACTATE, POTASSIUM CHLORIDE, CALCIUM CHLORIDE 600; 310; 30; 20 MG/100ML; MG/100ML; MG/100ML; MG/100ML
125 INJECTION, SOLUTION INTRAVENOUS CONTINUOUS
Status: DISCONTINUED | OUTPATIENT
Start: 2022-11-29 | End: 2022-12-01

## 2022-11-29 RX ORDER — BETAMETHASONE SODIUM PHOSPHATE AND BETAMETHASONE ACETATE 3; 3 MG/ML; MG/ML
12 INJECTION, SUSPENSION INTRA-ARTICULAR; INTRALESIONAL; INTRAMUSCULAR; SOFT TISSUE EVERY 24 HOURS
Status: COMPLETED | OUTPATIENT
Start: 2022-11-29 | End: 2022-11-30

## 2022-11-29 RX ORDER — LIDOCAINE HYDROCHLORIDE 20 MG/ML
15 SOLUTION OROPHARYNGEAL ONCE
Status: DISCONTINUED | OUTPATIENT
Start: 2022-11-29 | End: 2022-11-29

## 2022-11-29 RX ORDER — INSULIN LISPRO 100 [IU]/ML
2 INJECTION, SOLUTION INTRAVENOUS; SUBCUTANEOUS ONCE
Status: DISCONTINUED | OUTPATIENT
Start: 2022-11-29 | End: 2022-11-29

## 2022-11-29 RX ORDER — FAMOTIDINE 10 MG/ML
20 INJECTION, SOLUTION INTRAVENOUS EVERY 12 HOURS SCHEDULED
Status: DISCONTINUED | OUTPATIENT
Start: 2022-11-29 | End: 2022-12-02

## 2022-11-29 RX ORDER — ACETAMINOPHEN 325 MG/1
650 TABLET ORAL EVERY 6 HOURS PRN
Status: DISCONTINUED | OUTPATIENT
Start: 2022-11-29 | End: 2022-12-01

## 2022-11-29 RX ORDER — MAGNESIUM SULFATE HEPTAHYDRATE 40 MG/ML
4 INJECTION, SOLUTION INTRAVENOUS ONCE
Status: COMPLETED | OUTPATIENT
Start: 2022-11-29 | End: 2022-11-29

## 2022-11-29 RX ADMIN — LABETALOL HYDROCHLORIDE 20 MG: 5 INJECTION, SOLUTION INTRAVENOUS at 03:30

## 2022-11-29 RX ADMIN — BETAMETHASONE SODIUM PHOSPHATE AND BETAMETHASONE ACETATE 12 MG: 3; 3 INJECTION, SUSPENSION INTRA-ARTICULAR; INTRALESIONAL; INTRAMUSCULAR at 04:36

## 2022-11-29 RX ADMIN — MAGNESIUM SULFATE HEPTAHYDRATE 2 G: 2 INJECTION, SOLUTION INTRAVENOUS at 04:27

## 2022-11-29 RX ADMIN — SODIUM CHLORIDE, SODIUM LACTATE, POTASSIUM CHLORIDE, AND CALCIUM CHLORIDE 75 ML/HR: .6; .31; .03; .02 INJECTION, SOLUTION INTRAVENOUS at 14:49

## 2022-11-29 RX ADMIN — ACETAMINOPHEN 650 MG: 325 TABLET, FILM COATED ORAL at 14:52

## 2022-11-29 RX ADMIN — INSULIN LISPRO 1 UNITS: 100 INJECTION, SOLUTION INTRAVENOUS; SUBCUTANEOUS at 22:47

## 2022-11-29 RX ADMIN — LABETALOL HYDROCHLORIDE 40 MG: 5 INJECTION, SOLUTION INTRAVENOUS at 03:51

## 2022-11-29 RX ADMIN — FAMOTIDINE 20 MG: 10 INJECTION INTRAVENOUS at 21:45

## 2022-11-29 RX ADMIN — FAMOTIDINE 20 MG: 10 INJECTION INTRAVENOUS at 04:42

## 2022-11-29 RX ADMIN — SODIUM CHLORIDE, SODIUM LACTATE, POTASSIUM CHLORIDE, AND CALCIUM CHLORIDE 125 ML/HR: .6; .31; .03; .02 INJECTION, SOLUTION INTRAVENOUS at 03:26

## 2022-11-29 RX ADMIN — INSULIN LISPRO 1 UNITS: 100 INJECTION, SOLUTION INTRAVENOUS; SUBCUTANEOUS at 18:45

## 2022-11-29 RX ADMIN — MAGNESIUM SULFATE HEPTAHYDRATE 4 G: 40 INJECTION, SOLUTION INTRAVENOUS at 04:05

## 2022-11-29 RX ADMIN — MAGNESIUM SULFATE HEPTAHYDRATE 2 G/HR: 40 INJECTION, SOLUTION INTRAVENOUS at 04:40

## 2022-11-29 RX ADMIN — ALUMINUM HYDROXIDE, MAGNESIUM HYDROXIDE, AND SIMETHICONE 30 ML: 200; 200; 20 SUSPENSION ORAL at 03:54

## 2022-11-29 RX ADMIN — MAGNESIUM SULFATE HEPTAHYDRATE 2 G/HR: 40 INJECTION, SOLUTION INTRAVENOUS at 14:49

## 2022-11-29 NOTE — CONSULTS
NICU Prenatal Consult   Rupa Robbins 38 y o  female MRN: [id number]  Unit/Bed#: -01 Encounter: [id number]    Consulting Physician: Odessa Bear DO    A NICU Prenatal Consult has been requested by the [facility] team due to  delivery in the setting of maternal pre-eclampsia with severe features  Mother receiving BMZ course with plans for IOL after steroid window is complete, as long as mother's health and labs remain stable  Rupa Robbins is a 38 y o  [id number], with an HILLARY of 23 at [address] [place] [address]  Mother admitted for pre-eclampsia with severe features, pregnancy also complicated by R5ANP, anemia and AMA  Rupa is expecting a girl, to be named Evelin  She intends to breastfeed and is familiar with pumping from previous pregnancies  Along with Dg Keane, her , was present for the consultation  Tomás provided Croatian interpretation, per he and mother's request (Medical  was offered and they declined)  Prenatal Care:Yes, description: good       Prenatal Labs:  Lab Results   Component Value Date/Time    ABO Grouping O 2022 03:22 AM    Rh Factor Positive 2022 03:22 AM    Hepatitis B Surface Ag Non-reactive 2022 09:46 AM    RPR Non-Reactive 10/27/2022 01:51 PM    Rubella IgG Quant >175 0 2022 09:46 AM    HIV-1/HIV-2 Ab Non-Reactive 2022 09:46 AM      22 03:22   Antibody Screen Negative     Unknown labs at this time: GBS (collected  on admit)     Pregnancy complications:   Patient Active Problem List   Diagnosis   • History of insulin controlled gestational diabetes mellitus (GDM)   • 32 weeks gestation of pregnancy   • Grand multiparity with current pregnancy in second trimester   • History of postpartum hemorrhage, currently pregnant   • History of severe pre-eclampsia   • Anemia affecting pregnancy in third trimester   • Advanced maternal age in multigravida, third trimester   • Bilateral hand numbness   • Gestational diabetes mellitus (GDM) in third trimester   • Pre-eclampsia affecting pregnancy, antepartum     Maternal medical history:   Past Medical History:   Diagnosis Date   • 39 weeks gestation of pregnancy 2020    IOL - 2020 @8pm [facility]  Flu vaccine 19   • Anemia    • Anemia during pregnancy in third trimester 1/15/2020   • COVID-19 06/10/2020   • COVID-19 virus detected 2020   • Elderly multigravida in third trimester 1/15/2020   • Encounter for injection education 3/3/2020   • GBS bacteriuria 2019    Amoxicillin sent to patient's pharmacy  Will need PCN in labor DO NOT collect GBS swab at 36 weeks!!   • Hyperglycemia during pregnancy 3/3/2020   • Iron deficiency anemia 2019    F/u Ferritin (Hb 8 4) Will likely need IV iron   • Postpartum hemorrhage, delivered, current hospitalization 2020   • Prediabetes    •  (spontaneous vaginal delivery) 2020     Medications at home:   Medications Prior to Admission   Medication   • benzonatate (TESSALON PERLES) 100 mg capsule   • Contour Next Test test strip   • doxylamine (UNISOM) 25 MG tablet   • famotidine (PEPCID) 10 mg tablet   • ferrous sulfate 324 (65 Fe) mg   • Microlet Lancets MISC   • ondansetron (ZOFRAN) 4 mg tablet   • Prenatal Vit-Fe Fumarate-FA (Prenatal Vitamin) 27-0 8 MG TABS   • pyridoxine (B-6) 25 MG tablet     Maternal social history:  Social History     Tobacco Use   • Smoking status: Never   • Smokeless tobacco: Never   Substance Use Topics   • Alcohol use: No     Comment: pt reports social drinking once per month prior to pregnancy     Maternal  medications:  steroids: Betamethasone #1 administered  AM, next due  AM      I spoke with Genesis Benítez and her  today regarding the upcoming birth of her daughter  We discussed the baby's possible medical problems and the specialized care needed to address these problems    This discussion included, but was not limited to: Attendance of physician/LUIS, nursing, and/or respiratory therapist in the delivery and delivery room management , Risk of feedings problems and potential need for IV fluids or gavage tube feeds, Risk of hypothermia and need for radiant warmer and/or isolette, Risk of immature cardiorespiratory control and need for monitoring and/or caffeine , Risk of jaundice requiring phototherapy and Risk of respiratory distress and possible need for support (oxygen, CPAP, intubation, and/or surfactant)    All of Iris's questions were answered to the best of my ability, and she was encouraged to contact us with any further questions  Thank you for including us in the care of [address]  Please reach out to the on-call Neonatologist via [employer] for any further questions that may arise  I spent 25 minutes in consultation with [address], of which 90% was in direct communication      Brittany Castro MD  - Medicine

## 2022-11-29 NOTE — PLAN OF CARE
Problem: Potential for Falls  Goal: Patient will remain free of falls  Description: INTERVENTIONS:  - Educate patient/family on patient safety including physical limitations  - Instruct patient to call for assistance with activity   - Consult OT/PT to assist with strengthening/mobility   - Keep Call bell within reach  - Keep bed low and locked with side rails adjusted as appropriate  - Keep care items and personal belongings within reach  - Initiate and maintain comfort rounds  - Make Fall Risk Sign visible to staff  - Apply yellow socks and bracelet for high fall risk patients  - Consider moving patient to room near nurses station  Outcome: Progressing     Problem: PAIN - ADULT  Goal: Verbalizes/displays adequate comfort level or baseline comfort level  Description: Interventions:  - Encourage patient to monitor pain and request assistance  - Assess pain using appropriate pain scale  - Administer analgesics based on type and severity of pain and evaluate response  - Implement non-pharmacological measures as appropriate and evaluate response  - Consider cultural and social influences on pain and pain management  - Notify physician/advanced practitioner if interventions unsuccessful or patient reports new pain  Outcome: Progressing     Problem: INFECTION - ADULT  Goal: Absence or prevention of progression during hospitalization  Description: INTERVENTIONS:  - Assess and monitor for signs and symptoms of infection  - Monitor lab/diagnostic results  - Monitor all insertion sites, i e  indwelling lines, tubes, and drains  - Monitor endotracheal if appropriate and nasal secretions for changes in amount and color  - [place] appropriate cooling/warming therapies per order  - Administer medications as ordered  - Instruct and encourage patient and family to use good hand hygiene technique  - Identify and instruct in appropriate isolation precautions for identified infection/condition  Outcome: Progressing  Goal: Absence of fever/infection during neutropenic period  Description: INTERVENTIONS:  - Monitor WBC    Outcome: Progressing     Problem: SAFETY ADULT  Goal: Patient will remain free of falls  Description: INTERVENTIONS:  - Educate patient/family on patient safety including physical limitations  - Instruct patient to call for assistance with activity   - Consult OT/PT to assist with strengthening/mobility   - Keep Call bell within reach  - Keep bed low and locked with side rails adjusted as appropriate  - Keep care items and personal belongings within reach  - Initiate and maintain comfort rounds  - Make Fall Risk Sign visible to staff  - Apply yellow socks and bracelet for high fall risk patients  - Consider moving patient to room near nurses station  Outcome: Progressing  Goal: Maintain or return to baseline ADL function  Description: INTERVENTIONS:  -  Assess patient's ability to carry out ADLs; assess patient's baseline for ADL function and identify physical deficits which impact ability to perform ADLs (bathing, care of mouth/teeth, toileting, grooming, dressing, etc )  - Assess/evaluate cause of self-care deficits   - Assess range of motion  - Assess patient's mobility; develop plan if impaired  - Assess patient's need for assistive devices and provide as appropriate  - Encourage maximum independence but intervene and supervise when necessary  - Involve family in performance of ADLs  - Assess for home care needs following discharge   - Consider OT consult to assist with ADL evaluation and planning for discharge  - Provide patient education as appropriate  Outcome: Progressing  Goal: Maintains/Returns to pre admission functional level  Description: INTERVENTIONS:  - Perform BMAT or MOVE assessment daily    - Set and communicate daily mobility goal to care team and patient/family/caregiver     - Collaborate with rehabilitation services on mobility goals if consulted  - Out of bed for toileting  - Record patient progress and toleration of activity level   Outcome: Progressing     Problem: Knowledge Deficit  Goal: Patient/family/caregiver demonstrates understanding of disease process, treatment plan, medications, and discharge instructions  Description: Complete learning assessment and assess knowledge base    Interventions:  - Provide teaching at level of understanding  - Provide teaching via preferred learning methods  Outcome: Progressing

## 2022-11-29 NOTE — PLAN OF CARE
Problem: Potential for Falls  Goal: Patient will remain free of falls  Description: INTERVENTIONS:  - Educate patient/family on patient safety including physical limitations  - Instruct patient to call for assistance with activity   - Consult OT/PT to assist with strengthening/mobility   - Keep Call bell within reach  - Keep bed low and locked with side rails adjusted as appropriate  - Keep care items and personal belongings within reach  - Initiate and maintain comfort rounds  - Make Fall Risk Sign visible to staff  - Apply yellow socks and bracelet for high fall risk patients  - Consider moving patient to room near nurses station  Outcome: Progressing     Problem: PAIN - ADULT  Goal: Verbalizes/displays adequate comfort level or baseline comfort level  Description: Interventions:  - Encourage patient to monitor pain and request assistance  - Assess pain using appropriate pain scale  - Administer analgesics based on type and severity of pain and evaluate response  - Implement non-pharmacological measures as appropriate and evaluate response  - Consider cultural and social influences on pain and pain management  - Notify physician/advanced practitioner if interventions unsuccessful or patient reports new pain  Outcome: Progressing     Problem: INFECTION - ADULT  Goal: Absence or prevention of progression during hospitalization  Description: INTERVENTIONS:  - Assess and monitor for signs and symptoms of infection  - Monitor lab/diagnostic results  - Monitor all insertion sites, i e  indwelling lines, tubes, and drains  - Monitor endotracheal if appropriate and nasal secretions for changes in amount and color  - [place] appropriate cooling/warming therapies per order  - Administer medications as ordered  - Instruct and encourage patient and family to use good hand hygiene technique  - Identify and instruct in appropriate isolation precautions for identified infection/condition  Outcome: Progressing  Goal: Absence of fever/infection during neutropenic period  Description: INTERVENTIONS:  - Monitor WBC    Outcome: Progressing     Problem: SAFETY ADULT  Goal: Patient will remain free of falls  Description: INTERVENTIONS:  - Educate patient/family on patient safety including physical limitations  - Instruct patient to call for assistance with activity   - Consult OT/PT to assist with strengthening/mobility   - Keep Call bell within reach  - Keep bed low and locked with side rails adjusted as appropriate  - Keep care items and personal belongings within reach  - Initiate and maintain comfort rounds  - Make Fall Risk Sign visible to staff  - Apply yellow socks and bracelet for high fall risk patients  - Consider moving patient to room near nurses station  Outcome: Progressing  Goal: Maintain or return to baseline ADL function  Description: INTERVENTIONS:  -  Assess patient's ability to carry out ADLs; assess patient's baseline for ADL function and identify physical deficits which impact ability to perform ADLs (bathing, care of mouth/teeth, toileting, grooming, dressing, etc )  - Assess/evaluate cause of self-care deficits   - Assess range of motion  - Assess patient's mobility; develop plan if impaired  - Assess patient's need for assistive devices and provide as appropriate  - Encourage maximum independence but intervene and supervise when necessary  - Involve family in performance of ADLs  - Assess for home care needs following discharge   - Consider OT consult to assist with ADL evaluation and planning for discharge  - Provide patient education as appropriate  Outcome: Progressing  Goal: Maintains/Returns to pre admission functional level  Description: INTERVENTIONS:  - Perform BMAT or MOVE assessment daily    - Set and communicate daily mobility goal to care team and patient/family/caregiver     - Collaborate with rehabilitation services on mobility goals if consulted  - Out of bed for toileting  - Record patient progress and toleration of activity level   Outcome: Progressing     Problem: Knowledge Deficit  Goal: Patient/family/caregiver demonstrates understanding of disease process, treatment plan, medications, and discharge instructions  Description: Complete learning assessment and assess knowledge base    Interventions:  - Provide teaching at level of understanding  - Provide teaching via preferred learning methods  Outcome: Progressing

## 2022-11-29 NOTE — PROGRESS NOTES
Update note  Received sign out from Dr Karen Pepper at 6 am   Pt is 39 yo  @ 33 4/7 weeks admitted with preeclampsia with severe features  Pt was admitted initially with severe epigastric pain that resolved with pepcid and maalox  Pt denies any pain at this time  No headache or blurred vision  Pt feels good fetal movement, no bleeding, no loss of fluid  Pt is currently on magnesium 2 gram/ hour  Dr Karen Pepper discussed plan with MFM  Will advance diet at this time  Plan for observation at this time  Betamethasone given on 22 at 4:36 am   Goal will be to reach 48 hours from betamethasone  On  will reassess to determine if patient will be delivered at that time or wait until 34 weeks ( 22)  Indications for delivery would be non-reassuring fetal monitoring, lab abnormalities to suggest worsening clinical status, uncontrollable blood pressures, labor, or worsening clinical symptoms  Pt expresses an understanding  NICU to see patient in consultation today

## 2022-11-29 NOTE — H&P
OB H&P  Rupa DONIS White Albino  1980  LD TRIAGE /LD TRIAGE 1*      38 y o  yo  at 33/4 weeks by us and lmp    Chief complaint:  Epigastric pain    HPI:  Pt presents with epigastric pain  since 1 AM   Woke pt from sleep  Pt had epigastric pain and seen here 6 weeks ago which resolved spontaneously  Pt supposed to be taking pepcid po for ongoing issue but has not been able to obtain from pharmacy  Pt with h/o PreE with SF last pregnancy  which developed during elective 39 week labor induction  Pt did have [address] with that delivery  On admission here BP elevated 223/113  Since then has had labetalol 20 and 40 with now nonsevere range elevations  This pregnancy complicated by [facility]- just saw dietitian  did not bring records but reports PP as 120-130 range      Pregnancy Complications:  Patient Active Problem List   Diagnosis   • History of insulin controlled gestational diabetes mellitus (GDM)   • 32 weeks gestation of pregnancy   • Grand multiparity with current pregnancy in second trimester   • History of postpartum hemorrhage, currently pregnant   • History of severe pre-eclampsia   • Anemia affecting pregnancy in third trimester   • Advanced maternal age in multigravida, third trimester   • Bilateral hand numbness   • Gestational diabetes mellitus (GDM) in third trimester     THIS pregnancy complicated by GDMA1, now Pre E with SF, anemia s/p venofer, AMA      Past Medical History:  Past Medical History:   Diagnosis Date   • 39 weeks gestation of pregnancy 2020    IOL - 2020 @8pm [address]  Flu vaccine 19   • Anemia    • Anemia during pregnancy in third trimester 1/15/2020   • COVID-19 06/10/2020   • COVID-19 virus detected 2020   • Elderly multigravida in third trimester 1/15/2020   • Encounter for injection education 3/3/2020   • GBS bacteriuria 2019    Amoxicillin sent to patient's pharmacy  Will need PCN in labor DO NOT collect GBS swab at 36 weeks! !   • Hyperglycemia during pregnancy 3/3/2020   • Iron deficiency anemia 2019    F/u Ferritin (Hb 8 4) Will likely need IV iron   • Postpartum hemorrhage, delivered, current hospitalization 2020   • Prediabetes 2015   •  (spontaneous vaginal delivery) 2020       Past Surgical History:  Past Surgical History:   Procedure Laterality Date   • TOOTH EXTRACTION         Social Hx:  Social History     Socioeconomic History   • Marital status: Single     Spouse name: Not on file   • Number of children: 5   • Years of education: 9   • Highest education level: 9th grade   Occupational History   • Occupation: Homemaker   Tobacco Use   • Smoking status: Never   • Smokeless tobacco: Never   Vaping Use   • Vaping Use: Never used   Substance and Sexual Activity   • Alcohol use: No     Comment: pt reports social drinking once per month prior to pregnancy   • Drug use: No   • Sexual activity: Yes     Partners: Male     Birth control/protection: None   Other Topics Concern   • Not on file   Social History Narrative   • Not on file     Social Determinants of Health     Financial Resource Strain: Low Risk    • Difficulty of Paying Living Expenses: Not hard at all   Food Insecurity: No Food Insecurity   • Worried About Running Out of Food in the Last Year: Never true   • Ran Out of Food in the Last Year: Never true   Transportation Needs: No Transportation Needs   • Lack of Transportation (Medical): No   • Lack of Transportation (Non-Medical): No   Physical Activity: Inactive   • Days of Exercise per Week: 0 days   • Minutes of Exercise per Session: 0 min   Stress: No Stress Concern Present   • Feeling of Stress : Not at all   Social Connections:  Moderately Integrated   • Frequency of Communication with Friends and Family: More than three times a week   • Frequency of Social Gatherings with Friends and Family: More than three times a week   • Attends Voodoo Services: More than 4 times per year   • Active Member of Clubs or Organizations: No   • Attends Club or Organization Meetings: Never   • Marital Status: Living with partner   Intimate Partner Violence: Not At Risk   • Fear of Current or Ex-Partner: No   • Emotionally Abused: No   • Physically Abused: No   • Sexually Abused: No   Housing Stability: Low Risk    • Unable to Pay for Housing in the Last Year: No   • Number of Places Lived in the Last Year: 1   • Unstable Housing in the Last Year: No       Meds:  No current facility-administered medications on file prior to encounter  Current Outpatient Medications on File Prior to Encounter   Medication Sig Dispense Refill   • benzonatate (TESSALON PERLES) 100 mg capsule Take 1 capsule (100 mg total) by mouth 3 (three) times a day as needed for cough (Patient not taking: Reported on 10/27/2022) 20 capsule 0   • Contour Next Test test strip Test 4 times daily 100 strip 4   • doxylamine (UNISOM) 25 MG tablet Take 0 5 tablets (12 5 mg total) by mouth daily at bedtime (Patient not taking: Reported on 9/1/2022) 30 tablet 0   • famotidine (PEPCID) 10 mg tablet Take 1 tablet (10 mg total) by mouth 2 (two) times a day (Patient not taking: No sig reported) 60 tablet 0   • ferrous sulfate 324 (65 Fe) mg Take 1 tablet (324 mg total) by mouth 2 (two) times a day before meals (Patient not taking: Reported on 9/1/2022) 60 tablet 3   • Microlet Lancets MISC Test 4 times daily 100 each 4   • ondansetron (ZOFRAN) 4 mg tablet Take 1 tablet (4 mg total) by mouth every 8 (eight) hours as needed for nausea or vomiting (Patient not taking: Reported on 9/1/2022) 20 tablet 0   • Prenatal Vit-Fe Fumarate-FA (Prenatal Vitamin) 27-0 8 MG TABS Take 1 tablet by mouth in the morning (Patient not taking: Reported on 9/1/2022) 30 tablet 9   • pyridoxine (B-6) 25 MG tablet Take 1 tablet (25 mg total) by mouth daily (Patient not taking: Reported on 9/1/2022) 90 tablet 1       Allergies:   Allergies   Allergen Reactions   • Dog Epithelium Allergic Rhinitis   • Pollen Extract Allergic Rhinitis       Obstetric History:  G 6 P 5005  5   Last pregnancy with preE with SF dx during labor induction  H/o GDMA2 prior pregnancies    Labs:  No results found for: STREPGRPB  Type & Screen:  ABO Grouping   Date Value Ref Range Status   2022 O  Final   2014 CANCELED - Canc (U)  Corrected     Comment:     CANCELED - Cancel notice received from [address] [place] Patient [facility]      Rh Factor   Date Value Ref Range Status   2022 Positive  Final   2014 CANCELED - Canc (U)  Corrected     Comment:     CANCELED - Cancel notice received from [address] [place] Patient [facility]      Antibody Screen   Date Value Ref Range Status   2014 CANCELED - Canc (U)  Corrected     Comment:     CANCELED - Cancel notice received from [address] [place] Patient [facility]       Specimen Expiration Date   Date Value Ref Range Status   2022 [id number]  Final     HIV-1/HIV-2 Ab   Date Value Ref Range Status   2022 Non-Reactive Non-Reactive Final     HEPATITIS B SURFACE ANTIGEN   Date Value Ref Range Status   2014 Non-Reactive (q Nonreactive (NR),Nonreactive- confirmed (NR) Final     Comment:     Non-Reactive (qualifier value)  The above 1 analytes were performed by Zach Tripp [id number]       Hepatitis B Surface Ag   Date Value Ref Range Status   2022 Non-reactive Non-reactive, NonReactive - Confirmed Final     RPR SCREEN   Date Value Ref Range Status   2014 Non-Reactive (q Nonreactive Final     Comment:     Non-Reactive (qualifier value)  The above 1 analytes were performed by Sherice Bryant [address] [address]       RPR   Date Value Ref Range Status   10/27/2022 Non-Reactive Non-Reactive Final     RUBELLA IGG QUANTITATION   Date Value Ref Range Status   2014 >175 0 IU/mL Final     Comment:     Rubella IgG     <5 0 IU/ml       Negative; not immune      5 0-9 9 IU/ml   Equivocal     >9 9 IU/ml       Positive; immune  The above 1 analytes were performed by Michael  Pennie [id number]       Rubella IgG Quant   Date Value Ref Range Status   2022 >175 0 >9 9 IU/mL Final     Comment:     >     GLUCOSE 1 HR 50 GM GLUC CHALLENGE-PREG PTS   Date Value Ref Range Status   2014 131 mg/dL Final     Comment:     <=134 Normal   135-179 Impaired fasting glucose- perform 3 hour Glucose Tolerance  >=180 Diagnosis Gestational Diabetes Mellitus  The above 1 analytes were performed by Zach  [address] [place] [address],[place] [id number]       Glucose   Date Value Ref Range Status   10/27/2022 178 (H) 40 - 134 mg/dL Final     Comment:     Specimen collection should occur prior to Sulfasalazine administration due to the potential for falsely depressed results  Specimen collection should occur prior to Sulfapyridine administration due to the potential for falsely elevated results  Specimen collection should occur prior to Sulfasalazine administration due to the potential for falsely depressed results  Specimen collection should occur prior to Sulfapyridine administration due to the potential for falsely elevated results  Labs    P/C - 49 78  (random prot 1593)    WBC 8 3  H/H 11 0/37 0  Plt  500 K    BUN 13  Cr 0 5  Glu 126  Liver enzymes wnl    UA  +3 protein, ++ glucose          Physical Exam:  Vital signs:    Vitals:    22 0430   BP: 156/89   Pulse: 78   Resp:    Temp:    SpO2:        Heart: RRR  Lungs: clear to ausculation   Abdomen: soft, nontender, gravid,  33 week size  Vertex by US now  Estimated Fetal Weight: 4 lbs  Extremeties: min edema, no chuy's or geraldine's sign  Presentation: vertex  Cervix: long thick closed  FHR: cat 1  CTXN: none      Assessment/Plan    at 33/4 weeks       PreE with severe features,   -S/P IV labetalol 20 and 40 with pressure now below severe                   range- repeat labs ordered 900 AM   -S/P mag bolus, now on 2 gm/hr, mag level ordered 900 AM   -Discussed with Dr Desiree Pal- if epigastric pain remains minimal, can wait for steroids and induce after benefit/34 weeks, if pain returns as severe, consider delivery then   - betamethasone given, repeat dose ordered    Severe epigastric pain, now resolved after maalox,    -Symptom of severe PreE or intrinsic GI issue?    - Pt supposed to be taking pepcid PO for GI issues but is not- maalox and IV pepcid ordered    GDMA1- h/o GDMA2-    Only saw dietitian 11/17, no BS reported yet, states PP running 120-130   Will check q 4 hours here for now as pt currently NPO in case need to proceed to emergent c/s, when begins to eat will check FBS and PP- will begin to elevate from steroids    Anemia in pregnancy   -S/P venofer x 4- Hgb now 11 0     GBS status unknown   - PCR sent now    AMA age 38   Genetic screening wnl    H/O PPH last delivery- when anticipate delivery, consider type and cross

## 2022-11-30 ENCOUNTER — ANESTHESIA EVENT (INPATIENT)
Dept: LABOR AND DELIVERY | Facility: HOSPITAL | Age: 42
End: 2022-11-30

## 2022-11-30 ENCOUNTER — ANESTHESIA (INPATIENT)
Dept: LABOR AND DELIVERY | Facility: HOSPITAL | Age: 42
End: 2022-11-30

## 2022-11-30 PROBLEM — Z3A.33 33 WEEKS GESTATION OF PREGNANCY: Status: ACTIVE | Noted: 2022-05-19

## 2022-11-30 PROBLEM — O14.13 PREECLAMPSIA, SEVERE, THIRD TRIMESTER: Status: ACTIVE | Noted: 2022-11-29

## 2022-11-30 LAB
ALBUMIN SERPL BCP-MCNC: 2.1 G/DL (ref 3.5–5)
ALP SERPL-CCNC: 127 U/L (ref 46–116)
ALT SERPL W P-5'-P-CCNC: 30 U/L (ref 12–78)
ANION GAP SERPL CALCULATED.3IONS-SCNC: 8 MMOL/L (ref 4–13)
AST SERPL W P-5'-P-CCNC: 28 U/L (ref 5–45)
BASOPHILS # BLD AUTO: 0.01 THOUSANDS/ÂΜL (ref 0–0.1)
BASOPHILS NFR BLD AUTO: 0 % (ref 0–1)
BILIRUB SERPL-MCNC: 0.2 MG/DL (ref 0.2–1)
BUN SERPL-MCNC: 13 MG/DL (ref 5–25)
CALCIUM ALBUM COR SERPL-MCNC: 8.3 MG/DL (ref 8.3–10.1)
CALCIUM SERPL-MCNC: 6.8 MG/DL (ref 8.3–10.1)
CHLORIDE SERPL-SCNC: 104 MMOL/L (ref 96–108)
CO2 SERPL-SCNC: 18 MMOL/L (ref 21–32)
CREAT SERPL-MCNC: 0.78 MG/DL (ref 0.6–1.3)
EOSINOPHIL # BLD AUTO: 0 THOUSAND/ÂΜL (ref 0–0.61)
EOSINOPHIL NFR BLD AUTO: 0 % (ref 0–6)
ERYTHROCYTE [DISTWIDTH] IN BLOOD BY AUTOMATED COUNT: 20.6 % (ref 11.6–15.1)
GFR SERPL CREATININE-BSD FRML MDRD: 95 ML/MIN/1.73SQ M
GLUCOSE SERPL-MCNC: 161 MG/DL (ref 65–140)
GLUCOSE SERPL-MCNC: 166 MG/DL (ref 65–140)
GLUCOSE SERPL-MCNC: 193 MG/DL (ref 65–140)
GLUCOSE SERPL-MCNC: 209 MG/DL (ref 65–140)
GLUCOSE SERPL-MCNC: 221 MG/DL (ref 65–140)
GLUCOSE SERPL-MCNC: 228 MG/DL (ref 65–140)
GLUCOSE SERPL-MCNC: 232 MG/DL (ref 65–140)
GP B STREP DNA SPEC QL NAA+PROBE: NEGATIVE
HCT VFR BLD AUTO: 32.4 % (ref 34.8–46.1)
HGB BLD-MCNC: 9.4 G/DL (ref 11.5–15.4)
IMM GRANULOCYTES # BLD AUTO: 0.2 THOUSAND/UL (ref 0–0.2)
IMM GRANULOCYTES NFR BLD AUTO: 2 % (ref 0–2)
LYMPHOCYTES # BLD AUTO: 1.01 THOUSANDS/ÂΜL (ref 0.6–4.47)
LYMPHOCYTES NFR BLD AUTO: 12 % (ref 14–44)
MCH RBC QN AUTO: 22.1 PG (ref 26.8–34.3)
MCHC RBC AUTO-ENTMCNC: 29 G/DL (ref 31.4–37.4)
MCV RBC AUTO: 76 FL (ref 82–98)
MONOCYTES # BLD AUTO: 0.54 THOUSAND/ÂΜL (ref 0.17–1.22)
MONOCYTES NFR BLD AUTO: 6 % (ref 4–12)
NEUTROPHILS # BLD AUTO: 6.93 THOUSANDS/ÂΜL (ref 1.85–7.62)
NEUTS SEG NFR BLD AUTO: 80 % (ref 43–75)
NRBC BLD AUTO-RTO: 1 /100 WBCS
PLATELET # BLD AUTO: 454 THOUSANDS/UL (ref 149–390)
PMV BLD AUTO: 9.1 FL (ref 8.9–12.7)
POTASSIUM SERPL-SCNC: 4.5 MMOL/L (ref 3.5–5.3)
PROT SERPL-MCNC: 6.5 G/DL (ref 6.4–8.4)
RBC # BLD AUTO: 4.26 MILLION/UL (ref 3.81–5.12)
SODIUM SERPL-SCNC: 130 MMOL/L (ref 135–147)
WBC # BLD AUTO: 8.69 THOUSAND/UL (ref 4.31–10.16)

## 2022-11-30 RX ORDER — LABETALOL 100 MG/1
100 TABLET, FILM COATED ORAL EVERY 6 HOURS SCHEDULED
Status: DISCONTINUED | OUTPATIENT
Start: 2022-11-30 | End: 2022-12-01

## 2022-11-30 RX ADMIN — INSULIN LISPRO 1 UNITS: 100 INJECTION, SOLUTION INTRAVENOUS; SUBCUTANEOUS at 18:13

## 2022-11-30 RX ADMIN — BETAMETHASONE SODIUM PHOSPHATE AND BETAMETHASONE ACETATE 12 MG: 3; 3 INJECTION, SUSPENSION INTRA-ARTICULAR; INTRALESIONAL; INTRAMUSCULAR at 04:25

## 2022-11-30 RX ADMIN — FAMOTIDINE 20 MG: 10 INJECTION INTRAVENOUS at 09:43

## 2022-11-30 RX ADMIN — LABETALOL HYDROCHLORIDE 100 MG: 100 TABLET, FILM COATED ORAL at 18:00

## 2022-11-30 RX ADMIN — FAMOTIDINE 20 MG: 10 INJECTION INTRAVENOUS at 21:35

## 2022-11-30 RX ADMIN — INSULIN LISPRO 1 UNITS: 100 INJECTION, SOLUTION INTRAVENOUS; SUBCUTANEOUS at 15:20

## 2022-11-30 RX ADMIN — INSULIN LISPRO 1 UNITS: 100 INJECTION, SOLUTION INTRAVENOUS; SUBCUTANEOUS at 08:47

## 2022-11-30 RX ADMIN — MAGNESIUM SULFATE HEPTAHYDRATE 2 G/HR: 40 INJECTION, SOLUTION INTRAVENOUS at 00:36

## 2022-11-30 RX ADMIN — INSULIN LISPRO 2 UNITS: 100 INJECTION, SOLUTION INTRAVENOUS; SUBCUTANEOUS at 22:44

## 2022-11-30 RX ADMIN — LABETALOL HYDROCHLORIDE 100 MG: 100 TABLET, FILM COATED ORAL at 09:43

## 2022-11-30 RX ADMIN — INSULIN LISPRO 2 UNITS: 100 INJECTION, SOLUTION INTRAVENOUS; SUBCUTANEOUS at 11:23

## 2022-11-30 NOTE — ASSESSMENT & PLAN NOTE
Severe by blood pressures and severe epigastric pain on admission and marked proteinuria  Temporized currently with tylenol  Advise initiation of PO labetalol today, such as 100mg PO BID to QID  Would start with BID and uptitrate if tolerated  Current GA 33w5d and received second dose betamethasone this morning  Would advise starting IOL tomorrow morning at the 24h (post-betamethaseone #2) cole, sooner if any worsening maternal or fetal status  At time of starting IOL, resume magnesium sulfate and continue for 24h postpartum  Advise checking labs q6-8h with particular attention to LFT trend, platelet trend, and creatinine, given that symptomatology on arrival was suggestive of HELLP

## 2022-11-30 NOTE — PLAN OF CARE
Patient discharged to home per MD orders. Discharge instructions reviewed with patient. Questions encouraged and answered. Patient verbalizes understanding. Patient escorted by [facility] staff to private vehicle. Stable at discharge. Problem: Potential for Falls  Goal: Patient will remain free of falls  Description: INTERVENTIONS:  - Educate patient/family on patient safety including physical limitations  - Instruct patient to call for assistance with activity   - Consult OT/PT to assist with strengthening/mobility   - Keep Call bell within reach  - Keep bed low and locked with side rails adjusted as appropriate  - Keep care items and personal belongings within reach  - Initiate and maintain comfort rounds  - Make Fall Risk Sign visible to staff  - Offer Toileting every Hours, in advance of need  - Initiate/Maintain alarm  - Obtain necessary fall risk management equipment:   - Apply yellow socks and bracelet for high fall risk patients  - Consider moving patient to room near nurses station  Outcome: Progressing     Problem: PAIN - ADULT  Goal: Verbalizes/displays adequate comfort level or baseline comfort level  Description: Interventions:  - Encourage patient to monitor pain and request assistance  - Assess pain using appropriate pain scale  - Administer analgesics based on type and severity of pain and evaluate response  - Implement non-pharmacological measures as appropriate and evaluate response  - Consider cultural and social influences on pain and pain management  - Notify physician/advanced practitioner if interventions unsuccessful or patient reports new pain  Outcome: Progressing     Problem: INFECTION - ADULT  Goal: Absence or prevention of progression during hospitalization  Description: INTERVENTIONS:  - Assess and monitor for signs and symptoms of infection  - Monitor lab/diagnostic results  - Monitor all insertion sites, i e  indwelling lines, tubes, and drains  - Monitor endotracheal if appropriate and nasal secretions for changes in amount and color  - [place] appropriate cooling/warming therapies per order  - Administer medications as ordered  - Instruct and encourage patient and family to use good hand hygiene technique  - Identify and instruct in appropriate isolation precautions for identified infection/condition  Outcome: Progressing  Goal: Absence of fever/infection during neutropenic period  Description: INTERVENTIONS:  - Monitor WBC    Outcome: Progressing     Problem: SAFETY ADULT  Goal: Patient will remain free of falls  Description: INTERVENTIONS:  - Educate patient/family on patient safety including physical limitations  - Instruct patient to call for assistance with activity   - Consult OT/PT to assist with strengthening/mobility   - Keep Call bell within reach  - Keep bed low and locked with side rails adjusted as appropriate  - Keep care items and personal belongings within reach  - Initiate and maintain comfort rounds  - Make Fall Risk Sign visible to staff  - Offer Toileting every  Hours, in advance of need  - Initiate/Maintain alarm  - Obtain necessary fall risk management equipment:   - Apply yellow socks and bracelet for high fall risk patients  - Consider moving patient to room near nurses station  Outcome: Progressing  Goal: Maintain or return to baseline ADL function  Description: INTERVENTIONS:  -  Assess patient's ability to carry out ADLs; assess patient's baseline for ADL function and identify physical deficits which impact ability to perform ADLs (bathing, care of mouth/teeth, toileting, grooming, dressing, etc )  - Assess/evaluate cause of self-care deficits   - Assess range of motion  - Assess patient's mobility; develop plan if impaired  - Assess patient's need for assistive devices and provide as appropriate  - Encourage maximum independence but intervene and supervise when necessary  - Involve family in performance of ADLs  - Assess for home care needs following discharge   - Consider OT consult to assist with ADL evaluation and planning for discharge  - Provide patient education as appropriate  Outcome: Progressing  Goal: Maintains/Returns to pre admission functional level  Description: INTERVENTIONS:  - Perform BMAT or MOVE assessment daily    - Set and communicate daily mobility goal to care team and patient/family/caregiver  - Collaborate with rehabilitation services on mobility goals if consulted  - Perform Range of Motion  times a day  - Reposition patient every  hours  - Dangle patient  times a day  - Stand patient  times a day  - Ambulate patient  times a day  - Out of bed to chair  times a day   - Out of bed for meals times a day  - Out of bed for toileting  - Record patient progress and toleration of activity level   Outcome: Progressing     Problem: Knowledge Deficit  Goal: Patient/family/caregiver demonstrates understanding of disease process, treatment plan, medications, and discharge instructions  Description: Complete learning assessment and assess knowledge base    Interventions:  - Provide teaching at level of understanding  - Provide teaching via preferred learning methods  Outcome: Progressing     Problem: ANTEPARTUM  Goal: Maintain pregnancy as long as maternal and/or fetal condition is stable  Description: INTERVENTIONS:  - Maternal surveillance  - Fetal surveillance  - Monitor uterine activity  - Medications as ordered  - Bedrest  Outcome: Progressing

## 2022-11-30 NOTE — ASSESSMENT & PLAN NOTE
At risk for recurrent PPH (history is strongest risk factor; multiparity, preeclampsia; antepartum anemia)  Advise second IV, low threshold for TXA (can even give prophylactically), avoid methergine  Suspect her most recent Hg is masking hemoconcentration (superimposed on antepartum anemia) therefore less reassuring so consider crossmatching x2 units      Lab Results   Component Value Date    HGB 10 1 (L) 11/29/2022

## 2022-11-30 NOTE — ASSESSMENT & PLAN NOTE
Most recent growth scan 11/17/22 therefore growth not indicated today  Confirm vertex if not already done  GUX>1103W and GA>32 weeks therefore candidate for delivery here at [facility] though NICU should be in attendance given prematurity  Advise TID NST then continuous once IOL starts (or sooner if any change in maternal or fetal status)

## 2022-11-30 NOTE — NURSING NOTE
Nurse contacted Dr Maame Smith regarding patient complaint of new onset leg pain in the right leg  Positive homans sign upon assessment  Dr Maame Smith stated to continue use of SCD's at this time

## 2022-11-30 NOTE — ASSESSMENT & PLAN NOTE
Lab Results   Component Value Date    HGBA1C 6 1 (H) 03/31/2015       Recent Labs     11/29/22  1257 11/29/22  1833 11/29/22  2157 11/30/22  0842   POCGLU 156* 174* 173* 161*     Blood Sugar Average: Last 72 hrs:  (P) 158 8     On correctional insulin; favor insulin drip initiation once IOL starts, goal BG intrapartum   Needs postpartum testing at 4-12 weeks

## 2022-11-30 NOTE — UTILIZATION REVIEW
Initial Clinical Review    Admission: Date/Time/Statement:   Admission Orders (From admission, onward)     Ordered        11/29/22 0508  Inpatient Admission  Once                      Orders Placed This Encounter   Procedures   • Inpatient Admission     Standing Status:   Standing     Number of Occurrences:   1     Order Specific Question:   Level of Care     Answer:   Med Surg [16]     Order Specific Question:   Estimated length of stay     Answer:   More than 2 Midnights     Order Specific Question:   Certification     Answer:   I certify that inpatient services are medically necessary for this patient for a duration of greater than two midnights  See H&P and MD Progress Notes for additional information about the patient's course of treatment  ED Arrival Information     Patient not seen in ED                     Chief Complaint   Patient presents with   • Abdominal Pain       Initial Presentation: 38 y o  female 38 y o  [id number] at 33w4d admit Inpatient due to Pre eclampsia w SF   Reports acute onset of epigastric pain waking patient from sleepl seen 6 wk prior for same & resolved spontaneously; directed to take Pepcid PO but unable to obtain from Pharmacy  [facility] Pre Eclampsia w SF in prior pregnancy in 2020  EXAM  severe /113 w repeated severe BPs ; given Labetalol 20mg then 40mg , s/p IV MAG bolus & IV MAG drip begun, Vertex by US, cervix long thick closed; FHT cat 1 no contractions  P/C - 49 78  (random prot 1593)    PLAN   Const fetal monitoring; per MFM discussion if epigastric pain remains minimal can wait for BTM & induce after benefit/ 34 wk if pain return severe  Currently epigastric pain resolved after Maalox   Cont Maalox, IV Pepcid    Date:  11/30/2022  Day 2:   @  33w 5d  OB MD    Continue NST TID, s/p 2nd dose of betamethasone at 0435 this am    IOL   start at 33W6D on 12/1 at 0500 when steroids  had full effect;  2nd IV, Type and Cross for 2 Units; likely start with zuñiga bulb and misoprostol, IV insulin drip Q 1-2 HR glucose monitoring   MFM recommendation    started labetalol 100mg po BID and will increase to QID as tolerated tomorrow;    Significant proteinuria noted on admission P/C ratio;  LFTs and Cr remain in normal range although Hgb 10 2 and platelets close to 869 likely due to hemoconcentration;  Continue serial labs q 12 for now and increase to q6-8 hrs during labor; Will restart MagSO4 with beginning of IOL tomorrow AM post BTM #2 cole; sooner if any worsening maternal or fetal status;   PPH history, advise 2nd IV w low threshold for TXA avoid methergine, cross match x2 units   Suspect most recent HGB masking hemoconcentration   Triage Vitals   Temperature Pulse Respirations Blood Pressure SpO2   11/29/22 0317 11/29/22 0317 11/29/22 0317 11/29/22 0323 11/29/22 0317   97 7 °F (36 5 °C) 65 18 (!) 225/115 100 %      Temp Source Heart Rate Source Patient Position - Orthostatic VS BP Location FiO2 (%)   11/29/22 0317 11/29/22 0317 11/29/22 0328 11/29/22 0328 --   Temporal Monitor Lying Right arm       Pain Score       11/29/22 0629       No Pain          Wt Readings from Last 1 Encounters:   11/17/22 77 1 kg (170 lb)     Additional Vital Signs:   Date/Time Temp Pulse Resp BP MAP (mmHg) SpO2 O2 Device Cardiac (WDL) Patient Position - Orthostatic VS   11/30/22 1600 -- -- -- -- -- -- None (Room air) WDL --   11/30/22 1518 -- 87 -- 126/62 -- -- -- -- --   11/30/22 1500 98 5 °F (36 9 °C) 87 16 126/62 89 97 % None (Room air) -- Lying   11/30/22 1103 -- 92 -- 150/78 -- -- -- -- --   11/30/22 1102 -- 93 -- 156/80 -- -- -- -- --   11/30/22 1100 98 6 °F (37 °C) 94 18 150/78 -- 97 % None (Room air) -- Lying   11/30/22 0942 -- -- -- -- -- -- None (Room air) WDL --   11/30/22 0847 -- 82 -- 140/85 -- -- -- -- --   11/30/22 0840 97 9 °F (36 6 °C) 80 18 140/85 -- 98 % None (Room air) -- Sitting   11/30/22 0600 -- 85 -- 135/78 -- -- -- -- --   11/30/22 0500 -- 85 -- 154/81 -- -- -- -- --   11/30/22 0400 -- 84 -- 140/69 -- -- -- -- --   11/30/22 0300 98 °F (36 7 °C) 78 18 145/66 -- 97 % -- -- --   11/30/22 0200 -- 81 -- 123/67 -- -- -- -- --   11/30/22 0100 97 9 °F (36 6 °C) 82 18 130/74 -- 98 % None (Room air) -- --   11/30/22 0059 -- -- -- 145/77 -- -- -- -- --   11/30/22 0036 -- -- -- 145/77 -- -- -- -- --   11/30/22 0000 -- 76 -- 152/83 -- -- -- -- --   11/29/22 2300 97 9 °F (36 6 °C) 80 16 157/89 -- 97 % None (Room air) -- --   11/29/22 2200 -- 82 -- 143/84 -- -- -- -- --   11/29/22 2100 -- 77 18 136/82 -- 97 % -- -- --   11/29/22 2000 -- 83 -- 125/65 -- -- -- -- --   11/29/22 1901 98 1 °F (36 7 °C) 96 18 123/79 -- 96 % None (Room air) -- --   11/29/22 1801 -- 86 -- 135/78 -- -- -- -- --   11/29/22 1700 97 1 °F (36 2 °C) Abnormal  86 19 131/80 -- 98 % None (Room air) -- Lying   11/29/22 1602 -- 85 -- 134/78 -- -- -- -- --   11/29/22 1500 98 4 °F (36 9 °C) 83 -- 138/80 -- 98 % None (Room air) WDL Sitting   11/29/22 1402 -- 96 -- 152/89 -- -- -- -- --   11/29/22 1304 97 7 °F (36 5 °C) 88 19 141/84 -- 98 % None (Room air) -- Lying   11/29/22 1149 -- 84 -- 138/77 -- -- -- -- --   11/29/22 1133 -- 85 -- 163/87  -- -- -- -- --   BP: Dr Lucila Bird notified  Recheck BP in 10 minutes at 11/29/22 1133   11/29/22 1123 -- 85 -- 140/82 -- 100 % None (Room air) -- --   11/29/22 1025 -- 78 -- 144/87 -- -- -- -- --   11/29/22 0950 -- 79 -- 157/85 -- -- -- -- --   11/29/22 0920 97 7 °F (36 5 °C) 76 18 139/86 -- 100 % None (Room air) -- Lying   11/29/22 0822 -- 79 -- 129/81 -- -- -- -- --   11/29/22 0800 -- -- -- -- -- -- -- WDL --   11/29/22 0755 -- 71 -- 132/77 -- -- -- -- --   11/29/22 0720 -- 65 -- 137/75 -- -- -- -- --   11/29/22 0704 -- 85 -- 160/83  -- -- -- -- --   BP: Dr Gabi Valdez aware   recheck in 15 minutes at 11/29/22 0704 11/29/22 0700 -- -- -- 148/81 -- -- -- -- --   11/29/22 0634 -- 71 -- 148/84 -- -- -- -- --   11/29/22 0619 -- 81 -- 143/83 -- -- -- -- --   11/29/22 0604 -- 73 -- 139/82 -- -- -- -- --   11/29/22 0549 -- 71 -- 142/86 -- -- -- -- --   11/29/22 0522 -- 71 -- 143/90 -- -- -- -- --   11/29/22 0507 -- 70 -- 136/84 -- -- -- -- --   11/29/22 0452 -- 74 -- 145/83 -- 100 % None (Room air) WDL --   11/29/22 0441 -- 74 -- 140/81 -- -- -- -- --   11/29/22 0430 -- 78 -- 156/89 -- -- -- -- --   11/29/22 0421 -- 68 -- 146/80 -- -- -- -- --   11/29/22 0411 -- 62 -- 153/82 -- -- -- -- --   11/29/22 0401 -- 67 -- 157/90 -- -- -- -- --   11/29/22 0357 -- 71 -- 173/88 Abnormal  -- -- -- -- --   11/29/22 0346 -- 60 -- 188/96 Abnormal  -- -- -- -- --   11/29/22 0328 -- -- -- 220/110 Abnormal  -- -- -- -- Lying   11/29/22 0326 -- 58 -- 223/113 Abnormal  -- -- -- -- --   11/29/22 0323 -- 63 -- 225/115 Abnormal  -- -- -- -- --   11/29/22 0317 97 7 °F (36 5 °C) 65 18 -- -- 100 % --         Pertinent Labs/Diagnostic Test Results:   No orders to display         Results from last 7 days   Lab Units 11/30/22  1303 11/29/22  2159 11/29/22  0912 11/29/22  0322   WBC Thousand/uL 8 69 7 91 12 58* 8 34   HEMOGLOBIN g/dL 9 4* 10 1* 10 3* 11 0*   HEMATOCRIT % 32 4* 34 2* 34 3* 37 0   PLATELETS Thousands/uL 454* 457* 458* 500*   NEUTROS ABS Thousands/µL 6 93  --  11 32* 4 89         Results from last 7 days   Lab Units 11/30/22  1112 11/29/22 2159 11/29/22  0912 11/29/22  0322   SODIUM mmol/L 130* 132* 132* 134*   POTASSIUM mmol/L 4 5 4 2 4 1 4 1   CHLORIDE mmol/L 104 103 102 104   CO2 mmol/L 18* 20* 21 22   ANION GAP mmol/L 8 9 9 8   BUN mg/dL 13 14 12 13   CREATININE mg/dL 0 78 0 71 0 65 0 61   EGFR ml/min/1 73sq m 95 106 111 113   CALCIUM mg/dL 6 8* 7 1* 8 0* 9 3   MAGNESIUM mg/dL  --  5 4* 4 6*  --      Results from last 7 days   Lab Units 11/30/22  1112 11/29/22 2159 11/29/22  0912 11/29/22  0322   AST U/L 28 38 54* 42   ALT U/L 30 42 44 40   ALK PHOS U/L 127* 135* 139* 151*   TOTAL PROTEIN g/dL 6 5 6 8 6 9 7 3   ALBUMIN g/dL 2 1* 2 3* 2 3* 2 5*   TOTAL BILIRUBIN mg/dL 0 20 0 20 0 20 0 10*     Results from last 7 days   Lab Units 11/30/22  1517 22  1114 22  0842 22  2157 22  1833 22  1257 22  0819   POC GLUCOSE mg/dl 193* 221* 161* 173* 174* 156* 130     Results from last 7 days   Lab Units 22  1112 22  2159 22  0912 22  0322   GLUCOSE RANDOM mg/dL 232* 172* 148* 126           Results from last 7 days   Lab Units 22  0324 22  0322   CLARITY UA   --  Slightly Cloudy   COLOR UA   --  Light Yellow   SPEC GRAV UA   --  1 020   PH UA   --  7 5   GLUCOSE UA mg/dl  --  70 (7/100%)*   KETONES UA mg/dl  --  Negative   BLOOD UA   --  Small*   PROTEIN UA mg/dl  --  300 (3+)*   NITRITE UA   --  Negative   BILIRUBIN UA   --  Negative   UROBILINOGEN UA (BE) mg/dl  --  <2 0   LEUKOCYTES UA   --  Negative   WBC UA /hpf  --  None Seen   RBC UA /hpf  --  2-4   BACTERIA UA /hpf  --  Moderate*   EPITHELIAL CELLS WET PREP /hpf  --  Occasional   MUCUS THREADS   --  None Seen   CREATININE UR mg/dL 32 0  --    PROTEIN UR mg/dL 1,593  --    PROT/CREAT RATIO UR  49 78*  --          ED Treatment:   Medication Administration - No Administrations Displayed (No Start Event Found)     None        Past Medical History:   Diagnosis Date   • 39 weeks gestation of pregnancy 2020    IOL - 2020 @8pm [facility]  Flu vaccine 19   • Anemia    • Anemia during pregnancy in third trimester 1/15/2020   • COVID-19 06/10/2020   • COVID-19 virus detected 2020   • Elderly multigravida in third trimester 1/15/2020   • Encounter for injection education 3/3/2020   • GBS bacteriuria 2019    Amoxicillin sent to patient's pharmacy  Will need PCN in labor DO NOT collect GBS swab at 36 weeks!!   • Hyperglycemia during pregnancy 3/3/2020   • Iron deficiency anemia 2019    F/u Ferritin (Hb 8 4) Will likely need IV iron   • Postpartum hemorrhage, delivered, current hospitalization 2020   • Prediabetes    •  (spontaneous vaginal delivery) 2020     Present on Admission:  • Preeclampsia, severe, third trimester  • Gestational diabetes mellitus (GDM) in third trimester      Admitting Diagnosis: Abdominal pain in pregnancy [O26 899, R10 9]  Age/Sex: 38 y o  female     Admission Orders:  Continuous fetal monitoring  NST TID   Deep tendon reflexes  auscultate lung sounds; vitals, reflexes, pulse oximetry & clonus checks Q 2hr while on IV MAG     Scheduled Medications:  famotidine, 20 mg, Intravenous, Q12H LUISA  insulin lispro, 1 Units, Subcutaneous, Once  insulin lispro, 1-5 Units, Subcutaneous, 4 times day  labetalol, 100 mg, Oral, Q6H [address]      Continuous IV Infusions:  lactated ringers, 125 mL/hr, Intravenous, Continuous  magnesium sulfate, 2 g/hr, Intravenous, Continuous      PRN Meds:      IP CONSULT TO NEONATOLOGY  Network Utilization Review Department  ATTENTION: Please call with any questions or concerns to [phone] and carefully listen to the prompts so that you are directed to the right person  All voicemails are confidential   [facility] all requests for admission clinical reviews, approved or denied determinations and any other requests to dedicated fax number below belonging to the campus where the patient is receiving treatment   List of dedicated fax numbers for the Facilities:  [address] DENIALS (Administrative/Medical Necessity) [phone]   [address] (Maternity/NICU/Pediatrics) [phone]   [address]hardwaj [phone]   [facility] [phone]   [facility] [phone]   [address] [address] Ursula White [address] [phone] [address] [facility] [address] [facility] Koffi Mascorro [phone]

## 2022-11-30 NOTE — CONSULTS
Consultation - Maternal Fetal Medicine   Rupa Berger 38 y o  female MRN: [id number]  Unit/Bed#: -01 Encounter: [id number]  Admit date: 11/29/2022  Today's date: 11/30/22    Assessment/Plan   Ms Kadeem Berger is a 38y o  year-old [id number] at [address] Day: 2, admitted for preeclampsia with severe features  By issue:    Gestational diabetes mellitus (GDM) in third trimester  Assessment & Plan  Lab Results   Component Value Date    HGBA1C 6 1 (H) 03/31/2015       Recent Labs     11/29/22  1257 11/29/22  1833 11/29/22  2157 11/30/22  0842   POCGLU 156* 174* 173* 161*     Blood Sugar Average: Last 72 hrs:  (P) 158 8     On correctional insulin; favor insulin drip initiation once IOL starts, goal BG intrapartum   Needs postpartum testing at 4-12 weeks  * Preeclampsia, severe, third trimester  Assessment & Plan  Severe by blood pressures and severe epigastric pain on admission and marked proteinuria  Temporized currently with tylenol  Advise initiation of PO labetalol today, such as 100mg PO BID to QID  Would start with BID and uptitrate if tolerated  Current GA 33w5d and received second dose betamethasone this morning  Would advise starting IOL tomorrow morning at the 24h (post-betamethaseone #2) cole, sooner if any worsening maternal or fetal status  At time of starting IOL, resume magnesium sulfate and continue for 24h postpartum  Advise checking labs q6-8h with particular attention to LFT trend, platelet trend, and creatinine, given that symptomatology on arrival was suggestive of HELLP  History of postpartum hemorrhage, currently pregnant  Assessment & Plan  At risk for recurrent PPH (history is strongest risk factor; multiparity, preeclampsia; antepartum anemia)  Advise second IV, low threshold for TXA (can even give prophylactically), avoid methergine    Suspect her most recent Hg is masking hemoconcentration (superimposed on antepartum anemia) therefore less reassuring so consider crossmatching x2 units  Lab Results   Component Value Date    HGB 10 1 (L) 2022         33 weeks gestation of pregnancy  Assessment & Plan  Most recent growth scan 22 therefore growth not indicated today  Confirm vertex if not already done  IOD>5466W and GA>32 weeks therefore candidate for delivery here at [address] though NICU should be in attendance given prematurity  Advise TID NST then continuous once IOL starts (or sooner if any change in maternal or fetal status)  Chief Complaint   Patient presents with   • Abdominal Pain       Physician Requesting Consult: Jayla Connolly  Reason for Consult / Principal Problem: Pre-eclampsia  Subspeciality: Perinatology    Dear Dr aJyla Connolly,    Thank you for referring patient Froy Willson for Maternal-Fetal Medicine consultation regarding preeclampsia  HPI: As you know, Ms Gale Chao is a 38y o  year-old  with an HILLARY of Estimated Date of Delivery: 23 at [address] Day: 2, admitted for Pre-eclampsia with severe features  Her current pregnancy is significant for late GDM  Her obstetrical history is significant for 5 prior vaginal deliveries without preeclampsia, largest birthweight 8-9# per her estimate (though she has previously disclosed max 7#12 oz)  She received her care through [facility] but was told to come to [address] [place] [address] for evaluation during high volume situation the other evening; this campus is actually closer to her (10 minutes from her house)  [place] telephone  services utilized throughout  Other obstetric review of symptoms:  Contractions: None  Leakage of fluid: None  Bleeding: None  Fetal movement: present  Review of Systems   Eyes: Negative for visual disturbance  Gastrointestinal:        No RUQ pain (severe on admission, resolved currently)  Neurological: Negative for headaches  All other systems reviewed and are negative        Other history is as follows:    Historical Information   OB History    Para Term  AB Living   7 5 5     5   SAB IAB Ectopic Multiple Live Births         0 5      # Outcome Date GA Lbr Brandt/2nd Weight Sex Delivery Anes PTL Lv   7 Current            6 Term 20 39w1d / 00:03 3525 g (7 lb 12 3 oz) F Vag-Spont None N CLEO      Complications: Other Excessive Bleeding   5 Term 14 40w0d   M Vag-Spont EPI N CLEO      Birth Comments: GDM noncompliant    4 Term 06/10/08 39w0d  3118 g (6 lb 14 oz) M Vag-Spont  N CLEO      Birth Comments: oligo induced   3 Term 06 40w0d  3374 g (7 lb 7 oz) F Vag-Spont None N CLEO   2 Term 00 40w0d   M Vag-Spont  N CLEO   1               Gynecologic history: Patient's last menstrual period was 2022       Past Medical History:   Diagnosis Date   • 39 weeks gestation of pregnancy 2020    IOL - 2020 @8pm [facility]  Flu vaccine 19   • Anemia    • Anemia during pregnancy in third trimester 1/15/2020   • COVID-19 06/10/2020   • COVID-19 virus detected 2020   • Elderly multigravida in third trimester 1/15/2020   • Encounter for injection education 3/3/2020   • GBS bacteriuria 2019    Amoxicillin sent to patient's pharmacy  Will need PCN in labor DO NOT collect GBS swab at 36 weeks!!   • Hyperglycemia during pregnancy 3/3/2020   • Iron deficiency anemia 2019    F/u Ferritin (Hb 8 4) Will likely need IV iron   • Postpartum hemorrhage, delivered, current hospitalization 2020   • Prediabetes 2015   •  (spontaneous vaginal delivery) 2020     Past Surgical History:   Procedure Laterality Date   • TOOTH EXTRACTION       Social History   Social History     Substance and Sexual Activity   Alcohol Use No    Comment: pt reports social drinking once per month prior to pregnancy     Social History     Substance and Sexual Activity   Drug Use No     Social History     Tobacco Use   Smoking Status Never   Smokeless Tobacco Never Meds/Allergies   Prior to Admission Medications   Prescriptions Last Dose Informant Patient Reported? Taking?    Contour Next Test test strip   No No   Sig: Test 4 times daily   Microlet Lancets MISC   No No   Sig: Test 4 times daily   Prenatal Vit-Fe Fumarate-FA (Prenatal Vitamin) 27-0 8 MG TABS   No No   Sig: Take 1 tablet by mouth in the morning   Patient not taking: Reported on 9/1/2022   benzonatate (TESSALON PERLES) 100 mg capsule   No No   Sig: Take 1 capsule (100 mg total) by mouth 3 (three) times a day as needed for cough   Patient not taking: Reported on 10/27/2022   doxylamine (UNISOM) 25 MG tablet   No No   Sig: Take 0 5 tablets (12 5 mg total) by mouth daily at bedtime   Patient not taking: Reported on 9/1/2022   famotidine (PEPCID) 10 mg tablet   No No   Sig: Take 1 tablet (10 mg total) by mouth 2 (two) times a day   Patient not taking: No sig reported   ferrous sulfate 324 (65 Fe) mg   No No   Sig: Take 1 tablet (324 mg total) by mouth 2 (two) times a day before meals   Patient not taking: Reported on 9/1/2022   ondansetron (ZOFRAN) 4 mg tablet   No No   Sig: Take 1 tablet (4 mg total) by mouth every 8 (eight) hours as needed for nausea or vomiting   Patient not taking: Reported on 9/1/2022   pyridoxine (B-6) 25 MG tablet   No No   Sig: Take 1 tablet (25 mg total) by mouth daily   Patient not taking: Reported on 9/1/2022      Facility-Administered Medications: None     Medication Administration - last 24 hours from 11/29/2022 1114 to 11/30/2022 1114       Date/Time Order Dose Route Action Action by     11/29/2022 1449 EST lactated ringers infusion 75 mL/hr Intravenous [address] Keenan Minor RN     11/30/2022 0425 EST magnesium sulfate 20 g/500 mL infusion (premix) 0 g/hr Intravenous Stopped Carina Guerrier RN     11/30/2022 0036 EST magnesium sulfate 20 g/500 mL infusion (premix) 2 g/hr Intravenous [address] Carina Guerrier RN     11/29/2022 1449 EST magnesium sulfate 20 g/500 mL infusion (premix) 2 g/hr Intravenous New Bag Lilo Moura RN     11/30/2022 0425 EST betamethasone acetate-betamethasone sodium phosphate (CELESTONE) injection 12 mg 12 mg Intramuscular Given Stuart Polanco RN     11/30/2022 2831 EST Famotidine (PF) (PEPCID) injection 20 mg 20 mg Intravenous Given Sylwia Gutierrez RN     11/29/2022 2145 EST Famotidine (PF) (PEPCID) injection 20 mg 20 mg Intravenous Given Stuart Polanco RN     11/29/2022 1452 EST acetaminophen (TYLENOL) tablet 650 mg 650 mg Oral Given Lilo Moura RN     11/30/2022 0847 EST insulin lispro (HumaLOG) 100 units/mL subcutaneous injection 1-5 Units 1 Units Subcutaneous Given Sylwia Gutierrez RN     11/29/2022 1845 EST insulin lispro (HumaLOG) 100 units/mL subcutaneous injection 1-5 Units 1 Units Subcutaneous Given Lilo Moura RN     11/29/2022 1851 EST insulin lispro (HumaLOG) 100 units/mL subcutaneous injection 1 Units 1 Units Subcutaneous Not Given Lilo Moura RN     11/29/2022 2247 EST insulin lispro (HumaLOG) 100 units/mL subcutaneous injection 1 Units 1 Units Subcutaneous Given Stuart Polanco RN     11/30/2022 5705 EST labetalol (NORMODYNE) tablet 100 mg 100 mg Oral Given Sylwia Gutierrez RN        Current Facility-Administered Medications   Medication Dose Route Frequency   • acetaminophen (TYLENOL) tablet 650 mg  650 mg Oral Q6H PRN   • aluminum-magnesium hydroxide-simethicone (MYLANTA) oral suspension 30 mL  30 mL Oral Q4H PRN   • Famotidine (PF) (PEPCID) injection 20 mg  20 mg Intravenous Q12H [address]   • insulin lispro (HumaLOG) 100 units/mL subcutaneous injection 1 Units  1 Units Subcutaneous Once   • insulin lispro (HumaLOG) 100 units/mL subcutaneous injection 1-5 Units  1-5 Units Subcutaneous 4 times day   • labetalol (NORMODYNE) tablet 100 mg  100 mg Oral Q6H [address]   • lactated ringers infusion  125 mL/hr Intravenous Continuous   • magnesium sulfate 20 g/500 mL infusion (premix)  2 g/hr Intravenous Continuous     Allergies Allergen Reactions   • Dog Epithelium Allergic Rhinitis   • Pollen Extract Allergic Rhinitis       Objective    Patient Vitals for the past 24 hrs:   BP Temp Temp src Pulse Resp SpO2   11/30/22 1100 150/78 98 6 °F (37 °C) Temporal 94 18 97 %   11/30/22 0847 140/85 -- -- 82 -- --   11/30/22 0840 140/85 97 9 °F (36 6 °C) Temporal 80 18 98 %   11/30/22 0600 135/78 -- -- 85 -- --   11/30/22 0500 154/81 -- -- 85 -- --   11/30/22 0400 140/69 -- -- 84 -- --   11/30/22 0300 145/66 98 °F (36 7 °C) Temporal 78 18 97 %   11/30/22 0200 123/67 -- -- 81 -- --   11/30/22 0100 130/74 97 9 °F (36 6 °C) Temporal 82 18 98 %   11/30/22 0059 145/77 -- -- -- -- --   11/30/22 0036 145/77 -- -- -- -- --   11/30/22 0000 152/83 -- -- 76 -- --   11/29/22 2300 157/89 97 9 °F (36 6 °C) Oral 80 16 97 %   11/29/22 2200 143/84 -- -- 82 -- --   11/29/22 2100 136/82 -- -- 77 18 97 %   11/29/22 2000 125/65 -- -- 83 -- --   11/29/22 1901 123/79 98 1 °F (36 7 °C) Oral 96 18 96 %   11/29/22 1801 135/78 -- -- 86 -- --   11/29/22 1700 131/80 (!) 97 1 °F (36 2 °C) Temporal 86 19 98 %   11/29/22 1602 134/78 -- -- 85 -- --   11/29/22 1500 138/80 98 4 °F (36 9 °C) Temporal 83 -- 98 %   11/29/22 1402 152/89 -- -- 96 -- --   11/29/22 1304 141/84 97 7 °F (36 5 °C) Temporal 88 19 98 %   11/29/22 1149 138/77 -- -- 84 -- --   11/29/22 1133 163/87 -- -- 85 -- --   11/29/22 1123 140/82 -- -- 85 -- 100 %     Vitals: Blood pressure 150/78, pulse 94, temperature 98 6 °F (37 °C), temperature source Temporal, resp  rate 18, last menstrual period 03/31/2022, SpO2 97 %, unknown if currently breastfeeding  There is no height or weight on file to calculate BMI  Physical Exam  Constitutional:       General: She is not in acute distress  Appearance: Normal appearance  She is not ill-appearing, toxic-appearing or diaphoretic  HENT:      Head: Normocephalic and atraumatic  Nose: No congestion or rhinorrhea  Eyes:      General: No scleral icterus          Right eye: No discharge  Left eye: No discharge  Conjunctiva/sclera: Conjunctivae normal    Cardiovascular:      Rate and Rhythm: Normal rate and regular rhythm  Heart sounds: Murmur heard  Comments: Soft II/VI murmur  Pulmonary:      Effort: Pulmonary effort is normal  No respiratory distress  Breath sounds: No wheezing or rales  Abdominal:      Palpations: Abdomen is soft  Tenderness: There is no abdominal tenderness  There is no guarding or rebound  Hernia: No hernia is present  Musculoskeletal:         General: No tenderness or signs of injury  Cervical back: Normal range of motion  Right lower leg: No edema  Left lower leg: No edema  Skin:     Coloration: Skin is not jaundiced or pale  Findings: No erythema, lesion or rash  Neurological:      General: No focal deficit present  Mental Status: She is alert and oriented to person, place, and time        Deep Tendon Reflexes: Reflexes normal       Comments: No clonus; 2+ reflexes   Psychiatric:         Mood and Affect: Mood normal          Behavior: Behavior normal        Results from last 7 days   Lab Units 11/29/22 2159 11/29/22 0912 11/29/22  0322   WBC Thousand/uL 7 91 12 58* 8 34   NEUTROS ABS Thousands/µL  --  11 32* 4 89   HEMOGLOBIN g/dL 10 1* 10 3* 11 0*   MCV fL 75* 73* 74*   PLATELETS Thousands/uL 457* 458* 500*     Results from last 7 days   Lab Units 11/29/22  0912 11/29/22  0322   NEUTROS PCT % 90* 58   MONOS PCT % 1* 7   EOS PCT % 0 2     Results from last 7 days   Lab Units 11/29/22 2159 11/29/22  0912 11/29/22  0322   POTASSIUM mmol/L 4 2 4 1 4 1   CHLORIDE mmol/L 103 102 104   CO2 mmol/L 20* 21 22   BUN mg/dL 14 12 13   CREATININE mg/dL 0 71 0 65 0 61   EGFR ml/min/1 73sq m 106 111 113   MAGNESIUM mg/dL 5 4* 4 6*  --      Results from last 7 days   Lab Units 11/29/22 2159 11/29/22  0912 11/29/22  0322   AST U/L 38 54* 42   ALT U/L 42 44 40   ALK PHOS U/L 135* 139* 151*     Results from last 7 days   Lab Units 11/29/22  2159 11/29/22  0912 11/29/22  0322   PLATELETS Thousands/uL 457* 458* 500*     Results from last 7 days   Lab Units 11/30/22  0842 11/29/22  2157 11/29/22  1833 11/29/22  1257 11/29/22  0819   POC GLUCOSE mg/dl 161* 173* 174* 156* 130     Results from last 7 days   Lab Units 11/29/22  0324   PROT/CREAT RATIO UR  49 78*     Results from last 7 days   Lab Units 11/29/22  0520   URIC ACID mg/dL 4 4             MFM ultrasound report key findings: EFW 1729g (22nd percentile) from 11/17/22 date at 32w8d gestational age            Xavi Borges MD  11/30/2022  11:14 AM

## 2022-11-30 NOTE — PROGRESS NOTES
Progress Note - OB HOSPITALIST ON CALL  Rupa Reich 38 y o  female MRN: [id number]  Unit/Bed#: -01 Encounter: [id number]      Rupa Reich has no current complaints  Denies contraction; has  no LOF, and no VB  Good FM  /85 (BP Location: Left arm)   Pulse 80   Temp 97 9 °F (36 6 °C) (Temporal)   Resp 18   LMP 03/31/2022   SpO2 98%     Physical Exam:   General: AAOx3  No acute distress  Heart: Regular rate & rhythm  Lungs: Clear bilaterally  Normal effort  No wheezes/rales/rhonchi  Abdominal: Soft, non-tender gravid uterus at 33 weeks  Extremities: No TTP  Lower limbs symmetric bilaterally  FHT:  Baseline Rate: 130 bpm  Variability: Moderate 6-25 bpm  Accelerations: 15 x 15 or greater  Decelerations: None  FHR Category: Category I  TOCO:   Contraction Frequency (minutes): none  Contraction Duration (seconds): n/a  Contraction Quality: Not applicable    Lab Results   Component Value Date    WBC 7 91 11/29/2022    HGB 10 1 (L) 11/29/2022    HCT 34 2 (L) 11/29/2022     (H) 11/29/2022     Lab Results   Component Value Date     03/31/2015    K 4 2 11/29/2022     11/29/2022    CO2 20 (L) 11/29/2022    BUN 14 11/29/2022    CREATININE 0 71 11/29/2022    GLUCOSE 104 03/31/2015    AST 38 11/29/2022    ALT 42 11/29/2022       A/P: Rupa Reich is a 38 y o  [facility] at 33w5d admitted with pre-eclampsia with severe features  Currently in stable condition   Hospital Day: 2     1)33W5D gestation - fetal testing reassuring;  Continue NST TID, s/p 2nd dose of betamethasone at 0435 this am    2) pre-eclampsia with severe features - markedly elevated Bps on admission treated with labetalol 20mg and 40mg IV;  Per MFM recommendation today, started labetalol 100mg po BID and will increase to QID as tolerated tomorrow;  Significant proteinuria noted on admission P/C ratio;  LFTs and Cr remain in normal range although Hgb 10 2 and platelets close to 887 likely due to hemoconcentration;  Continue serial labs q 12 for now and increase to q6-8 hrs during labor; Will restart MagSO4 with beginning of IOL    3  IOL - will start at 33W6D on 12/1 at 0500 when steroids have had full effect to accelerate fetal lung maturity;  2nd IV, Type and Cross for 2 Units; Will likely start with zuñiga bulb and misoprostol    4  GDM - per patient has been A1 but now s/p steroids, is requiring insulin; Will start insulin drip with induction of labor and follow protocol for q 1-2 hr blood glucose and sliding scale of Humalog insulin    5  Iron deficiency anemia - has received 4 doses of Venofer with ferritin still low at 18; Had heme onc consult and may have element of alpha thalassemia; Will type and cross for 2 units of blood for induction of labor and have 2nd IV due to risk of PPH (has history of PPH in last delivery); Avoid use of methergine for uterotonic; Ok to use hemabate, oxytocin, misoprostol and TXA  3)FEN: house diet for now;  Clear liquids once start induction  4) DVT Prophylaxis: SCDs  5) Encouraged ambulation as tolerated  6) Disposition: will remain in hospital until delivered    Case reviewed at length with Dr Rochelle Brandt/M    Donna Celaya MD, [address] [facility] Hospitalist  9:04 AM  11/30/2022

## 2022-11-30 NOTE — PLAN OF CARE
Problem: Potential for Falls  Goal: Patient will remain free of falls  Description: INTERVENTIONS:  - Educate patient/family on patient safety including physical limitations  - Instruct patient to call for assistance with activity   - Consult OT/PT to assist with strengthening/mobility   - Keep Call bell within reach  - Keep bed low and locked with side rails adjusted as appropriate  - Keep care items and personal belongings within reach  - Initiate and maintain comfort rounds  - Make Fall Risk Sign visible to staff  - Offer Toileting every  Hours, in advance of need  - Initiate/Maintain alarm  - Obtain necessary fall risk management equipment:   - Apply yellow socks and bracelet for high fall risk patients  - Consider moving patient to room near nurses station  Outcome: Progressing     Problem: PAIN - ADULT  Goal: Verbalizes/displays adequate comfort level or baseline comfort level  Description: Interventions:  - Encourage patient to monitor pain and request assistance  - Assess pain using appropriate pain scale  - Administer analgesics based on type and severity of pain and evaluate response  - Implement non-pharmacological measures as appropriate and evaluate response  - Consider cultural and social influences on pain and pain management  - Notify physician/advanced practitioner if interventions unsuccessful or patient reports new pain  Outcome: Progressing     Problem: INFECTION - ADULT  Goal: Absence or prevention of progression during hospitalization  Description: INTERVENTIONS:  - Assess and monitor for signs and symptoms of infection  - Monitor lab/diagnostic results  - Monitor all insertion sites, i e  indwelling lines, tubes, and drains  - Monitor endotracheal if appropriate and nasal secretions for changes in amount and color  - [place] appropriate cooling/warming therapies per order  - Administer medications as ordered  - Instruct and encourage patient and family to use good hand hygiene technique  - Identify and instruct in appropriate isolation precautions for identified infection/condition  Outcome: Progressing  Goal: Absence of fever/infection during neutropenic period  Description: INTERVENTIONS:  - Monitor WBC    Outcome: Progressing     Problem: SAFETY ADULT  Goal: Patient will remain free of falls  Description: INTERVENTIONS:  - Educate patient/family on patient safety including physical limitations  - Instruct patient to call for assistance with activity   - Consult OT/PT to assist with strengthening/mobility   - Keep Call bell within reach  - Keep bed low and locked with side rails adjusted as appropriate  - Keep care items and personal belongings within reach  - Initiate and maintain comfort rounds  - Make Fall Risk Sign visible to staff  - Offer Toileting every  Hours, in advance of need  - Initiate/Maintain alarm  - Obtain necessary fall risk management equipment:   - Apply yellow socks and bracelet for high fall risk patients  - Consider moving patient to room near nurses station  Outcome: Progressing  Goal: Maintain or return to baseline ADL function  Description: INTERVENTIONS:  -  Assess patient's ability to carry out ADLs; assess patient's baseline for ADL function and identify physical deficits which impact ability to perform ADLs (bathing, care of mouth/teeth, toileting, grooming, dressing, etc )  - Assess/evaluate cause of self-care deficits   - Assess range of motion  - Assess patient's mobility; develop plan if impaired  - Assess patient's need for assistive devices and provide as appropriate  - Encourage maximum independence but intervene and supervise when necessary  - Involve family in performance of ADLs  - Assess for home care needs following discharge   - Consider OT consult to assist with ADL evaluation and planning for discharge  - Provide patient education as appropriate  Outcome: Progressing  Goal: Maintains/Returns to pre admission functional level  Description: INTERVENTIONS:  - Perform BMAT or MOVE assessment daily    - Set and communicate daily mobility goal to care team and patient/family/caregiver  - Collaborate with rehabilitation services on mobility goals if consulted  - Perform Range of Motion  times a day  - Reposition patient every  hours  - Dangle patient  times a day  - Stand patient  times a day  - Ambulate patient  times a day  - Out of bed to chair  times a day   - Out of bed for meals times a day  - Out of bed for toileting  - Record patient progress and toleration of activity level   Outcome: Progressing     Problem: Knowledge Deficit  Goal: Patient/family/caregiver demonstrates understanding of disease process, treatment plan, medications, and discharge instructions  Description: Complete learning assessment and assess knowledge base    Interventions:  - Provide teaching at level of understanding  - Provide teaching via preferred learning methods  Outcome: Progressing

## 2022-12-01 ENCOUNTER — APPOINTMENT (OUTPATIENT)
Dept: PERINATAL CARE | Facility: CLINIC | Age: 42
End: 2022-12-01

## 2022-12-01 LAB
ALBUMIN SERPL BCP-MCNC: 2 G/DL (ref 3.5–5)
ALBUMIN SERPL BCP-MCNC: 2.2 G/DL (ref 3.5–5)
ALP SERPL-CCNC: 113 U/L (ref 46–116)
ALP SERPL-CCNC: 92 U/L (ref 46–116)
ALT SERPL W P-5'-P-CCNC: 28 U/L (ref 12–78)
ALT SERPL W P-5'-P-CCNC: 29 U/L (ref 12–78)
ANION GAP SERPL CALCULATED.3IONS-SCNC: 3 MMOL/L (ref 4–13)
ANION GAP SERPL CALCULATED.3IONS-SCNC: 8 MMOL/L (ref 4–13)
AST SERPL W P-5'-P-CCNC: 17 U/L (ref 5–45)
AST SERPL W P-5'-P-CCNC: 20 U/L (ref 5–45)
BASE EXCESS BLDCOA CALC-SCNC: -1.9 MMOL/L (ref 3–11)
BASE EXCESS BLDCOV CALC-SCNC: -3.6 MMOL/L (ref 1–9)
BASOPHILS # BLD MANUAL: 0 THOUSAND/UL (ref 0–0.1)
BASOPHILS NFR MAR MANUAL: 0 % (ref 0–1)
BILIRUB SERPL-MCNC: 0.1 MG/DL (ref 0.2–1)
BILIRUB SERPL-MCNC: 0.2 MG/DL (ref 0.2–1)
BUN SERPL-MCNC: 13 MG/DL (ref 5–25)
BUN SERPL-MCNC: 17 MG/DL (ref 5–25)
CALCIUM ALBUM COR SERPL-MCNC: 8.5 MG/DL (ref 8.3–10.1)
CALCIUM ALBUM COR SERPL-MCNC: 8.8 MG/DL (ref 8.3–10.1)
CALCIUM SERPL-MCNC: 6.9 MG/DL (ref 8.3–10.1)
CALCIUM SERPL-MCNC: 7.4 MG/DL (ref 8.3–10.1)
CHLORIDE SERPL-SCNC: 103 MMOL/L (ref 96–108)
CHLORIDE SERPL-SCNC: 106 MMOL/L (ref 96–108)
CO2 SERPL-SCNC: 21 MMOL/L (ref 21–32)
CO2 SERPL-SCNC: 24 MMOL/L (ref 21–32)
CREAT SERPL-MCNC: 0.6 MG/DL (ref 0.6–1.3)
CREAT SERPL-MCNC: 0.62 MG/DL (ref 0.6–1.3)
DME PARACHUTE DELIVERY DATE REQUESTED: NORMAL
DME PARACHUTE ITEM DESCRIPTION: NORMAL
DME PARACHUTE ORDER STATUS: NORMAL
DME PARACHUTE SUPPLIER NAME: NORMAL
DME PARACHUTE SUPPLIER PHONE: NORMAL
EOSINOPHIL # BLD MANUAL: 0 THOUSAND/UL (ref 0–0.4)
EOSINOPHIL NFR BLD MANUAL: 0 % (ref 0–6)
ERYTHROCYTE [DISTWIDTH] IN BLOOD BY AUTOMATED COUNT: 20.3 % (ref 11.6–15.1)
ERYTHROCYTE [DISTWIDTH] IN BLOOD BY AUTOMATED COUNT: 20.5 % (ref 11.6–15.1)
GFR SERPL CREATININE-BSD FRML MDRD: 113 ML/MIN/1.73SQ M
GFR SERPL CREATININE-BSD FRML MDRD: 114 ML/MIN/1.73SQ M
GLUCOSE SERPL-MCNC: 116 MG/DL (ref 65–140)
GLUCOSE SERPL-MCNC: 146 MG/DL (ref 65–140)
GLUCOSE SERPL-MCNC: 151 MG/DL (ref 65–140)
HCO3 BLDCOA-SCNC: 24.5 MMOL/L (ref 17.3–27.3)
HCO3 BLDCOV-SCNC: 22.6 MMOL/L (ref 12.2–28.6)
HCT VFR BLD AUTO: 29.7 % (ref 34.8–46.1)
HCT VFR BLD AUTO: 32.1 % (ref 34.8–46.1)
HGB BLD-MCNC: 8.6 G/DL (ref 11.5–15.4)
HGB BLD-MCNC: 9.4 G/DL (ref 11.5–15.4)
LYMPHOCYTES # BLD AUTO: 1.12 THOUSAND/UL (ref 0.6–4.47)
LYMPHOCYTES # BLD AUTO: 9 % (ref 14–44)
MAGNESIUM SERPL-MCNC: 5.9 MG/DL (ref 1.6–2.6)
MCH RBC QN AUTO: 22.5 PG (ref 26.8–34.3)
MCH RBC QN AUTO: 22.6 PG (ref 26.8–34.3)
MCHC RBC AUTO-ENTMCNC: 29 G/DL (ref 31.4–37.4)
MCHC RBC AUTO-ENTMCNC: 29.3 G/DL (ref 31.4–37.4)
MCV RBC AUTO: 77 FL (ref 82–98)
MCV RBC AUTO: 78 FL (ref 82–98)
MONOCYTES # BLD AUTO: 0.25 THOUSAND/UL (ref 0–1.22)
MONOCYTES NFR BLD: 2 % (ref 4–12)
NEUTROPHILS # BLD MANUAL: 11.05 THOUSAND/UL (ref 1.85–7.62)
NEUTS SEG NFR BLD AUTO: 89 % (ref 43–75)
O2 CT VFR BLDCOA CALC: 13.2 ML/DL
OXYHGB MFR BLDCOA: 57.2 %
OXYHGB MFR BLDCOV: 57.3 %
PCO2 BLDCOA: 47.5 MM[HG] (ref 30–60)
PCO2 BLDCOV: 45 MM HG (ref 27–43)
PH BLDCOA: 7.33 [PH] (ref 7.23–7.43)
PH BLDCOV: 7.32 [PH] (ref 7.19–7.49)
PLATELET # BLD AUTO: 355 THOUSANDS/UL (ref 149–390)
PLATELET # BLD AUTO: 401 THOUSANDS/UL (ref 149–390)
PLATELET BLD QL SMEAR: ADEQUATE
PMV BLD AUTO: 9.1 FL (ref 8.9–12.7)
PMV BLD AUTO: 9.2 FL (ref 8.9–12.7)
PO2 BLDCOA: 24.1 MM HG (ref 5–25)
PO2 BLDCOV: 24.5 MM HG (ref 15–45)
POTASSIUM SERPL-SCNC: 4.4 MMOL/L (ref 3.5–5.3)
POTASSIUM SERPL-SCNC: 4.8 MMOL/L (ref 3.5–5.3)
PROT SERPL-MCNC: 5.6 G/DL (ref 6.4–8.4)
PROT SERPL-MCNC: 6.3 G/DL (ref 6.4–8.4)
RBC # BLD AUTO: 3.83 MILLION/UL (ref 3.81–5.12)
RBC # BLD AUTO: 4.16 MILLION/UL (ref 3.81–5.12)
SAO2 % BLDCOV: 13.2 ML/DL
SODIUM SERPL-SCNC: 130 MMOL/L (ref 135–147)
SODIUM SERPL-SCNC: 135 MMOL/L (ref 135–147)
WBC # BLD AUTO: 12.42 THOUSAND/UL (ref 4.31–10.16)
WBC # BLD AUTO: 17.17 THOUSAND/UL (ref 4.31–10.16)

## 2022-12-01 RX ORDER — FENTANYL CITRATE/PF 50 MCG/ML
25 SYRINGE (ML) INJECTION
Status: DISCONTINUED | OUTPATIENT
Start: 2022-12-01 | End: 2022-12-05 | Stop reason: HOSPADM

## 2022-12-01 RX ORDER — MAGNESIUM SULFATE HEPTAHYDRATE 40 MG/ML
2 INJECTION, SOLUTION INTRAVENOUS CONTINUOUS
Status: DISCONTINUED | OUTPATIENT
Start: 2022-12-01 | End: 2022-12-02

## 2022-12-01 RX ORDER — TRISODIUM CITRATE DIHYDRATE AND CITRIC ACID MONOHYDRATE 500; 334 MG/5ML; MG/5ML
30 SOLUTION ORAL ONCE
Status: COMPLETED | OUTPATIENT
Start: 2022-12-01 | End: 2022-12-01

## 2022-12-01 RX ORDER — OXYTOCIN/RINGER'S LACTATE 30/500 ML
PLASTIC BAG, INJECTION (ML) INTRAVENOUS CONTINUOUS PRN
Status: DISCONTINUED | OUTPATIENT
Start: 2022-12-01 | End: 2022-12-01

## 2022-12-01 RX ORDER — OXYTOCIN/RINGER'S LACTATE 30/500 ML
PLASTIC BAG, INJECTION (ML) INTRAVENOUS
Status: COMPLETED
Start: 2022-12-01 | End: 2022-12-01

## 2022-12-01 RX ORDER — CEFAZOLIN SODIUM 1 G/50ML
1000 SOLUTION INTRAVENOUS ONCE
Status: DISCONTINUED | OUTPATIENT
Start: 2022-12-01 | End: 2022-12-03

## 2022-12-01 RX ORDER — DOCUSATE SODIUM 100 MG/1
100 CAPSULE, LIQUID FILLED ORAL 2 TIMES DAILY
Status: DISCONTINUED | OUTPATIENT
Start: 2022-12-01 | End: 2022-12-05 | Stop reason: HOSPADM

## 2022-12-01 RX ORDER — SODIUM CHLORIDE, SODIUM LACTATE, POTASSIUM CHLORIDE, CALCIUM CHLORIDE 600; 310; 30; 20 MG/100ML; MG/100ML; MG/100ML; MG/100ML
125 INJECTION, SOLUTION INTRAVENOUS CONTINUOUS
Status: DISCONTINUED | OUTPATIENT
Start: 2022-12-01 | End: 2022-12-01

## 2022-12-01 RX ORDER — OXYCODONE HYDROCHLORIDE 5 MG/1
5 TABLET ORAL EVERY 4 HOURS PRN
Status: DISCONTINUED | OUTPATIENT
Start: 2022-12-02 | End: 2022-12-05 | Stop reason: HOSPADM

## 2022-12-01 RX ORDER — LABETALOL HYDROCHLORIDE 5 MG/ML
20 INJECTION, SOLUTION INTRAVENOUS ONCE
Status: COMPLETED | OUTPATIENT
Start: 2022-12-01 | End: 2022-12-01

## 2022-12-01 RX ORDER — SODIUM CHLORIDE, SODIUM LACTATE, POTASSIUM CHLORIDE, CALCIUM CHLORIDE 600; 310; 30; 20 MG/100ML; MG/100ML; MG/100ML; MG/100ML
INJECTION, SOLUTION INTRAVENOUS CONTINUOUS PRN
Status: DISCONTINUED | OUTPATIENT
Start: 2022-12-01 | End: 2022-12-01

## 2022-12-01 RX ORDER — ONDANSETRON 2 MG/ML
4 INJECTION INTRAMUSCULAR; INTRAVENOUS EVERY 8 HOURS PRN
Status: DISCONTINUED | OUTPATIENT
Start: 2022-12-01 | End: 2022-12-05 | Stop reason: HOSPADM

## 2022-12-01 RX ORDER — OXYTOCIN/RINGER'S LACTATE 30/500 ML
62.5 PLASTIC BAG, INJECTION (ML) INTRAVENOUS ONCE
Status: COMPLETED | OUTPATIENT
Start: 2022-12-01 | End: 2022-12-01

## 2022-12-01 RX ORDER — MORPHINE SULFATE 1 MG/ML
INJECTION, SOLUTION EPIDURAL; INTRATHECAL; INTRAVENOUS AS NEEDED
Status: DISCONTINUED | OUTPATIENT
Start: 2022-12-01 | End: 2022-12-01

## 2022-12-01 RX ORDER — MAGNESIUM SULFATE HEPTAHYDRATE 40 MG/ML
4 INJECTION, SOLUTION INTRAVENOUS ONCE
Status: COMPLETED | OUTPATIENT
Start: 2022-12-01 | End: 2022-12-01

## 2022-12-01 RX ORDER — LABETALOL HYDROCHLORIDE 5 MG/ML
40 INJECTION, SOLUTION INTRAVENOUS ONCE
Status: COMPLETED | OUTPATIENT
Start: 2022-12-01 | End: 2022-12-01

## 2022-12-01 RX ORDER — CEFAZOLIN SODIUM 1 G/50ML
SOLUTION INTRAVENOUS AS NEEDED
Status: DISCONTINUED | OUTPATIENT
Start: 2022-12-01 | End: 2022-12-01

## 2022-12-01 RX ORDER — FENTANYL CITRATE 50 UG/ML
INJECTION, SOLUTION INTRAMUSCULAR; INTRAVENOUS AS NEEDED
Status: DISCONTINUED | OUTPATIENT
Start: 2022-12-01 | End: 2022-12-01

## 2022-12-01 RX ORDER — DIPHENHYDRAMINE HYDROCHLORIDE 50 MG/ML
25 INJECTION INTRAMUSCULAR; INTRAVENOUS EVERY 6 HOURS PRN
Status: DISCONTINUED | OUTPATIENT
Start: 2022-12-01 | End: 2022-12-05 | Stop reason: HOSPADM

## 2022-12-01 RX ORDER — NALOXONE HYDROCHLORIDE 0.4 MG/ML
0.1 INJECTION, SOLUTION INTRAMUSCULAR; INTRAVENOUS; SUBCUTANEOUS
Status: DISCONTINUED | OUTPATIENT
Start: 2022-12-01 | End: 2022-12-02

## 2022-12-01 RX ORDER — IBUPROFEN 600 MG/1
600 TABLET ORAL EVERY 6 HOURS
Status: DISCONTINUED | OUTPATIENT
Start: 2022-12-03 | End: 2022-12-05 | Stop reason: HOSPADM

## 2022-12-01 RX ORDER — OXYCODONE HYDROCHLORIDE 5 MG/1
10 TABLET ORAL EVERY 4 HOURS PRN
Status: DISCONTINUED | OUTPATIENT
Start: 2022-12-02 | End: 2022-12-05 | Stop reason: HOSPADM

## 2022-12-01 RX ORDER — SODIUM CHLORIDE, SODIUM LACTATE, POTASSIUM CHLORIDE, CALCIUM CHLORIDE 600; 310; 30; 20 MG/100ML; MG/100ML; MG/100ML; MG/100ML
25 INJECTION, SOLUTION INTRAVENOUS CONTINUOUS
Status: DISCONTINUED | OUTPATIENT
Start: 2022-12-01 | End: 2022-12-02

## 2022-12-01 RX ORDER — GLYCOPYRROLATE 0.2 MG/ML
INJECTION INTRAMUSCULAR; INTRAVENOUS AS NEEDED
Status: DISCONTINUED | OUTPATIENT
Start: 2022-12-01 | End: 2022-12-01

## 2022-12-01 RX ORDER — KETOROLAC TROMETHAMINE 30 MG/ML
30 INJECTION, SOLUTION INTRAMUSCULAR; INTRAVENOUS EVERY 6 HOURS SCHEDULED
Status: COMPLETED | OUTPATIENT
Start: 2022-12-01 | End: 2022-12-02

## 2022-12-01 RX ORDER — EPHEDRINE SULFATE 50 MG/ML
INJECTION INTRAVENOUS AS NEEDED
Status: DISCONTINUED | OUTPATIENT
Start: 2022-12-01 | End: 2022-12-01

## 2022-12-01 RX ORDER — BUPIVACAINE HYDROCHLORIDE 7.5 MG/ML
INJECTION, SOLUTION INTRASPINAL AS NEEDED
Status: DISCONTINUED | OUTPATIENT
Start: 2022-12-01 | End: 2022-12-01

## 2022-12-01 RX ORDER — NALOXONE HYDROCHLORIDE 0.4 MG/ML
0.1 INJECTION, SOLUTION INTRAMUSCULAR; INTRAVENOUS; SUBCUTANEOUS
Status: DISCONTINUED | OUTPATIENT
Start: 2022-12-01 | End: 2022-12-01 | Stop reason: SDUPTHER

## 2022-12-01 RX ORDER — ONDANSETRON 2 MG/ML
INJECTION INTRAMUSCULAR; INTRAVENOUS AS NEEDED
Status: DISCONTINUED | OUTPATIENT
Start: 2022-12-01 | End: 2022-12-01

## 2022-12-01 RX ORDER — ACETAMINOPHEN 325 MG/1
650 TABLET ORAL EVERY 6 HOURS SCHEDULED
Status: DISPENSED | OUTPATIENT
Start: 2022-12-01 | End: 2022-12-04

## 2022-12-01 RX ORDER — ONDANSETRON 2 MG/ML
4 INJECTION INTRAMUSCULAR; INTRAVENOUS ONCE
Status: DISCONTINUED | OUTPATIENT
Start: 2022-12-01 | End: 2022-12-05 | Stop reason: HOSPADM

## 2022-12-01 RX ORDER — SENNOSIDES 8.6 MG
1 TABLET ORAL DAILY
Status: DISCONTINUED | OUTPATIENT
Start: 2022-12-01 | End: 2022-12-05 | Stop reason: HOSPADM

## 2022-12-01 RX ORDER — NIFEDIPINE 30 MG/1
30 TABLET, EXTENDED RELEASE ORAL DAILY
Status: DISCONTINUED | OUTPATIENT
Start: 2022-12-01 | End: 2022-12-05 | Stop reason: HOSPADM

## 2022-12-01 RX ORDER — NALBUPHINE HCL 10 MG/ML
5 AMPUL (ML) INJECTION
Status: DISCONTINUED | OUTPATIENT
Start: 2022-12-01 | End: 2022-12-02

## 2022-12-01 RX ADMIN — MAGNESIUM SULFATE HEPTAHYDRATE 2 G/HR: 40 INJECTION, SOLUTION INTRAVENOUS at 16:43

## 2022-12-01 RX ADMIN — KETOROLAC TROMETHAMINE 30 MG: 30 INJECTION, SOLUTION INTRAMUSCULAR; INTRAVENOUS at 11:50

## 2022-12-01 RX ADMIN — MORPHINE SULFATE 0.2 MG: 1 INJECTION, SOLUTION EPIDURAL; INTRATHECAL; INTRAVENOUS at 08:54

## 2022-12-01 RX ADMIN — MAGNESIUM SULFATE HEPTAHYDRATE 4 G: 40 INJECTION, SOLUTION INTRAVENOUS at 05:59

## 2022-12-01 RX ADMIN — DOCUSATE SODIUM 100 MG: 100 CAPSULE, LIQUID FILLED ORAL at 18:04

## 2022-12-01 RX ADMIN — SODIUM CHLORIDE, POTASSIUM CHLORIDE, SODIUM LACTATE AND CALCIUM CHLORIDE 25 ML/HR: 600; 310; 30; 20 INJECTION, SOLUTION INTRAVENOUS at 06:01

## 2022-12-01 RX ADMIN — CEFAZOLIN SODIUM 1000 MG: 1 SOLUTION INTRAVENOUS at 08:53

## 2022-12-01 RX ADMIN — MAGNESIUM SULFATE HEPTAHYDRATE 2 G/HR: 40 INJECTION, SOLUTION INTRAVENOUS at 06:23

## 2022-12-01 RX ADMIN — GLYCOPYRROLATE 0.1 MG: 0.2 INJECTION, SOLUTION INTRAMUSCULAR; INTRAVENOUS at 09:35

## 2022-12-01 RX ADMIN — Medication 62.5 MILLI-UNITS/MIN: at 10:30

## 2022-12-01 RX ADMIN — SODIUM CHLORIDE 200 MG: 9 INJECTION, SOLUTION INTRAVENOUS at 18:08

## 2022-12-01 RX ADMIN — NIFEDIPINE 30 MG: 30 TABLET, FILM COATED, EXTENDED RELEASE ORAL at 13:04

## 2022-12-01 RX ADMIN — PHENYLEPHRINE HYDROCHLORIDE 40 MCG/MIN: 10 INJECTION INTRAVENOUS at 08:58

## 2022-12-01 RX ADMIN — EPHEDRINE SULFATE 10 MG: 50 INJECTION INTRAVENOUS at 09:31

## 2022-12-01 RX ADMIN — FENTANYL CITRATE 50 MCG: 50 INJECTION, SOLUTION INTRAMUSCULAR; INTRAVENOUS at 09:49

## 2022-12-01 RX ADMIN — FENTANYL CITRATE 25 MCG: 50 INJECTION, SOLUTION INTRAMUSCULAR; INTRAVENOUS at 09:38

## 2022-12-01 RX ADMIN — ACETAMINOPHEN 650 MG: 325 TABLET, FILM COATED ORAL at 18:04

## 2022-12-01 RX ADMIN — SODIUM CHLORIDE, SODIUM LACTATE, POTASSIUM CHLORIDE, AND CALCIUM CHLORIDE 25 ML/HR: .6; .31; .03; .02 INJECTION, SOLUTION INTRAVENOUS at 11:48

## 2022-12-01 RX ADMIN — LABETALOL HYDROCHLORIDE 20 MG: 5 INJECTION, SOLUTION INTRAVENOUS at 05:12

## 2022-12-01 RX ADMIN — ONDANSETRON 4 MG: 2 INJECTION INTRAMUSCULAR; INTRAVENOUS at 09:02

## 2022-12-01 RX ADMIN — SODIUM CHLORIDE, POTASSIUM CHLORIDE, SODIUM LACTATE AND CALCIUM CHLORIDE 1000 ML: 600; 310; 30; 20 INJECTION, SOLUTION INTRAVENOUS at 06:32

## 2022-12-01 RX ADMIN — SODIUM CITRATE AND CITRIC ACID 30 ML: 334; 500 LIQUID ORAL at 07:45

## 2022-12-01 RX ADMIN — BUPIVACAINE HYDROCHLORIDE IN DEXTROSE 1.4 ML: 7.5 INJECTION, SOLUTION SUBARACHNOID at 08:54

## 2022-12-01 RX ADMIN — EPHEDRINE SULFATE 5 MG: 50 INJECTION INTRAVENOUS at 09:33

## 2022-12-01 RX ADMIN — KETOROLAC TROMETHAMINE 30 MG: 30 INJECTION, SOLUTION INTRAMUSCULAR; INTRAVENOUS at 18:04

## 2022-12-01 RX ADMIN — Medication 250 MILLI-UNITS/MIN: at 09:19

## 2022-12-01 RX ADMIN — EPHEDRINE SULFATE 10 MG: 50 INJECTION INTRAVENOUS at 09:36

## 2022-12-01 RX ADMIN — LABETALOL HYDROCHLORIDE 100 MG: 100 TABLET, FILM COATED ORAL at 00:42

## 2022-12-01 RX ADMIN — FAMOTIDINE 20 MG: 10 INJECTION INTRAVENOUS at 22:29

## 2022-12-01 RX ADMIN — SODIUM CHLORIDE, SODIUM LACTATE, POTASSIUM CHLORIDE, AND CALCIUM CHLORIDE: .6; .31; .03; .02 INJECTION, SOLUTION INTRAVENOUS at 09:00

## 2022-12-01 RX ADMIN — TRANEXAMIC ACID 1000 MG: 1 INJECTION, SOLUTION INTRAVENOUS at 09:19

## 2022-12-01 RX ADMIN — LABETALOL HYDROCHLORIDE 20 MG: 5 INJECTION, SOLUTION INTRAVENOUS at 08:05

## 2022-12-01 RX ADMIN — NALBUPHINE HYDROCHLORIDE 5 MG: 10 INJECTION, SOLUTION INTRAMUSCULAR; INTRAVENOUS; SUBCUTANEOUS at 13:54

## 2022-12-01 RX ADMIN — LABETALOL HYDROCHLORIDE 40 MG: 5 INJECTION, SOLUTION INTRAVENOUS at 05:47

## 2022-12-01 RX ADMIN — FENTANYL CITRATE 25 MCG: 50 INJECTION, SOLUTION INTRAMUSCULAR; INTRAVENOUS at 09:41

## 2022-12-01 NOTE — PLAN OF CARE
Problem: Potential for Falls  Goal: Patient will remain free of falls  Description: INTERVENTIONS:  - Educate patient/family on patient safety including physical limitations  - Instruct patient to call for assistance with activity   - Consult OT/PT to assist with strengthening/mobility   - Keep Call bell within reach  - Keep bed low and locked with side rails adjusted as appropriate  - Keep care items and personal belongings within reach  - Initiate and maintain comfort rounds  - Make Fall Risk Sign visible to staff  - Apply yellow socks and bracelet for high fall risk patients  - Consider moving patient to room near nurses station  Outcome: Progressing     Problem: PAIN - ADULT  Goal: Verbalizes/displays adequate comfort level or baseline comfort level  Description: Interventions:  - Encourage patient to monitor pain and request assistance  - Assess pain using appropriate pain scale  - Administer analgesics based on type and severity of pain and evaluate response  - Implement non-pharmacological measures as appropriate and evaluate response  - Consider cultural and social influences on pain and pain management  - Notify physician/advanced practitioner if interventions unsuccessful or patient reports new pain  Outcome: Progressing     Problem: INFECTION - ADULT  Goal: Absence or prevention of progression during hospitalization  Description: INTERVENTIONS:  - Assess and monitor for signs and symptoms of infection  - Monitor lab/diagnostic results  - Monitor all insertion sites, i e  indwelling lines, tubes, and drains  - Monitor endotracheal if appropriate and nasal secretions for changes in amount and color  - [place] appropriate cooling/warming therapies per order  - Administer medications as ordered  - Instruct and encourage patient and family to use good hand hygiene technique  - Identify and instruct in appropriate isolation precautions for identified infection/condition  Outcome: Progressing  Goal: Absence of fever/infection during neutropenic period  Description: INTERVENTIONS:  - Monitor WBC    Outcome: Progressing     Problem: SAFETY ADULT  Goal: Patient will remain free of falls  Description: INTERVENTIONS:  - Educate patient/family on patient safety including physical limitations  - Instruct patient to call for assistance with activity   - Consult OT/PT to assist with strengthening/mobility   - Keep Call bell within reach  - Keep bed low and locked with side rails adjusted as appropriate  - Keep care items and personal belongings within reach  - Initiate and maintain comfort rounds  - Make Fall Risk Sign visible to staff  - Apply yellow socks and bracelet for high fall risk patients  - Consider moving patient to room near nurses station  Outcome: Progressing  Goal: Maintain or return to baseline ADL function  Description: INTERVENTIONS:  -  Assess patient's ability to carry out ADLs; assess patient's baseline for ADL function and identify physical deficits which impact ability to perform ADLs (bathing, care of mouth/teeth, toileting, grooming, dressing, etc )  - Assess/evaluate cause of self-care deficits   - Assess range of motion  - Assess patient's mobility; develop plan if impaired  - Assess patient's need for assistive devices and provide as appropriate  - Encourage maximum independence but intervene and supervise when necessary  - Involve family in performance of ADLs  - Assess for home care needs following discharge   - Consider OT consult to assist with ADL evaluation and planning for discharge  - Provide patient education as appropriate  Outcome: Progressing  Goal: Maintains/Returns to pre admission functional level  Description: INTERVENTIONS:  - Perform BMAT or MOVE assessment daily    - Set and communicate daily mobility goal to care team and patient/family/caregiver     - Collaborate with rehabilitation services on mobility goals if consulted  - Out of bed for toileting  - Record patient progress and toleration of activity level   Outcome: Progressing     Problem: Knowledge Deficit  Goal: Patient/family/caregiver demonstrates understanding of disease process, treatment plan, medications, and discharge instructions  Description: Complete learning assessment and assess knowledge base    Interventions:  - Provide teaching at level of understanding  - Provide teaching via preferred learning methods  Outcome: Progressing     Problem: ANTEPARTUM  Goal: Maintain pregnancy as long as maternal and/or fetal condition is stable  Description: INTERVENTIONS:  - Maternal surveillance  - Fetal surveillance  - Monitor uterine activity  - Medications as ordered  - Bedrest  Outcome: Progressing

## 2022-12-01 NOTE — ANESTHESIA PROCEDURE NOTES
Spinal Block    Patient location during procedure: OB  Start time: 12/1/2022 8:54 AM  Reason for block: primary anesthetic  Staffing  Performed: CRNA   Anesthesiologist: Karen Mojica DO  Resident/CRNA: Ej Harper CRNA  Preanesthetic Checklist  Completed: patient identified, IV checked, site marked, risks and benefits discussed, surgical consent, monitors and equipment checked, pre-op evaluation and timeout performed  Spinal Block  Patient position: sitting  Prep: ChloraPrep  Patient monitoring: cardiac monitor, frequent blood pressure checks, continuous pulse ox and heart rate  Approach: midline  Location: L3-4  Injection technique: single-shot  Needle  Needle type: pencil-tip   Needle gauge: 25 G  Needle length: 10 cm  Assessment  Sensory level: T4  Injection Assessment:  negative aspiration for heme, no paresthesia on injection and positive aspiration for clear CSF    Post-procedure:  site cleaned

## 2022-12-01 NOTE — PLAN OF CARE
Problem: Potential for Falls  Goal: Patient will remain free of falls  Description: INTERVENTIONS:  - Educate patient/family on patient safety including physical limitations  - Instruct patient to call for assistance with activity   - Consult OT/PT to assist with strengthening/mobility   - Keep Call bell within reach  - Keep bed low and locked with side rails adjusted as appropriate  - Keep care items and personal belongings within reach  - Initiate and maintain comfort rounds  - Make Fall Risk Sign visible to staff  - Apply yellow socks and bracelet for high fall risk patients  - Consider moving patient to room near nurses station  Outcome: Progressing     Problem: PAIN - ADULT  Goal: Verbalizes/displays adequate comfort level or baseline comfort level  Description: Interventions:  - Encourage patient to monitor pain and request assistance  - Assess pain using appropriate pain scale  - Administer analgesics based on type and severity of pain and evaluate response  - Implement non-pharmacological measures as appropriate and evaluate response  - Consider cultural and social influences on pain and pain management  - Notify physician/advanced practitioner if interventions unsuccessful or patient reports new pain  Outcome: Progressing     Problem: INFECTION - ADULT  Goal: Absence or prevention of progression during hospitalization  Description: INTERVENTIONS:  - Assess and monitor for signs and symptoms of infection  - Monitor lab/diagnostic results  - Monitor all insertion sites, i e  indwelling lines, tubes, and drains  - Monitor endotracheal if appropriate and nasal secretions for changes in amount and color  - [place] appropriate cooling/warming therapies per order  - Administer medications as ordered  - Instruct and encourage patient and family to use good hand hygiene technique  - Identify and instruct in appropriate isolation precautions for identified infection/condition  Outcome: Progressing  Goal: Absence of fever/infection during neutropenic period  Description: INTERVENTIONS:  - Monitor WBC    Outcome: Progressing     Problem: SAFETY ADULT  Goal: Patient will remain free of falls  Description: INTERVENTIONS:  - Educate patient/family on patient safety including physical limitations  - Instruct patient to call for assistance with activity   - Consult OT/PT to assist with strengthening/mobility   - Keep Call bell within reach  - Keep bed low and locked with side rails adjusted as appropriate  - Keep care items and personal belongings within reach  - Initiate and maintain comfort rounds  - Make Fall Risk Sign visible to staff  - Apply yellow socks and bracelet for high fall risk patients  - Consider moving patient to room near nurses station  Outcome: Progressing  Goal: Maintain or return to baseline ADL function  Description: INTERVENTIONS:  -  Assess patient's ability to carry out ADLs; assess patient's baseline for ADL function and identify physical deficits which impact ability to perform ADLs (bathing, care of mouth/teeth, toileting, grooming, dressing, etc )  - Assess/evaluate cause of self-care deficits   - Assess range of motion  - Assess patient's mobility; develop plan if impaired  - Assess patient's need for assistive devices and provide as appropriate  - Encourage maximum independence but intervene and supervise when necessary  - Involve family in performance of ADLs  - Assess for home care needs following discharge   - Consider OT consult to assist with ADL evaluation and planning for discharge  - Provide patient education as appropriate  Outcome: Progressing  Goal: Maintains/Returns to pre admission functional level  Description: INTERVENTIONS:  - Perform BMAT or MOVE assessment daily    - Set and communicate daily mobility goal to care team and patient/family/caregiver     - Collaborate with rehabilitation services on mobility goals if consulted  - Out of bed for toileting  - Record patient progress and toleration of activity level   Outcome: Progressing     Problem: Knowledge Deficit  Goal: Patient/family/caregiver demonstrates understanding of disease process, treatment plan, medications, and discharge instructions  Description: Complete learning assessment and assess knowledge base    Interventions:  - Provide teaching at level of understanding  - Provide teaching via preferred learning methods  Outcome: Progressing     Problem: ANTEPARTUM  Goal: Maintain pregnancy as long as maternal and/or fetal condition is stable  Description: INTERVENTIONS:  - Maternal surveillance  - Fetal surveillance  - Monitor uterine activity  - Medications as ordered  - Bedrest  Outcome: Completed     Problem: POSTPARTUM  Goal: Experiences normal postpartum course  Description: INTERVENTIONS:  - Monitor maternal vital signs  - Assess uterine involution and lochia  Outcome: Progressing  Goal: Appropriate maternal -  bonding  Description: INTERVENTIONS:  - Identify family support  - Assess for appropriate maternal/infant bonding   -Encourage maternal/infant bonding opportunities  - Referral to  or  as needed  Outcome: Progressing  Goal: Establishment of infant feeding pattern  Description: INTERVENTIONS:  - Assess breast/bottle feeding  - Refer to lactation as needed  Outcome: Progressing  Goal: Incision(s), wounds(s) or drain site(s) healing without S/S of infection  Description: INTERVENTIONS  - Assess and document dressing, incision, wound bed, drain sites and surrounding tissue  - Provide patient and family education    Outcome: Progressing

## 2022-12-01 NOTE — QUICK NOTE
OB Hospitalist on Call    /72   Pulse 58   Temp 97 8 °F (36 6 °C) (Oral)   Resp 16   Ht 5' 2" (1 575 m)   Wt 77 6 kg (171 lb)   LMP 03/31/2022   SpO2 98%   BMI 31 28 kg/m²     After Labetalol 20mg IV and 40mg IV, BP has come down to acceptable range  Magnesium sulfate 4g bolus given and will continue with 2g/hour  Morning labs:    12/1/22 0434 11/30/22 1112 11/29/22 2159 11/29/22 0912 11/29/22 0322 10/27/22 1351 6/10/20 2145     Sodium 135 - 147 mmol/L 135  130 Low   132 Low   132 Low   134 Low   133 Low   133 Low  R    Potassium 3 5 - 5 3 mmol/L 4 4  4 5  4 2  4 1  4 1  3 6  3 6    Chloride 96 - 108 mmol/L 106  104  103  102  104  105  103 R    CO2 21 - 32 mmol/L 21  18 Low   20 Low   21  22  22  24    ANION GAP 4 - 13 mmol/L 8  8  9  9  8  6  6    BUN 5 - 25 mg/dL 13  13  14  12  13  9  11    Creatinine 0 60 - 1 30 mg/dL 0 60  0 78 CM  0 71 CM  0 65 CM  0 61 CM  0 50 Low  CM  0 94 CM    Comment: Standardized to IDMS reference method   Glucose 65 - 140 mg/dL 151 High   232 High  CM  172 High  CM  148 High  CM  126 CM   118 CM    Comment: If the patient is fasting, the ADA then defines impaired fasting glucose as > 100 mg/dL and diabetes as > or equal to 123 mg/dL  Specimen collection should occur prior to Sulfasalazine administration due to the potential for falsely depressed results  Specimen collection should occur prior to Sulfapyridine administration due to the potential for falsely elevated results  Calcium 8 3 - 10 1 mg/dL 7 4 Low   6 8 Low   7 1 Low   8 0 Low   9 3  9 0  6 9 Low     Corrected Calcium 8 3 - 10 1 mg/dL 8 8  8 3  8 5  9 4  10 5 High   10 1     AST 5 - 45 U/L 17  28 CM  38 CM  54 High  CM  42 CM  8 CM  25 CM    Comment: Specimen collection should occur prior to Sulfasalazine administration due to the potential for falsely depressed results      ALT 12 - 78 U/L 28  30 CM  42 CM  44 CM  40 CM  15 CM  17 CM    Comment: Specimen collection should occur prior to Sulfasalazine administration due to the potential for falsely depressed results  Alkaline Phosphatase 46 - 116 U/L 113  127 High   135 High   139 High   151 High   98  136 High     Total Protein 6 4 - 8 4 g/dL 6 3 Low   6 5  6 8  6 9  7 3  7 0  6 0 Low  R    Albumin 3 5 - 5 0 g/dL 2 2 Low   2 1 Low   2 3 Low   2 3 Low   2 5 Low   2 6 Low   2 3 Low     Total Bilirubin 0 20 - 1 00 mg/dL 0 10 Low   0 20 CM  0 20 CM  0 20 CM  0 10 Low  CM  0 24 CM  0 16 Low  CM    Comment: Use of this assay is not recommended for patients undergoing treatment with eltrombopag due to the potential for falsely elevated results     eGFR ml/min/1 73sq m 114  95  106  111        12/1/22 0434 11/30/22 1303 11/29/22 2159 11/29/22 0912 11/29/22 0322 10/27/22 1351 5/26/22 0946     WBC 4 31 - 10 16 Thousand/uL 12 42 High  P  8 69  7 91  12 58 High   8 34  8 72  5 62    RBC 3 81 - 5 12 Million/uL 4 16 P  4 26  4 55  4 68  5 00  4 80  4 67    Hemoglobin 11 5 - 15 4 g/dL 9 4 Low  P  9 4 Low   10 1 Low   10 3 Low   11 0 Low   10 9 Low   7 6 Low     Hematocrit 34 8 - 46 1 % 32 1 Low  P  32 4 Low   34 2 Low   34 3 Low   37 0  36 6  28 9 Low     MCV 82 - 98 fL 77 Low  P  76 Low   75 Low   73 Low   74 Low   76 Low   62 Low     MCH 26 8 - 34 3 pg 22 6 Low  P  22 1 Low   22 2 Low   22 0 Low   22 0 Low   22 7 Low   16 3 Low     MCHC 31 4 - 37 4 g/dL 29 3 Low  P  29 0 Low   29 5 Low   30 0 Low   29 7 Low   29 8 Low   26 3 Low     RDW 11 6 - 15 1 % 20 5 High  P  20 6 High   20 6 High   19 9 High   19 9 High   26 2 High   21 3 High     MPV 8 9 - 12 7 fL 9 2 P  9 1  9 1  9 3  9 0  8 7 Low   8 8 Low     Platelets 843 - 814 Thousands/uL 401 High  P  454 High   457 High   458 High   500 High   413 High   464 High     nRBC

## 2022-12-01 NOTE — PROGRESS NOTES
Assumed care of patient from Dr Sangeetha Koo  She is a 39 yo  at 33 6w with severe pre-eclampsia  Delivery indicated for severe uncontrolled Bps  Mode of delivery is  as baby is breech  She has signed consent and declines tubal ligation  Will proceed to OR

## 2022-12-01 NOTE — NURSING NOTE
Patient heart rate all shift has been consistently & asymptomatically below usual parameters for continuous pulse ox alarm (set to alarm at 50 or below), so discussed and ok'd to lower parameters to alarm when patient heart rate reaches 40 per Dr Fifi Galindo

## 2022-12-01 NOTE — ANESTHESIA PREPROCEDURE EVALUATION
Procedure:   SECTION () (Uterus)    Relevant Problems   CARDIO   (+) Preeclampsia, severe, third trimester      GYN   (+) 33 weeks gestation of pregnancy   (+) Advanced maternal age in multigravida, third trimester      HEMATOLOGY   (+) Anemia affecting pregnancy in third trimester      NEURO/PSYCH   (+) History of insulin controlled gestational diabetes mellitus (GDM)   (+) History of postpartum hemorrhage, currently pregnant   (+) History of severe pre-eclampsia        Physical Exam    Airway    Mallampati score: II  TM Distance: >3 FB  Neck ROM: full     Dental   No notable dental hx     Cardiovascular      Pulmonary      Other Findings        Anesthesia Plan  ASA Score- 2     Anesthesia Type- spinal with ASA Monitors  Additional Monitors:   Airway Plan:           Plan Factors-    Chart reviewed  Existing labs reviewed  Patient summary reviewed  Patient is not a current smoker  Induction-     Postoperative Plan-     Informed Consent- Anesthetic plan and risks discussed with patient  I personally reviewed this patient with the CRNA  Discussed and agreed on the Anesthesia Plan with the CRNA  [place]

## 2022-12-01 NOTE — ANESTHESIA POSTPROCEDURE EVALUATION
Post-Op Assessment Note    CV Status:  Stable  Pain Score: 0    Pain management: adequate     Mental Status:  Alert and awake   Hydration Status:  Euvolemic   PONV Controlled:  Controlled   Airway Patency:  Patent      Post Op Vitals Reviewed: Yes      Staff: CRNA         No notable events documented      /93 (12/01/22 1010)    Temp  per pacu   Pulse 61 (12/01/22 1010)   Resp 18 (12/01/22 1010)    SpO2 99 % (12/01/22 1010)

## 2022-12-01 NOTE — QUICK NOTE
OB Hospitalist on Call    Bedside US performed prior to placement of zuñiga bulb - baby found to be in oblique position with head in maternal RUQ  Due to pre-eclampsia with severe features and 33W6D gestation, not candidate for version  Will proceed with primary c/s later this AM     Elevated Bps in severe range this AM - treated with labetalol 20mg IV and will now restart Magnesium sulfate  Explained to patient via her partner as   All questions answered and patient and partner in agreement with this plan  BP (!) 175/85 (BP Location: Right arm)   Pulse 55   Temp 97 8 °F (36 6 °C) (Oral)   Resp 16   Ht 5' 2" (1 575 m)   Wt 77 6 kg (171 lb)   LMP 03/31/2022   SpO2 98%   BMI 31 28 kg/m²     Jennifer Joe MD

## 2022-12-01 NOTE — OP NOTE
OPERATIVE REPORT  PATIENT NAME: Isabel Pandya    :  1980  MRN: [id number]  Pt Location:  L&D OR ROOM 01    SURGERY DATE: 2022    Surgeon(s) and Role:     * Lisa Pham MD - Primary     * William Flores MD - Assisting    Pre-operative Diagnosis:   33 6 weeks gestation of pregnancy  Pre-eclampsia affecting pregnancy, antepartum [O14 90]  Preeclampsia, severe, third trimester [O14 13]  Sheila Olsenusv 11 multiparity   History of postpartum hemorrhage  Advanced maternal age in multigravida  Anemia affecting pregnancy in third trimester    Post-operative Diagnosis: same, delivered    Procedure(s) (LRB):   SECTION () (N/A)    Specimen(s):  ID Type Source Tests Collected by Time Destination   A :  Cord Blood Cord BLOOD GAS, VENOUS, CORD, BLOOD GAS, ARTERIAL, CORD Lisa Pham MD 2022 0179        QBL: 258mL     Drains:  Urethral Catheter Double-lumen 16 Fr  (Active)   Number of days: 0       Anesthesia Type:   Spinal    Operative Findings:  1  Delivery of viable female on 22 at 26 [id number], weight 4lbs 2oz;  Apgar scores pending  Umbilical artery pH 8 581(DXCZ excess -1 9)  2  Normal appearing placenta with centrally-inserted 3 vessel cord  3  Clear amniotic fluid  4  Grossly normal uterus, tubes, and ovaries    Specimens:   1  Arterial and venous cord gases  2  Cord blood  4  Placenta to path             Total IV Fluids:700mL    UOP: [id number]       Complications:  None; patient tolerated the procedure well  Disposition: PACU     Procedure Details   Decision was made to proceed with  section due to severe pre-eclampsia and uncontrolled blood pressure  Patient was made aware of these findings and the proposed plan  Risks, benefits, possible complications, alternate treatment options, and expected outcomes were discussed with the patient  The patient agreed with the proposed plan and gave informed consent        The patient was taken to the operating room where she was properly identified to the OR staff and attending physician  She was given spinal anesthesia  Fetal heart tones were appreciated and found to be appropriate  A Howell catheter and SCDs were placed  The vagina was prepped with betadine and abdomen was prepped with Chloraprep  Following appropriate drying time, the patient was draped in the usual sterile manner for a Pfannenstiel incision  The patient had received Ancef 1g IV pre-operatively for prophylaxis  A Time Out was held and the above information confirmed  The patient was identified as Iris R [place] and the procedure verified as  Delivery  A Pfannenstiel incision was made and carried down through the underlying subcutaneous tissue to the fascia using a scalpel  Rectus fascia was then incised in the midline and extended laterally using Matthew scissors  The superior edge of the fascia was grasped with Kocher clamps, tented upward, and the underlying muscle was bluntly dissected off  The inferior edge was grasped with Kocher clamps and cleared in similar fashion  All anatomic layers were well-demarcated  The rectus muscles were  and the peritoneum was identified and subsequently entered and extended longitudinally with blunt dissection  The bladder blade was inserted  A low transverse uterine incision was made with the scalpel and extended laterally with bandage scissors  The amnion was entered sharply  Surgeons hand was inserted through the hysterotomy and the fetal breech was palpated, elevated, and delivered through the uterine incision  The infant was delivered atraumatically up to the shoulders, then the head was flexed and delivered without difficulty  Baby had spontaneous cry with good color and tone  The umbilical cord was clamped and cut after delay  The infant was handed off to the  providers  Arterial and venous cord gases and cord blood  were obtained for evaluation and promptly sent to the lab    The placenta delivered spontaneously with uterine fundal massage and was noted to have a centrally inserted 3 vessel cord  This was also sent to the lab for placental pathology  The uterus was exteriorized and a moist lap sponge was used to clear the cavity of clots and products of conception  The uterine incision was closed with a running locked suture of 0 Vicryl  A second layer of the same suture was used to imbricate the first   Good hemostasis was confirmed upon uterine closure  The uterus was returned to the abdomen  The paracolic gutters were inspected and cleared of all clots and debris with moist lap sponges  The fascia was closed with a running suture of 0 Vicryl  Subcutaneous tissues were closed with 2-0 Vicryl suture  The skin was closed with 4-0 Monocryl in a subcuticular fashion  Sterile dressing was applied  At the conclusion of the procedure, all needle, sponge, and instrument counts were noted to be correct x2  The patient tolerated the procedure well and was transferred to her the recovery room in stable condition         Patient Disposition:  hemodynamically stable        SIGNATURE: Xiao Soto MD  DATE: December 1, 2022  TIME: 10:47 AM

## 2022-12-01 NOTE — QUICK NOTE
OB Hospitalist on Call    Called by Beulah Del Cid RN for patient /94  Last dose of IV labetalol over 2 hours ago  Will treat with Labetalol 20mg IV now  For primary c/s at 0830  Jennifer CHIANG   [facility] MD  OB/GYN Hospitalist  [phone]  12/1/2022   7:59 AM

## 2022-12-01 NOTE — LACTATION NOTE
Met with mother and family to discuss feeding plan  Mother would like to provide breast milk to her baby who was admitted into the NICU for prematurity  The Ready, Set, Baby Booklet was discussed  Discussed importance of skin to skin to help with milk production as well as stabilize temperature, blood sugars, decrease pain, promote relaxation, and calm the baby as well as for bonding that father may do as well  Showed images of tummy size progression as milk production increases to meet the nutritional/growing needs of the baby  Dicussed milk storage, pumping, cycling and hand expression during the encounter  Demonstrated on breast model how to hold, compress and perform hand expression  Addressed breast pump needs and after discussing with mother, she decided on the Spectra S2 breast pump for home use  Case management consult will be placed  Mother was set up with pumping and assisted with hand expression afterward  Mother was able to to pump and hand express 4 ml that will be taken to the NICU for baby  How to clean the pump parts was demonstrated and performed for mother  Mother was provided with syringes and a medication cup to use for hand expression and colostrum collection  Mother was given a pumping log to keep track of pumping sessions and information on importance of frequently pumping throughout the day to protect and maintain a good milk supply  Mother was made aware of how to communicate with lactation and encouraged to reach out for continued support and/or questions that arise

## 2022-12-02 ENCOUNTER — TELEPHONE (OUTPATIENT)
Dept: PERINATAL CARE | Facility: CLINIC | Age: 42
End: 2022-12-02

## 2022-12-02 DIAGNOSIS — Z13.1 DIABETES MELLITUS SCREENING: Primary | ICD-10-CM

## 2022-12-02 DIAGNOSIS — Z86.32 HISTORY OF GESTATIONAL DIABETES MELLITUS, NOT CURRENTLY PREGNANT: ICD-10-CM

## 2022-12-02 LAB
ALBUMIN SERPL BCP-MCNC: 2 G/DL (ref 3.5–5)
ALBUMIN SERPL BCP-MCNC: 2.1 G/DL (ref 3.5–5)
ALP SERPL-CCNC: 107 U/L (ref 46–116)
ALP SERPL-CCNC: 94 U/L (ref 46–116)
ALT SERPL W P-5'-P-CCNC: 45 U/L (ref 12–78)
ALT SERPL W P-5'-P-CCNC: 46 U/L (ref 12–78)
ANION GAP SERPL CALCULATED.3IONS-SCNC: 5 MMOL/L (ref 4–13)
ANION GAP SERPL CALCULATED.3IONS-SCNC: 7 MMOL/L (ref 4–13)
AST SERPL W P-5'-P-CCNC: 29 U/L (ref 5–45)
AST SERPL W P-5'-P-CCNC: 37 U/L (ref 5–45)
BILIRUB SERPL-MCNC: 0.1 MG/DL (ref 0.2–1)
BILIRUB SERPL-MCNC: 0.1 MG/DL (ref 0.2–1)
BUN SERPL-MCNC: 12 MG/DL (ref 5–25)
BUN SERPL-MCNC: 16 MG/DL (ref 5–25)
CALCIUM ALBUM COR SERPL-MCNC: 7.9 MG/DL (ref 8.3–10.1)
CALCIUM ALBUM COR SERPL-MCNC: 8.6 MG/DL (ref 8.3–10.1)
CALCIUM SERPL-MCNC: 6.4 MG/DL (ref 8.3–10.1)
CALCIUM SERPL-MCNC: 7 MG/DL (ref 8.3–10.1)
CHLORIDE SERPL-SCNC: 100 MMOL/L (ref 96–108)
CHLORIDE SERPL-SCNC: 103 MMOL/L (ref 96–108)
CO2 SERPL-SCNC: 26 MMOL/L (ref 21–32)
CO2 SERPL-SCNC: 28 MMOL/L (ref 21–32)
CREAT SERPL-MCNC: 0.53 MG/DL (ref 0.6–1.3)
CREAT SERPL-MCNC: 0.66 MG/DL (ref 0.6–1.3)
ERYTHROCYTE [DISTWIDTH] IN BLOOD BY AUTOMATED COUNT: 20.5 % (ref 11.6–15.1)
ERYTHROCYTE [DISTWIDTH] IN BLOOD BY AUTOMATED COUNT: 20.6 % (ref 11.6–15.1)
GFR SERPL CREATININE-BSD FRML MDRD: 110 ML/MIN/1.73SQ M
GFR SERPL CREATININE-BSD FRML MDRD: 119 ML/MIN/1.73SQ M
GLUCOSE SERPL-MCNC: 101 MG/DL (ref 65–140)
GLUCOSE SERPL-MCNC: 124 MG/DL (ref 65–140)
GLUCOSE SERPL-MCNC: 134 MG/DL (ref 65–140)
GLUCOSE SERPL-MCNC: 135 MG/DL (ref 65–140)
HCT VFR BLD AUTO: 28.9 % (ref 34.8–46.1)
HCT VFR BLD AUTO: 29.1 % (ref 34.8–46.1)
HGB BLD-MCNC: 8.5 G/DL (ref 11.5–15.4)
HGB BLD-MCNC: 8.6 G/DL (ref 11.5–15.4)
MAGNESIUM SERPL-MCNC: 3.1 MG/DL (ref 1.6–2.6)
MAGNESIUM SERPL-MCNC: 5.9 MG/DL (ref 1.6–2.6)
MCH RBC QN AUTO: 22.5 PG (ref 26.8–34.3)
MCH RBC QN AUTO: 22.8 PG (ref 26.8–34.3)
MCHC RBC AUTO-ENTMCNC: 29.4 G/DL (ref 31.4–37.4)
MCHC RBC AUTO-ENTMCNC: 29.6 G/DL (ref 31.4–37.4)
MCV RBC AUTO: 77 FL (ref 82–98)
MCV RBC AUTO: 77 FL (ref 82–98)
PLATELET # BLD AUTO: 339 THOUSANDS/UL (ref 149–390)
PLATELET # BLD AUTO: 373 THOUSANDS/UL (ref 149–390)
PMV BLD AUTO: 9 FL (ref 8.9–12.7)
PMV BLD AUTO: 9.2 FL (ref 8.9–12.7)
POTASSIUM SERPL-SCNC: 4.1 MMOL/L (ref 3.5–5.3)
POTASSIUM SERPL-SCNC: 4.3 MMOL/L (ref 3.5–5.3)
PROT SERPL-MCNC: 5.9 G/DL (ref 6.4–8.4)
PROT SERPL-MCNC: 6.2 G/DL (ref 6.4–8.4)
RBC # BLD AUTO: 3.77 MILLION/UL (ref 3.81–5.12)
RBC # BLD AUTO: 3.78 MILLION/UL (ref 3.81–5.12)
SODIUM SERPL-SCNC: 133 MMOL/L (ref 135–147)
SODIUM SERPL-SCNC: 136 MMOL/L (ref 135–147)
WBC # BLD AUTO: 10.2 THOUSAND/UL (ref 4.31–10.16)
WBC # BLD AUTO: 11.7 THOUSAND/UL (ref 4.31–10.16)

## 2022-12-02 RX ORDER — FAMOTIDINE 20 MG/1
20 TABLET, FILM COATED ORAL 2 TIMES DAILY
Status: DISCONTINUED | OUTPATIENT
Start: 2022-12-02 | End: 2022-12-05 | Stop reason: HOSPADM

## 2022-12-02 RX ADMIN — KETOROLAC TROMETHAMINE 30 MG: 30 INJECTION, SOLUTION INTRAMUSCULAR; INTRAVENOUS at 00:17

## 2022-12-02 RX ADMIN — ACETAMINOPHEN 650 MG: 325 TABLET, FILM COATED ORAL at 00:21

## 2022-12-02 RX ADMIN — KETOROLAC TROMETHAMINE 30 MG: 30 INJECTION, SOLUTION INTRAMUSCULAR; INTRAVENOUS at 18:07

## 2022-12-02 RX ADMIN — FAMOTIDINE 20 MG: 20 TABLET, FILM COATED ORAL at 18:06

## 2022-12-02 RX ADMIN — DOCUSATE SODIUM 100 MG: 100 CAPSULE, LIQUID FILLED ORAL at 18:06

## 2022-12-02 RX ADMIN — SENNOSIDES 8.6 MG: 8.6 TABLET, FILM COATED ORAL at 08:03

## 2022-12-02 RX ADMIN — ACETAMINOPHEN 650 MG: 325 TABLET, FILM COATED ORAL at 18:06

## 2022-12-02 RX ADMIN — KETOROLAC TROMETHAMINE 30 MG: 30 INJECTION, SOLUTION INTRAMUSCULAR; INTRAVENOUS at 06:07

## 2022-12-02 RX ADMIN — ACETAMINOPHEN 650 MG: 325 TABLET, FILM COATED ORAL at 11:46

## 2022-12-02 RX ADMIN — ACETAMINOPHEN 650 MG: 325 TABLET, FILM COATED ORAL at 06:12

## 2022-12-02 RX ADMIN — KETOROLAC TROMETHAMINE 30 MG: 30 INJECTION, SOLUTION INTRAMUSCULAR; INTRAVENOUS at 11:46

## 2022-12-02 RX ADMIN — NIFEDIPINE 30 MG: 30 TABLET, FILM COATED, EXTENDED RELEASE ORAL at 08:03

## 2022-12-02 RX ADMIN — SODIUM CHLORIDE 100 MG: 9 INJECTION, SOLUTION INTRAVENOUS at 16:06

## 2022-12-02 RX ADMIN — DOCUSATE SODIUM 100 MG: 100 CAPSULE, LIQUID FILLED ORAL at 08:03

## 2022-12-02 RX ADMIN — FAMOTIDINE 20 MG: 10 INJECTION INTRAVENOUS at 08:03

## 2022-12-02 RX ADMIN — MAGNESIUM SULFATE HEPTAHYDRATE 2 G/HR: 40 INJECTION, SOLUTION INTRAVENOUS at 02:40

## 2022-12-02 NOTE — PLAN OF CARE
Problem: Potential for Falls  Goal: Patient will remain free of falls  Description: INTERVENTIONS:  - Educate patient/family on patient safety including physical limitations  - Instruct patient to call for assistance with activity   - Consult OT/PT to assist with strengthening/mobility   - Keep Call bell within reach  - Keep bed low and locked with side rails adjusted as appropriate  - Keep care items and personal belongings within reach  - Initiate and maintain comfort rounds    Outcome: Progressing     Problem: PAIN - ADULT  Goal: Verbalizes/displays adequate comfort level or baseline comfort level  Description: Interventions:  - Encourage patient to monitor pain and request assistance  - Assess pain using appropriate pain scale  - Administer analgesics based on type and severity of pain and evaluate response  - Implement non-pharmacological measures as appropriate and evaluate response  - Consider cultural and social influences on pain and pain management  - Notify physician/advanced practitioner if interventions unsuccessful or patient reports new pain  Outcome: Progressing     Problem: INFECTION - ADULT  Goal: Absence or prevention of progression during hospitalization  Description: INTERVENTIONS:  - Assess and monitor for signs and symptoms of infection  - Monitor lab/diagnostic results  - Monitor all insertion sites, i e  indwelling lines, tubes, and drains  - Monitor endotracheal if appropriate and nasal secretions for changes in amount and color  - [place] appropriate cooling/warming therapies per order  - Administer medications as ordered  - Instruct and encourage patient and family to use good hand hygiene technique  - Identify and instruct in appropriate isolation precautions for identified infection/condition  Outcome: Progressing  Goal: Absence of fever/infection during neutropenic period  Description: INTERVENTIONS:  - Monitor WBC    Outcome: Progressing     Problem: SAFETY ADULT  Goal: Patient will remain free of falls  Description: INTERVENTIONS:  - Educate patient/family on patient safety including physical limitations  - Instruct patient to call for assistance with activity   - Consult OT/PT to assist with strengthening/mobility   - Keep Call bell within reach  - Keep bed low and locked with side rails adjusted as appropriate  - Keep care items and personal belongings within reach  - Initiate and maintain comfort rounds      Outcome: Progressing  Goal: Maintain or return to baseline ADL function  Description: INTERVENTIONS:  -  Assess patient's ability to carry out ADLs; assess patient's baseline for ADL function and identify physical deficits which impact ability to perform ADLs (bathing, care of mouth/teeth, toileting, grooming, dressing, etc )  - Assess/evaluate cause of self-care deficits   - Assess range of motion  - Assess patient's mobility; develop plan if impaired  - Assess patient's need for assistive devices and provide as appropriate  - Encourage maximum independence but intervene and supervise when necessary  - Involve family in performance of ADLs  - Assess for home care needs following discharge   - Consider OT consult to assist with ADL evaluation and planning for discharge  - Provide patient education as appropriate  Outcome: Progressing  Goal: Maintains/Returns to pre admission functional level  Description: INTERVENTIONS:  - Perform BMAT or MOVE assessment daily    - Set and communicate daily mobility goal to care team and patient/family/caregiver  - Collaborate with rehabilitation services on mobility goals if consulted    Outcome: Progressing     Problem: Knowledge Deficit  Goal: Patient/family/caregiver demonstrates understanding of disease process, treatment plan, medications, and discharge instructions  Description: Complete learning assessment and assess knowledge base    Interventions:  - Provide teaching at level of understanding  - Provide teaching via preferred learning methods  Outcome: Progressing     Problem: POSTPARTUM  Goal: Experiences normal postpartum course  Description: INTERVENTIONS:  - Monitor maternal vital signs  - Assess uterine involution and lochia  Outcome: Progressing  Goal: Appropriate maternal -  bonding  Description: INTERVENTIONS:  - Identify family support  - Assess for appropriate maternal/infant bonding   -Encourage maternal/infant bonding opportunities  - Referral to  or  as needed  Outcome: Progressing  Goal: Establishment of infant feeding pattern  Description: INTERVENTIONS:  - Assess breast/bottle feeding  - Refer to lactation as needed  Outcome: Progressing  Goal: Incision(s), wounds(s) or drain site(s) healing without S/S of infection  Description: INTERVENTIONS  - Assess and document dressing, incision, wound bed, drain sites and surrounding tissue  - Provide patient and family education    Outcome: Progressing

## 2022-12-02 NOTE — PROGRESS NOTES
Progress Note - OB/GYN  Post-Partum Physician Note   Thomasina Spatz Whitley Herd 38 y o  female MRN: [id number]  Unit/Bed#: -01 Encounter: [id number]    Patient is postpartum day 1 from Primary  section for breech, pre-eclampsia with severe features        Pain: no  Tolerating Oral Intake: yes, sitting in bed eating pancake  Voiding: yes  Flatus: yes  Bowel Movement: no  Ambulating: yes;  Just coming off magnesium sulfate  Breastfeeding: Using breast pump  Chest Pain: no  Shortness of Breath: no  Leg Pain/Discomfort: no  Lochia: minimal  Other: patient feeling fatigued from magnesium    Objective:   Vitals:    22 0915   BP:    Pulse:    Resp:    Temp:    SpO2: 93%   139/75    Intake/Output Summary (Last 24 hours) at 2022 1040  Last data filed at 2022 0122  Gross per 24 hour   Intake 536 67 ml   Output 2333 ml   Net -1796 33 ml       Physical Exam:  General: in no apparent distress, well developed and well nourished, non-toxic, in no respiratory distress and acyanotic, alert, oriented times 3 and cooperative  Cardiovascular: Cor RRR  Lungs: clear to auscultation bilaterally  Abdomen: abdomen is soft without significant tenderness, masses, organomegaly or guarding  Fundus: Firm and appropriately tender to palpation, 1 below the umbilicus  Lower extremeties: nontender  Other: +1 edema lower extremities, no clonus, DTRs 2+    Labs/Tests:    Latest Reference Range & Units 22 08:22   Sodium 135 - 147 mmol/L 133 (L)   Potassium 3 5 - 5 3 mmol/L 4 3   Chloride 96 - 108 mmol/L 100   CO2 21 - 32 mmol/L 28   Anion Gap 4 - 13 mmol/L 5   BUN 5 - 25 mg/dL 12   Creatinine 0 60 - 1 30 mg/dL 0 53 (L)   Glucose, Random 65 - 140 mg/dL 101   Calcium 8 3 - 10 1 mg/dL 6 4 (L)   CORRECTED CALCIUM 8 3 - 10 1 mg/dL 7 9 (L)   AST 5 - 45 U/L 37   ALT 12 - 78 U/L 45   Alkaline Phosphatase 46 - 116 U/L 94   Total Protein 6 4 - 8 4 g/dL 5 9 (L)   Albumin 3 5 - 5 0 g/dL 2 1 (L)   TOTAL BILIRUBIN 0 20 - 1 00 mg/dL 0 10 (L)   eGFR ml/min/1 73sq m 119   Magnesium 1 6 - 2 6 mg/dL 5 9 (HH)   WBC 4 31 - 10 16 Thousand/uL 10 20 (H)   Red Blood Cell Count 3 81 - 5 12 Million/uL 3 78 (L)   Hemoglobin 11 5 - 15 4 g/dL 8 5 (L)   HCT 34 8 - 46 1 % 28 9 (L)   MCV 82 - 98 fL 77 (L)   MCH 26 8 - 34 3 pg 22 5 (L)   MCHC 31 4 - 37 4 g/dL 29 4 (L)   RDW 11 6 - 15 1 % 20 5 (H)   Platelet Count 002 - 390 Thousands/uL 339   MPV 8 9 - 12 7 fL 9 0   (HH): Data is critically high  (L): Data is abnormally low  (H): Data is abnormally high    MEDS:   Current Facility-Administered Medications   Medication Dose Route Frequency   • acetaminophen (TYLENOL) tablet 650 mg  650 mg Oral Q6H [address]   • aluminum-magnesium hydroxide-simethicone (MYLANTA) oral suspension 30 mL  30 mL Oral Q4H PRN   • ceFAZolin (ANCEF) IVPB (premix in dextrose) 1,000 mg 50 mL  1,000 mg Intravenous Once   • diphenhydrAMINE (BENADRYL) injection 25 mg  25 mg Intravenous Q6H PRN   • docusate sodium (COLACE) capsule 100 mg  100 mg Oral BID   • famotidine (PEPCID) tablet 20 mg  20 mg Oral BID   • fentaNYL (SUBLIMAZE) injection 25 mcg  25 mcg Intravenous Q5 Min PRN   • ketorolac (TORADOL) injection 30 mg  30 mg Intravenous Q6H [address]    Followed by   • [START ON 12/3/2022] ibuprofen (MOTRIN) tablet 600 mg  600 mg Oral Q6H   • NIFEdipine (PROCARDIA XL) 24 hr tablet 30 mg  30 mg Oral Daily   • ondansetron (ZOFRAN) injection 4 mg  4 mg Intravenous Q8H PRN   • ondansetron (ZOFRAN) injection 4 mg  4 mg Intravenous Once   • oxyCODONE (ROXICODONE) IR tablet 10 mg  10 mg Oral Q4H PRN   • oxyCODONE (ROXICODONE) IR tablet 5 mg  5 mg Oral Q4H PRN   • senna (SENOKOT) tablet 8 6 mg  1 tablet Oral Daily     Invasive Devices     Peripheral Intravenous Line  Duration           Peripheral IV 11/29/22 Distal;Dorsal (posterior); Right Forearm 2 days    Peripheral IV 12/01/22 Dorsal (posterior); Left Wrist 1 day                Assessment and Plan:  38y o  year-old [id number], postpartum day 1 status-post Primary Low Transverse  delivery for breech baby at 33W6D with pre-eclampsia with severe features    1  Postop day 1 c/s for breech- pain controlled, zuñiga out, patient voiding,  IVF medlocked    2  Pre-eclampsia with severe features - Bps stable on Procardia XL 30 mg started yesterday; Magnesium sulfate x 24 hours post delivery - will stop at 0915 today;  repeat Labs normal today,  Continue to check labs q 12 hours;  Continue to monitor fluid balance over next 24-48 hours;  Diuresis has begun and patient is negative for 1800cc in past 24 hours    3  Anemia - Hgb stable at 8 5, did not require blood transfusion; Received one dose of Venofer and will give another today    4  Gestational diabetes - A1 vs A2 - fasting glucose this am 101; Will check 2 posprandial blood sugars today and if normal will d/c POC glucose     D/W patient and her partner at bedside with partner as  - all questions answered and patient in agreement with this plan    Alyse CHIANG   [facility] MD  OB/GYN Hospitalist  [phone]  2022  10:40 AM

## 2022-12-02 NOTE — PLAN OF CARE
Problem: Potential for Falls  Goal: Patient will remain free of falls  Description: INTERVENTIONS:  - Educate patient/family on patient safety including physical limitations  - Instruct patient to call for assistance with activity   - Consult OT/PT to assist with strengthening/mobility   - Keep Call bell within reach  - Keep bed low and locked with side rails adjusted as appropriate  - Keep care items and personal belongings within reach  - Initiate and maintain comfort rounds  - Make Fall Risk Sign visible to staff  - Offer Toileting every  Hours, in advance of need  - Initiate/Maintain alarm  - Obtain necessary fall risk management equipment:   - Apply yellow socks and bracelet for high fall risk patients  - Consider moving patient to room near nurses station  Outcome: Progressing     Problem: PAIN - ADULT  Goal: Verbalizes/displays adequate comfort level or baseline comfort level  Description: Interventions:  - Encourage patient to monitor pain and request assistance  - Assess pain using appropriate pain scale  - Administer analgesics based on type and severity of pain and evaluate response  - Implement non-pharmacological measures as appropriate and evaluate response  - Consider cultural and social influences on pain and pain management  - Notify physician/advanced practitioner if interventions unsuccessful or patient reports new pain  Outcome: Progressing     Problem: INFECTION - ADULT  Goal: Absence or prevention of progression during hospitalization  Description: INTERVENTIONS:  - Assess and monitor for signs and symptoms of infection  - Monitor lab/diagnostic results  - Monitor all insertion sites, i e  indwelling lines, tubes, and drains  - Monitor endotracheal if appropriate and nasal secretions for changes in amount and color  - [place] appropriate cooling/warming therapies per order  - Administer medications as ordered  - Instruct and encourage patient and family to use good hand hygiene technique  - Identify and instruct in appropriate isolation precautions for identified infection/condition  Outcome: Progressing  Goal: Absence of fever/infection during neutropenic period  Description: INTERVENTIONS:  - Monitor WBC    Outcome: Progressing     Problem: SAFETY ADULT  Goal: Patient will remain free of falls  Description: INTERVENTIONS:  - Educate patient/family on patient safety including physical limitations  - Instruct patient to call for assistance with activity   - Consult OT/PT to assist with strengthening/mobility   - Keep Call bell within reach  - Keep bed low and locked with side rails adjusted as appropriate  - Keep care items and personal belongings within reach  - Initiate and maintain comfort rounds  - Make Fall Risk Sign visible to staff  - Offer Toileting every  Hours, in advance of need  - Initiate/Maintain alarm  - Obtain necessary fall risk management equipment:   - Apply yellow socks and bracelet for high fall risk patients  - Consider moving patient to room near nurses station  Outcome: Progressing  Goal: Maintain or return to baseline ADL function  Description: INTERVENTIONS:  -  Assess patient's ability to carry out ADLs; assess patient's baseline for ADL function and identify physical deficits which impact ability to perform ADLs (bathing, care of mouth/teeth, toileting, grooming, dressing, etc )  - Assess/evaluate cause of self-care deficits   - Assess range of motion  - Assess patient's mobility; develop plan if impaired  - Assess patient's need for assistive devices and provide as appropriate  - Encourage maximum independence but intervene and supervise when necessary  - Involve family in performance of ADLs  - Assess for home care needs following discharge   - Consider OT consult to assist with ADL evaluation and planning for discharge  - Provide patient education as appropriate  Outcome: Progressing  Goal: Maintains/Returns to pre admission functional level  Description: INTERVENTIONS:  - Perform BMAT or MOVE assessment daily    - Set and communicate daily mobility goal to care team and patient/family/caregiver  - Collaborate with rehabilitation services on mobility goals if consulted  - Perform Range of Motion  times a day  - Reposition patient every  hours  - Dangle patient  times a day  - Stand patient  times a day  - Ambulate patient  times a day  - Out of bed to chair  times a day   - Out of bed for meals times a day  - Out of bed for toileting  - Record patient progress and toleration of activity level   Outcome: Progressing     Problem: Knowledge Deficit  Goal: Patient/family/caregiver demonstrates understanding of disease process, treatment plan, medications, and discharge instructions  Description: Complete learning assessment and assess knowledge base    Interventions:  - Provide teaching at level of understanding  - Provide teaching via preferred learning methods  Outcome: Progressing     Problem: POSTPARTUM  Goal: Experiences normal postpartum course  Description: INTERVENTIONS:  - Monitor maternal vital signs  - Assess uterine involution and lochia  Outcome: Progressing  Goal: Appropriate maternal -  bonding  Description: INTERVENTIONS:  - Identify family support  - Assess for appropriate maternal/infant bonding   -Encourage maternal/infant bonding opportunities  - Referral to  or  as needed  Outcome: Progressing  Goal: Establishment of infant feeding pattern  Description: INTERVENTIONS:  - Assess breast/bottle feeding  - Refer to lactation as needed  Outcome: Progressing  Goal: Incision(s), wounds(s) or drain site(s) healing without S/S of infection  Description: INTERVENTIONS  - Assess and document dressing, incision, wound bed, drain sites and surrounding tissue  - Provide patient and family education  - Perform skin care/dressing changes every  Outcome: Progressing

## 2022-12-02 NOTE — LACTATION NOTE
Met with mother to follow up on breast pump  Case management has not received work from [employer] for a breast pump  Mother was assisted in finding a breast pump at reduced price on [address] and will be ordering as back up  Discussed use of hand pump if mother is discharged and the double breast pump has not arrived home  Mother was also encouraged to use the breast pump at the bedside when possible and "room in" if it is a possibility for pump usage  Reviewed pump usage and assisted mother in collected expressed colostrum  Mother expressed 2 ml during this pumping/hand expression session  Encounter and education occurred in [facility], primary language of mother

## 2022-12-02 NOTE — CASE MANAGEMENT
Case Management Progress Note    Patient name Mable Calderon  Location /-48 MRN [id number]  : 1980 Date 2022       LOS (days): 3  Geometric Mean LOS (GMLOS) (days):   Days to GMLOS:        OBJECTIVE:        Current admission status: Inpatient  Preferred Pharmacy:   [address] [place] [address] #[id number] Claire Nguyen, [address] [address]  Phone: [phone] Fax: [phone]    Primary Care Provider: No primary care provider on file  Primary Insurance: [place] [employer]  Secondary Insurance:     PROGRESS NOTE:    Consult(s): Mother would like a Spectra S2 breast pump for home use  · Delivery method/date:   on 2022   · Age: Gestational age 32w10d  · Admitted to NICU for management of prematurity and respiratory distress  SW met w/MOB who provided the following information:      · Baby's name/gender: baby girl  · Mother of baby: Rodolfo Jimenez [phone]   · Father of baby//SO: Sunil Kirby [phone]  · Other Legal Guardian(s) for baby: no  · Alternate emergency contact: no  · Other children: yes, 21yo, 15yo, 13yo, 9yo, and 3yo  · Lives with: MOB and 4 children  · Support System: MOB reports family and friends are supportive  · Baby Supplies:  MOB reports having all baby supplies  · Bottle or Breast Feeding: both  · Breast Pump if breast feeding: Order sent to [facility] for a Spectra S2  CM was informed that MOB has emergency medicaid which does not cover a breast pump  [employer] pump recommends MOB contact [employer] [facility] to obtain a breast pump      · Government Assistance Programs/WIC/EBT/SSI:  food stamps and WIC  · 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    · Transportation: FOB drives   · Pediatrician:  [address]  · [address] Hx or Treatment: no  · Substance Abuse: no  · Hx DV/IPV: no  · Community Referrals/C&Y/NFP: no   · Insurance for baby: MOB reports being in contact with Juani LEOS with [employer] and she is assisting with the insurance process  · POA/LW: no  · NICU Resources and packet: yes   · [facility]:  yes, referral sent to see if they're able to assist with a breast pump  CM was informed by Anya Cunningham with [facility] that she has a Medela breast pump available  Checking with pt to see if she is interested  Per Anya Cunningham with lactation, she discussed breast pump options  Anya Cunningham assisted MOB and FOB with placing an [employer] pump in their [address] account [facility] does not have a breast pump available  Pt is agreeable to Medela breast pump from [facility]  CM notified Anya Cunningham at [facility] of same  Leda to deliver Medela pump tonight to MOB  Pt's bedside RN Savanna Lawrence aware of same  SW reviewed discharge planning process including the following: identifying caregivers at home, preference for d/c planning needs, availability of treatment team to discuss questions or concerns patient and/or family may have regarding diagnosis, plan of care, old or new medications and discharge planning   SW will continue to follow for care coordination and update assessment as appropriate       SW will follow infant in NICU through dc

## 2022-12-02 NOTE — LACTATION NOTE
Met with parents to follow up from yesterday's encounter  Case management was present to discuss further needs of the family and obtain permission to reach out to [employer] for a breast pump if possible  Mother discussed that she is pumping every 2-3 hours, but has found hand expression to be more effective in removing colostrum  She is expressing 2-4 ml per pumping/hand expression sessions  Parents were encouraged to communicate for further assistance/questions as they arise  Encounter and education was completed in primary language of mother, [place]  Father is able to understand and converse in [place]

## 2022-12-03 LAB
ABO GROUP BLD BPU: NORMAL
ABO GROUP BLD BPU: NORMAL
BPU ID: NORMAL
BPU ID: NORMAL
CROSSMATCH: NORMAL
CROSSMATCH: NORMAL
UNIT DISPENSE STATUS: NORMAL
UNIT DISPENSE STATUS: NORMAL
UNIT PRODUCT CODE: NORMAL
UNIT PRODUCT CODE: NORMAL
UNIT PRODUCT VOLUME: 350 ML
UNIT PRODUCT VOLUME: 350 ML
UNIT RH: NORMAL
UNIT RH: NORMAL

## 2022-12-03 RX ADMIN — ACETAMINOPHEN 650 MG: 325 TABLET, FILM COATED ORAL at 12:22

## 2022-12-03 RX ADMIN — IBUPROFEN 600 MG: 600 TABLET, FILM COATED ORAL at 06:11

## 2022-12-03 RX ADMIN — OXYCODONE HYDROCHLORIDE 5 MG: 5 TABLET ORAL at 14:02

## 2022-12-03 RX ADMIN — DOCUSATE SODIUM 100 MG: 100 CAPSULE, LIQUID FILLED ORAL at 19:34

## 2022-12-03 RX ADMIN — ACETAMINOPHEN 650 MG: 325 TABLET, FILM COATED ORAL at 00:51

## 2022-12-03 RX ADMIN — IBUPROFEN 600 MG: 600 TABLET, FILM COATED ORAL at 19:34

## 2022-12-03 RX ADMIN — SENNOSIDES 8.6 MG: 8.6 TABLET, FILM COATED ORAL at 08:21

## 2022-12-03 RX ADMIN — FAMOTIDINE 20 MG: 20 TABLET, FILM COATED ORAL at 08:21

## 2022-12-03 RX ADMIN — IBUPROFEN 600 MG: 600 TABLET, FILM COATED ORAL at 00:51

## 2022-12-03 RX ADMIN — ACETAMINOPHEN 650 MG: 325 TABLET, FILM COATED ORAL at 19:34

## 2022-12-03 RX ADMIN — IBUPROFEN 600 MG: 600 TABLET, FILM COATED ORAL at 12:22

## 2022-12-03 RX ADMIN — ACETAMINOPHEN 650 MG: 325 TABLET, FILM COATED ORAL at 06:11

## 2022-12-03 RX ADMIN — FAMOTIDINE 20 MG: 20 TABLET, FILM COATED ORAL at 19:39

## 2022-12-03 RX ADMIN — NIFEDIPINE 30 MG: 30 TABLET, FILM COATED, EXTENDED RELEASE ORAL at 08:21

## 2022-12-03 RX ADMIN — DOCUSATE SODIUM 100 MG: 100 CAPSULE, LIQUID FILLED ORAL at 08:21

## 2022-12-03 NOTE — PROGRESS NOTES
Ob Postpartum/Postop Rounds Note  Rupa DYKES Nancy Dockery 38 y o  female MRN: [id number]  Unit/Bed#: -01 Encounter: [id number]    Subjective/    Feels well  Tolerating po, ambulating, voiding without difficulty  Happy  Bonding  Mild cramps, appropriate lochia  Pain controlled with oral medications  No flatus yet  Objective/  Blood pressure 134/92, pulse 73, temperature 98 3 °F (36 8 °C), temperature source Temporal, resp  rate 18, height 5' 2" (1 575 m), weight 77 6 kg (171 lb), last menstrual period 2022, SpO2 98 %, currently breastfeeding  Intake/Output Summary (Last 24 hours) at 12/3/2022 3660  Last data filed at 2022 1608  Gross per 24 hour   Intake 120 ml   Output 1000 ml   Net -880 ml         Physical Exam/      General:  Alert, comfortable, NAD      Cardiovascular: Regular rate and rhythm      Respiratory: Clear to auscultation bilaterally  Abdomen: Soft, non-tender, non-distended  Incision C/D/I  Fundus: Firm, below umbilicus, nontender      Extremities: Warm, non-tender      Labs/      Blood type:  O+      Rubella: Immune      Lab Results   Component Value Date    WBC 11 70 (H) 2022    HGB 8 6 (L) 2022    HCT 29 1 (L) 2022    MCV 77 (L) 2022     2022             A/P   38 y o  [id number] POD#2 s/p , Low Transverse  at 33w6d of 1870 g (4 lb 2 oz)  female  , apgars 8 /8   (1) POD#2  Delivered 2022  9:18 AM    Doing well  --> Routine postoperative care  (2) Preeclampsia with severe features  Delivered for this indication  Bps over the past 24 hours normal to "mild range" on Procardia XL 30mg daily  No symptoms of severe preeclampsia  Labs okay   --> Follow BP/Sx  (3) GDM   --> [facility]  (4) Anemia  Hgb stable at 8 6g/dL   --> Continue xiomy Welch MD  22

## 2022-12-03 NOTE — PLAN OF CARE
Problem: Potential for Falls  Goal: Patient will remain free of falls  Description: INTERVENTIONS:  - Educate patient/family on patient safety including physical limitations  - Instruct patient to call for assistance with activity   - Consult OT/PT to assist with strengthening/mobility   - Keep Call bell within reach  - Keep bed low and locked with side rails adjusted as appropriate  - Keep care items and personal belongings within reach  - Initiate and maintain comfort rounds  - Make Fall Risk Sign visible to staff  - Apply yellow socks and bracelet for high fall risk patients  - Consider moving patient to room near nurses station  Outcome: Progressing     Problem: PAIN - ADULT  Goal: Verbalizes/displays adequate comfort level or baseline comfort level  Description: Interventions:  - Encourage patient to monitor pain and request assistance  - Assess pain using appropriate pain scale  - Administer analgesics based on type and severity of pain and evaluate response  - Implement non-pharmacological measures as appropriate and evaluate response  - Consider cultural and social influences on pain and pain management  - Notify physician/advanced practitioner if interventions unsuccessful or patient reports new pain  Outcome: Progressing     Problem: INFECTION - ADULT  Goal: Absence or prevention of progression during hospitalization  Description: INTERVENTIONS:  - Assess and monitor for signs and symptoms of infection  - Monitor lab/diagnostic results  - Monitor all insertion sites, i e  indwelling lines, tubes, and drains  - Monitor endotracheal if appropriate and nasal secretions for changes in amount and color  - [place] appropriate cooling/warming therapies per order  - Administer medications as ordered  - Instruct and encourage patient and family to use good hand hygiene technique  - Identify and instruct in appropriate isolation precautions for identified infection/condition  Outcome: Progressing  Goal: Absence of fever/infection during neutropenic period  Description: INTERVENTIONS:  - Monitor WBC    Outcome: Progressing     Problem: SAFETY ADULT  Goal: Patient will remain free of falls  Description: INTERVENTIONS:  - Educate patient/family on patient safety including physical limitations  - Instruct patient to call for assistance with activity   - Consult OT/PT to assist with strengthening/mobility   - Keep Call bell within reach  - Keep bed low and locked with side rails adjusted as appropriate  - Keep care items and personal belongings within reach  - Initiate and maintain comfort rounds  - Make Fall Risk Sign visible to staff  - Apply yellow socks and bracelet for high fall risk patients  - Consider moving patient to room near nurses station  Outcome: Progressing  Goal: Maintain or return to baseline ADL function  Description: INTERVENTIONS:  -  Assess patient's ability to carry out ADLs; assess patient's baseline for ADL function and identify physical deficits which impact ability to perform ADLs (bathing, care of mouth/teeth, toileting, grooming, dressing, etc )  - Assess/evaluate cause of self-care deficits   - Assess range of motion  - Assess patient's mobility; develop plan if impaired  - Assess patient's need for assistive devices and provide as appropriate  - Encourage maximum independence but intervene and supervise when necessary  - Involve family in performance of ADLs  - Assess for home care needs following discharge   - Consider OT consult to assist with ADL evaluation and planning for discharge  - Provide patient education as appropriate  Outcome: Progressing  Goal: Maintains/Returns to pre admission functional level  Description: INTERVENTIONS:  - Perform BMAT or MOVE assessment daily    - Set and communicate daily mobility goal to care team and patient/family/caregiver     - Collaborate with rehabilitation services on mobility goals if consulted  - Out of bed for toileting  - Record patient progress and toleration of activity level   Outcome: Progressing     Problem: Knowledge Deficit  Goal: Patient/family/caregiver demonstrates understanding of disease process, treatment plan, medications, and discharge instructions  Description: Complete learning assessment and assess knowledge base    Interventions:  - Provide teaching at level of understanding  - Provide teaching via preferred learning methods  Outcome: Progressing     Problem: POSTPARTUM  Goal: Experiences normal postpartum course  Description: INTERVENTIONS:  - Monitor maternal vital signs  - Assess uterine involution and lochia  Outcome: Progressing  Goal: Appropriate maternal -  bonding  Description: INTERVENTIONS:  - Identify family support  - Assess for appropriate maternal/infant bonding   -Encourage maternal/infant bonding opportunities  - Referral to  or  as needed  Outcome: Progressing  Goal: Establishment of infant feeding pattern  Description: INTERVENTIONS:  - Assess breast/bottle feeding  - Refer to lactation as needed  Outcome: Progressing  Goal: Incision(s), wounds(s) or drain site(s) healing without S/S of infection  Description: INTERVENTIONS  - Assess and document dressing, incision, wound bed, drain sites and surrounding tissue  - Provide patient and family education  Outcome: Progressing

## 2022-12-03 NOTE — PLAN OF CARE
Problem: Potential for Falls  Goal: Patient will remain free of falls  Description: INTERVENTIONS:  - Educate patient/family on patient safety including physical limitations  - Instruct patient to call for assistance with activity   - Consult OT/PT to assist with strengthening/mobility   - Keep Call bell within reach  - Keep bed low and locked with side rails adjusted as appropriate  - Keep care items and personal belongings within reach  - Initiate and maintain comfort rounds  - Make Fall Risk Sign visible to staff  - Apply yellow socks and bracelet for high fall risk patients  - Consider moving patient to room near nurses station  Outcome: Progressing     Problem: PAIN - ADULT  Goal: Verbalizes/displays adequate comfort level or baseline comfort level  Description: Interventions:  - Encourage patient to monitor pain and request assistance  - Assess pain using appropriate pain scale  - Administer analgesics based on type and severity of pain and evaluate response  - Implement non-pharmacological measures as appropriate and evaluate response  - Consider cultural and social influences on pain and pain management  - Notify physician/advanced practitioner if interventions unsuccessful or patient reports new pain  Outcome: Progressing     Problem: INFECTION - ADULT  Goal: Absence or prevention of progression during hospitalization  Description: INTERVENTIONS:  - Assess and monitor for signs and symptoms of infection  - Monitor lab/diagnostic results  - Monitor all insertion sites, i e  indwelling lines, tubes, and drains  - Monitor endotracheal if appropriate and nasal secretions for changes in amount and color  - [place] appropriate cooling/warming therapies per order  - Administer medications as ordered  - Instruct and encourage patient and family to use good hand hygiene technique  - Identify and instruct in appropriate isolation precautions for identified infection/condition  Outcome: Progressing  Goal: Absence of fever/infection during neutropenic period  Description: INTERVENTIONS:  - Monitor WBC    Outcome: Progressing     Problem: SAFETY ADULT  Goal: Patient will remain free of falls  Description: INTERVENTIONS:  - Educate patient/family on patient safety including physical limitations  - Instruct patient to call for assistance with activity   - Consult OT/PT to assist with strengthening/mobility   - Keep Call bell within reach  - Keep bed low and locked with side rails adjusted as appropriate  - Keep care items and personal belongings within reach  - Initiate and maintain comfort rounds  - Make Fall Risk Sign visible to staff  - Apply yellow socks and bracelet for high fall risk patients  - Consider moving patient to room near nurses station  Outcome: Progressing  Goal: Maintain or return to baseline ADL function  Description: INTERVENTIONS:  -  Assess patient's ability to carry out ADLs; assess patient's baseline for ADL function and identify physical deficits which impact ability to perform ADLs (bathing, care of mouth/teeth, toileting, grooming, dressing, etc )  - Assess/evaluate cause of self-care deficits   - Assess range of motion  - Assess patient's mobility; develop plan if impaired  - Assess patient's need for assistive devices and provide as appropriate  - Encourage maximum independence but intervene and supervise when necessary  - Involve family in performance of ADLs  - Assess for home care needs following discharge   - Consider OT consult to assist with ADL evaluation and planning for discharge  - Provide patient education as appropriate  Outcome: Progressing  Goal: Maintains/Returns to pre admission functional level  Description: INTERVENTIONS:  - Perform BMAT or MOVE assessment daily    - Set and communicate daily mobility goal to care team and patient/family/caregiver     - Collaborate with rehabilitation services on mobility goals if consulted  - Out of bed for toileting  - Record patient progress and toleration of activity level   Outcome: Progressing     Problem: Knowledge Deficit  Goal: Patient/family/caregiver demonstrates understanding of disease process, treatment plan, medications, and discharge instructions  Description: Complete learning assessment and assess knowledge base    Interventions:  - Provide teaching at level of understanding  - Provide teaching via preferred learning methods  Outcome: Progressing     Problem: POSTPARTUM  Goal: Experiences normal postpartum course  Description: INTERVENTIONS:  - Monitor maternal vital signs  - Assess uterine involution and lochia  Outcome: Progressing  Goal: Appropriate maternal -  bonding  Description: INTERVENTIONS:  - Identify family support  - Assess for appropriate maternal/infant bonding   -Encourage maternal/infant bonding opportunities  - Referral to  or  as needed  Outcome: Progressing  Goal: Establishment of infant feeding pattern  Description: INTERVENTIONS:  - Assess breast/bottle feeding  - Refer to lactation as needed  Outcome: Progressing  Goal: Incision(s), wounds(s) or drain site(s) healing without S/S of infection  Description: INTERVENTIONS  - Assess and document dressing, incision, wound bed, drain sites and surrounding tissue  - Provide patient and family education  Outcome: Progressing

## 2022-12-04 RX ADMIN — DOCUSATE SODIUM 100 MG: 100 CAPSULE, LIQUID FILLED ORAL at 18:30

## 2022-12-04 RX ADMIN — IBUPROFEN 600 MG: 600 TABLET, FILM COATED ORAL at 19:55

## 2022-12-04 RX ADMIN — ACETAMINOPHEN 650 MG: 325 TABLET, FILM COATED ORAL at 01:31

## 2022-12-04 RX ADMIN — IBUPROFEN 600 MG: 600 TABLET, FILM COATED ORAL at 08:13

## 2022-12-04 RX ADMIN — IBUPROFEN 600 MG: 600 TABLET, FILM COATED ORAL at 01:31

## 2022-12-04 RX ADMIN — ACETAMINOPHEN 650 MG: 325 TABLET, FILM COATED ORAL at 08:13

## 2022-12-04 RX ADMIN — FAMOTIDINE 20 MG: 20 TABLET, FILM COATED ORAL at 18:30

## 2022-12-04 RX ADMIN — IBUPROFEN 600 MG: 600 TABLET, FILM COATED ORAL at 13:38

## 2022-12-04 RX ADMIN — DOCUSATE SODIUM 100 MG: 100 CAPSULE, LIQUID FILLED ORAL at 08:10

## 2022-12-04 RX ADMIN — NIFEDIPINE 30 MG: 30 TABLET, FILM COATED, EXTENDED RELEASE ORAL at 08:10

## 2022-12-04 RX ADMIN — FAMOTIDINE 20 MG: 20 TABLET, FILM COATED ORAL at 08:10

## 2022-12-04 RX ADMIN — SENNOSIDES 8.6 MG: 8.6 TABLET, FILM COATED ORAL at 08:10

## 2022-12-04 NOTE — PLAN OF CARE
Problem: Potential for Falls  Goal: Patient will remain free of falls  Description: INTERVENTIONS:  - Educate patient/family on patient safety including physical limitations  - Instruct patient to call for assistance with activity   - Consult OT/PT to assist with strengthening/mobility   - Keep Call bell within reach  - Keep bed low and locked with side rails adjusted as appropriate  - Keep care items and personal belongings within reach  - Initiate and maintain comfort rounds    Outcome: Adequate for Discharge     Problem: PAIN - ADULT  Goal: Verbalizes/displays adequate comfort level or baseline comfort level  Description: Interventions:  - Encourage patient to monitor pain and request assistance  - Assess pain using appropriate pain scale  - Administer analgesics based on type and severity of pain and evaluate response  - Implement non-pharmacological measures as appropriate and evaluate response  - Consider cultural and social influences on pain and pain management  - Notify physician/advanced practitioner if interventions unsuccessful or patient reports new pain  Outcome: Adequate for Discharge     Problem: INFECTION - ADULT  Goal: Absence or prevention of progression during hospitalization  Description: INTERVENTIONS:  - Assess and monitor for signs and symptoms of infection  - Monitor lab/diagnostic results  - Monitor all insertion sites, i e  indwelling lines, tubes, and drains  - Monitor endotracheal if appropriate and nasal secretions for changes in amount and color  - [place] appropriate cooling/warming therapies per order  - Administer medications as ordered  - Instruct and encourage patient and family to use good hand hygiene technique  - Identify and instruct in appropriate isolation precautions for identified infection/condition  Outcome: Adequate for Discharge  Goal: Absence of fever/infection during neutropenic period  Description: INTERVENTIONS:  - Monitor WBC    Outcome: Adequate for Discharge     Problem: SAFETY ADULT  Goal: Patient will remain free of falls  Description: INTERVENTIONS:  - Educate patient/family on patient safety including physical limitations  - Instruct patient to call for assistance with activity   - Consult OT/PT to assist with strengthening/mobility   - Keep Call bell within reach  - Keep bed low and locked with side rails adjusted as appropriate  - Keep care items and personal belongings within reach  - Initiate and maintain comfort rounds    Outcome: Adequate for Discharge  Goal: Maintain or return to baseline ADL function  Description: INTERVENTIONS:  -  Assess patient's ability to carry out ADLs; assess patient's baseline for ADL function and identify physical deficits which impact ability to perform ADLs (bathing, care of mouth/teeth, toileting, grooming, dressing, etc )  - Assess/evaluate cause of self-care deficits   - Assess range of motion  - Assess patient's mobility; develop plan if impaired  - Assess patient's need for assistive devices and provide as appropriate  - Encourage maximum independence but intervene and supervise when necessary  - Involve family in performance of ADLs  - Assess for home care needs following discharge   - Consider OT consult to assist with ADL evaluation and planning for discharge  - Provide patient education as appropriate  Outcome: Adequate for Discharge  Goal: Maintains/Returns to pre admission functional level  Description: INTERVENTIONS:  - Perform BMAT or MOVE assessment daily    - Set and communicate daily mobility goal to care team and patient/family/caregiver       Outcome: Adequate for Discharge     Problem: POSTPARTUM  Goal: Experiences normal postpartum course  Description: INTERVENTIONS:  - Monitor maternal vital signs  - Assess uterine involution and lochia  Outcome: Adequate for Discharge  Goal: Appropriate maternal -  bonding  Description: INTERVENTIONS:  - Identify family support  - Assess for appropriate maternal/infant bonding   -Encourage maternal/infant bonding opportunities  - Referral to  or  as needed  Outcome: Adequate for Discharge  Goal: Establishment of infant feeding pattern  Description: INTERVENTIONS:  - Assess breast/bottle feeding  - Refer to lactation as needed  Outcome: Adequate for Discharge  Goal: Incision(s), wounds(s) or drain site(s) healing without S/S of infection  Description: INTERVENTIONS  - Assess and document dressing, incision, wound bed, drain sites and surrounding tissue  - Provide patient and family education    Outcome: Adequate for Discharge     Problem: Knowledge Deficit  Goal: Patient/family/caregiver demonstrates understanding of disease process, treatment plan, medications, and discharge instructions  Description: Complete learning assessment and assess knowledge base    Interventions:  - Provide teaching at level of understanding  - Provide teaching via preferred learning methods  Outcome: Adequate for Discharge

## 2022-12-04 NOTE — PROGRESS NOTES
Progress Note - OB/GYN  Post-Partum Physician Note   Yadira Chou 38 y o  female MRN: [id number]  Unit/Bed#:  210-01 Encounter: [id number]    Patient is postpartum day 3 from a Primary  section due to breech presentation and pre-eclampsia with severe features      Pain: no-controlled  Tolerating Oral Intake: yes  Voiding: yes  Flatus: yes  Bowel Movement: yes  Ambulating: yes  Breastfeeding: Using breast pump- baby in NICU  Chest Pain: no  Shortness of Breath: no  Leg Pain/Discomfort: no  Lochia: minimal  Other: mild edema bilateral lower extremities    Objective:   Vitals:    22 1230   BP: 144/98   Pulse:    Resp:    Temp:    SpO2:      No intake or output data in the 24 hours ending 22 1303    Physical Exam:  General: in no apparent distress, well developed and well nourished, non-toxic, in no respiratory distress and acyanotic, alert, oriented times 3, afebrile and cooperative  Cardiovascular: Cor RRR  Lungs: clear to auscultation bilaterally  Abdomen: abdomen is soft without significant tenderness, masses, organomegaly or guarding  Fundus: Firm and non-tender, 2 below the umbilicus  Lower extremeties: nontender  Other: incision clean/dry/intact with steri strips in place        MEDS:   Current Facility-Administered Medications   Medication Dose Route Frequency   • acetaminophen (TYLENOL) tablet 650 mg  650 mg Oral Q6H [address]   • aluminum-magnesium hydroxide-simethicone (MYLANTA) oral suspension 30 mL  30 mL Oral Q4H PRN   • diphenhydrAMINE (BENADRYL) injection 25 mg  25 mg Intravenous Q6H PRN   • docusate sodium (COLACE) capsule 100 mg  100 mg Oral BID   • famotidine (PEPCID) tablet 20 mg  20 mg Oral BID   • fentaNYL (SUBLIMAZE) injection 25 mcg  25 mcg Intravenous Q5 Min PRN   • ibuprofen (MOTRIN) tablet 600 mg  600 mg Oral Q6H   • NIFEdipine (PROCARDIA XL) 24 hr tablet 30 mg  30 mg Oral Daily   • ondansetron (ZOFRAN) injection 4 mg  4 mg Intravenous Q8H PRN   • ondansetron (ZOFRAN) injection 4 mg  4 mg Intravenous Once   • oxyCODONE (ROXICODONE) IR tablet 10 mg  10 mg Oral Q4H PRN   • oxyCODONE (ROXICODONE) IR tablet 5 mg  5 mg Oral Q4H PRN   • senna (SENOKOT) tablet 8 6 mg  1 tablet Oral Daily     Invasive Devices     Peripheral Intravenous Line  Duration           Peripheral IV 22 Distal;Dorsal (posterior); Right Forearm 5 days                Assessment and Plan:  38y o  year-old , postpartum day 3 status-post Primary Low Transverse  delivery, pre-eclampsia with Severe Features- blood pressure controlled on Procardia XL 30mg po q day    Continue routine postpartum care  Encourage ambulation  Advance diet as tolerated    Will need BP cuff when discharged to home tomorrow    [address] [facility] GAMALIEL SOUTH MD  OB/GYN Hospitalist  [phone]  2022  1:03 PM

## 2022-12-04 NOTE — PLAN OF CARE
Problem: Potential for Falls  Goal: Patient will remain free of falls  Description: INTERVENTIONS:  - Educate patient/family on patient safety including physical limitations  - Instruct patient to call for assistance with activity   - Consult OT/PT to assist with strengthening/mobility   - Keep Call bell within reach  - Keep bed low and locked with side rails adjusted as appropriate  - Keep care items and personal belongings within reach  - Initiate and maintain comfort rounds  - Make Fall Risk Sign visible to staff  - Apply yellow socks and bracelet for high fall risk patients  - Consider moving patient to room near nurses station  Outcome: Progressing     Problem: PAIN - ADULT  Goal: Verbalizes/displays adequate comfort level or baseline comfort level  Description: Interventions:  - Encourage patient to monitor pain and request assistance  - Assess pain using appropriate pain scale  - Administer analgesics based on type and severity of pain and evaluate response  - Implement non-pharmacological measures as appropriate and evaluate response  - Consider cultural and social influences on pain and pain management  - Notify physician/advanced practitioner if interventions unsuccessful or patient reports new pain  Outcome: Progressing     Problem: INFECTION - ADULT  Goal: Absence or prevention of progression during hospitalization  Description: INTERVENTIONS:  - Assess and monitor for signs and symptoms of infection  - Monitor lab/diagnostic results  - Monitor all insertion sites, i e  indwelling lines, tubes, and drains  - Monitor endotracheal if appropriate and nasal secretions for changes in amount and color  - [place] appropriate cooling/warming therapies per order  - Administer medications as ordered  - Instruct and encourage patient and family to use good hand hygiene technique  - Identify and instruct in appropriate isolation precautions for identified infection/condition  Outcome: Progressing  Goal: Absence of fever/infection during neutropenic period  Description: INTERVENTIONS:  - Monitor WBC    Outcome: Progressing     Problem: SAFETY ADULT  Goal: Patient will remain free of falls  Description: INTERVENTIONS:  - Educate patient/family on patient safety including physical limitations  - Instruct patient to call for assistance with activity   - Consult OT/PT to assist with strengthening/mobility   - Keep Call bell within reach  - Keep bed low and locked with side rails adjusted as appropriate  - Keep care items and personal belongings within reach  - Initiate and maintain comfort rounds  - Make Fall Risk Sign visible to staff  - Apply yellow socks and bracelet for high fall risk patients  - Consider moving patient to room near nurses station  Outcome: Progressing  Goal: Maintain or return to baseline ADL function  Description: INTERVENTIONS:  -  Assess patient's ability to carry out ADLs; assess patient's baseline for ADL function and identify physical deficits which impact ability to perform ADLs (bathing, care of mouth/teeth, toileting, grooming, dressing, etc )  - Assess/evaluate cause of self-care deficits   - Assess range of motion  - Assess patient's mobility; develop plan if impaired  - Assess patient's need for assistive devices and provide as appropriate  - Encourage maximum independence but intervene and supervise when necessary  - Involve family in performance of ADLs  - Assess for home care needs following discharge   - Consider OT consult to assist with ADL evaluation and planning for discharge  - Provide patient education as appropriate  Outcome: Progressing  Goal: Maintains/Returns to pre admission functional level  Description: INTERVENTIONS:  - Perform BMAT or MOVE assessment daily    - Set and communicate daily mobility goal to care team and patient/family/caregiver     - Collaborate with rehabilitation services on mobility goals if consulted  - Out of bed for toileting  - Record patient progress and toleration of activity level   Outcome: Progressing     Problem: Knowledge Deficit  Goal: Patient/family/caregiver demonstrates understanding of disease process, treatment plan, medications, and discharge instructions  Description: Complete learning assessment and assess knowledge base    Interventions:  - Provide teaching at level of understanding  - Provide teaching via preferred learning methods  Outcome: Progressing     Problem: POSTPARTUM  Goal: Experiences normal postpartum course  Description: INTERVENTIONS:  - Monitor maternal vital signs  - Assess uterine involution and lochia  Outcome: Progressing  Goal: Appropriate maternal -  bonding  Description: INTERVENTIONS:  - Identify family support  - Assess for appropriate maternal/infant bonding   -Encourage maternal/infant bonding opportunities  - Referral to  or  as needed  Outcome: Progressing  Goal: Establishment of infant feeding pattern  Description: INTERVENTIONS:  - Assess breast/bottle feeding  - Refer to lactation as needed  Outcome: Progressing  Goal: Incision(s), wounds(s) or drain site(s) healing without S/S of infection  Description: INTERVENTIONS  - Assess and document dressing, incision, wound bed, drain sites and surrounding tissue  - Provide patient and family education  Outcome: Progressing

## 2022-12-05 VITALS
HEIGHT: 62 IN | WEIGHT: 171 LBS | OXYGEN SATURATION: 98 % | RESPIRATION RATE: 16 BRPM | SYSTOLIC BLOOD PRESSURE: 145 MMHG | DIASTOLIC BLOOD PRESSURE: 89 MMHG | BODY MASS INDEX: 31.47 KG/M2 | TEMPERATURE: 98.9 F | HEART RATE: 86 BPM

## 2022-12-05 RX ORDER — DOCUSATE SODIUM 100 MG/1
100 CAPSULE, LIQUID FILLED ORAL 2 TIMES DAILY PRN
Qty: 30 CAPSULE | Refills: 0 | Status: SHIPPED | OUTPATIENT
Start: 2022-12-05 | End: 2022-12-08 | Stop reason: SDUPTHER

## 2022-12-05 RX ORDER — NIFEDIPINE 30 MG/1
30 TABLET, EXTENDED RELEASE ORAL DAILY
Qty: 30 TABLET | Refills: 0 | Status: SHIPPED | OUTPATIENT
Start: 2022-12-06

## 2022-12-05 RX ORDER — FAMOTIDINE 20 MG/1
20 TABLET, FILM COATED ORAL 2 TIMES DAILY
Qty: 60 TABLET | Refills: 0 | Status: SHIPPED | OUTPATIENT
Start: 2022-12-05 | End: 2023-01-04

## 2022-12-05 RX ORDER — IBUPROFEN 600 MG/1
600 TABLET ORAL EVERY 6 HOURS
Qty: 30 TABLET | Refills: 0 | Status: SHIPPED | OUTPATIENT
Start: 2022-12-05

## 2022-12-05 RX ORDER — OXYCODONE HYDROCHLORIDE 5 MG/1
5 TABLET ORAL EVERY 4 HOURS PRN
Qty: 10 TABLET | Refills: 0 | Status: SHIPPED | OUTPATIENT
Start: 2022-12-05

## 2022-12-05 RX ADMIN — DOCUSATE SODIUM 100 MG: 100 CAPSULE, LIQUID FILLED ORAL at 08:48

## 2022-12-05 RX ADMIN — FAMOTIDINE 20 MG: 20 TABLET, FILM COATED ORAL at 08:48

## 2022-12-05 RX ADMIN — IBUPROFEN 600 MG: 600 TABLET, FILM COATED ORAL at 08:48

## 2022-12-05 RX ADMIN — NIFEDIPINE 30 MG: 30 TABLET, FILM COATED, EXTENDED RELEASE ORAL at 08:48

## 2022-12-05 RX ADMIN — FAMOTIDINE 20 MG: 20 TABLET, FILM COATED ORAL at 18:19

## 2022-12-05 RX ADMIN — DOCUSATE SODIUM 100 MG: 100 CAPSULE, LIQUID FILLED ORAL at 18:19

## 2022-12-05 RX ADMIN — IBUPROFEN 600 MG: 600 TABLET, FILM COATED ORAL at 15:50

## 2022-12-05 RX ADMIN — SENNOSIDES 8.6 MG: 8.6 TABLET, FILM COATED ORAL at 08:48

## 2022-12-05 NOTE — LACTATION NOTE
This note was copied from a baby's chart  Met with mother to follow up from last encounter and mother discussed that she received a donated breast pump from case management from [employer]  Mother received the Medela breast pump and discussed that it has been well and she is using at home, while she use the hospital grade breast pump at the bedside  Mother discussed that her mature milk is in getting 30 ml and more at this time and increasing  Mother discussed that she would like to directly breastfeed  Agreed on a latch assessment and follow up tomorrow at 9 am     Primary RN will be made aware  Encounter occurred in [facility], mother's primary language

## 2022-12-05 NOTE — PLAN OF CARE
Problem: PAIN - ADULT  Goal: Verbalizes/displays adequate comfort level or baseline comfort level  Description: Interventions:  - Encourage patient to monitor pain and request assistance  - Assess pain using appropriate pain scale  - Administer analgesics based on type and severity of pain and evaluate response  - Implement non-pharmacological measures as appropriate and evaluate response  - Consider cultural and social influences on pain and pain management  - Notify physician/advanced practitioner if interventions unsuccessful or patient reports new pain  Outcome: Progressing     Problem: INFECTION - ADULT  Goal: Absence or prevention of progression during hospitalization  Description: INTERVENTIONS:  - Assess and monitor for signs and symptoms of infection  - Monitor lab/diagnostic results  - Monitor all insertion sites, i e  indwelling lines, tubes, and drains  - Monitor endotracheal if appropriate and nasal secretions for changes in amount and color  - [place] appropriate cooling/warming therapies per order  - Administer medications as ordered  - Instruct and encourage patient and family to use good hand hygiene technique  - Identify and instruct in appropriate isolation precautions for identified infection/condition  Outcome: Progressing  Goal: Absence of fever/infection during neutropenic period  Description: INTERVENTIONS:  - Monitor WBC    Outcome: Progressing     Problem: SAFETY ADULT  Goal: Patient will remain free of falls  Description: INTERVENTIONS:  - Educate patient/family on patient safety including physical limitations  - Instruct patient to call for assistance with activity   - Consult OT/PT to assist with strengthening/mobility   - Keep Call bell within reach  - Keep bed low and locked with side rails adjusted as appropriate  - Keep care items and personal belongings within reach  - Initiate and maintain comfort rounds  Outcome: Progressing  Goal: Maintain or return to baseline ADL function  Description: INTERVENTIONS:  -  Assess patient's ability to carry out ADLs; assess patient's baseline for ADL function and identify physical deficits which impact ability to perform ADLs (bathing, care of mouth/teeth, toileting, grooming, dressing, etc )  - Assess/evaluate cause of self-care deficits   - Assess range of motion  - Assess patient's mobility; develop plan if impaired  - Assess patient's need for assistive devices and provide as appropriate  - Encourage maximum independence but intervene and supervise when necessary  - Involve family in performance of ADLs  - Assess for home care needs following discharge   - Consider OT consult to assist with ADL evaluation and planning for discharge  - Provide patient education as appropriate  Outcome: Progressing  Goal: Maintains/Returns to pre admission functional level  Description: INTERVENTIONS:  - Perform BMAT or MOVE assessment daily    - Out of bed for toileting  - Record patient progress and toleration of activity level   Outcome: Progressing     Problem: Knowledge Deficit  Goal: Patient/family/caregiver demonstrates understanding of disease process, treatment plan, medications, and discharge instructions  Description: Complete learning assessment and assess knowledge base    Interventions:  - Provide teaching at level of understanding  - Provide teaching via preferred learning methods  Outcome: Progressing     Problem: POSTPARTUM  Goal: Experiences normal postpartum course  Description: INTERVENTIONS:  - Monitor maternal vital signs  - Assess uterine involution and lochia  Outcome: Progressing  Goal: Appropriate maternal -  bonding  Description: INTERVENTIONS:  - Identify family support  - Assess for appropriate maternal/infant bonding   -Encourage maternal/infant bonding opportunities  - Referral to  or  as needed  Outcome: Progressing  Goal: Establishment of infant feeding pattern  Description: INTERVENTIONS:  - Assess breast/bottle feeding  - Refer to lactation as needed  Outcome: Progressing  Goal: Incision(s), wounds(s) or drain site(s) healing without S/S of infection  Description: INTERVENTIONS  - Assess and document dressing, incision, wound bed, drain sites and surrounding tissue  - Provide patient and family education  - Perform skin care  Outcome: Progressing

## 2022-12-05 NOTE — PROGRESS NOTES
Discharge instructions reviewed with patient and significant other at bedside  All questions answered  Patient and significant other given instructions as to signs and symptoms of pre-eclampsia returning, taking blood pressures at home daily, and to notify OB [facility] clinic if patient experiences elevated blood pressures above parameters, or signs and symptoms of pre-eclampsia  Patient and significant other verbalized understanding  Patient and significant other aware to  medications at pharmacy ASAP  Significant other Sagar told RN that the pharmacy already called him, the medications are ready to   Patient and Sagar aware patient needs blood pressure cuff for home, can order off [facility], get from [facility], or Pharmacy  Final BP rechecked per Dr Olimpia Romero  Patient ok to be discharged per Dr Olimpia Romero

## 2022-12-05 NOTE — DISCHARGE INSTRUCTIONS
Hipertensión   LO QUE NECESITA SABER:   ¿Qué es la hipertensión? La hipertensión es la presión arterial mervin  La presión arterial es la fuerza que ejerce la carmita al [employer] [place] [employer] contra las doyle de las arterias  La hipertensión causa que garcia presión arterial se eleve tanto que garcia corazón se ve forzado a trabajar mucho más marilia que lo normal  Aquadale puede dañar garcia corazón  Puede que no se conozca la causa de la hipertensión  Aquadale se denomina hipertensión esencial o primaria  La hipertensión causada por otra afección médica, tales joseph la enfermedad renal, se denomina hipertensión secundaria  ¿Qué necesito saber sobre las etapas de la hipertensión? [place] presión arterial normal es 119/79 o inferior   Garcia médico podría comprobar garcia presión arterial solamente cada año si se mantiene en un nivel normal     Jaimie presión arterial elevada es de 120/79 a 129/79   A veces esto se llama prehipertensión  Garcia médico puede sugerir cambios de estilo de mannie para ayudar a bajar la presión arterial a un nivel normal  Entonces él podría comprobar garcia presión otra vez en 3 a 6 meses  La etapa 1 de la hipertensión es de 130/80  a 139/89   Garcia médico podría recomendar cambios de estilo de mannie, medicamentos y controles cada 3 a 6 meses hasta que garcia presión arterial esté controlada  La etapa 2 de la hipertensión es de 140/90 o mayor   Garcia médico le recomendará cambios en el estilo de mannie y le indicará 2 clases de medicamentos para la hipertensión  También necesitará controlar garcia presión arterial cada mes hasta que esté controlada  ¿Qué aumenta mi riesgo de hipertensión?   Ser mayor de [address] (hombres) o 74 años (mujeres)    Estrés o antecedentes familiares de hipertensión o enfermedades cardíacas    Obesidad, falta de ejercicio o demasiados alimentos con alto contenido de sodio    Uso de tabaco, alcohol o drogas ilegales    Jaimie condición médica, joseph diabetes, enfermedad renal, enfermedad de la tiroides o enfermedad de la glándula suprarrenal    Ciertos medicamentos, joseph los esteroides o las píldoras anticonceptivas    ¿Cuáles son los signos y síntomas de la hipertensión? Es posible que no presente ningún signo ni síntoma o que tenga alguno de los siguientes:  Dolor de princess    Visión borrosa    Dolor de pecho    Mareos o debilidad    Dificultad para respirar    Hemorragias nasales (sangrado de la nariz)    ¿Cómo se diagnostica la hipertensión? Garcia médico tomará garcia presión arterial en varias consultas  Es posible que también deba controlar garcia presión arterial en garcia hogar  El médico lo [place] y le preguntará qué medicamentos tosin  También le preguntará si tiene antecedentes familiares de presión arterial mervin y [place] cualquier condición médica que tenga  Revisará garcia presión arterial y [place] corporal y le examinará el corazón, los pulmones y los ojos  Es posible que usted necesite hacerse alguno de los siguientes estudios:  Un monitor ambulatorio de presión arterial (ABPM) es un dispositivo que usted [place]  El ABPM mide garcia presión arterial mientras hace antwon actividades diarias regulares  Registra la presión arterial cada 15 a 30 minutos valeri el día  También registra la presión arterial cada 15 minutos a 1 hora en la noche  El registro de la presión arterial le permite a garcia médico saber si usted tiene hipertensión no detectada en garcia consulta  Los análisis de carmita podrían ayudar a los médicos a determinar la causa de garcia hipertensión  Los exámenes de carmita también pueden ayudar a encontrar otros problemas de [place] provocados por la hipertensión  Análisis de [facility] se realizarán para revisar la función de antwon riñones  Los problemas renales pueden aumentar garcia riesgo de hipertensión  ¿Qué medicamentos se utilizan para tratar la hipertensión? Los antihipertensivos podrían usarse para ayudar a disminuir la presión arterial  Varios tipos de medicamentos están disponibles   Garcia médico elegirá medicamentos basados en el tipo de hipertensión que tiene  Es posible que necesite más de un tipo de [place] el medicamento exactamente joseph indicado  Los diuréticos ayudan a eliminar el exceso de líquido que se acumula en el organismo  [place] ayudará a bajar garcia presión arterial  Es posible que orine más seguido mientras tosin demetrice medicamento  Los medicamentos para el colesterol ayudan a bajar los niveles de [place]  Un nivel bajo de colesterol ayuda a prevenir enfermedades cardíacas y facilita el control de la presión arterial     ¿Qué puedo hacer para controlar la hipertensión? Controle garcia presión arterial en casa  Evite fumar, consumir cafeína y hacer ejercicio al menos 30 minutos antes de controlar garcia presión arterial  Siéntese y descanse por 5 minutos antes de tomarse la presión arterial  Extienda garcia brazo y apóyelo en jael superficie plana  Garcia brazo debe estar a la misma altura que garcia corazón  Siga las instrucciones que vienen con el monitor para la presión arterial  Controle garcia presión arterial 2 veces, con diferencia de 1 minuto, antes de karen garcia medicamento por la mañana  También controle garcia presión arterial antes de la zhen  Mantenga un registro de garcia peso y llévelo con usted a las citas de control  Pregúntele a garcia médico cuál debería ser garcia presión arterial          Controle cualquier otra condición médica que usted tenga  Algunas condiciones médicas joseph la diabetes pueden aumentar garcia riesgo de hipertensión  [address] garcia médico y tómese antwon medicamentos según dichas instrucciones  Pregunte [employer]  Ciertos medicamentos pueden aumentar garcia presión arterial  Los ejemplos incluyen las píldoras anticonceptivas orales, los descongestivos, los suplementos herbales y los [place], joseph el ibuprofeno  Garcia médico puede indicarle qué medicamentos son seguros para usted  Estos medicamentos incluyen los recetados y de [place]  ¿Qué cambios puedo hacer en mi estilo de mannie para manejar la hipertensión?  Garcia médico puede recomendarle que trabaje con un equipo para controlar la hipertensión  El equipo puede incluir expertos médicos, joseph un dietista, un fisioterapeuta o un terapeuta del comportamiento  Los [employer] de garcia aidan pueden participar en la creación de cambios en el estilo de mannie  Limite el sodio (la sal) joseph se le haya indicado  Demasiado sodio puede afectar el equilibrio de líquidos  Revise las etiquetas para buscar alimentos bajos en sodio o sin sal agregada  Algunos alimentos bajos en sodio utilizan sales de potasio para añadir sabor  Demasiado potasio también puede causar problemas de [place]  Garcia médico le dirá qué cantidad de sodio y potasio es gleason para el consumo en un día  Puede recomendarle que limite el sodio a 2,300 mg al día  Siga el plan de comidas recomendado por garcia médico  Un dietista o médico puede darle más información sobre planes de bajo contenido de sodio o el plan de alimentación DASH (enfoques dietéticos para detener la hipertensión)  El plan DASH es bajo en sodio, azúcar procesada, grasas dañinas y grasas totales  Es alto en potasio, calcio y [place]  Estos se encuentran en las verduras, las frutas y los alimentos integrales  Manténgase físicamente activo valeri todo el día  La actividad física, joseph el ejercicio, puede ayudar a controlar garcia presión arterial y garcia peso  Bernice actividad física por lo menos 30 minutos al día, la mayoría de los días de la [place]  Incluya jael actividad aeróbica, joseph caminar o montar en bicicleta  Incluya también entrenamiento de fuerza al menos 2 veces por semana  Breanna médicos pueden ayudarle a crear un plan de Nellene Pagoda  [id number]  Es posible que esto contribuya a bajar garcia presión arterial  Aprenda sobre formas de [place], joseph respiración profunda o escuchar música  Limite el consumo de alcohol según le indicaron   El alcohol puede aumentar la presión arterial  Un trago equivale a 12 onzas de cerveza, 5 onzas de vino o 1 onza y ½ de licor  No fume  La nicotina y otros químicos en los cigarrillos y cigarros pueden aumentar cheek presión arterial y también provocar daño al pulmón  Pida información a cheek médico si usted actualmente fuma y necesita ayuda para dejar de fumar  Los cigarrillos electrónicos o el tabaco sin humo igualmente contienen nicotina  Consulte con cheek médico antes de [employer]  Llame al [facility] de emergencias (911 en los [place]) o pídale a alguien que llame si:  Usted tiene dolor en el pecho  Tiene alguno de los siguientes signos de un ataque cardíaco:      Estrujamiento, presión o tensión en cheek pecho    Usted también podría presentar alguno de los siguientes:     Malestar o dolor en cheek espalda, shanelle, mandíbula, abdomen, o brazo    Falta de [employer] o vómitos    Desvanecimiento o sudor frío repentino    Usted se siente confundido o tiene dificultad para hablar  Repentinamente se siente aturdido o con dificultad para respirar  ¿Cuándo tulio buscar atención inmediata? Usted tiene un marilia dolor de princess o pérdida de la visión  Usted tiene debilidad en un brazo o en jael pierna  ¿Cuándo tulio llamar a mi médico?  Usted se siente mareado, confundido, somnoliento o joseph si se fuera a desmayar  Usted ha estado tomando medicamento para la presión arterial kenzie todavía está en un nivel más elevado del que cheek médico le indicó que debería estar  Usted tiene preguntas o inquietudes acerca de cheek condición o cuidado  ACUERDOS SOBRE CHEEK CUIDADO:   Usted tiene el derecho de ayudar a planear cheek cuidado  Aprenda todo lo que pueda sobre cheek condición y joseph darle tratamiento  Discuta antwon opciones de tratamiento con antwon médicos para decidir el cuidado que usted desea recibir  Usted siempre tiene el derecho de rechazar el tratamiento  Esta información es sólo para uso en educación  Cheek intención no es darle un consejo médico sobre enfermedades o tratamientos  Colsulte con garcia Lorraine Orwell farmacéutico antes de seguir cualquier régimen médico para saber si es seguro y efectivo para usted  © Copyright [facility] 2022 Information is for End User's use only and may not be sold, redistributed or otherwise used for commercial purposes   All illustrations and images included in CareNotes® are the copyrighted property of [employer] , Inc  or [address] [place] [address]

## 2022-12-05 NOTE — PLAN OF CARE
Problem: Potential for Falls  Goal: Patient will remain free of falls  Description: INTERVENTIONS:  - Educate patient/family on patient safety including physical limitations  - Instruct patient to call for assistance with activity   - Consult OT/PT to assist with strengthening/mobility   - Keep Call bell within reach  - Keep bed low and locked with side rails adjusted as appropriate  - Keep care items and personal belongings within reach  - Initiate and maintain comfort rounds  - Make Fall Risk Sign visible to staff  - Offer Toileting every  Hours, in advance of need  - Initiate/Maintain alarm  - Obtain necessary fall risk management equipment:   - Apply yellow socks and bracelet for high fall risk patients  - Consider moving patient to room near nurses station  Outcome: Progressing     Problem: PAIN - ADULT  Goal: Verbalizes/displays adequate comfort level or baseline comfort level  Description: Interventions:  - Encourage patient to monitor pain and request assistance  - Assess pain using appropriate pain scale  - Administer analgesics based on type and severity of pain and evaluate response  - Implement non-pharmacological measures as appropriate and evaluate response  - Consider cultural and social influences on pain and pain management  - Notify physician/advanced practitioner if interventions unsuccessful or patient reports new pain  Outcome: Progressing     Problem: INFECTION - ADULT  Goal: Absence or prevention of progression during hospitalization  Description: INTERVENTIONS:  - Assess and monitor for signs and symptoms of infection  - Monitor lab/diagnostic results  - Monitor all insertion sites, i e  indwelling lines, tubes, and drains  - Monitor endotracheal if appropriate and nasal secretions for changes in amount and color  - [place] appropriate cooling/warming therapies per order  - Administer medications as ordered  - Instruct and encourage patient and family to use good hand hygiene technique  - Identify and instruct in appropriate isolation precautions for identified infection/condition  Outcome: Progressing  Goal: Absence of fever/infection during neutropenic period  Description: INTERVENTIONS:  - Monitor WBC    Outcome: Progressing     Problem: SAFETY ADULT  Goal: Patient will remain free of falls  Description: INTERVENTIONS:  - Educate patient/family on patient safety including physical limitations  - Instruct patient to call for assistance with activity   - Consult OT/PT to assist with strengthening/mobility   - Keep Call bell within reach  - Keep bed low and locked with side rails adjusted as appropriate  - Keep care items and personal belongings within reach  - Initiate and maintain comfort rounds  - Make Fall Risk Sign visible to staff  - Offer Toileting every  Hours, in advance of need  - Initiate/Maintain alarm  - Obtain necessary fall risk management equipment:   - Apply yellow socks and bracelet for high fall risk patients  - Consider moving patient to room near nurses station  Outcome: Progressing  Goal: Maintain or return to baseline ADL function  Description: INTERVENTIONS:  -  Assess patient's ability to carry out ADLs; assess patient's baseline for ADL function and identify physical deficits which impact ability to perform ADLs (bathing, care of mouth/teeth, toileting, grooming, dressing, etc )  - Assess/evaluate cause of self-care deficits   - Assess range of motion  - Assess patient's mobility; develop plan if impaired  - Assess patient's need for assistive devices and provide as appropriate  - Encourage maximum independence but intervene and supervise when necessary  - Involve family in performance of ADLs  - Assess for home care needs following discharge   - Consider OT consult to assist with ADL evaluation and planning for discharge  - Provide patient education as appropriate  Outcome: Progressing  Goal: Maintains/Returns to pre admission functional level  Description: INTERVENTIONS:  - Perform BMAT or MOVE assessment daily    - Set and communicate daily mobility goal to care team and patient/family/caregiver  - Collaborate with rehabilitation services on mobility goals if consulted  - Perform Range of Motion  times a day  - Reposition patient every  hours  - Dangle patient  times a day  - Stand patient  times a day  - Ambulate patient  times a day  - Out of bed to chair  times a day   - Out of bed for meals  times a day  - Out of bed for toileting  - Record patient progress and toleration of activity level   Outcome: Progressing     Problem: Knowledge Deficit  Goal: Patient/family/caregiver demonstrates understanding of disease process, treatment plan, medications, and discharge instructions  Description: Complete learning assessment and assess knowledge base    Interventions:  - Provide teaching at level of understanding  - Provide teaching via preferred learning methods  Outcome: Progressing     Problem: POSTPARTUM  Goal: Experiences normal postpartum course  Description: INTERVENTIONS:  - Monitor maternal vital signs  - Assess uterine involution and lochia  Outcome: Progressing  Goal: Appropriate maternal -  bonding  Description: INTERVENTIONS:  - Identify family support  - Assess for appropriate maternal/infant bonding   -Encourage maternal/infant bonding opportunities  - Referral to  or  as needed  Outcome: Progressing  Goal: Establishment of infant feeding pattern  Description: INTERVENTIONS:  - Assess breast/bottle feeding  - Refer to lactation as needed  Outcome: Progressing  Goal: Incision(s), wounds(s) or drain site(s) healing without S/S of infection  Description: INTERVENTIONS  - Assess and document dressing, incision, wound bed, drain sites and surrounding tissue  - Provide patient and family education  - Perform skin care/dressing changes every   Outcome: Progressing

## 2022-12-05 NOTE — CASE MANAGEMENT
Case Management Progress Note    Patient name Jamari Gamble  Location /-73 MRN [id number]  : 1980 Date 2022       LOS (days): 6  Geometric Mean LOS (GMLOS) (days):   Days to GMLOS:        OBJECTIVE:        Current admission status: Inpatient  [facility]erred [facility]acy:   [id number], [address] [place] [id number]  Phone: [phone] Fax: [phone]    Primary Care Provider: No primary care provider on file  Primary Insurance[place] [employer]  Secondary Insurance:     PROGRESS NOTE:    Consult received: Need blood pressure cuff for discharge  CM informed pt's bedside RN Yocasta Bettencourt that a blood pressure cuff is not covered with insurance  CHARLES informed Urszula that blood pressure cuffs range in pricing starting around $16 online  Urszula to inform patient of same

## 2022-12-05 NOTE — PROGRESS NOTES
Post- Progress note    Patient is post-op day 2 from a  delivery for PreE with SF and GDM       Pain: controlled  Tolerating Oral Intake: yes  Voiding: yes  Flatus:yes  Ambulating: yes  Breastfeeding: well  Chest Pain: no  Shortness of Breath: no  Leg Pain/Discomfort: no  Lochia: minimal      Objective:   Vitals:    22 1217   BP: 139/93   Pulse: 85   Resp: 18   Temp: 98 3 °F (36 8 °C)   SpO2: 98%     No intake or output data in the 24 hours ending 22 1610    Physical Exam:  General: alert awake oriented  Cardiovascular: RRR  Lungs: clear  Abdomen:soft, nontender, minimal distention  Fundus: below umbilicus  Incision: clean dry intact  Lower extremeties: no evidence of DVT    Assessment and Plan:  38y o  year-old [id number], postoperative day 4 status-post  delivery  BP today better, on nifedipine XL 30 mg OD  Will recheck her BP again prior to DC  Pt will contact office ASAP for fu and BP check, will buy cuff for home BP checks      Continue routine post op care,    Fela Malagon DO

## 2022-12-05 NOTE — DISCHARGE SUMMARY
Discharge Summary - OB/GYN   Vee Gonsalezer 38 y o  female MRN: [id number]  Unit/Bed#: -01 Encounter: [id number]      Admission Date: 2022     Discharge Date: 22    Admitting Diagnosis:   1  Pregnancy at 33w6d  2  preE with SF  3  GDMA2  4  AMA  5  Upper epigastric pain  6  Anemia in pregnancy    Discharge Diagnosis:   Same, delivered  breech    Procedures: primary  section, low transverse incision    Delivery Attending: Cj Carlisle MD  Discharge Attending: [facility] Course:     Piero Devlin is a 38 y o  Laura Crosser at 33w6d wks who was initially admitted for epigastric pain  Noted to have pre E with SF with P/C ration 49 78 and BP on admit 223/113  Pt admitted, given steroids, IV labetalol multiple times, magnesium and at benefit of steroids was for iol but baby had converted to breech and c/s performed  Post op BP remained high but decreased to acceptable levels for d/c with procardia 30 xl OD  BS resolved  hgb with no need for IV therapy      She delivered a viable female  on 22 at [id number]  Weight 4lbs 2oz via primary  section, low transverse incision  Apgars were 8 (1 min) and 8 (5 min)   was transferred to intensive care  nursery  Patient tolerated the procedure well and was transferred to recovery in stable condition  Her post-operative course was complicated by high BP resolving pre E  Her postoperative pain was well controlled with oral analgesics  On day of discharge, she was ambulating and able to reasonably perform all ADLs  She was voiding and had appropriate bowel function  Pain was well controlled  She was discharged home on post-operative day #4 without complications  Patient was instructed to follow up with her OB as an outpatient and was given appropriate warnings to call provider if she develops signs of infection or uncontrolled pain      Complications: none apparent    Condition at discharge: stable     Discharge instructions/Information to patient and family:   See after visit summary for information provided to patient and family  Provisions for Follow-Up Care:  See after visit summary for information related to follow-up care and any pertinent home health orders  Disposition: See After Visit Summary for discharge disposition information  Planned Readmission: No    Discharge Medications: For a complete list of the patient's medications, please refer to her med rec        Gabriel Arsenio Vences DO  OB/Gyn Hospitalist  [phone]  4:19 PM  12/5/2022

## 2022-12-06 PROBLEM — Z98.891 STATUS POST PRIMARY LOW TRANSVERSE CESAREAN SECTION: Status: ACTIVE | Noted: 2022-05-19

## 2022-12-06 PROBLEM — O14.13 PREECLAMPSIA, SEVERE, THIRD TRIMESTER: Status: RESOLVED | Noted: 2022-11-29 | Resolved: 2022-12-06

## 2022-12-06 PROBLEM — O09.523 ADVANCED MATERNAL AGE IN MULTIGRAVIDA, THIRD TRIMESTER: Status: RESOLVED | Noted: 2022-10-26 | Resolved: 2022-12-06

## 2022-12-06 PROBLEM — Z87.59 HISTORY OF POSTPARTUM HEMORRHAGE: Status: ACTIVE | Noted: 2022-07-27

## 2022-12-06 PROBLEM — O09.42 GRAND MULTIPARITY WITH CURRENT PREGNANCY IN SECOND TRIMESTER: Status: RESOLVED | Noted: 2022-07-27 | Resolved: 2022-12-06

## 2022-12-06 NOTE — ASSESSMENT & PLAN NOTE
Due to severe preeclampsia and breech presentation  Normal postpartum exam      1  Contraception: wants interval IUD (Mirena), paperwork filled out today  2  Baby in NICU, pumping successfully, [address] information discussed  3  Increase activity as tolerated, may resume all normal activity  4  Next pap smear due now, will schedule for IUD placement and pap smear at 6 weeks PP

## 2022-12-06 NOTE — PROGRESS NOTES
Postpartum Visit  2022    Assessment/Plan     Status post primary low transverse  section  Due to severe preeclampsia and breech presentation  Normal postpartum exam      1  Contraception: wants interval IUD (Mirena), paperwork filled out today  2  Baby in NICU, pumping successfully, [address] information discussed  3  Increase activity as tolerated, may resume all normal activity  4  Next pap smear due now, will schedule for IUD placement and pap smear at 6 weeks PP  Iron deficiency anemia  Repeat CBC and iron panel ordered  PO iron supplementation reordered along with colace    History of gestational diabetes    Lab Results   Component Value Date    HGBA1C 6 1 (H) 2015     Will need GTT at 6 weeks PP    History of severe pre-eclampsia  Was discharged on Procardia XL 30 mg daily however she did not pick it up  BP elevated today but not severe, emphasized importance of taking it daily    Will return in one week for BP and mood check      Subjective     Iris DONIS Addison Stern is a 38 y o  female who presents for a postpartum visit  Her pregnancy was complicated by severe preeclampsia, A2GDM, and iron def anemia requiring venofer infusions  She delivered a viable female  on 22 weight 4lbs 2oz via primary  section, low transverse incision  Apgars were 8 (1 min) and 8 (5 min)   was transferred to intensive care  nursery  She was initially admitted for epigastric pain  Noted to have pre E with SF with P/C ratio 49 78 and BP on admit 223/113  She received steroids, IV labetalol multiple times, magnesium and at benefit of steroids was for IOL but baby had converted to breech so a c/s was performed  Post op BP remained high but decreased to acceptable levels for d/c with procardia 30 xl OD  Bleeding thin lochia  Bowel function is normal  Bladder function is normal  Patient ha not been been sexually active  Desired contraception method is interval IUD  Postpartum depression screening to be done at next visit, patient reports some sadness and tiredness however denies SI/HI  Baby's course has been complicated by NICU admission  Per FOB, baby is doing well, breathing on own and regulating temperature  Baby is feeding by bottle - mom is pumping    Last Pap : in 2019, normal   Gestational Diabetes: yes      Current Outpatient Medications:   •  docusate sodium (COLACE) 100 mg capsule, Take 1 capsule (100 mg total) by mouth 2 (two) times a day as needed for constipation for up to 30 doses, Disp: 30 capsule, Rfl: 1  •  famotidine (PEPCID) 20 mg tablet, Take 1 tablet (20 mg total) by mouth 2 (two) times a day, Disp: 60 tablet, Rfl: 0  •  ferrous sulfate 324 (65 Fe) mg, Take 1 tablet (324 mg total) by mouth 2 (two) times a day before meals, Disp: 60 tablet, Rfl: 3  •  ibuprofen (MOTRIN) 600 mg tablet, Take 1 tablet (600 mg total) by mouth every 6 (six) hours, Disp: 30 tablet, Rfl: 0  •  Microlet Lancets MISC, Test 4 times daily, Disp: 100 each, Rfl: 4  •  NIFEdipine (PROCARDIA XL) 30 mg 24 hr tablet, Take 1 tablet (30 mg total) by mouth daily Do not start before December 6, 2022 , Disp: 30 tablet, Rfl: 0  •  oxyCODONE (Roxicodone) 5 immediate release tablet, Take 1 tablet (5 mg total) by mouth every 4 (four) hours as needed for moderate pain for up to 10 doses Max Daily Amount: 30 mg, Disp: 10 tablet, Rfl: 0  •  Prenatal Vit-Fe Fumarate-FA (Prenatal Vitamin) 27-0 8 MG TABS, Take 1 tablet by mouth in the morning (Patient not taking: Reported on 12/8/2022), Disp: 30 tablet, Rfl: 9    Allergies   Allergen Reactions   • Dog Epithelium Allergic Rhinitis   • Pollen Extract Allergic Rhinitis       Review of Systems  Constitutional: no fever, feels well  Breasts: no complaints of breast pain, breast lump, or nipple discharge  Gastrointestinal: no complaints nausea, vomiting  Genitourinary: as noted in HPI    Neurological: no complaints of headache    Objective      BP 143/98 (BP Location: Right arm, Patient Position: Sitting, Cuff Size: Adult)   Pulse (!) 107   Temp 98 9 °F (37 2 °C)   Resp 18   Ht 5' 2" (1 575 m)   Wt 71 8 kg (158 lb 6 4 oz)   LMP 03/31/2022 (Approximate)   Breastfeeding Yes   BMI 28 97 kg/m²     OBGyn Exam   GEN: The patient was alert and oriented x3, pleasant well-appearing female in no acute distress     CV: Regular rate  PULM: Non-labored respirations  MSK: Normal gait  Skin: Warm, dry  Neuro: No focal deficits  Psych: Normal affect and judgement, cooperative      Lina Montaño MD   OB/Gyn PGY-2  1:25 PM  12/08/22

## 2022-12-08 ENCOUNTER — OFFICE VISIT (OUTPATIENT)
Dept: OBGYN CLINIC | Facility: CLINIC | Age: 42
End: 2022-12-08

## 2022-12-08 VITALS
RESPIRATION RATE: 18 BRPM | HEIGHT: 62 IN | HEART RATE: 107 BPM | DIASTOLIC BLOOD PRESSURE: 98 MMHG | BODY MASS INDEX: 29.15 KG/M2 | TEMPERATURE: 98.9 F | WEIGHT: 158.4 LBS | SYSTOLIC BLOOD PRESSURE: 143 MMHG

## 2022-12-08 DIAGNOSIS — D50.9 IRON DEFICIENCY ANEMIA, UNSPECIFIED IRON DEFICIENCY ANEMIA TYPE: Primary | ICD-10-CM

## 2022-12-08 DIAGNOSIS — Z87.59 HISTORY OF SEVERE PRE-ECLAMPSIA: ICD-10-CM

## 2022-12-08 DIAGNOSIS — O99.012 ANEMIA AFFECTING PREGNANCY IN SECOND TRIMESTER: ICD-10-CM

## 2022-12-08 DIAGNOSIS — Z98.891 STATUS POST PRIMARY LOW TRANSVERSE CESAREAN SECTION: ICD-10-CM

## 2022-12-08 DIAGNOSIS — O14.90 PRE-ECLAMPSIA AFFECTING PREGNANCY, ANTEPARTUM: ICD-10-CM

## 2022-12-08 PROBLEM — Z86.32 HISTORY OF GESTATIONAL DIABETES: Status: ACTIVE | Noted: 2022-11-16

## 2022-12-08 RX ORDER — FERROUS SULFATE TAB EC 324 MG (65 MG FE EQUIVALENT) 324 (65 FE) MG
324 TABLET DELAYED RESPONSE ORAL
Qty: 60 TABLET | Refills: 3 | Status: SHIPPED | OUTPATIENT
Start: 2022-12-08

## 2022-12-08 RX ORDER — DOCUSATE SODIUM 100 MG/1
100 CAPSULE, LIQUID FILLED ORAL 2 TIMES DAILY PRN
Qty: 30 CAPSULE | Refills: 1 | Status: SHIPPED | OUTPATIENT
Start: 2022-12-08

## 2022-12-08 NOTE — ASSESSMENT & PLAN NOTE
Was discharged on Procardia XL 30 mg daily however she did not pick it up  BP elevated today but not severe, emphasized importance of taking it daily    Will return in one week for BP and mood check
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
## 2022-12-29 ENCOUNTER — APPOINTMENT (OUTPATIENT)
Dept: LAB | Facility: CLINIC | Age: 42
End: 2022-12-29

## 2022-12-29 ENCOUNTER — PROCEDURE VISIT (OUTPATIENT)
Dept: OBGYN CLINIC | Facility: CLINIC | Age: 42
End: 2022-12-29

## 2022-12-29 VITALS
HEIGHT: 62 IN | SYSTOLIC BLOOD PRESSURE: 127 MMHG | RESPIRATION RATE: 18 BRPM | WEIGHT: 159 LBS | DIASTOLIC BLOOD PRESSURE: 85 MMHG | HEART RATE: 58 BPM | BODY MASS INDEX: 29.26 KG/M2

## 2022-12-29 DIAGNOSIS — Z30.430 ENCOUNTER FOR INSERTION OF MIRENA IUD: Primary | ICD-10-CM

## 2022-12-29 DIAGNOSIS — D50.9 IRON DEFICIENCY ANEMIA, UNSPECIFIED IRON DEFICIENCY ANEMIA TYPE: ICD-10-CM

## 2022-12-29 DIAGNOSIS — Z32.02 PREGNANCY TEST NEGATIVE: ICD-10-CM

## 2022-12-29 LAB
ERYTHROCYTE [DISTWIDTH] IN BLOOD BY AUTOMATED COUNT: 19.9 % (ref 11.6–15.1)
FERRITIN SERPL-MCNC: 35 NG/ML (ref 8–388)
HCT VFR BLD AUTO: 37.3 % (ref 34.8–46.1)
HGB BLD-MCNC: 11.2 G/DL (ref 11.5–15.4)
IRON SATN MFR SERPL: 10 % (ref 15–50)
IRON SERPL-MCNC: 36 UG/DL (ref 50–170)
MCH RBC QN AUTO: 23.4 PG (ref 26.8–34.3)
MCHC RBC AUTO-ENTMCNC: 30 G/DL (ref 31.4–37.4)
MCV RBC AUTO: 78 FL (ref 82–98)
PLATELET # BLD AUTO: 372 THOUSANDS/UL (ref 149–390)
PMV BLD AUTO: 9 FL (ref 8.9–12.7)
RBC # BLD AUTO: 4.78 MILLION/UL (ref 3.81–5.12)
SL AMB POCT URINE HCG: NORMAL
TIBC SERPL-MCNC: 358 UG/DL (ref 250–450)
WBC # BLD AUTO: 4.45 THOUSAND/UL (ref 4.31–10.16)

## 2022-12-29 RX ORDER — LEVONORGESTREL 52 MG/1
INTRAUTERINE DEVICE INTRAUTERINE
COMMUNITY
Start: 2022-12-09

## 2022-12-29 NOTE — PROGRESS NOTES
Iud insertions    Date/Time: 12/29/2022 10:04 AM  Performed by: ISIDRO Valenzuela  Authorized by: ISIDRO Valenzuela     Verbal consent obtained?: Yes    Written consent obtained?: Yes    Risks and benefits: Risks, benefits and alternatives were discussed    Consent given by:  Patient  Time Out:     Time out: Immediately prior to the procedure a time out was called    Patient states understanding of procedure being performed: Yes    Patient's understanding of procedure matches consent: Yes    Procedure consent matches procedure scheduled: Yes    Relevant documents present and verified: Yes    Patient identity confirmed:  Verbally with patient  Procedure:     Pelvic exam performed: yes      Negative urine pregnancy test: yes      Cervix cleaned and prepped: yes      Speculum placed in vagina: yes      Tenaculum applied to cervix: yes      Uterus sounded: yes      Uterus sound depth (cm):  9    IUD inserted with no complications: yes      IUD type:  Mirena    Strings trimmed: yes    Post-procedure:     Patient tolerated procedure well: yes      Patient will follow up after next period: yes    Comments:      4 weeks post partum, doing well  Baby is now home from NICU   Will follow up in 4 weeks for string check and annual

## 2023-01-10 ENCOUNTER — TELEPHONE (OUTPATIENT)
Dept: OBGYN CLINIC | Facility: CLINIC | Age: 43
End: 2023-01-10

## 2023-01-11 NOTE — TELEPHONE ENCOUNTER
Patient was left a voicemail in regards to if her headache persisted, given office call back number if needed  Advised again if headache continued to follow up in the ED

## 2023-01-26 ENCOUNTER — OFFICE VISIT (OUTPATIENT)
Dept: OBGYN CLINIC | Facility: CLINIC | Age: 43
End: 2023-01-26

## 2023-01-26 VITALS
SYSTOLIC BLOOD PRESSURE: 143 MMHG | WEIGHT: 164.4 LBS | BODY MASS INDEX: 30.25 KG/M2 | HEART RATE: 53 BPM | HEIGHT: 62 IN | DIASTOLIC BLOOD PRESSURE: 92 MMHG

## 2023-01-26 DIAGNOSIS — Z87.59 HISTORY OF PRE-ECLAMPSIA: ICD-10-CM

## 2023-01-26 DIAGNOSIS — Z30.431 IUD CHECK UP: Primary | ICD-10-CM

## 2023-01-26 DIAGNOSIS — K21.9 GASTROESOPHAGEAL REFLUX DISEASE WITHOUT ESOPHAGITIS: ICD-10-CM

## 2023-01-26 RX ORDER — FAMOTIDINE 20 MG/1
20 TABLET, FILM COATED ORAL 2 TIMES DAILY
Qty: 60 TABLET | Refills: 2 | Status: SHIPPED | OUTPATIENT
Start: 2023-01-26

## 2023-01-26 RX ORDER — NIFEDIPINE 30 MG/1
30 TABLET, EXTENDED RELEASE ORAL DAILY
Qty: 30 TABLET | Refills: 0 | Status: SHIPPED | OUTPATIENT
Start: 2023-01-26

## 2023-01-26 NOTE — PROGRESS NOTES
PROBLEM GYNECOLOGICAL VISIT    Rupa Nieves is a 38 y o  female who presents today for an IUD check  Her general medical history has been reviewed and she reports it as follows:    Past Medical History:   Diagnosis Date   • 39 weeks gestation of pregnancy 2020    IOL - 2020 @8pm [address]  Flu vaccine 19   • Advanced maternal age in multigravida, third trimester 10/26/2022    Did not complete aneuploidy screening this pregnancy Need AFS starting at 32 weeks, first NST perfomed today , 2x week   • Anemia    • Anemia during pregnancy in third trimester 1/15/2020   • COVID-19 06/10/2020   • COVID-19 virus detected 2020   • Elderly multigravida in third trimester 1/15/2020   • Encounter for injection education 3/3/2020   • GBS bacteriuria 2019    Amoxicillin sent to patient's pharmacy  Will need PCN in labor DO NOT collect GBS swab at 39 weeks!!   • [address] multiparity with current pregnancy in second trimester 2022   • Hyperglycemia during pregnancy 3/3/2020   • Iron deficiency anemia 2019    F/u Ferritin (Hb 8 4) Will likely need IV iron   • Postpartum hemorrhage, delivered, current hospitalization 2020   • Prediabetes    • Preeclampsia, severe, third trimester 2022   •  (spontaneous vaginal delivery) 2020     Past Surgical History:   Procedure Laterality Date   • GA  DELIVERY ONLY N/A 2022    Procedure:  SECTION ();   Surgeon: Fannie Hearn MD;  Location: [facility];  Service: Obstetrics   • TOOTH EXTRACTION       OB History        7    Para   6    Term   5       1    AB        Living   6       SAB        IAB        Ectopic        Multiple   0    Live Births   6               Social History     Tobacco Use   • Smoking status: Never   • Smokeless tobacco: Never   Vaping Use   • Vaping Use: Never used   Substance Use Topics   • Alcohol use: No     Comment: pt reports social drinking once per month prior to pregnancy   • Drug use: No     Social History     Substance and Sexual Activity   Sexual Activity Not Currently   • Partners: Male   • Birth control/protection: I U D  Current Outpatient Medications   Medication Instructions   • docusate sodium (COLACE) 100 mg, Oral, 2 times daily PRN   • famotidine (PEPCID) 20 mg, Oral, 2 times daily   • ferrous sulfate 324 mg, Oral, 2 times daily before meals   • ibuprofen (MOTRIN) 600 mg, Oral, Every 6 hours   • Microlet Lancets MISC Test 4 times daily   • Mirena, 52 MG, 20 MCG/DAY IUD TO BE INSERTED ONE TIME BY PRESCRIBER  ROUTE INTRAUTERINE    • NIFEdipine (PROCARDIA XL) 30 mg, Oral, Daily   • oxyCODONE (ROXICODONE) 5 mg, Oral, Every 4 hours PRN   • Prenatal Vit-Fe Fumarate-FA (Prenatal Vitamin) 27-0 8 MG TABS 1 tablet, Oral, Daily       History of Present Illness:   Rupa presents today for an IUD check  She had a Mirena IUD placed in our office on 12/29/22  She reports she has had intercourse since insertion without issue  She has not been bothered by the strings  She states she has had intermittent bleeding since insertion  She is also requesting refills for her procardia  She had issues with severe preeclampsia in pregnancy and had been taking this since delivery for BP control  She states that she has not taken the medication since Saturday due to running out of the medication  She also reports that she had only been taking the medication "when she needed it"  She had been taking a dose if she felt like her BP was elevated or she had a headache  Review of Systems:  Review of Systems   Constitutional: Negative  Gastrointestinal: Negative  Genitourinary: Negative  Physical Exam:  /92 (BP Location: Right arm, Patient Position: Sitting, Cuff Size: Adult)   Pulse (!) 53   Ht 5' 2" (1 575 m)   Wt 74 6 kg (164 lb 6 4 oz)   LMP 03/31/2022   BMI 30 07 kg/m²   Physical Exam  Constitutional:       General: She is not in acute distress  Appearance: Normal appearance  Genitourinary:      Vulva normal       No vaginal discharge or bleeding  Right Adnexa: not tender, not full and no mass present  Left Adnexa: not tender, not full and no mass present  Cervical lesion present  No cervical motion tenderness  Neurological:      Mental Status: She is alert  Skin:     General: Skin is warm and dry  Psychiatric:         Mood and Affect: Mood normal          Behavior: Behavior normal    Vitals reviewed  Assessment:   1  IUD check    2  Plan:   1  IUD is in place  2  Discussed expected bleeding patterns with IUD, reassurance given  3  Discussed importance of taking antihypertensives as directed and importance of follow up with PCP for continued BP management  Refill approved x 30 days  4  Will return for annual exam and BP check in one month  Reviewed with patient that test results are available in [employer] immediately, but that they will not necessarily be reviewed by me immediately  Explained that I will review results at my earliest opportunity and contact patient appropriately

## 2023-04-26 ENCOUNTER — TELEPHONE (OUTPATIENT)
Dept: HEMATOLOGY ONCOLOGY | Facility: CLINIC | Age: 43
End: 2023-04-26

## 2023-04-26 NOTE — TELEPHONE ENCOUNTER
I called the patient in regards to their upcoming appointment with Misa Marvin in [address]  I called to ensure the patient had the correct address for the new [facility] location  I provided the patient with [place], [id number]  I gave the patient the number of [phone] to give us a call back if they were to have any questions

## 2023-04-28 ENCOUNTER — TELEPHONE (OUTPATIENT)
Dept: HEMATOLOGY ONCOLOGY | Facility: CLINIC | Age: 43
End: 2023-04-28

## 2023-04-28 NOTE — TELEPHONE ENCOUNTER
Made an attempt to call the patient advising on the correct location for their appointment with [facility] in [facility]            I left a voicemail detailing this and instructing patient to call back [facility] phone number to confirm appointment and to get correct location

## 2023-05-03 NOTE — PROGRESS NOTES
HEMATOLOGY / ONCOLOGY CLINIC FOLLOW UP NOTE    Primary Care Provider: No primary care provider on file  Referring Provider:    MRN: [id number]  : 1980    Reason for Encounter: Follow-up for iron deficiency anemia       Interval History: Patient returns for follow-up visit  She was last seen by Dr Safia Blankenship on 2022 for iron deficiency in pregnancy  She was treated with IV Venofer  She had  on 2022 due to severe preeclampsia and breech position  Blood work completed on 2023 shows improvement in hemoglobin to 13 1  She still has microcytic indices with MCV of 80, MCH 24 8, MCHC 31  White count, platelet count and differential are normal   Her serum iron is in a low normal range 62, iron saturation 14%  Ferritin is low at 8    Patient spouse helps to provide translation as patient is mostly Chinese-speaking  She is symptomatic from her iron deficiency and endorses restless legs, mild headaches, exertional dyspnea and intermittent heart palpitations  She has been having irregular bleeding since having IUD placed  REVIEW OF SYSTEMS:  Please note that a 14-point review of systems was performed to include Constitutional, HEENT, Respiratory, CVS, GI, , Musculoskeletal, Integumentary, Neurologic, Rheumatologic, Endocrinologic, Psychiatric, Lymphatic, and Hematologic/Oncologic systems were reviewed and are negative unless otherwise stated in HPI  Positive and negative findings pertinent to this evaluation are incorporated into the history of present illness  PROBLEM LIST:  Patient Active Problem List   Diagnosis    History of insulin controlled gestational diabetes mellitus (GDM)    Status post primary low transverse  section    History of postpartum hemorrhage    History of severe pre-eclampsia    Iron deficiency anemia    Bilateral hand numbness    History of gestational diabetes       Assessment / Plan:  1   Iron deficiency anemia, unspecified iron deficiency anemia type      Most recent blood work is consistent with persistent iron deficiency  She is not anemic at this time as her hemoglobin is in a low normal range  However she does have microcytic hyperchromic indices and her iron panel reflects iron deficiency  She is symptomatic from her iron deficiency  She has been unable to tolerate oral iron replacement as this has resulted in GI upset  She is agreeable to course of parenteral iron replacement  I will set her up for IV Venofer 300 mg weekly x4 doses  I will also give her 4 doses of B12  Patient was in agreement  I will see her back in 4 months with repeat blood work to assess how she is feeling and how she has responded to parenteral iron replacement  She is instructed to call at anytime with questions or concerns    I spent 30 minutes on chart review, face to face counseling time, coordination of care and documentation  Past Medical History:   has a past medical history of 39 weeks gestation of pregnancy (2020), Advanced maternal age in multigravida, third trimester (10/26/2022), Anemia, Anemia during pregnancy in third trimester (1/15/2020), COVID-19 (06/10/2020), COVID-19 virus detected (2020), Elderly multigravida in third trimester (1/15/2020), Encounter for injection education (3/3/2020), GBS bacteriuria (2019), Grand multiparity with current pregnancy in second trimester (2022), Hyperglycemia during pregnancy (3/3/2020), Iron deficiency anemia (2019), Postpartum hemorrhage, delivered, current hospitalization (2020), Prediabetes (), Preeclampsia, severe, third trimester (2022), and  (spontaneous vaginal delivery) (2020)  PAST SURGICAL HISTORY:   has a past surgical history that includes Tooth extraction and pr  delivery only (N/A, 2022)      CURRENT MEDICATIONS  Current Outpatient Medications   Medication Sig Dispense Refill    ferrous sulfate 324 (65 Fe) mg Take 1 tablet (324 mg total) by mouth 2 (two) times a day before meals 60 tablet 3    Mirena, 52 MG, 20 MCG/DAY IUD TO BE INSERTED ONE TIME BY PRESCRIBER  ROUTE INTRAUTERINE       NIFEdipine (PROCARDIA XL) 30 mg 24 hr tablet Take 1 tablet (30 mg total) by mouth daily 30 tablet 0    docusate sodium (COLACE) 100 mg capsule Take 1 capsule (100 mg total) by mouth 2 (two) times a day as needed for constipation for up to 30 doses (Patient not taking: Reported on 5/4/2023) 30 capsule 1    famotidine (PEPCID) 20 mg tablet Take 1 tablet (20 mg total) by mouth 2 (two) times a day 60 tablet 0    famotidine (PEPCID) 20 mg tablet Take 1 tablet (20 mg total) by mouth 2 (two) times a day (Patient not taking: Reported on 5/4/2023) 60 tablet 2    ibuprofen (MOTRIN) 600 mg tablet Take 1 tablet (600 mg total) by mouth every 6 (six) hours (Patient not taking: Reported on 5/4/2023) 30 tablet 0    Microlet Lancets MISC Test 4 times daily (Patient not taking: Reported on 12/29/2022) 100 each 4    oxyCODONE (Roxicodone) 5 immediate release tablet Take 1 tablet (5 mg total) by mouth every 4 (four) hours as needed for moderate pain for up to 10 doses Max Daily Amount: 30 mg (Patient not taking: Reported on 1/26/2023) 10 tablet 0    Prenatal Vit-Fe Fumarate-FA (Prenatal Vitamin) 27-0 8 MG TABS Take 1 tablet by mouth in the morning (Patient not taking: Reported on 12/8/2022) 30 tablet 9     No current facility-administered medications for this visit  [unfilled]    SOCIAL HISTORY:   reports that she has never smoked  She has never used smokeless tobacco  She reports that she does not drink alcohol and does not use drugs  FAMILY HISTORY:  family history includes Cancer in her mother; Diabetes in her maternal grandmother and paternal grandmother; No Known Problems in her brother, daughter, father, and son  ALLERGIES:  is allergic to dog epithelium and pollen extract        Physical Exam:  Vital Signs:   Visit Vitals  /80 (BP "Location: Left arm, Patient Position: Sitting, Cuff Size: Large)   Pulse 78   Temp (!) 97 °F (36 1 °C) (Temporal)   Resp 16   Ht 5' 2\" (1 575 m)   Wt 75 8 kg (167 lb)   SpO2 98%   BMI 30 54 kg/m²   OB Status Unknown   Smoking Status Never   BSA 1 77 m²     Body mass index is 30 54 kg/m²  Body surface area is 1 77 meters squared  Physical Exam  Constitutional:       General: She is not in acute distress  Appearance: Normal appearance  HENT:      Head: Normocephalic and atraumatic  Eyes:      General: No scleral icterus  Right eye: No discharge  Left eye: No discharge  Conjunctiva/sclera: Conjunctivae normal    Cardiovascular:      Rate and Rhythm: Normal rate and regular rhythm  Pulmonary:      Effort: Pulmonary effort is normal  No respiratory distress  Breath sounds: Normal breath sounds  Abdominal:      General: Bowel sounds are normal  There is no distension  Palpations: Abdomen is soft  There is no mass  Tenderness: There is no abdominal tenderness  Musculoskeletal:         General: Normal range of motion  Lymphadenopathy:      Cervical: No cervical adenopathy  Upper Body:      Right upper body: No supraclavicular, axillary or pectoral adenopathy  Left upper body: No supraclavicular, axillary or pectoral adenopathy  Skin:     General: Skin is warm and dry  Neurological:      General: No focal deficit present  Mental Status: She is alert and oriented to person, place, and time     Psychiatric:         Mood and Affect: Mood normal          Behavior: Behavior normal          Labs:  Lab Results   Component Value Date    WBC 6 44 04/13/2023    HGB 13 1 04/13/2023    HCT 42 1 04/13/2023    MCV 80 (L) 04/13/2023     04/13/2023     Lab Results   Component Value Date     03/31/2015    SODIUM 136 12/02/2022    K 4 1 12/02/2022     12/02/2022    CO2 26 12/02/2022    ANIONGAP 7 03/31/2015    AGAP 7 12/02/2022    BUN 16 12/02/2022    " CREATININE 0 66 12/02/2022    GLUC 134 12/02/2022    GLUF 114 (H) 11/03/2022    CALCIUM 7 0 (L) 12/02/2022    AST 29 12/02/2022    ALT 46 12/02/2022    ALKPHOS 107 12/02/2022    PROT 7 8 03/31/2015    TP 6 2 (L) 12/02/2022    BILITOT 0 4 03/31/2015    TBILI 0 10 (L) 12/02/2022    EGFR 110 12/02/2022

## 2023-05-04 ENCOUNTER — TELEPHONE (OUTPATIENT)
Age: 43
End: 2023-05-04

## 2023-05-04 ENCOUNTER — OFFICE VISIT (OUTPATIENT)
Age: 43
End: 2023-05-04

## 2023-05-04 VITALS
HEART RATE: 78 BPM | OXYGEN SATURATION: 98 % | BODY MASS INDEX: 30.73 KG/M2 | WEIGHT: 167 LBS | RESPIRATION RATE: 16 BRPM | DIASTOLIC BLOOD PRESSURE: 80 MMHG | SYSTOLIC BLOOD PRESSURE: 100 MMHG | TEMPERATURE: 97 F | HEIGHT: 62 IN

## 2023-05-04 DIAGNOSIS — D50.9 IRON DEFICIENCY ANEMIA, UNSPECIFIED IRON DEFICIENCY ANEMIA TYPE: Primary | ICD-10-CM

## 2023-05-04 RX ORDER — SODIUM CHLORIDE 9 MG/ML
20 INJECTION, SOLUTION INTRAVENOUS ONCE
OUTPATIENT
Start: 2023-05-08

## 2023-05-04 RX ORDER — CYANOCOBALAMIN 1000 UG/ML
1000 INJECTION, SOLUTION INTRAMUSCULAR; SUBCUTANEOUS ONCE
OUTPATIENT
Start: 2023-05-08 | End: 2023-05-08

## 2023-05-18 ENCOUNTER — HOSPITAL ENCOUNTER (OUTPATIENT)
Dept: INFUSION CENTER | Facility: HOSPITAL | Age: 43
Discharge: HOME | End: 2023-05-18
Attending: INTERNAL MEDICINE

## 2023-05-18 VITALS
RESPIRATION RATE: 16 BRPM | SYSTOLIC BLOOD PRESSURE: 133 MMHG | OXYGEN SATURATION: 97 % | TEMPERATURE: 96.4 F | DIASTOLIC BLOOD PRESSURE: 84 MMHG | HEART RATE: 58 BPM

## 2023-05-18 DIAGNOSIS — D50.9 IRON DEFICIENCY ANEMIA, UNSPECIFIED IRON DEFICIENCY ANEMIA TYPE: Primary | ICD-10-CM

## 2023-05-18 RX ORDER — SODIUM CHLORIDE 9 MG/ML
20 INJECTION, SOLUTION INTRAVENOUS ONCE
Status: CANCELLED | OUTPATIENT
Start: 2023-05-25

## 2023-05-18 RX ORDER — SODIUM CHLORIDE 9 MG/ML
20 INJECTION, SOLUTION INTRAVENOUS ONCE
Status: COMPLETED | OUTPATIENT
Start: 2023-05-18 | End: 2023-05-18

## 2023-05-18 RX ORDER — CYANOCOBALAMIN 1000 UG/ML
1000 INJECTION, SOLUTION INTRAMUSCULAR; SUBCUTANEOUS ONCE
Status: COMPLETED | OUTPATIENT
Start: 2023-05-18 | End: 2023-05-18

## 2023-05-18 RX ORDER — CYANOCOBALAMIN 1000 UG/ML
1000 INJECTION, SOLUTION INTRAMUSCULAR; SUBCUTANEOUS ONCE
Status: CANCELLED | OUTPATIENT
Start: 2023-05-25 | End: 2023-05-25

## 2023-05-18 RX ADMIN — CYANOCOBALAMIN 1000 MCG: 1000 INJECTION, SOLUTION INTRAMUSCULAR at 08:58

## 2023-05-18 RX ADMIN — SODIUM CHLORIDE 20 ML/HR: 9 INJECTION, SOLUTION INTRAVENOUS at 08:57

## 2023-05-18 RX ADMIN — IRON SUCROSE 300 MG: 20 INJECTION, SOLUTION INTRAVENOUS at 08:57

## 2023-05-18 NOTE — PROGRESS NOTES
Pt tolerated treatment with no adv reactions; AVS given; s/s to report to physician reviewed with pt who verb understanding; pt left unit ambulatory with steady gait

## 2023-05-25 ENCOUNTER — HOSPITAL ENCOUNTER (OUTPATIENT)
Dept: INFUSION CENTER | Facility: HOSPITAL | Age: 43
Discharge: HOME | End: 2023-05-25
Attending: INTERNAL MEDICINE

## 2023-05-25 VITALS
TEMPERATURE: 97 F | DIASTOLIC BLOOD PRESSURE: 83 MMHG | RESPIRATION RATE: 16 BRPM | OXYGEN SATURATION: 97 % | SYSTOLIC BLOOD PRESSURE: 121 MMHG | HEART RATE: 66 BPM

## 2023-05-25 DIAGNOSIS — D50.9 IRON DEFICIENCY ANEMIA, UNSPECIFIED IRON DEFICIENCY ANEMIA TYPE: Primary | ICD-10-CM

## 2023-05-25 RX ORDER — CYANOCOBALAMIN 1000 UG/ML
1000 INJECTION, SOLUTION INTRAMUSCULAR; SUBCUTANEOUS ONCE
Status: COMPLETED | OUTPATIENT
Start: 2023-05-25 | End: 2023-05-25

## 2023-05-25 RX ORDER — SODIUM CHLORIDE 9 MG/ML
20 INJECTION, SOLUTION INTRAVENOUS ONCE
Status: COMPLETED | OUTPATIENT
Start: 2023-05-25 | End: 2023-05-25

## 2023-05-25 RX ORDER — SODIUM CHLORIDE 9 MG/ML
20 INJECTION, SOLUTION INTRAVENOUS ONCE
Status: CANCELLED | OUTPATIENT
Start: 2023-06-01

## 2023-05-25 RX ORDER — CYANOCOBALAMIN 1000 UG/ML
1000 INJECTION, SOLUTION INTRAMUSCULAR; SUBCUTANEOUS ONCE
Status: CANCELLED | OUTPATIENT
Start: 2023-06-01 | End: 2023-06-01

## 2023-05-25 RX ADMIN — SODIUM CHLORIDE 20 ML/HR: 9 INJECTION, SOLUTION INTRAVENOUS at 08:46

## 2023-05-25 RX ADMIN — CYANOCOBALAMIN 1000 MCG: 1000 INJECTION, SOLUTION INTRAMUSCULAR at 08:40

## 2023-05-25 RX ADMIN — SODIUM CHLORIDE 300 MG: 0.9 INJECTION, SOLUTION INTRAVENOUS at 08:46

## 2023-05-25 NOTE — PROGRESS NOTES
Patient completed venofer infusion and received B12 injection  Declined AVS, aware of next appointment   Left unit ambulatory with steady gait accompanied by SO

## 2023-06-01 ENCOUNTER — HOSPITAL ENCOUNTER (OUTPATIENT)
Dept: INFUSION CENTER | Facility: HOSPITAL | Age: 43
Discharge: HOME | End: 2023-06-01
Attending: INTERNAL MEDICINE
Payer: COMMERCIAL

## 2023-06-01 VITALS
HEART RATE: 60 BPM | OXYGEN SATURATION: 97 % | TEMPERATURE: 96.9 F | RESPIRATION RATE: 16 BRPM | DIASTOLIC BLOOD PRESSURE: 77 MMHG | SYSTOLIC BLOOD PRESSURE: 114 MMHG

## 2023-06-01 DIAGNOSIS — D50.9 IRON DEFICIENCY ANEMIA, UNSPECIFIED IRON DEFICIENCY ANEMIA TYPE: Primary | ICD-10-CM

## 2023-06-01 PROCEDURE — 96372 THER/PROPH/DIAG INJ SC/IM: CPT

## 2023-06-01 PROCEDURE — 96365 THER/PROPH/DIAG IV INF INIT: CPT

## 2023-06-01 RX ORDER — SODIUM CHLORIDE 9 MG/ML
20 INJECTION, SOLUTION INTRAVENOUS ONCE
Status: COMPLETED | OUTPATIENT
Start: 2023-06-01 | End: 2023-06-01

## 2023-06-01 RX ORDER — SODIUM CHLORIDE 9 MG/ML
20 INJECTION, SOLUTION INTRAVENOUS ONCE
Status: CANCELLED | OUTPATIENT
Start: 2023-06-08

## 2023-06-01 RX ORDER — CYANOCOBALAMIN 1000 UG/ML
1000 INJECTION, SOLUTION INTRAMUSCULAR; SUBCUTANEOUS ONCE
Status: CANCELLED | OUTPATIENT
Start: 2023-06-08 | End: 2023-06-08

## 2023-06-01 RX ORDER — CYANOCOBALAMIN 1000 UG/ML
1000 INJECTION, SOLUTION INTRAMUSCULAR; SUBCUTANEOUS ONCE
Status: COMPLETED | OUTPATIENT
Start: 2023-06-01 | End: 2023-06-01

## 2023-06-01 RX ADMIN — IRON SUCROSE 300 MG: 20 INJECTION, SOLUTION INTRAVENOUS at 08:23

## 2023-06-01 RX ADMIN — SODIUM CHLORIDE 20 ML/HR: 9 INJECTION, SOLUTION INTRAVENOUS at 08:23

## 2023-06-01 RX ADMIN — CYANOCOBALAMIN 1000 MCG: 1000 INJECTION, SOLUTION INTRAMUSCULAR at 08:11

## 2023-06-01 NOTE — PROGRESS NOTES
Pt tolerated venofer infusion with no adverse reactions  Pt left ambulatory with steady gait   Declined AVS

## 2023-06-08 ENCOUNTER — HOSPITAL ENCOUNTER (OUTPATIENT)
Dept: INFUSION CENTER | Facility: HOSPITAL | Age: 43
Discharge: HOME | End: 2023-06-08
Attending: INTERNAL MEDICINE
Payer: COMMERCIAL

## 2023-06-08 VITALS
OXYGEN SATURATION: 97 % | DIASTOLIC BLOOD PRESSURE: 84 MMHG | TEMPERATURE: 96.8 F | SYSTOLIC BLOOD PRESSURE: 135 MMHG | HEART RATE: 51 BPM | RESPIRATION RATE: 14 BRPM

## 2023-06-08 DIAGNOSIS — D50.9 IRON DEFICIENCY ANEMIA, UNSPECIFIED IRON DEFICIENCY ANEMIA TYPE: Primary | ICD-10-CM

## 2023-06-08 PROCEDURE — 96365 THER/PROPH/DIAG IV INF INIT: CPT

## 2023-06-08 PROCEDURE — 96366 THER/PROPH/DIAG IV INF ADDON: CPT

## 2023-06-08 PROCEDURE — 96372 THER/PROPH/DIAG INJ SC/IM: CPT

## 2023-06-08 RX ORDER — CYANOCOBALAMIN 1000 UG/ML
1000 INJECTION, SOLUTION INTRAMUSCULAR; SUBCUTANEOUS ONCE
Status: CANCELLED | OUTPATIENT
Start: 2023-06-15 | End: 2023-06-15

## 2023-06-08 RX ORDER — SODIUM CHLORIDE 9 MG/ML
20 INJECTION, SOLUTION INTRAVENOUS ONCE
Status: CANCELLED | OUTPATIENT
Start: 2023-06-15

## 2023-06-08 RX ORDER — SODIUM CHLORIDE 9 MG/ML
20 INJECTION, SOLUTION INTRAVENOUS ONCE
Status: COMPLETED | OUTPATIENT
Start: 2023-06-08 | End: 2023-06-08

## 2023-06-08 RX ORDER — CYANOCOBALAMIN 1000 UG/ML
1000 INJECTION, SOLUTION INTRAMUSCULAR; SUBCUTANEOUS ONCE
Status: COMPLETED | OUTPATIENT
Start: 2023-06-08 | End: 2023-06-08

## 2023-06-08 RX ADMIN — SODIUM CHLORIDE 20 ML/HR: 9 INJECTION, SOLUTION INTRAVENOUS at 08:54

## 2023-06-08 RX ADMIN — IRON SUCROSE 300 MG: 20 INJECTION, SOLUTION INTRAVENOUS at 08:54

## 2023-06-08 RX ADMIN — CYANOCOBALAMIN 1000 MCG: 1000 INJECTION, SOLUTION INTRAMUSCULAR at 08:45

## 2023-06-08 NOTE — PROGRESS NOTES
Pt tolerated IV Venofer infusion and B12 injection without adverse reaction  Pt left unit ambulatory with steady gait  AVS printed and given to patient

## 2023-09-06 ENCOUNTER — TELEPHONE (OUTPATIENT)
Age: 43
End: 2023-09-06

## 2023-09-06 NOTE — TELEPHONE ENCOUNTER
Phone call to Iris through [employer] . Left message for patient we needed to reschedule appointment 9/7/23 due to lab work not completed. Appointment rescheduled to 9/12/23 at 9am with Wil Real. Reminded patient to have lab work completed for upcoming appointment. Provided [facility] telephone number.

## 2023-10-26 ENCOUNTER — TELEPHONE (OUTPATIENT)
Age: 43
End: 2023-10-26

## 2023-10-26 NOTE — TELEPHONE ENCOUNTER
Phone call to patient. Spoke to Luis Alfredo Bennett through [place] . Call regarding having lab work completed for upcoming appointment 10/27. Patient stated she went for lab work and was told her insurance was not active. She stated she reached out to her  Haim Crabtree and is awaiting a return call. Patient rescheduled appointment to 11/30/23. Provided [facility] telephone number if she has any questions or concerns.

## 2023-11-02 ENCOUNTER — PATIENT OUTREACH (OUTPATIENT)
Dept: OBGYN CLINIC | Facility: CLINIC | Age: 43
End: 2023-11-02

## 2023-11-02 NOTE — PROGRESS NOTES
RAFA SOTO spoke with Pt on her request. Pt with question regarding medical coverage. Pt was under MA due to prior pregnancy but is no longer eligible. Pt was educated about the [employer] program and will meet with the financial counselor today. Pt denies other concerns.

## 2023-12-21 ENCOUNTER — OFFICE VISIT (OUTPATIENT)
Dept: OBGYN CLINIC | Facility: CLINIC | Age: 43
End: 2023-12-21

## 2023-12-21 VITALS
BODY MASS INDEX: 29.53 KG/M2 | HEIGHT: 60 IN | SYSTOLIC BLOOD PRESSURE: 145 MMHG | HEART RATE: 59 BPM | DIASTOLIC BLOOD PRESSURE: 91 MMHG | WEIGHT: 150.4 LBS

## 2023-12-21 DIAGNOSIS — Z30.432 ENCOUNTER FOR IUD REMOVAL: Primary | ICD-10-CM

## 2023-12-21 DIAGNOSIS — I10 HYPERTENSION, UNSPECIFIED TYPE: ICD-10-CM

## 2023-12-21 DIAGNOSIS — Z30.09 BIRTH CONTROL COUNSELING: ICD-10-CM

## 2023-12-21 DIAGNOSIS — Z87.59 HISTORY OF PRE-ECLAMPSIA: ICD-10-CM

## 2023-12-21 PROCEDURE — 58301 REMOVE INTRAUTERINE DEVICE: CPT | Performed by: NURSE PRACTITIONER

## 2023-12-21 PROCEDURE — 99214 OFFICE O/P EST MOD 30 MIN: CPT | Performed by: NURSE PRACTITIONER

## 2023-12-21 RX ORDER — NIFEDIPINE 30 MG/1
30 TABLET, EXTENDED RELEASE ORAL DAILY
Qty: 30 TABLET | Refills: 0 | Status: CANCELLED | OUTPATIENT
Start: 2023-12-21

## 2023-12-21 NOTE — PROGRESS NOTES
PROBLEM GYNECOLOGICAL VISIT    Rupa Curry is a 43 y.o. female who presents today requesting IUD removal.  Her general medical history has been reviewed and she reports it as follows:    Past Medical History:   Diagnosis Date    39 weeks gestation of pregnancy 2020    IOL - 2020 @8pm [facility]  Flu vaccine 19    Advanced maternal age in multigravida, third trimester 10/26/2022    Did not complete aneuploidy screening this pregnancy Need AFS starting at 32 weeks, first NST perfomed today , 2x week    Anemia     Anemia during pregnancy in third trimester 1/15/2020    COVID-19 06/10/2020    COVID-19 virus detected 2020    Elderly multigravida in third trimester 1/15/2020    Encounter for injection education 3/3/2020    GBS bacteriuria 2019    Amoxicillin sent to patient's pharmacy  Will need PCN in labor DO NOT collect GBS swab at 36 weeks!!    Grand multiparity with current pregnancy in second trimester 2022    Hyperglycemia during pregnancy 3/3/2020    Iron deficiency anemia 2019    F/u Ferritin (Hb 8.4) Will likely need IV iron    Postpartum hemorrhage, delivered, current hospitalization 2020    Prediabetes 2015    Preeclampsia, severe, third trimester 2022     (spontaneous vaginal delivery) 2020     Past Surgical History:   Procedure Laterality Date    [place]  DELIVERY ONLY N/A 2022    Procedure:  SECTION ();  Surgeon: Mena Anguiano MD;  Location: [facility];  Service: Obstetrics    TOOTH EXTRACTION       OB History          7    Para   6    Term   5       1    AB        Living   6         SAB        IAB        Ectopic        Multiple   0    Live Births   6               Social History     Tobacco Use    Smoking status: Never    Smokeless tobacco: Never   Vaping Use    Vaping status: Never Used   Substance Use Topics    Alcohol use: No     Comment: pt reports social drinking once per month prior to pregnancy     Drug use: No     Social History     Substance and Sexual Activity   Sexual Activity Not Currently    Partners: Male    Birth control/protection: I.U.D.       Current Outpatient Medications   Medication Instructions    Mirena, 52 MG, 20 MCG/DAY IUD TO BE INSERTED ONE TIME BY PRESCRIBER. ROUTE INTRAUTERINE.    NIFEdipine (PROCARDIA XL) 30 mg, Oral, Daily       History of Present Illness:   Rupa presents today requesting IUD removal. She has had a Mirena IUD in place since 12/2022. She reports that the devise is uncomfortable and causes intermittent cramping/spotting, which is bothersome to her. She is asking to change to OCPs.     Review of Systems:  Review of Systems   Constitutional: Negative.    Gastrointestinal: Negative.    Genitourinary:  Positive for vaginal bleeding.       Physical Exam:  /91 (BP Location: Left arm, Patient Position: Sitting)   Pulse 59   Ht 5' (1.524 m)   Wt 68.2 kg (150 lb 6.4 oz)   LMP 11/07/2023 (Approximate)   BMI 29.37 kg/m²   Physical Exam  Constitutional:       General: She is not in acute distress.     Appearance: Normal appearance.   Genitourinary:      Vulva normal.      No vaginal discharge, bleeding or ulceration.      IUD strings visualized.   Neurological:      Mental Status: She is alert.   Skin:     General: Skin is warm and dry.   Psychiatric:         Mood and Affect: Mood normal.         Behavior: Behavior normal.   Vitals reviewed.         Assessment:   1. IUD removal    2. Birth control counseling    3. Hypertension     Plan:   1. We discussed IUD removal procedure. We discussed alternate methods of contraception. Discussed that she is not a candidate for estrogen containing contraception. She could safely use POPs if she desires oral contraception, educated on importance of strict 24 hour dosing schedule. After discussing options she desires to proceed with IUD removal and condom usage. She reports that she is very rarely sexually active with her .  "   2. We discussed the importance of reliable contraception given her history. Her last baby was delivered at 33 weeks gestation due to severe pre eclampsia and she continues to have uncontrolled chronic HTN. We discussed that unplanned  pregnancy could be dangerous for her and would be high risk.   3. We discussed importance of scheduling follow up with PCP for her uncontrolled HTN. Discussed that uncontrolled HTN can lead to heart attack and stroke.     4. Contact information again provided for [facility].    5. IUD removed.    6. Return for annual exam, pap due 2024.     Iud removal    Date/Time: 12/21/2023 10:30 AM    Performed by: ISIDRO Betancourt  Authorized by: ISIDRO Betancourt  Universal Protocol:  Consent: Verbal consent obtained. Written consent obtained.  Risks and benefits: risks, benefits and alternatives were discussed  Consent given by: patient  Time out: Immediately prior to procedure a \"time out\" was called to verify the correct patient, procedure, equipment, support staff and site/side marked as required.  Patient understanding: patient states understanding of the procedure being performed  Patient consent: the patient's understanding of the procedure matches consent given  Relevant documents: relevant documents present and verified  Patient identity confirmed: verbally with patient    Procedure:     Removed with no complications: yes      Removal due to mechanical complications of IUD: no      Removal due to infection and inflammatory reaction: no      Other reason for removal:  Per patient request      "

## 2023-12-26 ENCOUNTER — HOSPITAL ENCOUNTER (EMERGENCY)
Facility: HOSPITAL | Age: 43
Discharge: HOME | End: 2023-12-26
Attending: EMERGENCY MEDICINE

## 2023-12-26 VITALS
DIASTOLIC BLOOD PRESSURE: 73 MMHG | RESPIRATION RATE: 16 BRPM | SYSTOLIC BLOOD PRESSURE: 136 MMHG | TEMPERATURE: 98.2 F | OXYGEN SATURATION: 98 % | HEART RATE: 69 BPM

## 2023-12-26 DIAGNOSIS — N93.9 VAGINAL BLEEDING: ICD-10-CM

## 2023-12-26 DIAGNOSIS — R07.9 CHEST PAIN: ICD-10-CM

## 2023-12-26 DIAGNOSIS — F41.9 ANXIETY: Primary | ICD-10-CM

## 2023-12-26 LAB
ATRIAL RATE: 48 BPM
BASOPHILS # BLD AUTO: 0.02 THOUSANDS/ÂΜL (ref 0–0.1)
BASOPHILS NFR BLD AUTO: 0 % (ref 0–1)
EOSINOPHIL # BLD AUTO: 0.03 THOUSAND/ÂΜL (ref 0–0.61)
EOSINOPHIL NFR BLD AUTO: 1 % (ref 0–6)
ERYTHROCYTE [DISTWIDTH] IN BLOOD BY AUTOMATED COUNT: 12.6 % (ref 11.6–15.1)
EXT PREGNANCY TEST URINE: NEGATIVE
EXT. CONTROL: NORMAL
HCT VFR BLD AUTO: 41.8 % (ref 34.8–46.1)
HGB BLD-MCNC: 14.5 G/DL (ref 11.5–15.4)
IMM GRANULOCYTES # BLD AUTO: 0.01 THOUSAND/UL (ref 0–0.2)
IMM GRANULOCYTES NFR BLD AUTO: 0 % (ref 0–2)
LYMPHOCYTES # BLD AUTO: 1.68 THOUSANDS/ÂΜL (ref 0.6–4.47)
LYMPHOCYTES NFR BLD AUTO: 26 % (ref 14–44)
MCH RBC QN AUTO: 30.7 PG (ref 26.8–34.3)
MCHC RBC AUTO-ENTMCNC: 34.7 G/DL (ref 31.4–37.4)
MCV RBC AUTO: 88 FL (ref 82–98)
MONOCYTES # BLD AUTO: 0.28 THOUSAND/ÂΜL (ref 0.17–1.22)
MONOCYTES NFR BLD AUTO: 4 % (ref 4–12)
NEUTROPHILS # BLD AUTO: 4.54 THOUSANDS/ÂΜL (ref 1.85–7.62)
NEUTS SEG NFR BLD AUTO: 69 % (ref 43–75)
NRBC BLD AUTO-RTO: 0 /100 WBCS
P AXIS: 29 DEGREES
PLATELET # BLD AUTO: 259 THOUSANDS/UL (ref 149–390)
PMV BLD AUTO: 10.2 FL (ref 8.9–12.7)
PR INTERVAL: 138 MS
QRS AXIS: 39 DEGREES
QRSD INTERVAL: 78 MS
QT INTERVAL: 440 MS
QTC INTERVAL: 393 MS
RBC # BLD AUTO: 4.73 MILLION/UL (ref 3.81–5.12)
T WAVE AXIS: 8 DEGREES
VENTRICULAR RATE: 48 BPM
WBC # BLD AUTO: 6.56 THOUSAND/UL (ref 4.31–10.16)

## 2023-12-26 PROCEDURE — 85025 COMPLETE CBC W/AUTO DIFF WBC: CPT

## 2023-12-26 PROCEDURE — 36415 COLL VENOUS BLD VENIPUNCTURE: CPT

## 2023-12-26 PROCEDURE — 93005 ELECTROCARDIOGRAM TRACING: CPT

## 2023-12-26 PROCEDURE — 81025 URINE PREGNANCY TEST: CPT | Performed by: EMERGENCY MEDICINE

## 2023-12-26 PROCEDURE — 99283 EMERGENCY DEPT VISIT LOW MDM: CPT

## 2023-12-26 PROCEDURE — 99284 EMERGENCY DEPT VISIT MOD MDM: CPT | Performed by: EMERGENCY MEDICINE

## 2023-12-26 RX ORDER — LORAZEPAM 0.5 MG/1
0.5 TABLET ORAL ONCE
Status: COMPLETED | OUTPATIENT
Start: 2023-12-26 | End: 2023-12-26

## 2023-12-26 RX ORDER — LORAZEPAM 0.5 MG/1
0.5 TABLET ORAL EVERY 8 HOURS PRN
Qty: 5 TABLET | Refills: 0 | Status: SHIPPED | OUTPATIENT
Start: 2023-12-26 | End: 2024-01-04 | Stop reason: ALTCHOICE

## 2023-12-26 RX ADMIN — LORAZEPAM 0.5 MG: 0.5 TABLET ORAL at 07:15

## 2023-12-26 NOTE — ED PROCEDURE NOTE
PROCEDURE  ECG 12 Lead Documentation Only    Date/Time: 12/26/2023 8:23 AM    Performed by: Tato Paz MD  Authorized by: Tato Paz MD    Indications / Diagnosis:  Cp-  ECG reviewed by me, the ED Provider: yes    Patient location:  ED and bedside  Previous ECG:     Previous ECG:  Unavailable    Comparison to cardiac monitor: Yes    Interpretation:     Interpretation: non-specific    Rate:     ECG rate:  48    ECG rate assessment: bradycardic    Rhythm:     Rhythm: sinus bradycardia    Ectopy:     Ectopy: none    QRS:     QRS axis:  Normal    QRS intervals:  Normal  Conduction:     Conduction: normal    ST segments:     ST segments:  Normal  T waves:     T waves: flattening      Flattening:  III, aVF, V1 and V3  Other findings:     Other findings: U wave    Comments:      No ecg signs of ischemia/ injury / r heart strain / selin/pericarditis        Tato Paz MD  12/26/23 [id number]

## 2023-12-26 NOTE — DISCHARGE INSTRUCTIONS
Diagnosis: vaginal bleeding // chest pain / likely anxiety related    - expect  vaginal bleeding- should resolve on its own -- if vaginal bleeding continues - please call your gyn  to schedule an appointment- you are not anemic   -    - please return to the er for any  intractable vaginal bleeding- going thru a pad per hour for several hrs    - please call the family medicine [place]  number to schedule an appointment    - only as needed for anxiety - ativan 1 tablet x 1- -do not take  every day     - whey ou are anxious  is not the best time to check your blood pressure - if you check your blood pressure at home - when you are clam and relaxed it should be persistently less than 140/90-- if it is persistently greater  than 140/90- will need to discuss thsi when you see family medicine

## 2023-12-26 NOTE — ED PROVIDER NOTES
History  Chief Complaint   Patient presents with    High Blood Pressure     Pt presents with high blood pressure, hx of preeclampsia. Pt was on BP medication but could not keep up with it r/t not having a PCP. Denies chance of pregnancy     42 yo F presenting with chest pressure and epigastric pain for the last few weeks which she thought was related to her HTN, but today she is feeling a lot of anxiety as well and wanted to get checked. She had an IUD removed on Thursday and since Friday she has been having heavy bleeding, requiring her to change her pad every 1.5 hours. No n/v, no diaphoresis, chills, no constipation, no blood in her stool. Did have 3 episodes of diarrhea yesterday. Does feel slightly light-headed but has not had any syncopal episodes.        used: [id number]    Prior to Admission Medications   Prescriptions Last Dose Informant Patient Reported? Taking?   Mirena, 52 MG, 20 MCG/DAY IUD  Self Yes No   Sig: TO BE INSERTED ONE TIME BY PRESCRIBER. ROUTE INTRAUTERINE.   NIFEdipine (PROCARDIA XL) 30 mg 24 hr tablet   No No   Sig: Take 1 tablet (30 mg total) by mouth daily   Patient not taking: Reported on 12/21/2023      Facility-Administered Medications: None       Past Medical History:   Diagnosis Date    39 weeks gestation of pregnancy 1/22/2020    IOL - 6/8/2020 @8pm [facility]  Flu vaccine 12/24/19    Advanced maternal age in multigravida, third trimester 10/26/2022    Did not complete aneuploidy screening this pregnancy Need AFS starting at 32 weeks, first NST perfomed today , 2x week    Anemia     Anemia during pregnancy in third trimester 1/15/2020    COVID-19 06/10/2020    COVID-19 virus detected 6/11/2020    Elderly multigravida in third trimester 1/15/2020    Encounter for injection education 3/3/2020    GBS bacteriuria 11/26/2019    Amoxicillin sent to patient's pharmacy 11/25 Will need PCN in labor DO NOT collect GBS swab at 36 weeks!!    Grand multiparity with current  pregnancy in second trimester 2022    Hyperglycemia during pregnancy 3/3/2020    Iron deficiency anemia 2019    F/u Ferritin (Hb 8.4) Will likely need IV iron    Postpartum hemorrhage, delivered, current hospitalization 2020    Prediabetes 2015    Preeclampsia, severe, third trimester 2022     (spontaneous vaginal delivery) 2020       Past Surgical History:   Procedure Laterality Date    [place]  DELIVERY ONLY N/A 2022    Procedure:  SECTION ();  Surgeon: Mena Anguiano MD;  Location: [facility];  Service: Obstetrics    TOOTH EXTRACTION         Family History   Problem Relation Age of Onset    Cancer Mother     No Known Problems Father     No Known Problems Brother     No Known Problems Daughter     No Known Problems Son     Diabetes Maternal Grandmother     Diabetes Paternal Grandmother      I have reviewed and agree with the history as documented.    E-Cigarette/Vaping    E-Cigarette Use Never User      E-Cigarette/Vaping Substances    Nicotine No     THC No     CBD No     Flavoring No     Other No     Unknown No      Social History     Tobacco Use    Smoking status: Never    Smokeless tobacco: Never   Vaping Use    Vaping status: Never Used   Substance Use Topics    Alcohol use: No     Comment: pt reports social drinking once per month prior to pregnancy    Drug use: No        Review of Systems   Constitutional:  Negative for chills, diaphoresis, fatigue and fever.   HENT: Negative.     Respiratory:  Positive for chest tightness. Negative for cough, choking, wheezing and stridor.    Cardiovascular:  Positive for chest pain. Negative for leg swelling.   Gastrointestinal:  Positive for abdominal pain and diarrhea. Negative for abdominal distention, anal bleeding, blood in stool, constipation, nausea and vomiting.   Genitourinary:  Positive for menstrual problem and vaginal bleeding.   Musculoskeletal: Negative.    Skin:  Negative for color change and pallor.    Neurological:  Negative for dizziness, tremors, syncope and weakness.   Hematological: Negative.    Psychiatric/Behavioral: Negative.         Physical Exam  ED Triage Vitals [12/26/23 0646]   Temperature Pulse Respirations Blood Pressure SpO2   98.2 °F (36.8 °C) 63 20 142/82 99 %      Temp Source Heart Rate Source Patient Position - Orthostatic VS BP Location FiO2 (%)   Oral Monitor Lying Right arm --      Pain Score       --             Orthostatic Vital Signs  Vitals:    12/26/23 0646 12/26/23 0730   BP: 142/82 136/73   Pulse: 63 69   Patient Position - Orthostatic VS: Lying        Physical Exam  Constitutional:       General: She is not in acute distress.     Appearance: Normal appearance. She is normal weight. She is not diaphoretic.   HENT:      Head: Normocephalic.   Cardiovascular:      Rate and Rhythm: Normal rate and regular rhythm.      Pulses: Normal pulses.      Heart sounds: Normal heart sounds.   Pulmonary:      Effort: Pulmonary effort is normal.      Breath sounds: Normal breath sounds.   Abdominal:      General: Abdomen is flat.      Palpations: Abdomen is soft.   Skin:     General: Skin is warm and dry.      Capillary Refill: Capillary refill takes less than 2 seconds.   Neurological:      General: No focal deficit present.      Mental Status: She is alert and oriented to person, place, and time.   Psychiatric:         Mood and Affect: Mood normal.         Behavior: Behavior normal.         Thought Content: Thought content normal.         Judgment: Judgment normal.         ED Medications  Medications   LORazepam (ATIVAN) tablet 0.5 mg (0.5 mg Oral Given 12/26/23 0715)       Diagnostic Studies  Results Reviewed       Procedure Component Value Units Date/Time    POCT pregnancy, urine [[id number]]  (Normal) Resulted: 12/26/23 0800    Lab Status: Final result Updated: 12/26/23 0809     EXT Preg Test, Ur Negative     Control Valid    CBC and differential [[id number]] Collected: 12/26/23 0756    Lab  Status: Final result Specimen: Blood from Arm, Right Updated: 12/26/23 0804     WBC 6.56 Thousand/uL      RBC 4.73 Million/uL      Hemoglobin 14.5 g/dL      Hematocrit 41.8 %      MCV 88 fL      MCH 30.7 pg      MCHC 34.7 g/dL      RDW 12.6 %      MPV 10.2 fL      Platelets 259 Thousands/uL      nRBC 0 /100 WBCs      Neutrophils Relative 69 %      Immat GRANS % 0 %      Lymphocytes Relative 26 %      Monocytes Relative 4 %      Eosinophils Relative 1 %      Basophils Relative 0 %      Neutrophils Absolute 4.54 Thousands/µL      Immature Grans Absolute 0.01 Thousand/uL      Lymphocytes Absolute 1.68 Thousands/µL      Monocytes Absolute 0.28 Thousand/µL      Eosinophils Absolute 0.03 Thousand/µL      Basophils Absolute 0.02 Thousands/µL                    No orders to display         Procedures  Procedures      ED Course  ED Course as of 12/26/23 0958   Tue Dec 26, 2023   0714 Patient seen and evaluated. EKG ordered for chest pain/pressure. CBC, CMP ordered.      POCT urine pregnancy test ordered. Pelvic exam showed bleeding, but not abnormally heavy. Hgb wnl. 0.5 mg of ativan given for anxiety. Patient cleared for discharge home with 5 doses of ativan to take at home.                                   Medical Decision Making  Amount and/or Complexity of Data Reviewed  Labs: ordered.    Risk  Prescription drug management.    Chest pressure likely due to anxiety. EKG wnl, history not consistent with cardiac event. Vaginal bleeding likely due to recent IUD removal.       Disposition  Final diagnoses:   Anxiety   Chest pain   Vaginal bleeding     Time reflects when diagnosis was documented in both MDM as applicable and the Disposition within this note       Time User Action Codes Description Comment    12/26/2023  8:25 AM Tato Paz Add [F41.9] Anxiety     12/26/2023  8:25 AM Tato Paz Add [R07.9] Chest pain     12/26/2023  8:26 AM Tato Paz Add [N93.9] Vaginal bleeding           ED Disposition        ED Disposition   Discharge    Condition   Stable    Date/Time   Tue Dec 26, 2023  8:25 AM    Comment   Rupa Curry discharge to home/self care.                   Follow-up Information       Follow up With Specialties Details Why Contact Info Additional Information    [facility] Family Medicine Call   [address]  [address] [address]  [phone] [facility], [address], [place], [address], [phone]            Discharge Medication List as of 12/26/2023  9:10 AM        START taking these medications    Details   LORazepam (Ativan) 0.5 mg tablet Take 1 tablet (0.5 mg total) by mouth every 8 (eight) hours as needed for anxiety for up to 5 doses, Starting Tue 12/26/2023, Normal           CONTINUE these medications which have NOT CHANGED    Details   Mirena, 52 MG, 20 MCG/DAY IUD TO BE INSERTED ONE TIME BY PRESCRIBER. ROUTE INTRAUTERINE., Historical Med      NIFEdipine (PROCARDIA XL) 30 mg 24 hr tablet Take 1 tablet (30 mg total) by mouth daily, Starting u 1/26/2023, Normal           No discharge procedures on file.    PDMP Review       None             ED Provider  Attending physically available and evaluated Rupa Curry. I managed the patient along with the ED Attending.    Electronically Signed by           Keren Andrea MD  12/26/23 8897

## 2023-12-27 NOTE — ED ATTENDING ATTESTATION
12/26/2023  ITato MD, saw and evaluated the patient. I have discussed the patient with the resident/non-physician practitioner and agree with the resident's/non-physician practitioner's findings, Plan of Care, and MDM as documented in the resident's/non-physician practitioner's note, except where noted. All available labs and Radiology studies were reviewed.  I was present for key portions of any procedure(s) performed by the resident/non-physician practitioner and I was immediately available to provide assistance.       At this point I agree with the current assessment done in the Emergency Department.  I have conducted an independent evaluation of this patient a history and physical is as follows:see h and p above     ED Course  ED Course as of 12/27/23 1810   Tue Dec 26, 2023   0816 Er md note-  pelvic exam  with er nurse present-  normal external exam - mild  blood in vault- cervic closed-  no uterine/adenexal tenderness on exam    0858 Er md note- all results reviewed with language ipad- all questions answered- pt has no further questions- does have someone at home who reads english          Critical Care Time  Procedures

## 2024-01-04 ENCOUNTER — OFFICE VISIT (OUTPATIENT)
Dept: INTERNAL MEDICINE CLINIC | Facility: CLINIC | Age: 44
End: 2024-01-04

## 2024-01-04 ENCOUNTER — APPOINTMENT (OUTPATIENT)
Dept: LAB | Facility: CLINIC | Age: 44
End: 2024-01-04

## 2024-01-04 VITALS
HEIGHT: 61 IN | TEMPERATURE: 98 F | HEART RATE: 56 BPM | SYSTOLIC BLOOD PRESSURE: 129 MMHG | OXYGEN SATURATION: 98 % | WEIGHT: 144.4 LBS | DIASTOLIC BLOOD PRESSURE: 91 MMHG | BODY MASS INDEX: 27.26 KG/M2

## 2024-01-04 DIAGNOSIS — D50.9 IRON DEFICIENCY ANEMIA, UNSPECIFIED IRON DEFICIENCY ANEMIA TYPE: ICD-10-CM

## 2024-01-04 DIAGNOSIS — D50.8 OTHER IRON DEFICIENCY ANEMIA: ICD-10-CM

## 2024-01-04 DIAGNOSIS — Z13.220 ENCOUNTER FOR SCREENING FOR LIPID DISORDER: ICD-10-CM

## 2024-01-04 DIAGNOSIS — F32.A MODERATELY SEVERE DEPRESSION: ICD-10-CM

## 2024-01-04 DIAGNOSIS — Z11.59 ENCOUNTER FOR HEPATITIS C SCREENING TEST FOR LOW RISK PATIENT: ICD-10-CM

## 2024-01-04 DIAGNOSIS — Z00.00 ANNUAL PHYSICAL EXAM: Primary | ICD-10-CM

## 2024-01-04 PROBLEM — R20.0 BILATERAL HAND NUMBNESS: Status: RESOLVED | Noted: 2022-10-27 | Resolved: 2024-01-04

## 2024-01-04 PROBLEM — Z86.32 HISTORY OF INSULIN CONTROLLED GESTATIONAL DIABETES MELLITUS (GDM): Status: RESOLVED | Noted: 2019-11-19 | Resolved: 2024-01-04

## 2024-01-04 PROBLEM — Z86.32 HISTORY OF GESTATIONAL DIABETES: Status: RESOLVED | Noted: 2022-11-16 | Resolved: 2024-01-04

## 2024-01-04 PROBLEM — Z87.59 HISTORY OF SEVERE PRE-ECLAMPSIA: Status: RESOLVED | Noted: 2022-07-27 | Resolved: 2024-01-04

## 2024-01-04 PROBLEM — Z87.59 HISTORY OF POSTPARTUM HEMORRHAGE: Status: RESOLVED | Noted: 2022-07-27 | Resolved: 2024-01-04

## 2024-01-04 PROBLEM — Z98.891 STATUS POST PRIMARY LOW TRANSVERSE CESAREAN SECTION: Status: RESOLVED | Noted: 2022-05-19 | Resolved: 2024-01-04

## 2024-01-04 LAB
ALBUMIN SERPL BCP-MCNC: 4.6 G/DL (ref 3.5–5)
ALP SERPL-CCNC: 51 U/L (ref 34–104)
ALT SERPL W P-5'-P-CCNC: 18 U/L (ref 7–52)
ANION GAP SERPL CALCULATED.3IONS-SCNC: 11 MMOL/L
AST SERPL W P-5'-P-CCNC: 16 U/L (ref 13–39)
BASOPHILS # BLD AUTO: 0.02 THOUSANDS/ÂΜL (ref 0–0.1)
BASOPHILS NFR BLD AUTO: 1 % (ref 0–1)
BILIRUB SERPL-MCNC: 0.57 MG/DL (ref 0.2–1)
BUN SERPL-MCNC: 12 MG/DL (ref 5–25)
CALCIUM SERPL-MCNC: 9.5 MG/DL (ref 8.4–10.2)
CHLORIDE SERPL-SCNC: 104 MMOL/L (ref 96–108)
CHOLEST SERPL-MCNC: 170 MG/DL
CO2 SERPL-SCNC: 26 MMOL/L (ref 21–32)
CREAT SERPL-MCNC: 0.71 MG/DL (ref 0.6–1.3)
EOSINOPHIL # BLD AUTO: 0.07 THOUSAND/ÂΜL (ref 0–0.61)
EOSINOPHIL NFR BLD AUTO: 2 % (ref 0–6)
ERYTHROCYTE [DISTWIDTH] IN BLOOD BY AUTOMATED COUNT: 12.8 % (ref 11.6–15.1)
FERRITIN SERPL-MCNC: 75 NG/ML (ref 11–307)
GFR SERPL CREATININE-BSD FRML MDRD: 106 ML/MIN/1.73SQ M
GLUCOSE P FAST SERPL-MCNC: 94 MG/DL (ref 65–99)
HCT VFR BLD AUTO: 42.4 % (ref 34.8–46.1)
HCV AB SER QL: NORMAL
HDLC SERPL-MCNC: 40 MG/DL
HGB BLD-MCNC: 14.5 G/DL (ref 11.5–15.4)
IMM GRANULOCYTES # BLD AUTO: 0.01 THOUSAND/UL (ref 0–0.2)
IMM GRANULOCYTES NFR BLD AUTO: 0 % (ref 0–2)
IRON SATN MFR SERPL: 18 % (ref 15–50)
IRON SERPL-MCNC: 58 UG/DL (ref 50–212)
LDLC SERPL CALC-MCNC: 110 MG/DL (ref 0–100)
LYMPHOCYTES # BLD AUTO: 1.68 THOUSANDS/ÂΜL (ref 0.6–4.47)
LYMPHOCYTES NFR BLD AUTO: 39 % (ref 14–44)
MCH RBC QN AUTO: 30.4 PG (ref 26.8–34.3)
MCHC RBC AUTO-ENTMCNC: 34.2 G/DL (ref 31.4–37.4)
MCV RBC AUTO: 89 FL (ref 82–98)
MONOCYTES # BLD AUTO: 0.22 THOUSAND/ÂΜL (ref 0.17–1.22)
MONOCYTES NFR BLD AUTO: 5 % (ref 4–12)
NEUTROPHILS # BLD AUTO: 2.33 THOUSANDS/ÂΜL (ref 1.85–7.62)
NEUTS SEG NFR BLD AUTO: 53 % (ref 43–75)
NONHDLC SERPL-MCNC: 130 MG/DL
NRBC BLD AUTO-RTO: 0 /100 WBCS
PLATELET # BLD AUTO: 302 THOUSANDS/UL (ref 149–390)
PMV BLD AUTO: 10.5 FL (ref 8.9–12.7)
POTASSIUM SERPL-SCNC: 3.7 MMOL/L (ref 3.5–5.3)
PROT SERPL-MCNC: 7.4 G/DL (ref 6.4–8.4)
RBC # BLD AUTO: 4.77 MILLION/UL (ref 3.81–5.12)
SODIUM SERPL-SCNC: 141 MMOL/L (ref 135–147)
TIBC SERPL-MCNC: 324 UG/DL (ref 250–450)
TRIGL SERPL-MCNC: 100 MG/DL
TSH SERPL DL<=0.05 MIU/L-ACNC: 0.84 UIU/ML (ref 0.45–4.5)
UIBC SERPL-MCNC: 266 UG/DL (ref 155–355)
WBC # BLD AUTO: 4.33 THOUSAND/UL (ref 4.31–10.16)

## 2024-01-04 PROCEDURE — 86803 HEPATITIS C AB TEST: CPT

## 2024-01-04 PROCEDURE — 99386 PREV VISIT NEW AGE 40-64: CPT | Performed by: PHYSICIAN ASSISTANT

## 2024-01-04 PROCEDURE — 80053 COMPREHEN METABOLIC PANEL: CPT

## 2024-01-04 PROCEDURE — 84443 ASSAY THYROID STIM HORMONE: CPT

## 2024-01-04 PROCEDURE — 80061 LIPID PANEL: CPT

## 2024-01-04 PROCEDURE — 83550 IRON BINDING TEST: CPT

## 2024-01-04 PROCEDURE — 85025 COMPLETE CBC W/AUTO DIFF WBC: CPT

## 2024-01-04 PROCEDURE — 83540 ASSAY OF IRON: CPT

## 2024-01-04 PROCEDURE — 36415 COLL VENOUS BLD VENIPUNCTURE: CPT

## 2024-01-04 PROCEDURE — 82728 ASSAY OF FERRITIN: CPT

## 2024-01-04 NOTE — PATIENT INSTRUCTIONS

## 2024-01-04 NOTE — PROGRESS NOTES
ADULT ANNUAL PHYSICAL  [facility]    NAME: Rupa Curry  AGE: 43 y.o. SEX: female  : 1980     DATE: 2024     Assessment and Plan:     Problem List Items Addressed This Visit          Other    Iron deficiency anemia     History of ANDREY. Will recheck CBC and iron panel.         Relevant Orders    CBC and differential    Iron Panel (Includes Ferritin, Iron Sat%, Iron, and TIBC)    Moderately severe depression     Reviewed PHQ which was positive. She declines to discuss today and declines  services. Denies SI. States her depression is due to her current circumstance. Her partner left her and she is currently a single parent trying to make it work. She declines referral to  to see what assistance may be provided.          Relevant Orders    Comprehensive metabolic panel    TSH, 3rd generation with Free T4 reflex (Completed)    Annual physical exam - Primary     Discussed general health recommendations and screening guidelines. Up to date on cervical cancer screening. Declines breast cancer screening. Agreeable to screening lab work. Declines immunizations. Recommend routine dental and eye exams. Next physical one year.           Other Visit Diagnoses       Encounter for hepatitis C screening test for low risk patient        Relevant Orders    Hepatitis C antibody (Completed)    Encounter for screening for lipid disorder        Relevant Orders    Lipid panel (Completed)    BMI 27.0-27.9,adult                Immunizations and preventive care screenings were discussed with patient today. Appropriate education was printed on patient's after visit summary.    Counseling:  Alcohol/drug use: discussed moderation in alcohol intake, the recommendations for healthy alcohol use, and avoidance of illicit drug use.  Dental Health: discussed importance of regular tooth brushing, flossing, and dental visits.  Injury prevention: discussed  safety/seat belts, safety helmets, smoke detectors, carbon dioxide detectors, and smoking near bedding or upholstery.  Sexual health: discussed sexually transmitted diseases, partner selection, use of condoms, avoidance of unintended pregnancy, and contraceptive alternatives.  Exercise: the importance of regular exercise/physical activity was discussed. Recommend exercise 3-5 times per week for at least 30 minutes.     BMI Counseling: Body mass index is 27.28 kg/m². The BMI is above normal. Nutrition recommendations include decreasing portion sizes, encouraging healthy choices of fruits and vegetables, decreasing fast food intake, consuming healthier snacks, limiting drinks that contain sugar, moderation in carbohydrate intake, increasing intake of lean protein, reducing intake of saturated and trans fat and reducing intake of cholesterol. Exercise recommendations include moderate physical activity 150 minutes/week, exercising 3-5 times per week and strength training exercises. No pharmacotherapy was ordered. Rationale for BMI follow-up plan is due to patient being overweight or obese.         Return in about 3 months (around 4/4/2024) for Recheck depression/PHQ  - 40 mins.     Chief Complaint:     Chief Complaint   Patient presents with    [facility]    Follow-up     ED follow up for anxiety and chest pain      History of Present Illness:     Adult Annual Physical   Patient here for a comprehensive physical exam. The patient reports problems - depression . She recently presented to the ED with complaints of chest pain. Her testing was normal. She knows this is due to her anxiety and depression. She states she has been under a lot of stress. She is a mother of 6 children and her partner recently left. She is trying to figure out how to make it work on her own. Denies SI. She declines all medications and BH services.     Diet and Physical Activity  Diet/Nutrition: poor diet, limited junk food, and limited  fruits/vegetables.   Exercise: no formal exercise.      Depression Screening  PHQ-2/9 Depression Screening    Little interest or pleasure in doing things: 3 - nearly every day  Feeling down, depressed, or hopeless: 3 - nearly every day  Trouble falling or staying asleep, or sleeping too much: 3 - nearly every day  Feeling tired or having little energy: 1 - several days  Poor appetite or overeating: 3 - nearly every day  Feeling bad about yourself - or that you are a failure or have let yourself or your family down: 3 - nearly every day  Trouble concentrating on things, such as reading the newspaper or watching television: 3 - nearly every day  Moving or speaking so slowly that other people could have noticed. Or the opposite - being so fidgety or restless that you have been moving around a lot more than usual: 0 - not at all  Thoughts that you would be better off dead, or of hurting yourself in some way: 0 - not at all  PHQ-2 Score: 6  PHQ-2 Interpretation: POSITIVE depression screen  PHQ-9 Score: 19  PHQ-9 Interpretation: Moderately severe depression       General Health  Sleep: sleeps well and gets 7-8 hours of sleep on average.   Hearing: normal - bilateral.  Vision: no vision problems and most recent eye exam >1 year ago.   Dental: no dental visits for >1 year and brushes teeth twice daily.       /GYN Health  Follows with gynecology? no   Patient is: perimenopausal  Last menstrual period: 12/21/23  Contraceptive method: barrier methods.    Advanced Care Planning  Do you have an advanced directive? no  Do you have a durable medical power of ? no     Review of Systems:     Review of Systems   Constitutional:  Negative for appetite change, chills, diaphoresis, fatigue, fever and unexpected weight change.   HENT:  Negative for congestion, hearing loss, sore throat, tinnitus and trouble swallowing.    Eyes:  Negative for visual disturbance.   Respiratory:  Negative for cough, chest tightness, shortness of  breath and wheezing.    Cardiovascular:  Negative for chest pain, palpitations and leg swelling.   Gastrointestinal:  Negative for abdominal pain, blood in stool, constipation, diarrhea, nausea and vomiting.   Endocrine: Negative for cold intolerance, heat intolerance, polydipsia, polyphagia and polyuria.   Musculoskeletal:  Negative for arthralgias and myalgias.   Skin:  Negative for rash.   Neurological:  Negative for dizziness, weakness, light-headedness, numbness and headaches.   Hematological:  Negative for adenopathy.   Psychiatric/Behavioral:  Positive for dysphoric mood. Negative for self-injury, sleep disturbance and suicidal ideas. The patient is nervous/anxious.       Past Medical History:     Past Medical History:   Diagnosis Date    39 weeks gestation of pregnancy 2020    IOL - 2020 @[facility]pm [facility]  Flu vaccine 19    Advanced maternal age in multigravida, third trimester 10/26/2022    Did not complete aneuploidy screening this pregnancy Need AFS starting at 32 weeks, first NST perfomed today , 2x week    Anemia     Anemia during pregnancy in third trimester 1/15/2020    COVID-19 06/10/2020    COVID-19 virus detected 2020    Elderly multigravida in third trimester 1/15/2020    Encounter for injection education 3/3/2020    GBS bacteriuria 2019    Amoxicillin sent to patient's pharmacy  Will need PCN in labor DO NOT collect GBS swab at 36 weeks!!    Grand multiparity with current pregnancy in second trimester 2022    Hyperglycemia during pregnancy 3/3/2020    Iron deficiency anemia 2019    F/u Ferritin (Hb 8.4) Will likely need IV iron    Postpartum hemorrhage, delivered, current hospitalization 2020    Prediabetes 2015    Preeclampsia, severe, third trimester 2022     (spontaneous vaginal delivery) 2020      Past Surgical History:     Past Surgical History:   Procedure Laterality Date    SD  DELIVERY ONLY N/A 2022    Procedure:   SECTION ();  Surgeon: Mena Anguiano MD;  Location: [facility];  Service: Obstetrics    TOOTH EXTRACTION        Social History:     Social History     Socioeconomic History    Marital status: Single     Spouse name: None    Number of children: 5    Years of education: 9    Highest education level: 9th grade   Occupational History    Occupation: Homemaker   Tobacco Use    Smoking status: Never    Smokeless tobacco: Never   Vaping Use    Vaping status: Never Used   Substance and Sexual Activity    Alcohol use: No     Comment: pt reports social drinking once per month prior to pregnancy    Drug use: No    Sexual activity: Not Currently     Partners: Male     Birth control/protection: I.U.D.   Other Topics Concern    None   Social History Narrative    None     Social Determinants of Health     Financial Resource Strain: High Risk (2024)    Overall Financial Resource Strain (CARDIA)     Difficulty of Paying Living Expenses: Hard   Food Insecurity: No Food Insecurity (2024)    Hunger Vital Sign     Worried About Running Out of Food in the Last Year: Never true     Ran Out of Food in the Last Year: Never true   Transportation Needs: No Transportation Needs (2024)    PRAPARE - Transportation     Lack of Transportation (Medical): No     Lack of Transportation (Non-Medical): No   Physical Activity: Inactive (2022)    Exercise Vital Sign     Days of Exercise per Week: 0 days     Minutes of Exercise per Session: 0 min   Stress: No Stress Concern Present (2022)    [employer] - Occupational Stress Questionnaire     Feeling of Stress : Not at all   Social Connections: Moderately Integrated (2022)    Social Connection and Isolation Panel [NHANES]     Frequency of Communication with Friends and Family: More than three times a week     Frequency of Social Gatherings with Friends and Family: More than three times a week     Attends [employer] Services: More than 4  "times per year     Active Member of Clubs or Organizations: No     Attends Club or Organization Meetings: Never     Marital Status: Living with partner   Intimate Partner Violence: Not At Risk (5/19/2022)    Humiliation, Afraid, Rape, and Kick questionnaire     Fear of Current or Ex-Partner: No     Emotionally Abused: No     Physically Abused: No     Sexually Abused: No   Housing Stability: Low Risk  (12/21/2023)    Housing Stability Vital Sign     Unable to Pay for Housing in the Last Year: No     Number of Places Lived in the Last Year: 1     Unstable Housing in the Last Year: No      Family History:     Family History   Problem Relation Age of Onset    Cancer Mother     No Known Problems Father     No Known Problems Brother     No Known Problems Son     No Known Problems Son     No Known Problems Daughter     No Known Problems Daughter     Scoliosis Daughter     Scoliosis Daughter     Diabetes Maternal Grandmother     Diabetes Paternal Grandmother       Current Medications:     No current outpatient medications on file.     No current facility-administered medications for this visit.      Allergies:     Allergies   Allergen Reactions    Dog Epithelium Allergic Rhinitis    Pollen Extract Allergic Rhinitis      Physical Exam:     /91 (BP Location: Right arm, Patient Position: Sitting, Cuff Size: Standard)   Pulse 56   Temp 98 °F (36.7 °C) (Temporal)   Ht 5' 1\" (1.549 m)   Wt 65.5 kg (144 lb 6.4 oz)   LMP 11/07/2023 (Approximate)   SpO2 98%   BMI 27.28 kg/m²     Physical Exam  Vitals and nursing note reviewed.   Constitutional:       General: She is awake. She is not in acute distress.     Appearance: Normal appearance. She is well-developed, well-groomed and normal weight. She is not ill-appearing.   HENT:      Head: Normocephalic and atraumatic.      Right Ear: Tympanic membrane, ear canal and external ear normal.      Left Ear: Tympanic membrane, ear canal and external ear normal.      Nose: Nose " normal.      Mouth/Throat:      Mouth: Mucous membranes are moist.      Pharynx: Oropharynx is clear. No oropharyngeal exudate or posterior oropharyngeal erythema.   Eyes:      General: No scleral icterus.     Extraocular Movements: Extraocular movements intact.      Conjunctiva/sclera: Conjunctivae normal.      Pupils: Pupils are equal, round, and reactive to light.   Cardiovascular:      Rate and Rhythm: Normal rate and regular rhythm.      Pulses: Normal pulses.           Radial pulses are 2+ on the right side and 2+ on the left side.      Heart sounds: Normal heart sounds. No murmur heard.  Pulmonary:      Effort: Pulmonary effort is normal. No respiratory distress.      Breath sounds: Normal breath sounds and air entry. No decreased air movement. No decreased breath sounds, wheezing, rhonchi or rales.   Abdominal:      General: Abdomen is flat. Bowel sounds are normal. There is no distension.      Palpations: Abdomen is soft. There is no mass.      Tenderness: There is no abdominal tenderness. There is no right CVA tenderness, left CVA tenderness, guarding or rebound.      Hernia: No hernia is present.   Musculoskeletal:         General: Normal range of motion.      Cervical back: Neck supple.      Right lower leg: No edema.      Left lower leg: No edema.   Lymphadenopathy:      Cervical: No cervical adenopathy.   Skin:     General: Skin is warm.      Coloration: Skin is not jaundiced.      Findings: No rash.   Neurological:      General: No focal deficit present.      Mental Status: She is alert and oriented to person, place, and time. Mental status is at baseline.      Motor: Motor function is intact.      Coordination: Coordination is intact.      Gait: Gait is intact.   Psychiatric:         Attention and Perception: Attention normal.         Mood and Affect: Mood and affect normal.         Speech: Speech normal.         Behavior: Behavior normal. Behavior is cooperative.          Taylor Pickard,  SUSAN  [facility] [place]

## 2024-01-05 ENCOUNTER — OFFICE VISIT (OUTPATIENT)
Dept: OBGYN CLINIC | Facility: CLINIC | Age: 44
End: 2024-01-05

## 2024-01-05 VITALS
DIASTOLIC BLOOD PRESSURE: 102 MMHG | WEIGHT: 141.8 LBS | SYSTOLIC BLOOD PRESSURE: 147 MMHG | HEART RATE: 64 BPM | HEIGHT: 61 IN | BODY MASS INDEX: 26.77 KG/M2

## 2024-01-05 DIAGNOSIS — N92.6 IRREGULAR BLEEDING: ICD-10-CM

## 2024-01-05 DIAGNOSIS — F32.A MODERATELY SEVERE DEPRESSION: ICD-10-CM

## 2024-01-05 DIAGNOSIS — R10.2 PELVIC PAIN: Primary | ICD-10-CM

## 2024-01-05 PROCEDURE — 99213 OFFICE O/P EST LOW 20 MIN: CPT | Performed by: NURSE PRACTITIONER

## 2024-01-05 NOTE — ASSESSMENT & PLAN NOTE
Reviewed PHQ which was positive. She declines to discuss today and declines  services. Denies SI. States her depression is due to her current circumstance. Her partner left her and she is currently a single parent trying to make it work. She declines referral to  to see what assistance may be provided.

## 2024-01-05 NOTE — PROGRESS NOTES
PROBLEM GYNECOLOGICAL VISIT    Rupa Curry is a 43 y.o. female who presents today with complaint of pelvic pain.  Her general medical history has been reviewed and she reports it as follows:    Past Medical History:   Diagnosis Date    39 weeks gestation of pregnancy 2020    IOL - 2020 @8pm [facility]  Flu vaccine 19    Advanced maternal age in multigravida, third trimester 10/26/2022    Did not complete aneuploidy screening this pregnancy Need AFS starting at 32 weeks, first NST perfomed today , 2x week    Anemia     Anemia during pregnancy in third trimester 1/15/2020    COVID-19 06/10/2020    COVID-19 virus detected 2020    Elderly multigravida in third trimester 1/15/2020    Encounter for injection education 3/3/2020    GBS bacteriuria 2019    Amoxicillin sent to patient's pharmacy  Will need PCN in labor DO NOT collect GBS swab at 36 weeks!!    Grand multiparity with current pregnancy in second trimester 2022    Hyperglycemia during pregnancy 3/3/2020    Iron deficiency anemia 2019    F/u Ferritin (Hb 8.4) Will likely need IV iron    Postpartum hemorrhage, delivered, current hospitalization 2020    Prediabetes 2015    Preeclampsia, severe, third trimester 2022     (spontaneous vaginal delivery) 2020     Past Surgical History:   Procedure Laterality Date    RI  DELIVERY ONLY N/A 2022    Procedure:  SECTION ();  Surgeon: Mena Anguiano MD;  Location: [facility];  Service: Obstetrics    TOOTH EXTRACTION       OB History          7    Para   6    Term   5       1    AB        Living   6         SAB        IAB        Ectopic        Multiple   0    Live Births   6               Social History     Tobacco Use    Smoking status: Never    Smokeless tobacco: Never   Vaping Use    Vaping status: Never Used   Substance Use Topics    Alcohol use: No     Comment: pt reports social drinking once per month prior to  "pregnancy    Drug use: No     Social History     Substance and Sexual Activity   Sexual Activity Not Currently    Partners: Male    Birth control/protection: I.U.D.       No current outpatient medications    History of Present Illness:   Rupa presents today reporting pelvic pain and vaginal bleeding. She had a Mirena IUD removed 12/21/23 due to cramping and continued spotting. She reports that the day after removal she started to have a moderate amount of vaginal bleeding with clots, which continued until last night. She is not having bleeding now. She reports she has been having intermittent left sided pelvic pain.     Review of Systems:  Review of Systems   Constitutional: Negative.    Gastrointestinal: Negative.    Genitourinary:  Positive for pelvic pain and vaginal bleeding.       Physical Exam:  BP (!) 147/102 (BP Location: Right arm, Patient Position: Sitting, Cuff Size: Standard)   Pulse 64   Ht 5' 1\" (1.549 m)   Wt 64.3 kg (141 lb 12.8 oz)   LMP 11/07/2023 (Approximate)   Breastfeeding No   BMI 26.79 kg/m²   Physical Exam  Constitutional:       General: She is not in acute distress.     Appearance: Normal appearance.   Neurological:      Mental Status: She is alert.   Skin:     General: Skin is warm and dry.   Psychiatric:         Mood and Affect: Mood normal.         Behavior: Behavior normal.   Vitals reviewed.       Assessment:   1. Pelvic pain    2. Irregular bleeding    3. Moderately severe Depression     Plan:   1. Discussed that menses can be irregular immediately after discontinuing IUD. Encouraged to begin tracking when she is bleeding and how much.    2. Order placed for pelvic US to assess for structural causes of pain.     3. Patient is tearful during appointment. She reports that she is under a lot of stress and is feeling very depressed due to her spouse recently leaving her and the children. Referral placed for Social work.    4. Will follow up after imaging     Reviewed with patient " that test results are available in [employer] (formerly known as [employer]) immediately, but that they will not necessarily be reviewed by me immediately.  Explained that I will review results at my earliest opportunity and contact patient appropriately.

## 2024-01-05 NOTE — ASSESSMENT & PLAN NOTE
Discussed general health recommendations and screening guidelines. Up to date on cervical cancer screening. Declines breast cancer screening. Agreeable to screening lab work. Declines immunizations. Recommend routine dental and eye exams. Next physical one year.

## 2024-01-08 ENCOUNTER — PATIENT OUTREACH (OUTPATIENT)
Dept: OBGYN CLINIC | Facility: CLINIC | Age: 44
End: 2024-01-08

## 2024-01-08 NOTE — PROGRESS NOTES
"RAFA SOTO spoke with 42 y/o-P6-  Japanese speaking woman to address positive depression. Pt resides with her 5 youngest kids. Pt is a  and does have some financial concerns. Pt reported her spouse recently left her and it has been very difficult to process. Pt endorses sad feeling but denies SI / HI and stated \"I need to be strong for my kids\".  Pt is also dealing with her daughter's new diagnosis and need of a surgery. Pt has a coming up appointment with the surgeon. Pt reported her finance is tight but she is managing it. Pt reported her oldest daughter is very supportive and she is also awaiting for her ex to start supporting his kids.     RAFA SOTO provided supportive counseling and gave [phone] number to call as needed and since her PCP offered assistance with referral to their behavioral specialist during her last visit, Pt was encouraged Pt f/u with her PCP is signs worsen. Pt agreeable.   "

## 2024-01-23 ENCOUNTER — PATIENT OUTREACH (OUTPATIENT)
Dept: OBGYN CLINIC | Facility: CLINIC | Age: 44
End: 2024-01-23

## 2024-01-23 NOTE — PROGRESS NOTES
RAFA SOTO spoke with Pt for follow up. Pt reported she is doing somewhat better. Still dealing with financial concern. Pt has SNAP, WIC and Child support. Pt states she is looking to go back to work part time to pay her bills. RAFA SOTO provided listening counseling and encouraged Pt to call at any time needed. Pt denies other needs at this time.

## 2024-02-29 ENCOUNTER — ULTRASOUND (OUTPATIENT)
Dept: OBGYN CLINIC | Facility: CLINIC | Age: 44
End: 2024-02-29

## 2024-02-29 VITALS
HEIGHT: 61 IN | DIASTOLIC BLOOD PRESSURE: 58 MMHG | HEART RATE: 59 BPM | SYSTOLIC BLOOD PRESSURE: 124 MMHG | BODY MASS INDEX: 28.13 KG/M2 | WEIGHT: 149 LBS

## 2024-02-29 DIAGNOSIS — Z3A.08 8 WEEKS GESTATION OF PREGNANCY: ICD-10-CM

## 2024-02-29 DIAGNOSIS — N91.2 AMENORRHEA: Primary | ICD-10-CM

## 2024-02-29 LAB — SL AMB POCT URINE HCG: POSITIVE

## 2024-02-29 PROCEDURE — 99213 OFFICE O/P EST LOW 20 MIN: CPT | Performed by: OBSTETRICS & GYNECOLOGY

## 2024-02-29 PROCEDURE — 76817 TRANSVAGINAL US OBSTETRIC: CPT | Performed by: OBSTETRICS & GYNECOLOGY

## 2024-02-29 PROCEDURE — 81025 URINE PREGNANCY TEST: CPT | Performed by: OBSTETRICS & GYNECOLOGY

## 2024-02-29 RX ORDER — PNV NO.95/FERROUS FUM/FOLIC AC 28MG-0.8MG
1 TABLET ORAL DAILY
Qty: 90 TABLET | Refills: 4 | Status: SHIPPED | OUTPATIENT
Start: 2024-02-29

## 2024-02-29 NOTE — PROGRESS NOTES
[facility]   VIABILITY SCAN  Rupa Curry; 1980  [id number]      Assessment  44 y.o.  at Unknown presenting for amenorrhea. Pregnancy confirmed, dating not consistent with LMP. HILLARY 10/7/24.    Plan  Diagnoses and all orders for this visit:    Amenorrhea  -     POCT urine HCG    8 weeks gestation of pregnancy  -     HIV 1/2 AG/AB w Reflex [facility] for 2 yr old and above; Future  -     Hepatitis C antibody; Future  -     UA (URINE) with reflex to Scope; Future  -     Urine culture; Future  -     Hepatitis B surface antigen; Future  -     CBC and differential; Future  -     Rubella antibody, IgG; Future  -     Type and screen; Future  -     RPR-Syphilis Screening (Total Syphilis IGG/IGM); Future  -     Hepatitis B surface antibody; Future  -     Anemia Panel w/Reflex, OB; Future  -     Ambulatory Referral to Maternal Fetal Medicine; Future  -     Prenatal Vit-Fe Fumarate-FA (Prenatal Vitamin) 27-0.8 MG TABS; Take 1 tablet by mouth in the morning    - Prenatal vitamin  - Prenatal panel (slips given today)  - Prenatal Nursing Intake in 2 weeks  - Prenatal H&P in 4 weeks     ____________________________________________________________      Subjective   Rupa Curry is a 44 y.o.  with an LMP of No LMP recorded. Patient is pregnant. who presents for amenorrhea. She is unsure of her last period; she had an IUD on which she was amenorrheic. The IUD was removed 23 and she started bleeding on 2023. She recently took a home pregnancy test and it was positive. She is currently otherwise without complaint.    Patient notes that this pregnancy was planned. She was using contraception at the time. She reports she is uncertain of her LMP, as above. She has had no vaginal bleeding since the bleeding she experienced s/p IUD removal.  Patient's prior pregnancies were complicated by preeclampsia, A1GDM, A2GDM, ANDREY requiring venofer infusions, postpartum  "hemorrhage.      Objective  /58   Pulse 59   Ht 5' 1\" (1.549 m)   Wt 67.6 kg (149 lb)   BMI 28.15 kg/m²       Transvaginal Pelvic Ultrasound  Rajan IUP  CRL 1.88 cm, consistent with EGA 8w3d  +yolk sac  +cardiac activity    No adnexal masses appreciated        Angelita NYE PGY-1  02/29/24    "

## 2024-03-11 ENCOUNTER — APPOINTMENT (OUTPATIENT)
Dept: LAB | Facility: HOSPITAL | Age: 44
End: 2024-03-11

## 2024-03-11 DIAGNOSIS — Z3A.08 8 WEEKS GESTATION OF PREGNANCY: ICD-10-CM

## 2024-03-11 LAB
ABO GROUP BLD: NORMAL
BACTERIA UR QL AUTO: ABNORMAL /HPF
BASOPHILS # BLD AUTO: 0.03 THOUSANDS/ÂΜL (ref 0–0.1)
BASOPHILS NFR BLD AUTO: 0 % (ref 0–1)
BILIRUB UR QL STRIP: NEGATIVE
BLD GP AB SCN SERPL QL: NEGATIVE
CLARITY UR: ABNORMAL
COLOR UR: YELLOW
EOSINOPHIL # BLD AUTO: 0.21 THOUSAND/ÂΜL (ref 0–0.61)
EOSINOPHIL NFR BLD AUTO: 3 % (ref 0–6)
ERYTHROCYTE [DISTWIDTH] IN BLOOD BY AUTOMATED COUNT: 13.2 % (ref 11.6–15.1)
GLUCOSE UR STRIP-MCNC: NEGATIVE MG/DL
HCT VFR BLD AUTO: 40.2 % (ref 34.8–46.1)
HGB BLD-MCNC: 13.7 G/DL (ref 11.5–15.4)
HGB UR QL STRIP.AUTO: NEGATIVE
IMM GRANULOCYTES # BLD AUTO: 0.03 THOUSAND/UL (ref 0–0.2)
IMM GRANULOCYTES NFR BLD AUTO: 0 % (ref 0–2)
KETONES UR STRIP-MCNC: NEGATIVE MG/DL
LEUKOCYTE ESTERASE UR QL STRIP: NEGATIVE
LYMPHOCYTES # BLD AUTO: 2.22 THOUSANDS/ÂΜL (ref 0.6–4.47)
LYMPHOCYTES NFR BLD AUTO: 27 % (ref 14–44)
MCH RBC QN AUTO: 30.2 PG (ref 26.8–34.3)
MCHC RBC AUTO-ENTMCNC: 34.1 G/DL (ref 31.4–37.4)
MCV RBC AUTO: 89 FL (ref 82–98)
MONOCYTES # BLD AUTO: 0.39 THOUSAND/ÂΜL (ref 0.17–1.22)
MONOCYTES NFR BLD AUTO: 5 % (ref 4–12)
NEUTROPHILS # BLD AUTO: 5.46 THOUSANDS/ÂΜL (ref 1.85–7.62)
NEUTS SEG NFR BLD AUTO: 65 % (ref 43–75)
NITRITE UR QL STRIP: POSITIVE
NON-SQ EPI CELLS URNS QL MICRO: ABNORMAL /HPF
NRBC BLD AUTO-RTO: 0 /100 WBCS
PH UR STRIP.AUTO: 6 [PH]
PLATELET # BLD AUTO: 302 THOUSANDS/UL (ref 149–390)
PMV BLD AUTO: 9.7 FL (ref 8.9–12.7)
PROT UR STRIP-MCNC: ABNORMAL MG/DL
RBC # BLD AUTO: 4.53 MILLION/UL (ref 3.81–5.12)
RBC #/AREA URNS AUTO: ABNORMAL /HPF
RH BLD: POSITIVE
RUBV IGG SERPL IA-ACNC: 331.3 IU/ML
SP GR UR STRIP.AUTO: 1.02 (ref 1–1.03)
SPECIMEN EXPIRATION DATE: NORMAL
UROBILINOGEN UR STRIP-ACNC: <2 MG/DL
WBC # BLD AUTO: 8.34 THOUSAND/UL (ref 4.31–10.16)
WBC #/AREA URNS AUTO: ABNORMAL /HPF

## 2024-03-11 PROCEDURE — 86850 RBC ANTIBODY SCREEN: CPT

## 2024-03-11 PROCEDURE — 86706 HEP B SURFACE ANTIBODY: CPT

## 2024-03-11 PROCEDURE — 85025 COMPLETE CBC W/AUTO DIFF WBC: CPT

## 2024-03-11 PROCEDURE — 86803 HEPATITIS C AB TEST: CPT

## 2024-03-11 PROCEDURE — 86901 BLOOD TYPING SEROLOGIC RH(D): CPT

## 2024-03-11 PROCEDURE — 87389 HIV-1 AG W/HIV-1&-2 AB AG IA: CPT

## 2024-03-11 PROCEDURE — 87086 URINE CULTURE/COLONY COUNT: CPT

## 2024-03-11 PROCEDURE — 87340 HEPATITIS B SURFACE AG IA: CPT

## 2024-03-11 PROCEDURE — 86762 RUBELLA ANTIBODY: CPT

## 2024-03-11 PROCEDURE — 86780 TREPONEMA PALLIDUM: CPT

## 2024-03-11 PROCEDURE — 81001 URINALYSIS AUTO W/SCOPE: CPT

## 2024-03-11 PROCEDURE — 36415 COLL VENOUS BLD VENIPUNCTURE: CPT

## 2024-03-11 PROCEDURE — 86900 BLOOD TYPING SEROLOGIC ABO: CPT

## 2024-03-11 PROCEDURE — 87077 CULTURE AEROBIC IDENTIFY: CPT

## 2024-03-11 PROCEDURE — 87186 SC STD MICRODIL/AGAR DIL: CPT

## 2024-03-12 LAB
HBV SURFACE AB SER-ACNC: <3 MIU/ML
HBV SURFACE AG SER QL: NORMAL
HCV AB SER QL: NORMAL
TREPONEMA PALLIDUM IGG+IGM AB [PRESENCE] IN SERUM OR PLASMA BY IMMUNOASSAY: NORMAL

## 2024-03-13 ENCOUNTER — INITIAL PRENATAL (OUTPATIENT)
Dept: OBGYN CLINIC | Facility: CLINIC | Age: 44
End: 2024-03-13

## 2024-03-13 ENCOUNTER — PATIENT OUTREACH (OUTPATIENT)
Dept: OBGYN CLINIC | Facility: CLINIC | Age: 44
End: 2024-03-13

## 2024-03-13 DIAGNOSIS — Z34.91 PRENATAL CARE IN FIRST TRIMESTER: Primary | ICD-10-CM

## 2024-03-13 LAB
BACTERIA UR CULT: ABNORMAL
HIV 1+2 AB+HIV1 P24 AG SERPL QL IA: NORMAL
HIV 2 AB SERPL QL IA: NORMAL
HIV1 AB SERPL QL IA: NORMAL
HIV1 P24 AG SERPL QL IA: NORMAL

## 2024-03-13 PROCEDURE — 99211 OFF/OP EST MAY X REQ PHY/QHP: CPT

## 2024-03-13 NOTE — LETTER
Work Letter    Rupa Curry  1980  [address]  [place] [address]    Dear Rupa Curry,      03/12/24        Your employee is a patient at [facility].    We recommend that all pregnant women:    1. Have a well-ventilated workspace.  2. Wear low-heeled shoes.  3. Work no more than 40 hours per week.  4. Have a 15 minute break every 2 hours and at least 30 minutes for a meal break.   5. Use good body mechanics by bending at your knees to avoid back strain and lift no more than 20 pounds without assistance. Will need assistance with lifting over 20 lbs.   6. Have ready access to bathrooms and water.      She may continue to work until her due date unless medical complications arise. We anticipate she may return to work in 6-8 weeks after delivery.     Sincerely,  [facility]

## 2024-03-13 NOTE — PROGRESS NOTES
RAFA SOTO met with 45 y/o-s- G7:P6-  Syriac speaking woman for prenatal assessment. Pt came accompanied by FOB. Pt reported they are working on their relationship and plans to parents their unborn baby. Pt reported pregnancy was not intended but welcomed. Pt denies any usage of drug, alcohol or smoking. Pt has SFS and WIC. Pt reported she mood has improved and denies depression signs at this time. Pt denies transportation. Pt does reported some financial concerns.    Pt is behind on her electricity bill but plans to have the payment by next week. Pt not eligible for [employer] or budget plan. Pt is counting on her oldest daughter finding a job to alleviate their financial situation. Pt denies other concerns at this time. Pt is aware of [employer] service since she has been working with this [employer] form 2006. Pt was encouraged to call at any time needed.

## 2024-03-13 NOTE — PROGRESS NOTES
OB INTAKE INTERVIEW  Pt presents for OB intake.     OB History    Para Term  AB Living   7 6 5 1   6   SAB IAB Ectopic Multiple Live Births         0 6      # Outcome Date GA Lbr Brandt/2nd Weight Sex Delivery Anes PTL Lv   7 Current            6  22 33w6d  1870 g (4 lb 2 oz) F CS-LTranv Spinal  CLEO      Birth Comments: Neonatologist present at birth, baby transfered to NICU   5 Term 20 39w1d / 00:03 3525 g (7 lb 12.3 oz) F Vag-Spont None N CLEO      Complications: Other Excessive Bleeding   4 Term 14 40w0d   M Vag-Spont EPI N CLEO      Birth Comments: GDM noncompliant    3 Term 06/10/08 39w0d  3118 g (6 lb 14 oz) M Vag-Spont  N CLEO      Birth Comments: oligo induced   2 Term 06 40w0d  3374 g (7 lb 7 oz) F Vag-Spont None N CLEO   1 Term 00 40w0d   M Vag-Spont  N CLEO     Hx of  delivery prior to 36 weeks 6 days:  Yes   If yes, place a referral for cervical surveillance at 16 weeks.   Last Menstrual Period:    No LMP recorded. Patient is pregnant.     Ultrasound date: 2024  8 weeks 3 days     Estimated Date of Delivery: 10/7/24   by US  H&P visit scheduled. 3/2/2024 @ 0830  with Dr. Steel     Last pap smear: 2019  Findings; lab pap smear results: no abnormalities    Current Issues:  Constipation :   Yes, a little. Feels it's managable  Headaches :   No  Cramping:  Yes  Spotting :   No  PICA cravings :  No  FOB Involved:   Yes  Planned pregnancy:  No  I have these concerns about this prenatal patient:   Declines [employer] . FOB present and assisted with interpretation at pt request  Hx csection x1. Desires TOLAC for this birth. To discuss further with providers  Desires breastfeeding. [facility] resources provided for postpartum lactation support. Unable to order breast pump d/t self pay      Interview education  [facility] Pregnancy Essentials reviewed and discussed   [facility] Handout  [facility]  MFM Handout  Discussed genetic testing  Prenatal lab work: Scripts printed and given to pt.   Influenza vaccine given today: No, declines  Discussed Tdap vaccine.   Immunizations:   Immunization History   Administered Date(s) Administered    Influenza, injectable, quadrivalent, preservative free 0.5 mL 12/24/2019    Influenza, seasonal, injectable 01/15/2014     Depression Screening Follow-up Plan: Patient's depression screening was negative with an [place] score of  3  Clinically patient does not have depression. No treatment is required..  Nurse/Family Partnership- referral placed:  No   If yes, place referral for nurse family partnership  Tobacco Cessation Counseling: non-smoker  Infection Screening: Does the pt have a hx of MRSA? No  If yes- please follow MRSA protocol and obtain a nasal swab for MRSA culture  The patient was oriented to our practice and all questions were answered.  Interviewed by: cheryl david RN 03/13/24

## 2024-03-13 NOTE — LETTER
Dentist Letter    Rupa Curry  1980  [address]   [address]  [place] [address]          03/12/24    We have had several requests from local dentist requesting permission to perform procedures on our patients who are pregnant. We wish to respond with this letter regarding some of the more routine procedures that we have been asked about.    The following procedures may be performed on our obstetric patients:   1. Administration of local anesthesia   2. Administration of antibiotics such as PCN, Ampicillin, and Erythromycin.   3. Administration of pain medications such as Tylenol, Tylenol with codeine, and if needed Percocet.   4. Shielded X-rays    Should you have any questions, please do not hesitate to contact at [phone].        Sincerely,    [facility]   [phone]

## 2024-03-13 NOTE — LETTER
Proof of Pregnancy Letter    Rupa DONIS Akers Patricio  1980  [address]   [address]  [place] [address]        03/12/24      Rupa Curry is a patient at our facility. Rupa Curry Estimated Date of Delivery: 10/7/24       Any questions or concerns, please feel free to contact our office.    Sincerely,    [facility]    [address]  [address]   [place] [id number] [phone]

## 2024-03-13 NOTE — LETTER
[facility] Letter    Rupa Curry  1980  [address]   [address]  [place] [address]       03/12/24          Rupa Curry is a patient and under our care in our office. Rupa Curry's Estimated Date of Delivery: 10/7/24.  Any questions or concerns feel free to contact our office.     Thank you,  [facility]'s [facility]/[place]  [phone]  [facility]/[place]  [phone]    [facility]/[place]  [phone]   [facility]  [phone]    [facility]   [phone]    [facility]  [phone]

## 2024-03-24 DIAGNOSIS — N39.0 UTI (URINARY TRACT INFECTION): Primary | ICD-10-CM

## 2024-03-24 RX ORDER — NITROFURANTOIN 25; 75 MG/1; MG/1
100 CAPSULE ORAL 2 TIMES DAILY
Qty: 14 CAPSULE | Refills: 0 | Status: SHIPPED | OUTPATIENT
Start: 2024-03-24 | End: 2024-03-31

## 2024-03-25 ENCOUNTER — TELEPHONE (OUTPATIENT)
Dept: OBGYN CLINIC | Facility: CLINIC | Age: 44
End: 2024-03-25

## 2024-03-25 PROBLEM — Z3A.12 12 WEEKS GESTATION OF PREGNANCY: Status: ACTIVE | Noted: 2024-02-29

## 2024-03-25 NOTE — PROGRESS NOTES
OB/GYN  PRENATAL H&P VISIT  Scar Friedman  3/28/2024  10:11 AM  Dr. Niurka Steel MD      SUBJECTIVE  Patient is a  at 12w3d here for initial prenatal H&P. This is a planned and desired pregnancy.     She is currently doing well, reports some nausea and fatigue.    She denies hx of STD/STI, denies a hx of TB or close contacts with persons with TB. She denies had MRSA.     She denies a family history of inheritable conditions such as physical or intellectual disabilities, birth defects, blood disorders, heart or neural tube defects.     She denies recent travel or travel planned in the near future.     She denies use of nicotine or recreational drug use. She denies use of ETOH.    She denies vaginal bleeding, cramping, leakage, abnormal discharge.     OB History    Para Term  AB Living   7 6 5 1 0 6   SAB IAB Ectopic Multiple Live Births   0 0 0 0 6      # Outcome Date GA Lbr Brandt/2nd Weight Sex Delivery Anes PTL Lv   7 Current            6  22 33w6d  1870 g (4 lb 2 oz) F CS-LTranv Spinal  CLEO      Birth Comments: Neonatologist present at birth, baby transfered to NICU      Name: SANDIP FRIEDMANBABY GIRL (IRIS)      Apgar1: 8  Apgar5: 8   5 Term 20 39w1d / 00:03 3525 g (7 lb 12.3 oz) F Vag-Spont None N CLEO      Complications: Other Excessive Bleeding      Name: SUGEYBABY GIRL (SCAR)      Apgar1: 9  Apgar5: 9   4 Term 14 40w0d   M Vag-Spont EPI N CLEO      Birth Comments: GDM noncompliant    3 Term 06/10/08 39w0d  3118 g (6 lb 14 oz) M Vag-Spont  N CLEO      Birth Comments: oligo induced   2 Term 06 40w0d  3374 g (7 lb 7 oz) F Vag-Spont None N CLEO   1 Term 00 40w0d   M Vag-Spont  N CLEO     ROS negative except per HPI    Past Medical History:   Diagnosis Date    39 weeks gestation of pregnancy 2020    IOL - 2020 @8pm [facility]  Flu vaccine 19    Advanced maternal age in multigravida, third trimester 10/26/2022    Did not  complete aneuploidy screening this pregnancy Need AFS starting at 32 weeks, first NST perfomed today , 2x week    Anemia     Anemia during pregnancy in third trimester 1/15/2020    COVID-19 06/10/2020    COVID-19 virus detected 2020    Elderly multigravida in third trimester 1/15/2020    Encounter for injection education 3/3/2020    GBS bacteriuria 2019    Amoxicillin sent to patient's pharmacy  Will need PCN in labor DO NOT collect GBS swab at 36 weeks!!    Grand multiparity with current pregnancy in second trimester 2022    Hyperglycemia during pregnancy 3/3/2020    Iron deficiency anemia 2019    F/u Ferritin (Hb 8.4) Will likely need IV iron    Postpartum hemorrhage, delivered, current hospitalization 2020    Prediabetes 2015    Preeclampsia, severe, third trimester 2022     (spontaneous vaginal delivery) 2020       Past Surgical History:   Procedure Laterality Date    SD  DELIVERY ONLY N/A 2022    Procedure:  SECTION ();  Surgeon: Mena Anguiano MD;  Location: [facility];  Service: Obstetrics    TOOTH EXTRACTION         Social History     Socioeconomic History    Marital status: Single     Spouse name: Not on file    Number of children: 6    Years of education: 9    Highest education level: 9th grade   Occupational History    Occupation: Homemaker   Tobacco Use    Smoking status: Never    Smokeless tobacco: Never   Vaping Use    Vaping status: Never Used   Substance and Sexual Activity    Alcohol use: No     Comment: pt reports social drinking once per month prior to pregnancy    Drug use: No    Sexual activity: Yes     Partners: Male     Birth control/protection: None   Other Topics Concern    Not on file   Social History Narrative    Not on file     Social Determinants of Health     Financial Resource Strain: High Risk (2024)    Overall Financial Resource Strain (CARDIA)     Difficulty of Paying Living Expenses: Hard   Food Insecurity: No  Food Insecurity (1/4/2024)    Hunger Vital Sign     Worried About Running Out of Food in the Last Year: Never true     Ran Out of Food in the Last Year: Never true   Transportation Needs: No Transportation Needs (1/4/2024)    PRAPARE - Transportation     Lack of Transportation (Medical): No     Lack of Transportation (Non-Medical): No   Physical Activity: Unknown (1/8/2024)    Exercise Vital Sign     Days of Exercise per Week: Not on file     Minutes of Exercise per Session: 0 min   Stress: No Stress Concern Present (3/13/2024)    [employer] [place] [employer] - Occupational Stress Questionnaire     Feeling of Stress : Only a little   Recent Concern: Stress - Stress Concern Present (1/8/2024)    [employer] [place] [employer] - Occupational Stress Questionnaire     Feeling of Stress : To some extent   Social Connections: Moderately Integrated (1/8/2024)    Social Connection and Isolation Panel [NHANES]     Frequency of Communication with Friends and Family: Three times a week     Frequency of Social Gatherings with Friends and Family: Once a week     Attends Gnosticism Services: 1 to 4 times per year     Active Member of Clubs or Organizations: No     Attends Club or Organization Meetings: 1 to 4 times per year     Marital Status: Never    Intimate Partner Violence: Not At Risk (1/8/2024)    Humiliation, Afraid, Rape, and Kick questionnaire     Fear of Current or Ex-Partner: No     Emotionally Abused: No     Physically Abused: No     Sexually Abused: No   Housing Stability: Low Risk  (3/13/2024)    Housing Stability Vital Sign     Unable to Pay for Housing in the Last Year: No     Number of Places Lived in the Last Year: 1     Unstable Housing in the Last Year: No       OBJECTIVE  Vitals:    03/28/24 0800   BP: 123/62   Pulse: 56       Physical Exam:  GEN: The patient was alert and oriented x3, pleasant well-appearing female in no acute distress.   CV:  Regular rate   RESP:  Unlabored  "breathing  GI:  Soft, nontender, non-distended  MSK: bilateral lower extremities are nontender, no edema  : Normal appearing external female genitalia, normal appearing urethral meatus. On sterile speculum exam, normal appearing vaginal epithelium, no vaginal discharge, no bleeding, grossly normal appearing cervix.      ASSESSMENT AND PLAN    44 y.o., , with /62   Pulse 56   Ht 5' 1\" (1.549 m)   Wt 70.8 kg (156 lb) , at 12w3d here for her prenatal H&P.     by ultrasound    Pregnancy: H&P completed today. PN Labs reviewed today. Bacteruria identified on urine culture; prescription sent for Macrobid. Reviewed with patient need to treat and follow up urine culture. Labor expectations discussed with patient, including appointment schedule, nutrition, exercise, medications, sexual intercourse, and nausea/vomiting.    Hypertension: Patient has a history of preeclampsia in prior pregnancy. We discussed that she has continued to have elevated blood pressure outside of pregnancy at recent visits in late -early  and that she likely has chronic hypertension. We discussed need for baseline preeclampsia labs and low dose aspirin usage as well as need for close blood pressure monitoring through her pregnancy.    Weight gain: Patient's BMI is 29. Recommended weight gain is 25-25.     Screening: Pap smear collected today. GC/CT collected.     Consents: Delivery process including potential OVD and  reviewed. Sign delivery consent form at 28 weeks.    Follow up: RTC in 4 weeks. Precautions regarding labor, leakage, bleeding, and fetal movement reviewed.    Discussed with Dr. Jeanine Steel MD  3/28/2024  10:11 AM          "

## 2024-03-27 PROBLEM — Z86.32 HISTORY OF GESTATIONAL DIABETES: Status: ACTIVE | Noted: 2024-03-27

## 2024-03-28 ENCOUNTER — ROUTINE PRENATAL (OUTPATIENT)
Dept: OBGYN CLINIC | Facility: CLINIC | Age: 44
End: 2024-03-28

## 2024-03-28 ENCOUNTER — ROUTINE PRENATAL (OUTPATIENT)
Dept: PERINATAL CARE | Facility: CLINIC | Age: 44
End: 2024-03-28

## 2024-03-28 VITALS
WEIGHT: 156 LBS | DIASTOLIC BLOOD PRESSURE: 62 MMHG | HEART RATE: 56 BPM | SYSTOLIC BLOOD PRESSURE: 123 MMHG | BODY MASS INDEX: 29.45 KG/M2 | HEIGHT: 61 IN

## 2024-03-28 VITALS
DIASTOLIC BLOOD PRESSURE: 60 MMHG | WEIGHT: 159.6 LBS | HEART RATE: 61 BPM | BODY MASS INDEX: 30.13 KG/M2 | HEIGHT: 61 IN | SYSTOLIC BLOOD PRESSURE: 121 MMHG

## 2024-03-28 DIAGNOSIS — Z3A.08 8 WEEKS GESTATION OF PREGNANCY: ICD-10-CM

## 2024-03-28 DIAGNOSIS — K21.9 GASTROESOPHAGEAL REFLUX DISEASE, UNSPECIFIED WHETHER ESOPHAGITIS PRESENT: ICD-10-CM

## 2024-03-28 DIAGNOSIS — O09.521 MULTIGRAVIDA OF ADVANCED MATERNAL AGE IN FIRST TRIMESTER: ICD-10-CM

## 2024-03-28 DIAGNOSIS — O34.219 HISTORY OF CESAREAN DELIVERY AFFECTING PREGNANCY: ICD-10-CM

## 2024-03-28 DIAGNOSIS — Z86.32 HISTORY OF GESTATIONAL DIABETES: ICD-10-CM

## 2024-03-28 DIAGNOSIS — O09.292 HX OF PREECLAMPSIA, PRIOR PREGNANCY, CURRENTLY PREGNANT, SECOND TRIMESTER: ICD-10-CM

## 2024-03-28 DIAGNOSIS — Z3A.12 12 WEEKS GESTATION OF PREGNANCY: Primary | ICD-10-CM

## 2024-03-28 DIAGNOSIS — Z3A.12 12 WEEKS GESTATION OF PREGNANCY: ICD-10-CM

## 2024-03-28 DIAGNOSIS — Z36.82 ENCOUNTER FOR (NT) NUCHAL TRANSLUCENCY SCAN: ICD-10-CM

## 2024-03-28 DIAGNOSIS — O09.299 HX OF PREECLAMPSIA, PRIOR PREGNANCY, CURRENTLY PREGNANT: ICD-10-CM

## 2024-03-28 DIAGNOSIS — Z11.3 SCREEN FOR STD (SEXUALLY TRANSMITTED DISEASE): ICD-10-CM

## 2024-03-28 DIAGNOSIS — I10 CHRONIC HYPERTENSION: ICD-10-CM

## 2024-03-28 DIAGNOSIS — O99.211 OBESITY AFFECTING PREGNANCY IN FIRST TRIMESTER, UNSPECIFIED OBESITY TYPE: ICD-10-CM

## 2024-03-28 DIAGNOSIS — Z34.91 PRENATAL CARE IN FIRST TRIMESTER: Primary | ICD-10-CM

## 2024-03-28 PROCEDURE — 76813 OB US NUCHAL MEAS 1 GEST: CPT | Performed by: OBSTETRICS & GYNECOLOGY

## 2024-03-28 PROCEDURE — 76801 OB US < 14 WKS SINGLE FETUS: CPT | Performed by: OBSTETRICS & GYNECOLOGY

## 2024-03-28 PROCEDURE — 99213 OFFICE O/P EST LOW 20 MIN: CPT | Performed by: OBSTETRICS & GYNECOLOGY

## 2024-03-28 PROCEDURE — G0476 HPV COMBO ASSAY CA SCREEN: HCPCS

## 2024-03-28 PROCEDURE — 99244 OFF/OP CNSLTJ NEW/EST MOD 40: CPT | Performed by: OBSTETRICS & GYNECOLOGY

## 2024-03-28 PROCEDURE — G0145 SCR C/V CYTO,THINLAYER,RESCR: HCPCS

## 2024-03-28 PROCEDURE — 87591 N.GONORRHOEAE DNA AMP PROB: CPT

## 2024-03-28 PROCEDURE — 87491 CHLMYD TRACH DNA AMP PROBE: CPT

## 2024-03-28 RX ORDER — FAMOTIDINE 20 MG/1
20 TABLET, FILM COATED ORAL 2 TIMES DAILY
Qty: 90 TABLET | Refills: 3 | Status: SHIPPED | OUTPATIENT
Start: 2024-03-28

## 2024-03-28 RX ORDER — PYRIDOXINE HCL (VITAMIN B6) 25 MG
25 TABLET ORAL DAILY
Qty: 60 TABLET | Refills: 2 | Status: SHIPPED | OUTPATIENT
Start: 2024-03-28

## 2024-03-28 NOTE — LETTER
"2024    Angelita Araya MD  [address]  [address]  [place] [address]    Patient: Rupa Curry   YOB: 1980   Date of Visit: 3/28/2024   Gestational age 12w3d   Nature of this communication: Routine       Dear Dr Araya,    This patient was seen recently in our  office.  The content of my evaluation today is in the ultrasound report under \"OB Procedures\" tab.     Please don't hesitate to contact our office with any concerns or questions.     Sincerely,      Ilda Carrera MD  Attending Physician, Maternal-Fetal Medicine  [facility]      "

## 2024-03-28 NOTE — PATIENT INSTRUCTIONS
You elected to have non-invasive prenatal screening (NIPS). This involves a blood test to check for the four most common genetic syndromes (Trisomy 21, Trisomy 13, Trisomy 18, and sex chromosome abnormalities). It also MAY report the biologic sex of the fetus if you opted to learn this information. You can call our office to verbally review results to avoid inadvertently learning this information via [employer], if desired. Results will be visible in your [employer] portal 7-10 business days from when the test is drawn. Please follow all instructions regarding insurance cost/coverage provided to you today. Please contact our office with any concerns or questions. You will need spina bifida screening (called MSAFP) for the baby beginning at 15 weeks gestation, which will be ordered by your obstetrician's office. This test allows for earlier detection of spina bifida than is possible by ultrasound, and is advised in all pregnancies.

## 2024-03-28 NOTE — PROGRESS NOTES
"[facility]: Rupa DONIS Curry was seen today for nuchal translucency ultrasound.  See ultrasound report under \"OB Procedures\" tab.      MDM:   I. Diagnoses/Problems addressed:  AMA, aneuploidy screening, obesity, prior GDM, prior preeclampsia and PTB  II.  Data: I reviewed notes from prior discharge summary, initial prenatal.  III.  Risk of morbidity: Moderate (asa)    Please don't hesitate to contact our office with any concerns or questions.  -Ilda Carrera MD    "

## 2024-03-29 LAB
C TRACH DNA SPEC QL NAA+PROBE: NEGATIVE
HPV HR 12 DNA CVX QL NAA+PROBE: NEGATIVE
HPV16 DNA CVX QL NAA+PROBE: NEGATIVE
HPV18 DNA CVX QL NAA+PROBE: NEGATIVE
N GONORRHOEA DNA SPEC QL NAA+PROBE: NEGATIVE

## 2024-04-01 ENCOUNTER — PATIENT OUTREACH (OUTPATIENT)
Facility: HOSPITAL | Age: 44
End: 2024-04-01

## 2024-04-01 ENCOUNTER — DOCUMENTATION (OUTPATIENT)
Facility: HOSPITAL | Age: 44
End: 2024-04-01

## 2024-04-01 NOTE — PROGRESS NOTES
Reviewed patients chart from  to determine if she was eligible for [facility] Program.     Patient does not meet criteria and will not be outreached at this time.

## 2024-04-04 LAB
LAB AP GYN PRIMARY INTERPRETATION: NORMAL
Lab: NORMAL

## 2024-04-11 ENCOUNTER — TELEPHONE (OUTPATIENT)
Dept: INTERNAL MEDICINE CLINIC | Facility: CLINIC | Age: 44
End: 2024-04-11

## 2024-04-23 PROBLEM — Z3A.16 16 WEEKS GESTATION OF PREGNANCY: Status: ACTIVE | Noted: 2024-02-29

## 2024-04-23 NOTE — PROGRESS NOTES
[facility] WOMEN'S HE[facility]   PRENATAL VISIT  Rupa Curry  [id number]  1980        ASSESSMENT/PLAN:  Problem List Items Addressed This Visit       16 weeks gestation of pregnancy     Patient competed course of antibiotics for e coli bacteruria; no symptoms today, collected repeat urine culture  Discussed completing remainder of labs  Discussed MSAFP and lab ordered  Briefly discussed TOLAC; will continue counseling throughout pregnancy  Discussed preeclampsia prevention with aspirin which patient is taking         Relevant Orders    Alpha fetoprotein, maternal    Chronic hypertension     Discussed completing additional lab work          Other Visit Diagnoses       Prenatal care in second trimester    -  Primary    Relevant Orders    Urine culture                  If you are experiencing painful contractions, loss of fluids, vaginal bleeding, or decreased fetal movement, please call ask to speak to OBGYN doctor on call prior to proceeding to Labor & Delivery ([facility] 2nd floor of [facility] [place] or [facility] 3rd floor [facility]). We have a resident doctor on call at both  24 hours a day.     Subjective: 44 y.o.  at 16w3d here for prenatal visit. She denies contractions. She denies leakage of fluid and vaginal bleeding. She is starting to feel fetal movement. She reports intermittent back pain that improves with hydration and rest, and is not having pain today.     Objective:  Pre-Amaris Vitals      Flowsheet Row Most Recent Value   Prenatal Assessment    Fetal Heart Rate 140   Movement Present   Prenatal Vitals    Blood Pressure 119/58   Weight - Scale 72.1 kg (159 lb)   Urine Albumin/Glucose    Dilation/Effacement/Station    Vaginal Drainage    Edema              Pregnancy Plan:  Pregnancy: Rajan     Delivery Plans  Planned delivery location: [facility] L&D  Planned anesthesia: None  Acceptable blood products: All     Post-Delivery Plans  Feeding intentions:  Breast Milk and Non-human milk substitute      General: Well appearing, no distress  Respiratory: Unlabored breathing  Cardiovascular: Regular rate.  Abdomen: Soft, gravid, nontender, absent CVA tenderness  Back: Nontender to palpation  Fundal Height: Appropriate for gestational age.  Extremities: Warm and well perfused.  Non tender.      D/w  Episcopsonja Steel MD  PGY 1, Obstetrics and Gynecology  4/25/2024  1:01 PM

## 2024-04-23 NOTE — ASSESSMENT & PLAN NOTE
Patient competed course of antibiotics for e coli bacteruria; no symptoms today, collected repeat urine culture  Discussed completing remainder of labs  Discussed MSAFP and lab ordered  Briefly discussed TOLAC; will continue counseling throughout pregnancy  Discussed preeclampsia prevention with aspirin which patient is taking

## 2024-04-25 ENCOUNTER — ROUTINE PRENATAL (OUTPATIENT)
Dept: OBGYN CLINIC | Facility: CLINIC | Age: 44
End: 2024-04-25

## 2024-04-25 VITALS
BODY MASS INDEX: 30.02 KG/M2 | SYSTOLIC BLOOD PRESSURE: 119 MMHG | WEIGHT: 159 LBS | HEART RATE: 70 BPM | DIASTOLIC BLOOD PRESSURE: 58 MMHG | HEIGHT: 61 IN

## 2024-04-25 DIAGNOSIS — Z34.92 PRENATAL CARE IN SECOND TRIMESTER: Primary | ICD-10-CM

## 2024-04-25 DIAGNOSIS — I10 CHRONIC HYPERTENSION: ICD-10-CM

## 2024-04-25 DIAGNOSIS — Z3A.16 16 WEEKS GESTATION OF PREGNANCY: ICD-10-CM

## 2024-04-25 PROCEDURE — 87086 URINE CULTURE/COLONY COUNT: CPT

## 2024-04-25 PROCEDURE — 99213 OFFICE O/P EST LOW 20 MIN: CPT | Performed by: OBSTETRICS & GYNECOLOGY

## 2024-04-25 RX ORDER — FEXOFENADINE HCL 60 MG/1
60 TABLET, FILM COATED ORAL DAILY
COMMUNITY

## 2024-04-27 LAB — BACTERIA UR CULT: NORMAL

## 2024-05-01 ENCOUNTER — APPOINTMENT (EMERGENCY)
Dept: RADIOLOGY | Facility: HOSPITAL | Age: 44
End: 2024-05-01

## 2024-05-01 ENCOUNTER — HOSPITAL ENCOUNTER (EMERGENCY)
Facility: HOSPITAL | Age: 44
Discharge: HOME | End: 2024-05-01
Attending: EMERGENCY MEDICINE

## 2024-05-01 VITALS
RESPIRATION RATE: 13 BRPM | HEART RATE: 58 BPM | SYSTOLIC BLOOD PRESSURE: 134 MMHG | DIASTOLIC BLOOD PRESSURE: 71 MMHG | OXYGEN SATURATION: 99 % | TEMPERATURE: 98.7 F

## 2024-05-01 DIAGNOSIS — K29.70 GASTRITIS: ICD-10-CM

## 2024-05-01 DIAGNOSIS — K20.90 ESOPHAGITIS: Primary | ICD-10-CM

## 2024-05-01 LAB
ATRIAL RATE: 73 BPM
BASOPHILS # BLD AUTO: 0.02 THOUSANDS/ÂΜL (ref 0–0.1)
BASOPHILS NFR BLD AUTO: 0 % (ref 0–1)
CARDIAC TROPONIN I PNL SERPL HS: 3 NG/L
EOSINOPHIL # BLD AUTO: 0.26 THOUSAND/ÂΜL (ref 0–0.61)
EOSINOPHIL NFR BLD AUTO: 3 % (ref 0–6)
ERYTHROCYTE [DISTWIDTH] IN BLOOD BY AUTOMATED COUNT: 13.9 % (ref 11.6–15.1)
HCT VFR BLD AUTO: 37.7 % (ref 34.8–46.1)
HGB BLD-MCNC: 12.6 G/DL (ref 11.5–15.4)
IMM GRANULOCYTES # BLD AUTO: 0.03 THOUSAND/UL (ref 0–0.2)
IMM GRANULOCYTES NFR BLD AUTO: 0 % (ref 0–2)
LIPASE SERPL-CCNC: 20 U/L (ref 11–82)
LYMPHOCYTES # BLD AUTO: 1.77 THOUSANDS/ÂΜL (ref 0.6–4.47)
LYMPHOCYTES NFR BLD AUTO: 23 % (ref 14–44)
MCH RBC QN AUTO: 29.4 PG (ref 26.8–34.3)
MCHC RBC AUTO-ENTMCNC: 33.4 G/DL (ref 31.4–37.4)
MCV RBC AUTO: 88 FL (ref 82–98)
MONOCYTES # BLD AUTO: 0.39 THOUSAND/ÂΜL (ref 0.17–1.22)
MONOCYTES NFR BLD AUTO: 5 % (ref 4–12)
NEUTROPHILS # BLD AUTO: 5.15 THOUSANDS/ÂΜL (ref 1.85–7.62)
NEUTS SEG NFR BLD AUTO: 69 % (ref 43–75)
NRBC BLD AUTO-RTO: 0 /100 WBCS
P AXIS: 37 DEGREES
PLATELET # BLD AUTO: 298 THOUSANDS/UL (ref 149–390)
PMV BLD AUTO: 9.5 FL (ref 8.9–12.7)
PR INTERVAL: 152 MS
QRS AXIS: 33 DEGREES
QRSD INTERVAL: 72 MS
QT INTERVAL: 410 MS
QTC INTERVAL: 451 MS
RBC # BLD AUTO: 4.29 MILLION/UL (ref 3.81–5.12)
T WAVE AXIS: 15 DEGREES
VENTRICULAR RATE: 73 BPM
WBC # BLD AUTO: 7.62 THOUSAND/UL (ref 4.31–10.16)

## 2024-05-01 PROCEDURE — 80053 COMPREHEN METABOLIC PANEL: CPT | Performed by: EMERGENCY MEDICINE

## 2024-05-01 PROCEDURE — 83690 ASSAY OF LIPASE: CPT | Performed by: EMERGENCY MEDICINE

## 2024-05-01 PROCEDURE — 36415 COLL VENOUS BLD VENIPUNCTURE: CPT | Performed by: EMERGENCY MEDICINE

## 2024-05-01 PROCEDURE — 99285 EMERGENCY DEPT VISIT HI MDM: CPT

## 2024-05-01 PROCEDURE — 84484 ASSAY OF TROPONIN QUANT: CPT | Performed by: EMERGENCY MEDICINE

## 2024-05-01 PROCEDURE — 93005 ELECTROCARDIOGRAM TRACING: CPT

## 2024-05-01 PROCEDURE — 85025 COMPLETE CBC W/AUTO DIFF WBC: CPT | Performed by: EMERGENCY MEDICINE

## 2024-05-01 PROCEDURE — 99285 EMERGENCY DEPT VISIT HI MDM: CPT | Performed by: EMERGENCY MEDICINE

## 2024-05-01 PROCEDURE — 93010 ELECTROCARDIOGRAM REPORT: CPT | Performed by: INTERNAL MEDICINE

## 2024-05-01 PROCEDURE — C9113 INJ PANTOPRAZOLE SODIUM, VIA: HCPCS | Performed by: EMERGENCY MEDICINE

## 2024-05-01 PROCEDURE — 96374 THER/PROPH/DIAG INJ IV PUSH: CPT

## 2024-05-01 RX ORDER — SUCRALFATE ORAL 1 G/10ML
1 SUSPENSION ORAL EVERY 8 HOURS PRN
Qty: 414 ML | Refills: 0 | Status: SHIPPED | OUTPATIENT
Start: 2024-05-01

## 2024-05-01 RX ORDER — ACETAMINOPHEN 325 MG/1
975 TABLET ORAL ONCE
Status: DISCONTINUED | OUTPATIENT
Start: 2024-05-01 | End: 2024-05-01

## 2024-05-01 RX ORDER — SUCRALFATE 1 G/1
1 TABLET ORAL ONCE
Status: COMPLETED | OUTPATIENT
Start: 2024-05-01 | End: 2024-05-01

## 2024-05-01 RX ORDER — MAGNESIUM HYDROXIDE/ALUMINUM HYDROXICE/SIMETHICONE 120; 1200; 1200 MG/30ML; MG/30ML; MG/30ML
30 SUSPENSION ORAL ONCE
Status: COMPLETED | OUTPATIENT
Start: 2024-05-01 | End: 2024-05-01

## 2024-05-01 RX ORDER — PANTOPRAZOLE SODIUM 40 MG/10ML
40 INJECTION, POWDER, LYOPHILIZED, FOR SOLUTION INTRAVENOUS ONCE
Status: COMPLETED | OUTPATIENT
Start: 2024-05-01 | End: 2024-05-01

## 2024-05-01 RX ADMIN — ALUMINUM HYDROXIDE, MAGNESIUM HYDROXIDE, AND DIMETHICONE 30 ML: 200; 20; 200 SUSPENSION ORAL at 10:14

## 2024-05-01 RX ADMIN — PANTOPRAZOLE SODIUM 40 MG: 40 INJECTION, POWDER, FOR SOLUTION INTRAVENOUS at 11:15

## 2024-05-01 RX ADMIN — SUCRALFATE 1 G: 1 TABLET ORAL at 11:15

## 2024-05-01 NOTE — ED PROVIDER NOTES
History  Chief Complaint   Patient presents with    Chest Pain     Pt c/o pain in her chest since last night. + SOB. Chest pain comes and goes. Pt is 17 weeks pregnant. No bleeding or discharge. Pt has preeclampsia      History from patient and fiancé.  Fiancé translates for Venezuelan.  Also from medical records past medical history preeclampsia, 7 pregnancies, currently pregnant at 17 weeks.  Complains of epigastric and sternal chest discomfort a couple days now was intermittent but now more constant since yesterday at 3 PM.  Worse when she eats so she has not really eaten much.  No nausea or vomiting no diarrhea or constipation.  No trouble breathing.  Pain is positional and better when she lays back.  She tried her Pepcid she does take aspirin every day.        Prior to Admission Medications   Prescriptions Last Dose Informant Patient Reported? Taking?   Prenatal Vit-Fe Fumarate-FA (Prenatal Vitamin) 27-0.8 MG TABS   No No   Sig: Take 1 tablet by mouth in the morning   aspirin (ECOTRIN LOW STRENGTH) 81 mg EC tablet   No No   Sig: Take 2 tablets (162 mg total) by mouth daily Stop taking at 36 weeks of pregnancy.   doxylamine (UNISOM) 25 MG tablet   No No   Sig: Take 0.5 tablets (12.5 mg total) by mouth daily at bedtime as needed for sleep or nausea   famotidine (PEPCID) 20 mg tablet   No No   Sig: Take 1 tablet (20 mg total) by mouth 2 (two) times a day   fexofenadine (ALLEGRA) 60 MG tablet  Self Yes No   Sig: Take 60 mg by mouth daily   pyridoxine (B-6) 25 MG tablet   No No   Sig: Take 1 tablet (25 mg total) by mouth daily      Facility-Administered Medications: None       Past Medical History:   Diagnosis Date    39 weeks gestation of pregnancy 1/22/2020    IOL - 6/8/2020 @8pm [facility]  Flu vaccine 12/24/19    Advanced maternal age in multigravida, third trimester 10/26/2022    Did not complete aneuploidy screening this pregnancy Need AFS starting at 32 weeks, first NST perfomed today , 2x week    Anemia      Anemia during pregnancy in third trimester 1/15/2020    COVID-19 06/10/2020    COVID-19 virus detected 2020    Elderly multigravida in third trimester 1/15/2020    Encounter for injection education 3/3/2020    GBS bacteriuria 2019    Amoxicillin sent to patient's pharmacy  Will need PCN in labor DO NOT collect GBS swab at 36 weeks!!    Grand multiparity with current pregnancy in second trimester 2022    Hyperglycemia during pregnancy 3/3/2020    Iron deficiency anemia 2019    F/u Ferritin (Hb 8.4) Will likely need IV iron    Postpartum hemorrhage, delivered, current hospitalization 2020    Prediabetes 2015    Preeclampsia, severe, third trimester 2022     (spontaneous vaginal delivery) 2020       Past Surgical History:   Procedure Laterality Date    [place]  DELIVERY ONLY N/A 2022    Procedure:  SECTION ();  Surgeon: Mena Anguiano MD;  Location: [facility];  Service: Obstetrics    TOOTH EXTRACTION         Family History   Problem Relation Age of Onset    Cancer Mother     No Known Problems Father     No Known Problems Brother     No Known Problems Son     No Known Problems Son     No Known Problems Daughter     No Known Problems Daughter     Scoliosis Daughter     Scoliosis Daughter     Diabetes Maternal Grandmother     Diabetes Paternal Grandmother      I have reviewed and agree with the history as documented.    E-Cigarette/Vaping    E-Cigarette Use Never User      E-Cigarette/Vaping Substances    Nicotine No     THC No     CBD No     Flavoring No     Other No     Unknown No      Social History     Tobacco Use    Smoking status: Never    Smokeless tobacco: Never   Vaping Use    Vaping status: Never Used   Substance Use Topics    Alcohol use: No     Comment: pt reports social drinking once per month prior to pregnancy    Drug use: No       Review of Systems   Constitutional:  Negative for chills and fever.   HENT:  Negative for ear pain and sore  throat.    Eyes:  Negative for pain and visual disturbance.   Respiratory:  Negative for cough and shortness of breath.    Cardiovascular:  Positive for chest pain. Negative for palpitations.   Gastrointestinal:  Positive for abdominal pain. Negative for diarrhea, nausea and vomiting.   Genitourinary:  Negative for dysuria and hematuria.   Musculoskeletal:  Negative for arthralgias and back pain.   Skin:  Negative for color change and rash.   Neurological:  Negative for seizures and syncope.   All other systems reviewed and are negative.      Physical Exam  Physical Exam  Vitals and nursing note reviewed.   Constitutional:       General: She is not in acute distress.     Appearance: She is well-developed.   HENT:      Head: Normocephalic and atraumatic.   Eyes:      Conjunctiva/sclera: Conjunctivae normal.   Cardiovascular:      Rate and Rhythm: Normal rate and regular rhythm.      Heart sounds: No murmur heard.  Pulmonary:      Effort: Pulmonary effort is normal. No respiratory distress.      Breath sounds: Normal breath sounds.   Chest:      Chest wall: No deformity or tenderness.   Abdominal:      Palpations: Abdomen is soft.      Tenderness: There is abdominal tenderness in the epigastric area. There is no guarding or rebound.      Comments: Gravid fundal height above umbilicus   Musculoskeletal:         General: No swelling.      Cervical back: Neck supple.      Right lower leg: No edema.      Left lower leg: No edema.   Skin:     General: Skin is warm and dry.      Capillary Refill: Capillary refill takes less than 2 seconds.      Findings: No rash.   Neurological:      Mental Status: She is alert.   Psychiatric:         Mood and Affect: Mood normal.         Vital Signs  ED Triage Vitals [05/01/24 0934]   Temperature Pulse Respirations Blood Pressure SpO2   98.7 °F (37.1 °C) 73 18 114/70 98 %      Temp Source Heart Rate Source Patient Position - Orthostatic VS BP Location FiO2 (%)   Temporal Monitor Sitting  Right arm --      Pain Score       7           Vitals:    05/01/24 1030 05/01/24 1056 05/01/24 1100 05/01/24 1200   BP: 105/59 105/59 106/74 134/71   Pulse: 64 60 64 58   Patient Position - Orthostatic VS:  Sitting           Visual Acuity      ED Medications  Medications   aluminum-magnesium hydroxide-simethicone (MAALOX) oral suspension 30 mL (30 mL Oral Given 5/1/24 1014)   pantoprazole (PROTONIX) injection 40 mg (40 mg Intravenous Given 5/1/24 1115)   sucralfate (CARAFATE) tablet 1 g (1 g Oral Given 5/1/24 1115)       Diagnostic Studies  Results Reviewed       Procedure Component Value Units Date/Time    HS Troponin 0hr (reflex protocol) [[id number]]  (Normal) Collected: 05/01/24 1013    Lab Status: Final result Specimen: Blood from Arm, Right Updated: 05/01/24 1047     hs TnI 0hr 3 ng/L     Comprehensive metabolic panel [[id number]]  (Abnormal) Collected: 05/01/24 1013    Lab Status: Final result Specimen: Blood from Arm, Right Updated: 05/01/24 1040     Sodium 136 mmol/L      Potassium 3.3 mmol/L      Chloride 106 mmol/L      CO2 22 mmol/L      ANION GAP 8 mmol/L      BUN 7 mg/dL      Creatinine 0.41 mg/dL      Glucose 97 mg/dL      Calcium 8.3 mg/dL      AST 11 U/L      ALT 10 U/L      Alkaline Phosphatase 44 U/L      Total Protein 6.7 g/dL      Albumin 3.7 g/dL      Total Bilirubin 0.34 mg/dL      eGFR 127 ml/min/1.73sq m     Narrative:      National Kidney Disease Foundation guidelines for Chronic Kidney Disease (CKD):     Stage 1 with normal or high GFR (GFR > 90 mL/min/1.73 square meters)    Stage 2 Mild CKD (GFR = 60-89 mL/min/1.73 square meters)    Stage 3A Moderate CKD (GFR = 45-59 mL/min/1.73 square meters)    Stage 3B Moderate CKD (GFR = 30-44 mL/min/1.73 square meters)    Stage 4 Severe CKD (GFR = 15-29 mL/min/1.73 square meters)    Stage 5 End Stage CKD (GFR <15 mL/min/1.73 square meters)  Note: GFR calculation is accurate only with a steady state creatinine    Lipase [[id number]]  (Normal) Collected:  05/01/24 1013    Lab Status: Final result Specimen: Blood from Arm, Right Updated: 05/01/24 1040     Lipase 20 u/L     CBC and differential [[id number]] Collected: 05/01/24 1013    Lab Status: Final result Specimen: Blood from Arm, Right Updated: 05/01/24 1021     WBC 7.62 Thousand/uL      RBC 4.29 Million/uL      Hemoglobin 12.6 g/dL      Hematocrit 37.7 %      MCV 88 fL      MCH 29.4 pg      MCHC 33.4 g/dL      RDW 13.9 %      MPV 9.5 fL      Platelets 298 Thousands/uL      nRBC 0 /100 WBCs      Segmented % 69 %      Immature Grans % 0 %      Lymphocytes % 23 %      Monocytes % 5 %      Eosinophils Relative 3 %      Basophils Relative 0 %      Absolute Neutrophils 5.15 Thousands/µL      Absolute Immature Grans 0.03 Thousand/uL      Absolute Lymphocytes 1.77 Thousands/µL      Absolute Monocytes 0.39 Thousand/µL      Eosinophils Absolute 0.26 Thousand/µL      Basophils Absolute 0.02 Thousands/µL                    No orders to display              Procedures  ECG 12 Lead Documentation Only    Date/Time: 5/1/2024 4:04 PM    Performed by: Doc Dodd DO  Authorized by: Doc Dodd DO    Indications / Diagnosis:  Epigastric pain  ECG reviewed by me, the ED Provider: yes    Patient location:  ED  Previous ECG:     Previous ECG:  Compared to current    Comparison ECG info:  12-23    Similarity:  Changes noted  Interpretation:     Interpretation: non-specific    Rate:     ECG rate:  73    ECG rate assessment: normal    Rhythm:     Rhythm: sinus rhythm    Ectopy:     Ectopy: none    QRS:     QRS axis:  Normal    QRS intervals:  Normal  Conduction:     Conduction: normal    ST segments:     ST segments:  Normal  T waves:     T waves: normal    Comments:      This EKG was interpreted by me.           ED Course       bedside trans abd US performed by me - [place] fetus moving and       HEART Risk Score      Flowsheet Row Most Recent Value   Heart Score Risk Calculator    History 0 Filed at: 05/01/2024 1747   ECG 0 Filed  at: 05/01/2024 1747   Age 0 Filed at: 05/01/2024 1747   Risk Factors 1 Filed at: 05/01/2024 1747   Troponin 0 Filed at: 05/01/2024 1747   HEART Score 1 Filed at: 05/01/2024 1747                  PERC Rule for PE      Flowsheet Row Most Recent Value   PERC Rule for PE    Age >=50 0 Filed at: 05/01/2024 0955   HR >=100 0 Filed at: 05/01/2024 0955   O2 Sat on room air < 95% 0 Filed at: 05/01/2024 0955   History of PE or DVT 0 Filed at: 05/01/2024 0955   Recent trauma or surgery 0 Filed at: 05/01/2024 0955   Hemoptysis 0 Filed at: 05/01/2024 0955   Exogenous estrogen 0 Filed at: 05/01/2024 0955   Unilateral leg swelling 0 Filed at: 05/01/2024 0955   PERC Rule for PE Results 0 Filed at: 05/01/2024 0955                SBIRT 20yo+      Flowsheet Row Most Recent Value   Initial Alcohol Screen: US AUDIT-C     1. How often do you have a drink containing alcohol? 0 Filed at: 05/01/2024 0934   2. How many drinks containing alcohol do you have on a typical day you are drinking?  0 Filed at: 05/01/2024 0934   3a. Male UNDER 65: How often do you have five or more drinks on one occasion? 0 Filed at: 05/01/2024 0934   3b. FEMALE Any Age, or MALE 65+: How often do you have 4 or more drinks on one occassion? 0 Filed at: 05/01/2024 0934   Audit-C Score 0 Filed at: 05/01/2024 0934   MUKESH: How many times in the past year have you...    Used an illegal drug or used a prescription medication for non-medical reasons? Never Filed at: 05/01/2024 0934                      Medical Decision Making  Diff includes esophagitis, gastritis, less likely biliary colic, gallstones, biliary obstruction, pancreatitis, appendix, diverticulitis.  Less likely coronary syndrome, preeclampsia, HELP syndrome, UTI, pyelo kidney stone.  Patient taking aspirin to prevent pre-ecclampsia may be causing some persistent gastritis.  Improved with sucralfate in ER.    Amount and/or Complexity of Data Reviewed  Labs: ordered.    Risk  OTC drugs.  Prescription drug  management.             Disposition  Final diagnoses:   Esophagitis   Gastritis     Time reflects when diagnosis was documented in both MDM as applicable and the Disposition within this note       Time User Action Codes Description Comment    5/1/2024 12:25 PM Doc Dodd [K20.90] Esophagitis     5/1/2024 12:25 PM Doc Dodd [K29.70] Gastritis           ED Disposition       ED Disposition   Discharge    Condition   Stable    Date/Time   Wed May 1, 2024 1225    Comment   Rupa Curry discharge to home/self care.                   Follow-up Information       Follow up With Specialties Details Why Contact Info Additional Information    [facility] Schedule an appointment as soon as possible for a visit   [address]  [facility] [address] [phone] [facility] [place], [facility], [address], [facility]  [id number] [phone]            Discharge Medication List as of 5/1/2024 12:28 PM        START taking these medications    Details   sucralfate (CARAFATE) 1 g/10 mL suspension Take 10 mL (1 g total) by mouth every 8 (eight) hours as needed (epigastric/ sternal discomfort), Starting Wed 5/1/2024, Normal           CONTINUE these medications which have NOT CHANGED    Details   aspirin (ECOTRIN LOW STRENGTH) 81 mg EC tablet Take 2 tablets (162 mg total) by mouth daily Stop taking at 36 weeks of pregnancy., Starting u 3/28/2024, Normal      doxylamine (UNISOM) 25 MG tablet Take 0.5 tablets (12.5 mg total) by mouth daily at bedtime as needed for sleep or nausea, Starting Thu 3/28/2024, Normal      famotidine (PEPCID) 20 mg tablet Take 1 tablet (20 mg total) by mouth 2 (two) times a day, Starting Thu 3/28/2024, Normal      fexofenadine (ALLEGRA) 60 MG tablet Take 60 mg by mouth daily, Historical Med      Prenatal Vit-Fe Fumarate-FA (Prenatal Vitamin) 27-0.8 MG TABS Take  1 tablet by mouth in the morning, Starting Thu 2/29/2024, Normal      pyridoxine (B-6) 25 MG tablet Take 1 tablet (25 mg total) by mouth daily, Starting Thu 3/28/2024, Normal                 PDMP Review         Value Time User    PDMP Reviewed  Yes 1/4/2024  7:10 AM Taylor Pickard PA-C            ED Provider  Electronically Signed by             Doc Dodd DO  05/01/24 0046

## 2024-05-08 LAB
ALBUMIN SERPL BCP-MCNC: 3.7 G/DL (ref 3.5–5)
ALP SERPL-CCNC: 44 U/L (ref 34–104)
ALT SERPL W P-5'-P-CCNC: 10 U/L (ref 7–52)
ANION GAP SERPL CALCULATED.3IONS-SCNC: 8 MMOL/L (ref 4–13)
AST SERPL W P-5'-P-CCNC: 11 U/L (ref 13–39)
BILIRUB SERPL-MCNC: 0.34 MG/DL (ref 0.2–1)
BUN SERPL-MCNC: 7 MG/DL (ref 5–25)
CALCIUM SERPL-MCNC: 8.3 MG/DL (ref 8.4–10.2)
CHLORIDE SERPL-SCNC: 106 MMOL/L (ref 96–108)
CO2 SERPL-SCNC: 22 MMOL/L (ref 21–32)
CREAT SERPL-MCNC: 0.41 MG/DL (ref 0.6–1.3)
GFR SERPL CREATININE-BSD FRML MDRD: 127 ML/MIN/1.73SQ M
GLUCOSE SERPL-MCNC: 97 MG/DL (ref 65–140)
POTASSIUM SERPL-SCNC: 3.3 MMOL/L (ref 3.5–5.3)
PROT SERPL-MCNC: 6.7 G/DL (ref 6.4–8.4)
SODIUM SERPL-SCNC: 136 MMOL/L (ref 135–147)

## 2024-05-21 ENCOUNTER — APPOINTMENT (OUTPATIENT)
Dept: LAB | Facility: HOSPITAL | Age: 44
End: 2024-05-21

## 2024-05-21 DIAGNOSIS — O09.299 HX OF PREECLAMPSIA, PRIOR PREGNANCY, CURRENTLY PREGNANT: ICD-10-CM

## 2024-05-21 DIAGNOSIS — Z3A.16 16 WEEKS GESTATION OF PREGNANCY: ICD-10-CM

## 2024-05-21 DIAGNOSIS — Z86.32 HISTORY OF GESTATIONAL DIABETES: ICD-10-CM

## 2024-05-21 LAB
ALBUMIN SERPL BCP-MCNC: 3.6 G/DL (ref 3.5–5)
ALP SERPL-CCNC: 46 U/L (ref 34–104)
ALT SERPL W P-5'-P-CCNC: 8 U/L (ref 7–52)
ANION GAP SERPL CALCULATED.3IONS-SCNC: 8 MMOL/L (ref 4–13)
AST SERPL W P-5'-P-CCNC: 9 U/L (ref 13–39)
BILIRUB SERPL-MCNC: 0.29 MG/DL (ref 0.2–1)
BUN SERPL-MCNC: 6 MG/DL (ref 5–25)
CALCIUM SERPL-MCNC: 8.6 MG/DL (ref 8.4–10.2)
CHLORIDE SERPL-SCNC: 105 MMOL/L (ref 96–108)
CO2 SERPL-SCNC: 21 MMOL/L (ref 21–32)
CREAT SERPL-MCNC: 0.38 MG/DL (ref 0.6–1.3)
CREAT UR-MCNC: 100.9 MG/DL
GFR SERPL CREATININE-BSD FRML MDRD: 131 ML/MIN/1.73SQ M
GLUCOSE 1H P 50 G GLC PO SERPL-MCNC: 219 MG/DL (ref 70–134)
GLUCOSE P FAST SERPL-MCNC: 218 MG/DL (ref 65–99)
POTASSIUM SERPL-SCNC: 3.5 MMOL/L (ref 3.5–5.3)
PROT SERPL-MCNC: 6.8 G/DL (ref 6.4–8.4)
PROT UR-MCNC: 22 MG/DL
PROT/CREAT UR: 0.22 MG/G{CREAT} (ref 0–0.1)
SODIUM SERPL-SCNC: 134 MMOL/L (ref 135–147)

## 2024-05-21 PROCEDURE — 36415 COLL VENOUS BLD VENIPUNCTURE: CPT

## 2024-05-21 PROCEDURE — 84156 ASSAY OF PROTEIN URINE: CPT

## 2024-05-21 PROCEDURE — 82105 ALPHA-FETOPROTEIN SERUM: CPT

## 2024-05-21 PROCEDURE — 80053 COMPREHEN METABOLIC PANEL: CPT

## 2024-05-21 PROCEDURE — 82570 ASSAY OF URINE CREATININE: CPT

## 2024-05-21 PROCEDURE — 82950 GLUCOSE TEST: CPT

## 2024-05-23 ENCOUNTER — OFFICE VISIT (OUTPATIENT)
Dept: PERINATAL CARE | Facility: CLINIC | Age: 44
End: 2024-05-23

## 2024-05-23 ENCOUNTER — DOCUMENTATION (OUTPATIENT)
Dept: PERINATAL CARE | Facility: CLINIC | Age: 44
End: 2024-05-23

## 2024-05-23 ENCOUNTER — ROUTINE PRENATAL (OUTPATIENT)
Dept: OBGYN CLINIC | Facility: CLINIC | Age: 44
End: 2024-05-23

## 2024-05-23 ENCOUNTER — ROUTINE PRENATAL (OUTPATIENT)
Dept: PERINATAL CARE | Facility: CLINIC | Age: 44
End: 2024-05-23

## 2024-05-23 VITALS
HEIGHT: 61 IN | WEIGHT: 163 LBS | BODY MASS INDEX: 30.78 KG/M2 | SYSTOLIC BLOOD PRESSURE: 114 MMHG | DIASTOLIC BLOOD PRESSURE: 59 MMHG | HEART RATE: 65 BPM

## 2024-05-23 VITALS
DIASTOLIC BLOOD PRESSURE: 76 MMHG | SYSTOLIC BLOOD PRESSURE: 124 MMHG | HEART RATE: 77 BPM | HEIGHT: 61 IN | BODY MASS INDEX: 30.96 KG/M2 | WEIGHT: 164 LBS

## 2024-05-23 DIAGNOSIS — F32.A MODERATELY SEVERE DEPRESSION: ICD-10-CM

## 2024-05-23 DIAGNOSIS — O09.292 HX OF PREECLAMPSIA, PRIOR PREGNANCY, CURRENTLY PREGNANT, SECOND TRIMESTER: ICD-10-CM

## 2024-05-23 DIAGNOSIS — O24.419 GESTATIONAL DIABETES MELLITUS (GDM) IN SECOND TRIMESTER, GESTATIONAL DIABETES METHOD OF CONTROL UNSPECIFIED: Primary | ICD-10-CM

## 2024-05-23 DIAGNOSIS — Z3A.20 20 WEEKS GESTATION OF PREGNANCY: ICD-10-CM

## 2024-05-23 DIAGNOSIS — O24.419 GESTATIONAL DIABETES MELLITUS (GDM) IN SECOND TRIMESTER, GESTATIONAL DIABETES METHOD OF CONTROL UNSPECIFIED: ICD-10-CM

## 2024-05-23 DIAGNOSIS — I10 CHRONIC HYPERTENSION: ICD-10-CM

## 2024-05-23 DIAGNOSIS — Z3A.20 20 WEEKS GESTATION OF PREGNANCY: Primary | ICD-10-CM

## 2024-05-23 DIAGNOSIS — D50.8 OTHER IRON DEFICIENCY ANEMIA: ICD-10-CM

## 2024-05-23 DIAGNOSIS — Z86.32 HISTORY OF GESTATIONAL DIABETES: ICD-10-CM

## 2024-05-23 DIAGNOSIS — E66.3 OVERWEIGHT WITH BODY MASS INDEX (BMI) OF 26 TO 26.9 IN ADULT: ICD-10-CM

## 2024-05-23 DIAGNOSIS — O09.521 MULTIGRAVIDA OF ADVANCED MATERNAL AGE IN FIRST TRIMESTER: ICD-10-CM

## 2024-05-23 DIAGNOSIS — O24.410 DIET CONTROLLED GESTATIONAL DIABETES MELLITUS (GDM) IN SECOND TRIMESTER: Primary | ICD-10-CM

## 2024-05-23 DIAGNOSIS — Z36.86 ENCOUNTER FOR ANTENATAL SCREENING FOR CERVICAL LENGTH: ICD-10-CM

## 2024-05-23 PROBLEM — Z00.00 ANNUAL PHYSICAL EXAM: Status: RESOLVED | Noted: 2024-01-04 | Resolved: 2024-05-23

## 2024-05-23 LAB
2ND TRIMESTER 4 SCREEN SERPL-IMP: NORMAL
AFP ADJ MOM SERPL: 0.77
AFP INTERP AMN-IMP: NORMAL
AFP INTERP SERPL-IMP: NORMAL
AFP INTERP SERPL-IMP: NORMAL
AFP SERPL-MCNC: 43.9 NG/ML
AGE AT DELIVERY: 42.6 YR
GA METHOD: NORMAL
GA: 20.1 WEEKS
IDDM PATIENT QL: NO
MULTIPLE PREGNANCY: NO
NEURAL TUBE DEFECT RISK FETUS: NORMAL %

## 2024-05-23 PROCEDURE — 76816 OB US FOLLOW-UP PER FETUS: CPT | Performed by: OBSTETRICS & GYNECOLOGY

## 2024-05-23 PROCEDURE — 76817 TRANSVAGINAL US OBSTETRIC: CPT | Performed by: OBSTETRICS & GYNECOLOGY

## 2024-05-23 PROCEDURE — 99214 OFFICE O/P EST MOD 30 MIN: CPT | Performed by: OBSTETRICS & GYNECOLOGY

## 2024-05-23 PROCEDURE — G0108 DIAB MANAGE TRN  PER INDIV: HCPCS | Performed by: DIETITIAN, REGISTERED

## 2024-05-23 PROCEDURE — 99213 OFFICE O/P EST LOW 20 MIN: CPT | Performed by: OBSTETRICS & GYNECOLOGY

## 2024-05-23 NOTE — PROGRESS NOTES
"    Thank you for referring your patient to [facility] Maternal Fetal Medicine Diabetes in Pregnancy Program.     Rupa Curry is a  44 y.o. female who presents today for individual  Class 1.  Patient is at 20w3d gestation, Estimated Date of Delivery: 10/7/24.  Austrian speaking mottly;  translated.     Reviewed and updated the following from patients medical record: PMH, Problem List, Allergies, and Current Medications.    Visit Diagnosis:  Diet controlled GDM    Discussed with patient pathophysiology of GDM, untreated hyperglycemia in pregnancy and maternal fetal complications including fetal macrosomia,  hypoglycemia, polyhydramnios, increased incidence of  section,  labor, and in severe cases fetal demise and still birth . Discussed importance of blood glucose monitoring, nutrition, and medication if necessary in achieving BG goals.     Additional Pregnancy Complications:  Overweight prior to pregnancy  & hsitory of gestational diabetes in several past pregnancies managed by this program: -diet controlled --Insulin controlled, -non-compliant     Labs:    Lab Results   Component Value Date    [id number] 219 (H) 2024       Lab Results   Component Value Date    GLUF 218 (H) 2024    [id number] 158 2014    [id number] 87 2014        No components found for: \"HGA1C\"; HbA1c was ordered today    Medications:  No diabetes related medications    Anthropometrics:  Ht Readings from Last 3 Encounters:   24 5' 1\" (1.549 m)   24 5' 1\" (1.549 m)   24 5' 1\" (1.549 m)     Wt Readings from Last 3 Encounters:   24 74.4 kg (164 lb)   24 73.9 kg (163 lb)   24 72.1 kg (159 lb)     Pre-gravid weight: 141.75 at GYN visit on 24   Pre-gravid BMI: 26.78  Weight Change: gained 22.25 pounds  Weight gain recommendations: BMI (> 30) 11-20 lbs  Comments: needs to maintain her wwight    Recent Ultra Sound Results:  Date: " "24-today  Fetal Growth: Normal  LENORE: Normal  Next US date: 24    Blood Glucose Monitoring:   Glucose Meter: Contour Courtney JOSEPH gave to her in the office  Instructed on testing blood sugars: 4 x per day (Fasting, 2 hour after start of each meal). Self-pay & understands she will need to purchase the supplies on her own.     Gave instruction on site selection, skin preparation, loading strips and lancet device, meter activation, obtaining blood sample, test strip and lancet disposal and storage, and recording log book entries. Patient has good understanding of material covered and was able to test their own blood sugar in office today.     Instruction for reporting blood sugar results weekly via:  Phone: [phone]   OR  My Chart (Message with image attachment, or Glucose Flowsheet)    Goal Blood Sugar Ranges:   Fastin-90 mg/dL  1 hour after the start of each meal: 140 mg/dL or less  2 hours after start of each meal: 120 mg/dL or less    Meal Plan (daily calorie and protein needs):  Calories: 1800 calorie (CHO: 45-15-45-15-45-30) (PRO:2-1-3-1-3-2)    Type of Diet:Regular  Additional Nutrition Concerns:  Eats Mexican foods; eats 3-4 6\" homemade corn tortillas at both lunch & dinner with 1 cup rice & beans. 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 .   After children go to school, she goes back to bed for 3 hours till 12 PM--not able to eat a mid-morning snack. Agreed to eat a larger breakfast of a cheese sandwich with 8 oz whole milk in her coffee, decrease amounts at both lunch (12:30 PM)  & dinner (6 PM) add an  afternoon snack & change bedtime snack to .10 PM rather than 8 PM. Will try her 3rd snack either as an extra  in afternoon or in evening.  Receives both [facility] foods & SNAP program    Meal Plan Tips:  1. Patient was provided with a meal plan including 3 meals and 3 snacks.  2. Discussed appropriate amounts of CHO, " "PRO, and Fat at each meal and snack.   3. Reviewed CHO exchange list, and portion sizes for both CHO and PRO via food models  4. Instruction on how to read a food label  5. Provided suggested meal/snack options to increase nutrition and maintain consistent meal and snack intakes.  6. Instructed on how to keep a 3-day food diary to be brought to follow- up appointment.   7. Encouraged  patient to eat every 2.0-3.5 hours while awake  8. Encouraged patient to go no longer than 8-10 hours fasting overnight until first meal of the day.      Physical Activity:  Discussed benefits of physical activity to optimize blood glucose control, encouraged activity at patient is physically able. Always consult a physician prior to starting an exercise program. Recommend 20-30 minutes daily.    Patient Stated Goal: \"I will eat 3 meals and 3 snacks each day, including protein at each\"    Diabetes Self Management Support Plan outside of ongoing care: Spouse/Family    Learner/s Present:Learners Present: Patient  and Spouse  Barriers to Learning/Change: Cultural, Financial, and Language  Expected Compliance: good    Date to report blood sugars: Wednesday, 5/28/24  Follow-up: Scheduled with ISIDRO Morris on Thursday, 5/30/24    Begin Time: 11:30 AM  End Time: 12 PM     It was a pleasure working with them today. Please feel free to call with any questions or concerns.    Mary Mccain, MS, RD, [facility]  Diabetes Educator  [facility] Maternal Fetal Medicine  Diabetes in Pregnancy Program  [address], [address]  [place] [address]   "

## 2024-05-23 NOTE — PROGRESS NOTES
The patient was seen today for an ultrasound.  Please see ultrasound report (located under Ob Procedures) for additional details.   Thank you very much for allowing us to participate in the care of this very nice patient.  Should you have any questions, please do not hesitate to contact me.     pM Mcmillan MD FACOG  Attending Physician, Maternal-Fetal Medicine  [facility]

## 2024-05-23 NOTE — PROGRESS NOTES
[facility]   PRENATAL VISIT  Rupa Curry  [id number]  1980        A/P:  Problem List       Moderately severe depression    Overview     Reports good and stable mood         20 weeks gestation of pregnancy    Overview     Overview:  Labs  Pap smear NILM   and GC/CT neg  Prenatal panel wnl except bacteruria, repeat urine culture wnl   MSAFP screen negative  Genetic screening declines  28w labs [ ]  Vaccines:  Flu vaccine: [ ]  Covid vaccine: 3 doses  Tdap vaccine: [ ]  Contraception: Mirena IUD in office  Feeding plan: breast [ ]  Birth plan: TOLAC, no epidural  Delivery consent: to be signed at 28w [ ]  Ultrasounds: 3/28/24: NT, S=D, anterior placenta         History of gestational diabetes    Hx of preeclampsia, prior pregnancy, currently pregnant, second trimester    Overview     G6 pregnancy; delivered via LTCS at 33w6d due to severe preeclampsia and breech presentation  Baseline PreE labs ordered  Recommend low dose aspirin         Chronic hypertension    Overview     Elevated BP on 24 129/91, on 24 /102  Suspect underlying chronic hypertension  Baseline preE labs  wnl, P:C 0.22         Current Assessment & Plan     Normotensive today         History of postpartum hemorrhage, currently pregnant    Obesity affecting pregnancy in first trimester    Gestational diabetes    Overview     Early 1hr   Referral placed to diabetic education          Other Visit Diagnoses       Other iron deficiency anemia                    S: 44 y.o.  20w3d here for PN visit. She denies contractions. She denies leakage of fluid and vaginal bleeding. She has felt good fetal movement.     O:  Vitals:    24 0800   BP: 114/59   Pulse: 65     Physical Exam  GEN: The patient was alert and oriented x3, pleasant well-appearing female in no acute distress.   CV: Regular rate  PULM: Non-labored respirations  MSK: Normal gait  Skin: Warm, dry  Neuro: No focal  deficits  Psych: Normal affect and judgement, cooperative    Fetal Heart Rate: 144       D/w Dr. Leoncopsonja Clinton MD  OB/GYN PGY-3  5/23/2024  9:10 AM

## 2024-05-23 NOTE — PROGRESS NOTES
Ultrasound Probe Disinfection    A transvaginal ultrasound was performed.   Prior to use, disinfection was performed with High Level Disinfection Process ([employer]).  Probe serial number [id number] was used.      Velma Griffin  05/23/24  10:09 AM

## 2024-06-05 ENCOUNTER — OFFICE VISIT (OUTPATIENT)
Dept: PERINATAL CARE | Facility: CLINIC | Age: 44
End: 2024-06-05

## 2024-06-05 VITALS
SYSTOLIC BLOOD PRESSURE: 102 MMHG | HEIGHT: 61 IN | DIASTOLIC BLOOD PRESSURE: 70 MMHG | HEART RATE: 92 BPM | WEIGHT: 164.4 LBS | BODY MASS INDEX: 31.04 KG/M2

## 2024-06-05 DIAGNOSIS — Z59.89 DOES NOT HAVE HEALTH INSURANCE: ICD-10-CM

## 2024-06-05 DIAGNOSIS — O24.419 GESTATIONAL DIABETES MELLITUS (GDM) IN SECOND TRIMESTER, GESTATIONAL DIABETES METHOD OF CONTROL UNSPECIFIED: Primary | ICD-10-CM

## 2024-06-05 DIAGNOSIS — O99.212 OBESITY AFFECTING PREGNANCY IN SECOND TRIMESTER, UNSPECIFIED OBESITY TYPE: ICD-10-CM

## 2024-06-05 DIAGNOSIS — Z60.3 LANGUAGE BARRIER: ICD-10-CM

## 2024-06-05 DIAGNOSIS — Z86.32 HISTORY OF INSULIN CONTROLLED GESTATIONAL DIABETES MELLITUS: ICD-10-CM

## 2024-06-05 DIAGNOSIS — Z3A.22 22 WEEKS GESTATION OF PREGNANCY: ICD-10-CM

## 2024-06-05 DIAGNOSIS — Z75.8 LANGUAGE BARRIER: ICD-10-CM

## 2024-06-05 DIAGNOSIS — Z78.9 NEED FOR FOLLOW-UP BY SOCIAL WORKER: ICD-10-CM

## 2024-06-05 PROCEDURE — G0108 DIAB MANAGE TRN  PER INDIV: HCPCS

## 2024-06-05 SDOH — SOCIAL STABILITY - SOCIAL INSECURITY: ACCULTURATION DIFFICULTY: Z60.3

## 2024-06-05 SDOH — ECONOMIC STABILITY - INCOME SECURITY: OTHER PROBLEMS RELATED TO HOUSING AND ECONOMIC CIRCUMSTANCES: Z59.89

## 2024-06-05 NOTE — PROGRESS NOTES
CLASS 2 - Individual  (in-person office visit )    Thank you for referring your patient to [facility] Maternal Fetal Medicine Diabetes and Pregnancy Program.     Rupa Curry is a  44 y.o. female who presents today with mother for Patient Education (Class 2) and Gestational Diabetes (22w2d). Patient is at 22w2d gestation, Estimated Date of Delivery: 10/7/24.   - Norwegian Speaking; Utilized [id number]  #[id number]    Visit Diagnosis:  Encounter Diagnosis     ICD-10-CM    1. Gestational diabetes mellitus (GDM) in second trimester, gestational diabetes method of control unspecified  O24.419       2. 22 weeks gestation of pregnancy  Z3A.22       3. History of insulin controlled gestational diabetes mellitus  Z86.32       4. Obesity affecting pregnancy in second trimester, unspecified obesity type  O99.212       5. Adult BMI 31.0-31.9 kg/sq m  Z68.31     Current BMI: 31.06      6. Language barrier  Z60.3     Z75.8     Requires ; Norwegian Speaking      7. Does not have health insurance  Z59.89       8. Need for follow-up by   Z78.9     Staff message sent to assist with helping patient obtain insurance and glucose monitoring supplies         Reviewed and updated the following from patients medical record: PMH, Problem List, Allergies, and Current Medications.    Labs  GDM LABS: See Class 1 Note    A1C:  Lab Results   Component Value Date/Time    HGBA1C 6.1 (H) 03/31/2015 10:40 AM      Labs Ordered This Visit: None    Current Medications:    Current Outpatient Medications:     aspirin (ECOTRIN LOW STRENGTH) 81 mg EC tablet, Take 2 tablets (162 mg total) by mouth daily Stop taking at 36 weeks of pregnancy. (Patient not taking: Reported on 5/23/2024), Disp: 180 tablet, Rfl: 1    doxylamine (UNISOM) 25 MG tablet, Take 0.5 tablets (12.5 mg total) by mouth daily at bedtime as needed for sleep or nausea (Patient not taking: Reported on 5/23/2024), Disp: 60 tablet, Rfl: 2    famotidine (PEPCID) 20  "mg tablet, Take 1 tablet (20 mg total) by mouth 2 (two) times a day (Patient not taking: Reported on 2024), Disp: 90 tablet, Rfl: 3    fexofenadine (ALLEGRA) 60 MG tablet, Take 60 mg by mouth daily (Patient not taking: Reported on 2024), Disp: , Rfl:     Prenatal Vit-Fe Fumarate-FA (Prenatal Vitamin) 27-0.8 MG TABS, Take 1 tablet by mouth in the morning, Disp: 90 tablet, Rfl: 4    pyridoxine (B-6) 25 MG tablet, Take 1 tablet (25 mg total) by mouth daily (Patient not taking: Reported on 2024), Disp: 60 tablet, Rfl: 2    sucralfate (CARAFATE) 1 g/10 mL suspension, Take 10 mL (1 g total) by mouth every 8 (eight) hours as needed (epigastric/ sternal discomfort) (Patient not taking: Reported on 2024), Disp: 414 mL, Rfl: 0     Anthropometrics:  Ht Readings from Last 1 Encounters:   24 5' 1\" (1.549 m)      Wt Readings from Last 3 Encounters:   24 74.6 kg (164 lb 6.4 oz)   24 74.4 kg (164 lb)   24 73.9 kg (163 lb)      Pre-Gravid Wt Pre-Gravid BMI TWG   64 kg (141 lb) 26.66 10.6 kg (23 lb 6.4 oz)     Total Pregnancy Weight Gain Recommendations: BMI (25-29.9) 15-25 lbs  Current Wt Status Compared to Recommendations: Exceeding -- Recommended to maintain wt for remainder of pregnancy    Most Recent Ultrasound Results:  Findings: NML Growth/LENORE  Further Fetal Surveillance: None  Next US date: Scheduled Appropriately    BLOOD GLUCOSE MONITORING:   Glucometer: Contour Next EZ     Reinforced at Today's Visit:   Timing/Frequency of SMB x per day (Fasting, 1 hour after start of each meal)  Goals: (Fasting) 60-95mg/dL // (1hr PP) <140mg/dL   Reporting Guidelines: Weekly via Phone: [phone] OR My Chart (Message with image attachment) OR Glucose Flowsheet  Method of Reporting: [employer] Glucose Flowsheet    BG LOG: No BG to report. Pt states that it was difficult to test her blood sugars due to falling asleep. She will begin testing 1hr after meals instead of 2 hrs. Explained the " "importance of monitoring blood sugars.    MEAL PLAN (Patient was provided with a meal plan including 3 meals and 3 snacks at class 1)  *Calories: 1800 calorie (CHO: 45-15-45-15-45-30) (PRO:2-1-3-1-3-2)    Review of Patient's Current Diet: Not yet following diet recommendations    Reinforced Diet Instructions:  Individualized meal plan.   Importance of consistent carbohydrate intake via 3 meals and 3 snacks per day   Importance of protein as it relates to blood glucose control.   Encouraged  patient to eat every 2.0-3.5 hours while awake  Encouraged patient to go no longer than 8-10 hours fasting overnight until first meal of the day.  Provided suggested meal/snack options to increase nutrition and maintain consistent meal and snack intakes.    Will finish reviewing topics at follow-up appointment: Medications, Maternal-Fetal Testing, Sick Day Guidelines, Hypoglycemia, Post-Partum, Breastfeeding, Dining Out, Travel    Patient Stated Goal: \"I will eat 3 meals and 3 snacks each day, including protein at each\"  Goal Assessment: Not on track    Diabetes Self Management Support Plan outside of ongoing care: Spouse/Family    Barriers to Learning/Change: Financial and Language (Receiving WIC)  Expected Compliance: good    Date to report blood sugars: Weekly   Follow up:  Return in 8 days (on 6/13/2024), or Review BG + Finish Class 2.     Begin Time: 8:20am  End Time: 9:27am    It was a pleasure working with them today. Please feel free to call [phone]) with any questions or concerns.    Zo Cool RD   Diabetes Educator  [facility] Maternal Fetal Medicine  Diabetes and Pregnancy Program  [address], [address]  [place] [address]  "

## 2024-06-06 ENCOUNTER — LAB (OUTPATIENT)
Dept: LAB | Facility: HOSPITAL | Age: 44
End: 2024-06-06

## 2024-06-06 DIAGNOSIS — O24.419 GESTATIONAL DIABETES MELLITUS (GDM) IN SECOND TRIMESTER, GESTATIONAL DIABETES METHOD OF CONTROL UNSPECIFIED: ICD-10-CM

## 2024-06-06 LAB
EST. AVERAGE GLUCOSE BLD GHB EST-MCNC: 105 MG/DL
HBA1C MFR BLD: 5.3 %

## 2024-06-06 PROCEDURE — 36415 COLL VENOUS BLD VENIPUNCTURE: CPT

## 2024-06-06 PROCEDURE — 83036 HEMOGLOBIN GLYCOSYLATED A1C: CPT

## 2024-06-06 NOTE — RESULT ENCOUNTER NOTE
I have reviewed the labs which are normal in pregnancy. Please contact the patient and notify her of the normal results if she has not reviewed them in MyChart.   Thank you

## 2024-06-10 ENCOUNTER — PATIENT OUTREACH (OUTPATIENT)
Dept: OBGYN CLINIC | Facility: CLINIC | Age: 44
End: 2024-06-10

## 2024-06-10 NOTE — PROGRESS NOTES
RAFA SOTO spoke with Pt for follow up. Pt reported she is doing well with her new dx of GDM. Pt will go to [employer] today to  her diabetic supplies. Pt was reminded of coming up appointments. Pt was advice to bring documents to complete MA application due to HR pregnancy. Pt verbalized understanding. Pt denies other needs at this time.

## 2024-06-13 ENCOUNTER — ROUTINE PRENATAL (OUTPATIENT)
Dept: PERINATAL CARE | Facility: OTHER | Age: 44
End: 2024-06-13

## 2024-06-13 ENCOUNTER — OFFICE VISIT (OUTPATIENT)
Dept: PERINATAL CARE | Facility: CLINIC | Age: 44
End: 2024-06-13

## 2024-06-13 VITALS
WEIGHT: 165.4 LBS | SYSTOLIC BLOOD PRESSURE: 118 MMHG | HEIGHT: 61 IN | DIASTOLIC BLOOD PRESSURE: 60 MMHG | HEART RATE: 97 BPM | BODY MASS INDEX: 31.23 KG/M2

## 2024-06-13 VITALS
BODY MASS INDEX: 31.53 KG/M2 | SYSTOLIC BLOOD PRESSURE: 110 MMHG | WEIGHT: 167 LBS | HEIGHT: 61 IN | HEART RATE: 82 BPM | DIASTOLIC BLOOD PRESSURE: 62 MMHG

## 2024-06-13 DIAGNOSIS — Z55.8: ICD-10-CM

## 2024-06-13 DIAGNOSIS — Z60.3 LANGUAGE BARRIER: ICD-10-CM

## 2024-06-13 DIAGNOSIS — O99.211 OBESITY AFFECTING PREGNANCY IN FIRST TRIMESTER, UNSPECIFIED OBESITY TYPE: ICD-10-CM

## 2024-06-13 DIAGNOSIS — Z3A.23 23 WEEKS GESTATION OF PREGNANCY: ICD-10-CM

## 2024-06-13 DIAGNOSIS — Z59.89 DOES NOT HAVE HEALTH INSURANCE: ICD-10-CM

## 2024-06-13 DIAGNOSIS — Z59.89: ICD-10-CM

## 2024-06-13 DIAGNOSIS — Z75.8 LANGUAGE BARRIER: ICD-10-CM

## 2024-06-13 DIAGNOSIS — O24.419 GESTATIONAL DIABETES MELLITUS (GDM) IN SECOND TRIMESTER, GESTATIONAL DIABETES METHOD OF CONTROL UNSPECIFIED: Primary | ICD-10-CM

## 2024-06-13 DIAGNOSIS — O24.410 DIET CONTROLLED GESTATIONAL DIABETES MELLITUS (GDM) IN SECOND TRIMESTER: Primary | ICD-10-CM

## 2024-06-13 DIAGNOSIS — Z86.32 HISTORY OF GESTATIONAL DIABETES: ICD-10-CM

## 2024-06-13 PROCEDURE — G0108 DIAB MANAGE TRN  PER INDIV: HCPCS

## 2024-06-13 PROCEDURE — 76827 ECHO EXAM OF FETAL HEART: CPT | Performed by: OBSTETRICS & GYNECOLOGY

## 2024-06-13 PROCEDURE — 93325 DOPPLER ECHO COLOR FLOW MAPG: CPT | Performed by: OBSTETRICS & GYNECOLOGY

## 2024-06-13 PROCEDURE — 76825 ECHO EXAM OF FETAL HEART: CPT | Performed by: OBSTETRICS & GYNECOLOGY

## 2024-06-13 PROCEDURE — 99213 OFFICE O/P EST LOW 20 MIN: CPT | Performed by: OBSTETRICS & GYNECOLOGY

## 2024-06-13 SDOH — ECONOMIC STABILITY - INCOME SECURITY: OTHER PROBLEMS RELATED TO HOUSING AND ECONOMIC CIRCUMSTANCES: Z59.89

## 2024-06-13 SDOH — SOCIAL STABILITY - SOCIAL INSECURITY: ACCULTURATION DIFFICULTY: Z60.3

## 2024-06-13 SDOH — EDUCATIONAL SECURITY - EDUCATION ATTAINMENT: OTHER PROBLEMS RELATED TO EDUCATION AND LITERACY: Z55.8

## 2024-06-13 NOTE — PROGRESS NOTES
"Follow-Up Visit   (in-person office visit )    Thank you for referring your patient to [facility] Maternal Fetal Medicine Diabetes and Pregnancy Program.     Subjective:     Rupa Curry is a 44 y.o. female who presents today with son for Follow-up, Patient Education, and Gestational Diabetes. Patient is at 23w3d gestation, Estimated Date of Delivery: 10/7/24. Libyan Speaking [employer]  Utilized: ID#[id number].    Reviewed and updated the following from patients medical record: Demographics, Education, Occupation, PMH, Problem List, Allergies, and Current Medications.    Visit Diagnosis:  Encounter Diagnosis     ICD-10-CM    1. Diet controlled gestational diabetes mellitus (GDM) in second trimester  O24.410       2. Obesity affecting pregnancy in first trimester, unspecified obesity type  O99.211       3. 23 weeks gestation of pregnancy  Z3A.23       4. History of gestational diabetes  Z86.32       5. Financial barrier to education  Z59.89     Z55.8       6. Language barrier  Z60.3     Z75.8     Libyan Speaking; Requires       7. Does not have health insurance  Z59.89            Current Outpatient Medications  Current Outpatient Medications   Medication Instructions    aspirin (ECOTRIN LOW STRENGTH) 162 mg, Oral, Daily, Stop taking at 36 weeks of pregnancy.    doxylamine (UNISOM) 12.5 mg, Oral, Daily at bedtime PRN    famotidine (PEPCID) 20 mg, Oral, 2 times daily    fexofenadine (ALLEGRA) 60 mg, Daily    Prenatal Vit-Fe Fumarate-FA (Prenatal Vitamin) 27-0.8 MG TABS 1 tablet, Oral, Daily    pyridoxine (B-6) 25 mg, Oral, Daily    sucralfate (CARAFATE) 1 g, Oral, Every 8 hours PRN      Anthropometrics:  Ht Readings from Last 1 Encounters:   06/13/24 5' 1\" (1.549 m)      Wt Readings from Last 3 Encounters:   06/13/24 75.8 kg (167 lb)   06/13/24 75 kg (165 lb 6.4 oz)   06/05/24 74.6 kg (164 lb 6.4 oz)      Pre-Gravid Wt Pre-Gravid BMI TWG   64 kg (141 lb) 26.66 11.8 kg (26 lb)     Total " Pregnancy Weight Gain Recommendations: BMI (25-29.9) 15-25 lbs  Current Wt Status Compared to Recommendations: Exceeding -- Recommended to maintain wt for remainder of pregnancy    Most Recent Ultrasound Results:  Findings: NML Growth/LENORE  Further Fetal Surveillance: None  Next US date: Scheduled Appropriately    BLOOD GLUCOSE MONITORING:   Glucometer: Contour Next EZ     Reinforced at Today's Visit:   Timing/Frequency of SMB x per day (Fasting, 1 hour after start of each meal)  Goals: (Fasting) 60-95mg/dL // (1hr PP) <140mg/dL   Reporting Guidelines: Weekly via Phone: [phone] OR My Chart (Message with image attachment) OR Glucose Flowsheet  Method of Reporting: [employer] Glucose Flowsheet - Showed pt how to report via flowsheet for remainder of pregnancy.    BG LOG:       Date Fasting Post-  breakfast Post-  lunch Post-  dinner    101 116 92 78    103 96         115    115 114       Review of Blood Glucose Log:   Did not test consistently due to running out of testing supplies r/t financial issues  FBG = Not well controlled  Post-Prandial BG = Well controlled    MEAL PLAN (Patient was provided with a meal plan including 3 meals and 3 snacks at class 1)  *Continue Meal Plan: 1800 calorie (CHO: 45-15-45-15-45-30) (PRO:2-1-3-1-3-2)    Review of Patient's Current Diet: She is now following the meal plan. Carbohydrate intake is inadequate at meals. Not always having protein at snacks. Occasionally has a sip of soda.    - Reviewed CHO Counting  - Encouraged to pair carbohydrate with protein  - Discussed recommended portion sizes using food models  - Instructed to avoid sugar sweetened beverages    Physical Activity:  Currently physically active? Yes, Walking daily    Reviewed w/ Pt:   Benefits of physical activity to optimize blood glucose control, encouraged activity at patient is physically able.   Instructed pt to always consult a physician prior to starting an exercise program.   Recommend  "20-30 minutes daily.    Other Reviewed Topics: Medications, Maternal-Fetal Testing, Sick Day Guidelines, Hypoglycemia, Post-Partum, Breastfeeding, Dining Out, Travel     Patient Stated Goal: \"I will eat 3 meals and 3 snacks each day, including protein at each\"  Goal Assessment: On track    Diabetes Self Management Support Plan outside of ongoing care: Spouse/Family    Barriers to Learning/Change: Financial and Language  Expected Compliance: good    Date to report blood sugars: Weekly   Follow up:  Return in about 1 week (around 6/20/2024), or if fasting blood sugars remain >95.     Begin Time: 11:00am  End Time: 11:45am    It was a pleasure working with them today. Please feel free to call [phone]) with any questions or concerns.    Zo Cool RD   Diabetes Educator  [facility] Maternal Fetal Medicine  Diabetes and Pregnancy Program  [address], [address]  [place] [address]  "

## 2024-06-13 NOTE — PROGRESS NOTES
The patient was seen today for an ultrasound.  Please see ultrasound report (located under Ob Procedures) for additional details.   Thank you very much for allowing us to participate in the care of this very nice patient.  Should you have any questions, please do not hesitate to contact me.     Mp Mcmillan MD FACOG  Attending Physician, Maternal-Fetal Medicine  [facility]

## 2024-07-02 ENCOUNTER — PATIENT OUTREACH (OUTPATIENT)
Dept: OBGYN CLINIC | Facility: CLINIC | Age: 44
End: 2024-07-02

## 2024-07-02 ENCOUNTER — ROUTINE PRENATAL (OUTPATIENT)
Dept: OBGYN CLINIC | Facility: CLINIC | Age: 44
End: 2024-07-02

## 2024-07-02 VITALS
DIASTOLIC BLOOD PRESSURE: 67 MMHG | HEART RATE: 73 BPM | HEIGHT: 61 IN | BODY MASS INDEX: 31.19 KG/M2 | WEIGHT: 165.2 LBS | SYSTOLIC BLOOD PRESSURE: 122 MMHG

## 2024-07-02 DIAGNOSIS — Z3A.26 26 WEEKS GESTATION OF PREGNANCY: ICD-10-CM

## 2024-07-02 DIAGNOSIS — Z34.92 PRENATAL CARE IN SECOND TRIMESTER: Primary | ICD-10-CM

## 2024-07-02 PROCEDURE — 99213 OFFICE O/P EST LOW 20 MIN: CPT | Performed by: OBSTETRICS & GYNECOLOGY

## 2024-07-02 NOTE — PROGRESS NOTES
[facility]   PRENATAL VISIT  Name: Rupa Curry  MRN: [id number]  : 1980      ASSESSMENT/PLAN:  Problem   26 Weeks Gestation of Pregnancy    Overview:  Labs  Pap smear NILM   and GC/CT neg  Prenatal panel wnl except bacteruria, repeat urine culture wnl   MSAFP screen negative  Genetic screening declines  28w labs - will need CBC and RPR; met criteria for GDM with 1h  mg/dL  Vaccines:  Covid vaccine: 3 doses  Tdap vaccine: address at 27-28w  Contraception: Mirena IUD in office  Feeding plan: breast [ ]  Birth plan: TOLAC, no epidural  Delivery consent: signed 24  Ultrasounds: 3/28/24: NT, S=D, anterior placenta  24, 20w3d, nml fetal growth, cephalic, anterior placenta  24, 23w3d, nml fetal echo, variable presentation, anterior placenta  Growth scan scheduled for 24  RTO in 2w for routine PNV            SUBJECTIVE 44 y.o.  at 26w1d here for PN visit. She denies contractions. She denies leakage of fluid and vaginal bleeding. She has good fetal movement. Rupa prefers to deliver at [facility]. Discussed that this practice does not deliver there but will continue to care for her regardless of her delivery location. She had a bad experience at [facility] and will not report for care there.    OBJECTIVE:  Vitals:    24 1431   BP: 122/67   Pulse: 73     FHR: 141 bpm    Fundal height: 27 cm      Physical Exam  Vitals and nursing note reviewed. Exam conducted with a chaperone present.   Constitutional:       General: She is not in acute distress.  HENT:      Head: Normocephalic.      Right Ear: External ear normal.      Left Ear: External ear normal.   Eyes:      General: No scleral icterus.        Right eye: No discharge.         Left eye: No discharge.      Conjunctiva/sclera: Conjunctivae normal.   Cardiovascular:      Rate and Rhythm: Normal rate.      Pulses: Normal pulses.   Pulmonary:      Effort: Pulmonary effort is normal.    Abdominal:      Palpations: Abdomen is soft.      Tenderness: There is no abdominal tenderness. There is no guarding.      Comments: Gravid uterus   Musculoskeletal:         General: No swelling or tenderness. Normal range of motion.      Cervical back: Normal range of motion.      Right lower leg: No edema.      Left lower leg: No edema.   Skin:     General: Skin is warm and dry.      Capillary Refill: Capillary refill takes less than 2 seconds.   Neurological:      Mental Status: She is alert and oriented to person, place, and time. Mental status is at baseline.   Psychiatric:         Mood and Affect: Mood normal.         Behavior: Behavior normal.           Future Appointments   Date Time Provider Department Center   2024 11:00 AM BE US [facility]   2024  9:45 AM  [facility]   2024 11:30 AM  US 1 [facility] BE PERIUP BE [facility]         Doc Smith MD  OB/GYN PGY-4  2024  3:07 PM

## 2024-07-02 NOTE — PROGRESS NOTES
RAFA SOTO met with Pt and FOB for follow up. Pt reported she is doing well. Pt has the diabetic supplies and was reminded to report her sugar. Pt also mentioned that few days ago she had 3 incident of feeling bad around the same tome and only fell better after throwing up. Opt was advice to called and report those incident to the ob doctor. Pt otherwise doing well. Brief supportive counseling given.

## 2024-07-11 ENCOUNTER — ULTRASOUND (OUTPATIENT)
Dept: PERINATAL CARE | Facility: OTHER | Age: 44
End: 2024-07-11

## 2024-07-11 VITALS
SYSTOLIC BLOOD PRESSURE: 112 MMHG | WEIGHT: 164.4 LBS | HEART RATE: 77 BPM | BODY MASS INDEX: 31.04 KG/M2 | DIASTOLIC BLOOD PRESSURE: 60 MMHG | HEIGHT: 61 IN

## 2024-07-11 DIAGNOSIS — Z3A.27 27 WEEKS GESTATION OF PREGNANCY: ICD-10-CM

## 2024-07-11 DIAGNOSIS — O24.410 DIET CONTROLLED GESTATIONAL DIABETES MELLITUS (GDM) IN SECOND TRIMESTER: Primary | ICD-10-CM

## 2024-07-11 PROCEDURE — 76816 OB US FOLLOW-UP PER FETUS: CPT | Performed by: OBSTETRICS & GYNECOLOGY

## 2024-07-11 NOTE — PROGRESS NOTES
The patient was seen today for an ultrasound.  Please see ultrasound report (located under Ob Procedures) for additional details.   Thank you very much for allowing us to participate in the care of this very nice patient.  Should you have any questions, please do not hesitate to contact me.     Mp Mcmillan MD FACOG  Attending Physician, Maternal-Fetal Medicine  [facility]

## 2024-07-17 PROBLEM — Z3A.28 28 WEEKS GESTATION OF PREGNANCY: Status: ACTIVE | Noted: 2024-02-29

## 2024-07-25 ENCOUNTER — ROUTINE PRENATAL (OUTPATIENT)
Dept: OBGYN CLINIC | Facility: CLINIC | Age: 44
End: 2024-07-25

## 2024-07-25 VITALS
HEIGHT: 61 IN | SYSTOLIC BLOOD PRESSURE: 118 MMHG | HEART RATE: 73 BPM | DIASTOLIC BLOOD PRESSURE: 57 MMHG | WEIGHT: 162 LBS | BODY MASS INDEX: 30.58 KG/M2

## 2024-07-25 DIAGNOSIS — Z34.93 PRENATAL CARE IN THIRD TRIMESTER: ICD-10-CM

## 2024-07-25 DIAGNOSIS — Z3A.29 29 WEEKS GESTATION OF PREGNANCY: Primary | ICD-10-CM

## 2024-07-25 DIAGNOSIS — Z23 ENCOUNTER FOR IMMUNIZATION: ICD-10-CM

## 2024-07-25 PROBLEM — O10.919 CHRONIC HYPERTENSION IN PREGNANCY: Status: ACTIVE | Noted: 2024-03-28

## 2024-07-25 PROBLEM — O99.212 OBESITY AFFECTING PREGNANCY IN SECOND TRIMESTER: Status: ACTIVE | Noted: 2024-03-28

## 2024-07-25 PROBLEM — O99.213 OBESITY AFFECTING PREGNANCY IN THIRD TRIMESTER: Status: ACTIVE | Noted: 2024-03-28

## 2024-07-25 PROBLEM — O09.293 HX OF PREECLAMPSIA, PRIOR PREGNANCY, CURRENTLY PREGNANT, THIRD TRIMESTER: Status: ACTIVE | Noted: 2024-03-28

## 2024-07-25 NOTE — PROGRESS NOTES
"OB/GYN prenatal visit    S: 44 y.o.  29w3d here for PN visit. She has no obstetric complaints, including pelvic pain, contractions, vaginal bleeding, loss of fluid, or decreased fetal movement.     O:  Vitals:    24 0900   BP: 118/57   Pulse: 73       Prenatal Labs: I have personally reviewed pertinent reports.  , Blood Type:   Lab Results   Component Value Date/Time    ABO Grouping O 2024 09:28 AM    ABO Grouping CANCELED - Canc (U) 2014 07:33 PM     , D (Rh type):   Lab Results   Component Value Date/Time    Rh Factor Positive 2024 09:28 AM    Rh Factor CANCELED - Canc (U) 2014 07:33 PM     , Antibody Screen:   Lab Results   Component Value Date/Time    Antibody Screen CANCELED - Canc (U) 2014 07:33 PM    , HCT/HGB:   Lab Results   Component Value Date/Time    Hematocrit 37.7 2024 10:13 AM    Hematocrit 36.9 2015 10:40 AM    Hemoglobin 12.6 2024 10:13 AM    Hemoglobin 10.9 (L) 2015 10:40 AM      , MCV:   Lab Results   Component Value Date/Time    MCV 88 2024 10:13 AM    MCV 70 (L) 2015 10:40 AM      , Platelets:   Lab Results   Component Value Date/Time    Platelets 298 2024 10:13 AM    Platelets 331 2015 10:40 AM      , 1 hour Glucola:   Lab Results   Component Value Date/Time    GLUCOSE 1 HR 50 GM GLUC CHALLENGE-PREG  2014 11:40 AM    Glucose 219 (H) 2024 10:48 AM   , 3 hour GTT: No results found for: \"[id number]\", Varicella: No results found for: \"VARICELLAIGG\"    , Rubella:   Lab Results   Component Value Date/Time    RUBELLA IGG QUANTITATION >175.0 2014 06:40 PM    Rubella IgG Quant 331.3 2024 09:28 AM        , VDRL/RPR:   Lab Results   Component Value Date/Time    RPR Non-Reactive 10/27/2022 01:51 PM    RPR Non-Reactive (q 2014 04:36 AM      , Urine Culture/Screen:   Lab Results   Component Value Date/Time    Urine Culture <10,000 cfu/ml 2024 11:42 AM       , Urine Drug " "Screen: No results found for: \"AMPHETUR\", \"BARBTUR\", \"BDZUR\", \"THCUR\", \"COCAINEUR\", \"METHADONEUR\", \"OPIATEUR\", \"PCPUR\", \"MTHAMUR\", \"ECSTASYUR\", \"TRICYCLICSUR\", Hep B:   Lab Results   Component Value Date/Time    HEPATITIS B SURFACE ANTIGEN Non-Reactive (q 03/01/2014 06:40 PM    Hepatitis B Surface Ag Non-reactive 03/11/2024 09:28 AM     , Hep C: No components found for: \"HEPCSAG\", \"EXTHEPCSAG\"   , HIV:   Lab Results   Component Value Date/Time    HIV-1/HIV-2 Ab Non-Reactive 05/26/2022 09:46 AM     , Chlamydia: No results found for: \"EXTCHLAMYDIA\"  , Gonorrhea:   Lab Results   Component Value Date/Time    N GONORRHOEAE, AMPLIFIED DNA Negative 03/01/2014 06:32 PM    N gonorrhoeae, DNA Probe Negative 03/28/2024 10:18 AM     , Group B Strep:    Lab Results   Component Value Date/Time    Strep Grp B PCR Negative 11/29/2022 05:15 AM          General: AAOX3. No acute distress  Cardiovascular: Regular, Rate and Rhythm, no murmur, gallop or rub   Lungs: Clear to Auscultation Bilaterally, no wheezing, rhonchi or rales    Abdominal: Soft. NT/ND. Gravid    Fundal height: 31 cm  FHT: 142      Plan discussed with Dr. Solorzano            ASSESSMENT/PLAN:  29 weeks gestation of pregnancy  Labs  Pap smear NILM 4/24  and GC/CT neg  Prenatal panel wnl except bacteruria, repeat urine culture wnl 4/25  MSAFP screen negative  Genetic screening declines  28w labs - will need CBC and RPR; met criteria for GDM with 1h  mg/dL. Encouraged to complete CBC, RPR  Vaccines:  Covid vaccine: 3 doses  Tdap vaccine: Given 7/25/24  Contraception: Mirena IUD in office  Feeding plan: breast [ ]  Birth plan: TOLAC, no epidural at [place]  Iris prefers to deliver at  [place]. Discussed that this practice does not deliver there but will continue to care for her regardless of her delivery location. She had a bad experience at [facility] and will not report for care there.   Delivery consent: signed 7/2/24  Ultrasounds: 3/28/24: NT, S=D, " anterior placenta  5/23/24, 20w3d, nml fetal growth, cephalic, anterior placenta  6/13/24, 23w3d, nml fetal echo, variable presentation, anterior placenta  Growth scan scheduled for 7/11/24  RTO in 2w for routine PNV      Jesus Faustin MD  7/25/2024  10:27 AM  PGY 3 FM

## 2024-07-25 NOTE — PROGRESS NOTES
28 week education packet provided to patient on 07/25/24.      [employer] # [id number]    Included in packet:  Third Trimester paperwork  Delivery consent   Birthing room support person rules and acknowledgment  Birth Plan   Welcome information  Birth certificate worksheet   Consent for Photographers  Perineal/ Vaginal massage   Pediatric practices and locations      Breast & Bottle feeding / self pay  TDAP given today

## 2024-07-25 NOTE — PROGRESS NOTES
Ambulatory Visit  Name: Rupa Curry      : 1980      MRN: [id number]  Encounter Provider: Resident PARRIS Rodriguez  Encounter Date: 2024   Encounter department: formerly [facility] [place]    Assessment & Plan   1. 29 weeks gestation of pregnancy  Assessment & Plan:  Labs  Pap smear NILM   and GC/CT neg  Prenatal panel wnl except bacteruria, repeat urine culture wnl   MSAFP screen negative  Genetic screening declines  28w labs - will need CBC and RPR; met criteria for GDM with 1h  mg/dL. Encouraged to complete CBC, RPR  Vaccines:  Covid vaccine: 3 doses  Tdap vaccine: address at 27-28w  Contraception: Mirena IUD in office  Feeding plan: breast [ ]  Birth plan: TOLAC, no epidural at [place]  Rupa prefers to deliver at  [place]. Discussed that this practice does not deliver there but will continue to care for her regardless of her delivery location. She had a bad experience at [facility] and will not report for care there.   Delivery consent: signed 24  Ultrasounds: 3/28/24: NT, S=D, anterior placenta  24, 20w3d, nml fetal growth, cephalic, anterior placenta  24, 23w3d, nml fetal echo, variable presentation, anterior placenta  Growth scan scheduled for 24  RTO in 2w for routine PNV  2. Encounter for immunization  -     Tdap Vaccine greater than or equal to 6yo      History of Present Illness   {Disappearing Hyperlinks I Encounters * My Last Note * Since Last Visit * History :95742}  Rupa Curry is a 44 y.o. female who presents ***    Review of Systems  {Select to Display PMH (Optional):55902}  Objective   {Disappearing Hyperlinks   Review Vitals * Enter New Vitals * Results Review * Labs * Imaging * Cardiology * Procedures * Lung Cancer Screening :73007}  There were no vitals taken for this visit.    Physical Exam  Administrative Statements {Disappearing Hyperlinks I  Level of Service * PCMH/PCSP:27033}  {Time Spent  Statement (Optional):42377}

## 2024-07-25 NOTE — ASSESSMENT & PLAN NOTE
Labs  Pap smear NILM 4/24  and GC/CT neg  Prenatal panel wnl except bacteruria, repeat urine culture wnl 4/25  MSAFP screen negative  Genetic screening declines  28w labs - will need CBC and RPR; met criteria for GDM with 1h  mg/dL. Encouraged to complete CBC, RPR  Vaccines:  Covid vaccine: 3 doses  Tdap vaccine: Given 7/25/24  Contraception: Mirena IUD in office  Feeding plan: breast [ ]  Birth plan: TOLAC, no epidural at [facility]  Iris prefers to deliver at [facility]. Discussed that this practice does not deliver there but will continue to care for her regardless of her delivery location. She had a bad experience at [facility] and will not report for care there.   Delivery consent: signed 7/2/24  Ultrasounds: 3/28/24: NT, S=D, anterior placenta  5/23/24, 20w3d, nml fetal growth, cephalic, anterior placenta  6/13/24, 23w3d, nml fetal echo, variable presentation, anterior placenta  Growth scan scheduled for 7/11/24  RTO in 2w for routine PNV

## 2024-08-01 ENCOUNTER — APPOINTMENT (OUTPATIENT)
Dept: LAB | Facility: HOSPITAL | Age: 44
End: 2024-08-01

## 2024-08-01 ENCOUNTER — ROUTINE PRENATAL (OUTPATIENT)
Dept: OBGYN CLINIC | Facility: CLINIC | Age: 44
End: 2024-08-01

## 2024-08-01 ENCOUNTER — TELEPHONE (OUTPATIENT)
Age: 44
End: 2024-08-01

## 2024-08-01 VITALS
HEART RATE: 75 BPM | WEIGHT: 161.6 LBS | BODY MASS INDEX: 30.51 KG/M2 | SYSTOLIC BLOOD PRESSURE: 131 MMHG | HEIGHT: 61 IN | DIASTOLIC BLOOD PRESSURE: 85 MMHG

## 2024-08-01 DIAGNOSIS — Z3A.26 26 WEEKS GESTATION OF PREGNANCY: ICD-10-CM

## 2024-08-01 DIAGNOSIS — Z34.93 PRENATAL CARE IN THIRD TRIMESTER: Primary | ICD-10-CM

## 2024-08-01 DIAGNOSIS — Z3A.30 30 WEEKS GESTATION OF PREGNANCY: ICD-10-CM

## 2024-08-01 LAB
ERYTHROCYTE [DISTWIDTH] IN BLOOD BY AUTOMATED COUNT: 13.6 % (ref 11.6–15.1)
HCT VFR BLD AUTO: 34.1 % (ref 34.8–46.1)
HGB BLD-MCNC: 11.1 G/DL (ref 11.5–15.4)
MCH RBC QN AUTO: 27.5 PG (ref 26.8–34.3)
MCHC RBC AUTO-ENTMCNC: 32.6 G/DL (ref 31.4–37.4)
MCV RBC AUTO: 84 FL (ref 82–98)
PLATELET # BLD AUTO: 354 THOUSANDS/UL (ref 149–390)
PMV BLD AUTO: 9.5 FL (ref 8.9–12.7)
RBC # BLD AUTO: 4.04 MILLION/UL (ref 3.81–5.12)
WBC # BLD AUTO: 5.88 THOUSAND/UL (ref 4.31–10.16)

## 2024-08-01 PROCEDURE — 86780 TREPONEMA PALLIDUM: CPT

## 2024-08-01 PROCEDURE — 99213 OFFICE O/P EST LOW 20 MIN: CPT | Performed by: NURSE PRACTITIONER

## 2024-08-01 PROCEDURE — 36415 COLL VENOUS BLD VENIPUNCTURE: CPT

## 2024-08-01 PROCEDURE — 85027 COMPLETE CBC AUTOMATED: CPT

## 2024-08-01 NOTE — PROGRESS NOTES
Rupa Curry presents today for an OB problem visit at 30w3d. She reports that occasionally over the past three weeks she has had some discomfort externally on the left labia majora/left groin. She reports that it typically feels like a pressure sensation and only happens when lifting things or holding her other children. Discomfort is immediately relieved when she stops holding/carrying. She denies any vaginal pain, vaginal discharge, LOF, VB, vaginitis symptom, pelvic pain or contractions. She reports good fetal movement.      Blood Pressure: 131/85  Wt=73.3 kg (161 lb 9.6 oz); Body mass index is 30.53 kg/m².; TWG=9.344 kg (20 lb 9.6 oz)  Fetal Heart Rate: 140; Fundal Height (cm): 31 cm  Abdomen: gravid, soft, non-tender.  Exam is negative for vaginitis. No signs of hernia. Vulvar exam unremarkable.     Reviewed common discomforts of pregnancy. Reviewed premature labor precautions and fetal kick counts. Reviewed pre eclampsia warning signs.   Encouraged to try pregnancy support belt. Can consider ice to the area if needed as well as PRN tylenol.   Advised to continue medications and return in 1 week for routine OB care.      Current Outpatient Medications   Medication Instructions    aspirin (ECOTRIN LOW STRENGTH) 162 mg, Oral, Daily, Stop taking at 36 weeks of pregnancy.    doxylamine (UNISOM) 12.5 mg, Oral, Daily at bedtime PRN    famotidine (PEPCID) 20 mg, Oral, 2 times daily    fexofenadine (ALLEGRA) 60 mg, Daily    Prenatal Vit-Fe Fumarate-FA (Prenatal Vitamin) 27-0.8 MG TABS 1 tablet, Oral, Daily    pyridoxine (B-6) 25 mg, Oral, Daily    sucralfate (CARAFATE) 1 g, Oral, Every 8 hours PRN         Pregnancy Problems (from 02/29/24 to present)       Problem Noted Resolved    Gestational diabetes 5/23/2024 by Cherie Clinton MD No    Overview Signed 5/23/2024  9:08 AM by Cherie Clinton MD     Early 1hr   Referral placed to diabetic education         Chronic hypertension in pregnancy 3/28/2024 by  Niurka Steel MD No    Overview Addendum 5/23/2024  9:09 AM by Cherie Clinton MD     Elevated BP on 1/4/24 129/91, on 1/5/24 /102  Suspect underlying chronic hypertension  Baseline preE labs 5/21 wnl, P:C 0.22         30 weeks gestation of pregnancy 2/29/2024 by Angelita Araya MD No    Overview Addendum 7/25/2024 10:07 AM by Jesus Faustin MD     Overview:  Labs  Pap smear NILM 4/24  and GC/CT neg  Prenatal panel wnl except bacteruria, repeat urine culture wnl 4/25  MSAFP screen negative  Genetic screening declines  28w labs - will need CBC and RPR; met criteria for GDM with 1h  mg/dL. Encouraged to complete CBC, RPR  Vaccines:  Covid vaccine: 3 doses  Tdap vaccine: Given 7/25/24  Contraception: Mirena IUD in office  Feeding plan: breast [ ]  Birth plan: TOLAC, no [facility] at [facility]  Iris prefers to deliver at [facility]. Discussed that this practice does not deliver there but will continue to care for her regardless of her delivery location. She had a bad experience at [facility] and will not report for care there.   Delivery consent: signed 7/2/24  Ultrasounds: 3/28/24: NT, S=D, anterior placenta  5/23/24, 20w3d, nml fetal growth, cephalic, anterior placenta  6/13/24, 23w3d, nml fetal echo, variable presentation, anterior placenta  Growth scan scheduled for 7/11/24  RTO in 2w for routine PNV

## 2024-08-01 NOTE — TELEPHONE ENCOUNTER
Pt's  (Sagar) called looking to transfer care to [place] from [facility]. Communicated preference to deliver at [facility], and was referred to our office. Pt does not have insurance and is currently on a financial plan at [facility]. Did communicate self pay method and estimated out of pocket for each visit, which does not include any additional testing the provider may request. Pt is also about 32 weeks pregnant, did communicate would need to confirm if office could accept pt this late in care. Please F/U with Sagar, as he is on pt's communication consent. Thank you.      [phone]

## 2024-08-02 ENCOUNTER — TELEPHONE (OUTPATIENT)
Dept: OBGYN CLINIC | Facility: CLINIC | Age: 44
End: 2024-08-02

## 2024-08-02 LAB — TREPONEMA PALLIDUM IGG+IGM AB [PRESENCE] IN SERUM OR PLASMA BY IMMUNOASSAY: NORMAL

## 2024-08-02 NOTE — TELEPHONE ENCOUNTER
Called pt and informed of negative lab results, pt verbalized understanding.        ----- Message from Doc Smith MD sent at 8/2/2024  1:12 PM EDT -----  Please let patient know of negative lab results.    ThanksDoc

## 2024-08-07 NOTE — PROGRESS NOTES
[facility]   PRENATAL VISIT  Rupa Curry  [id number]  1980        ASSESSMENT/PLAN:  Problem List       Moderately severe depression    Overview     Reports good and stable mood         31 weeks gestation of pregnancy    Overview     Overview:  Labs  Pap smear NILM 4/24  and GC/CT neg  Prenatal panel wnl except bacteruria, repeat urine culture wnl 4/25  MSAFP screen negative  Genetic screening declines  28w labs - CBC/RPR wnl; met criteria for GDM with 1h  mg/dL  Vaccines:  Covid vaccine: 3 doses  Tdap vaccine: Given 7/25/24  Contraception: Mirena IUD in office  Feeding plan: breast   Birth plan: TOLAC, no epidural at [facility]  Rupa prefers to deliver at [facility]. Discussed that this practice does not deliver there but will continue to care for her regardless of her delivery location. She had a bad experience at [facility] and will not report for care there.   Delivery consent: signed 7/2/24  Ultrasounds: 3/28/24: NT wnl, anterior placenta  5/23/24, 20w3d, nml fetal growth, cephalic, anterior placenta  6/13/24, 23w3d, nml fetal echo, variable presentation, anterior placenta  7/11/24, 27w3d, nml fetal growth, anterior placenta  Growth US scheduled for 8/15/24  RTO in 2w for routine PNV         History of gestational diabetes    Hx of preeclampsia, prior pregnancy, currently pregnant, third trimester    Overview     G6 pregnancy; delivered via LTCS at 33w6d due to severe preeclampsia and breech presentation  Baseline PreE labs wnl  Recommend low dose aspirin         Chronic hypertension in pregnancy    Overview     Elevated BP on 1/4/24 129/91, on 1/5/24 /102  Suspect underlying chronic hypertension  Baseline preE labs 5/21 wnl, P:C 0.22         History of postpartum hemorrhage, currently pregnant    Gestational diabetes    Overview     Early 1hr   Referral placed to diabetic education  Glucose has been well controlled            If you are experiencing  painful contractions, loss of fluids, vaginal bleeding, or decreased fetal movement, please call ask to speak to OBGYN doctor on call prior to proceeding to Labor & Delivery ([facility] 2nd floor of [facility] [place] or [facility] 3rd floor of [facility]). We have a resident doctor on call at both hospitals 24 hours a day.     Subjective: 44 y.o.  31w3d here for prenatal visit. She denies contractions. She denies leakage of fluid and vaginal bleeding. She endorses good fetal movement. She reports occasional burning where her  section scar is. She feels like it is superficial and no deeper. She is not in any pain. Discussed that if she begins to feel pain, especially over her  scar, she should call the office and go to the hospital. No suspicion for uterine rupture at this time.       Objective:  Pre- Vitals      Flowsheet Row Most Recent Value   Prenatal Assessment    Fetal Heart Rate 142   Fundal Height (cm) 31 cm   Movement Present   Prenatal Vitals    Blood Pressure 105/57   Weight - Scale 74.4 kg (164 lb)   Urine Albumin/Glucose    Dilation/Effacement/Station    Vaginal Drainage    Edema              Pregnancy Plan:  Pregnancy: Rajan     Delivery Plans  Planned delivery location: [facility] L[facility]D  Planned anesthesia: None  Acceptable blood products: All     Post-Delivery Plans  Feeding intentions: Breast Milk and Non-human milk substitute      General: Well appearing, no distress  Respiratory: Unlabored breathing  Cardiovascular: Regular rate.  Abdomen: Soft, gravid, nontender  Fundal Height: Appropriate for gestational age.  Extremities: Warm and well perfused.  Non tender.      D/w Dr. Jeanine Fajardo MD   PGY-2, OBGYN  2024  10:22 AM

## 2024-08-08 ENCOUNTER — ROUTINE PRENATAL (OUTPATIENT)
Dept: OBGYN CLINIC | Facility: CLINIC | Age: 44
End: 2024-08-08

## 2024-08-08 ENCOUNTER — OFFICE VISIT (OUTPATIENT)
Dept: PERINATAL CARE | Facility: CLINIC | Age: 44
End: 2024-08-08

## 2024-08-08 VITALS
HEIGHT: 61 IN | DIASTOLIC BLOOD PRESSURE: 64 MMHG | HEART RATE: 88 BPM | BODY MASS INDEX: 31.19 KG/M2 | SYSTOLIC BLOOD PRESSURE: 104 MMHG | WEIGHT: 165.2 LBS

## 2024-08-08 VITALS
BODY MASS INDEX: 30.96 KG/M2 | HEIGHT: 61 IN | WEIGHT: 164 LBS | DIASTOLIC BLOOD PRESSURE: 57 MMHG | SYSTOLIC BLOOD PRESSURE: 105 MMHG | HEART RATE: 70 BPM

## 2024-08-08 DIAGNOSIS — E66.3 OVERWEIGHT WITH BODY MASS INDEX (BMI) OF 26 TO 26.9 IN ADULT: ICD-10-CM

## 2024-08-08 DIAGNOSIS — Z86.32 HISTORY OF GESTATIONAL DIABETES: ICD-10-CM

## 2024-08-08 DIAGNOSIS — O09.293 HX OF PREECLAMPSIA, PRIOR PREGNANCY, CURRENTLY PREGNANT, THIRD TRIMESTER: ICD-10-CM

## 2024-08-08 DIAGNOSIS — Z3A.31 31 WEEKS GESTATION OF PREGNANCY: Primary | ICD-10-CM

## 2024-08-08 DIAGNOSIS — Z3A.31 31 WEEKS GESTATION OF PREGNANCY: ICD-10-CM

## 2024-08-08 DIAGNOSIS — O24.410 DIET CONTROLLED GESTATIONAL DIABETES MELLITUS (GDM) IN SECOND TRIMESTER: ICD-10-CM

## 2024-08-08 DIAGNOSIS — O24.410 DIET CONTROLLED GESTATIONAL DIABETES MELLITUS (GDM) IN THIRD TRIMESTER: Primary | ICD-10-CM

## 2024-08-08 PROCEDURE — G0108 DIAB MANAGE TRN  PER INDIV: HCPCS | Performed by: DIETITIAN, REGISTERED

## 2024-08-08 PROCEDURE — 99213 OFFICE O/P EST LOW 20 MIN: CPT | Performed by: OBSTETRICS & GYNECOLOGY

## 2024-08-09 ENCOUNTER — PATIENT MESSAGE (OUTPATIENT)
Dept: PERINATAL CARE | Facility: CLINIC | Age: 44
End: 2024-08-09

## 2024-08-09 NOTE — PROGRESS NOTES
Thank you for referring your patient to [facility] Diabetes in Pregnancy Program.     Rupa Curry is a  44 y.o. female who presents today for Individual  DSMT Follow up/ Insulin Education.  Patient is at 31w4d gestation, Estimated Date of Delivery: 10/7/24. Armenian speaking only-- translted some of the information    Reviewed and updated the following from patients medical record: PMH, Problem List, Allergies, and Current Medications.      Diagnosis:  Diet controlled GDM    Additional Pregnancy Complications:  Overweight prior to pregnancy & history of gestational diabetes managed by this program in --controlled with diet, --controlled with insulin &  controlled by diet.       Blood Sugar Logs Submitted via:  did not bring her BG results or her meter to this appointment. Last reported BG was 24     Stated she is taking her BG & verbally stated her results: FBS-, after breakfast--101-103, after lunch--103-107 & after dinner 100-105. Desires to begin insulin    Insulin Education:    No diabetes related medications. Explained to   that cost of insulin will be out-of- pocket.  Advised to use Lantus with the coupon for $35 per insulin pen will need refill in about 2 weeks.      The patient was instructed on the following:  Insulin administration times, insulin action.  Hypoglycemia signs, symptoms and treatment. Advised patient to test blood sugar at 3:00 am for first 3 mornings following insulin start to monitor for hypoglycemia  Increase in maternal-fetal surveillance with insulin initiation.  Side effects of hyperglycemia in pregnancy including macrosomia,  hypoglycemia, polyhydramnios, pre-term labor and stillbirth.  Continue to monitor blood glucoses via fingerstick fasting (goal 60 mg/dl to 90 mg/dl) and two hours post prandial (goal less than 120 mg/dl).  Non-stress testing two times weekly and LENORE testing beginning at 32 weeks gestation.  "       Ultrasounds every 4 weeks at the [facility].  HbA1c every 6 to 8 weeks     24 hr Diet Recall:  1st meal--12-1 PM: scrambled & 3 6\"corn tortillas  1st snack-Fruit alone or peanut butter crackers  2nd meal-5-6 PM: 3 oz chicken or stea, 1 cup rice & beans, vegetables  Bedtime Snack (9-10 PM): banana or crackers    Beverages: water, 8 oz milk, coffee with milk     Diet review: Only eating 2 meals & 2 snacks daily. Eating correct amounts at both these meals. Needs to add protein at her 2 snacks she is eating taryn at the bedtime snack. FBS is increased as has more than 10 hours between bedtime snack & FBS. Awakens at 10 AM & bedtime is at 10 PM. Encouraged her to eat 3rd meal & another snack daily. Doubt if patient will add another meal & another snack. Stated she cannot eat later for her bedtime snack due to due to getting heartburn, but goes  to bed right after current bedtime snack.      Assessment and Plan:  No diabetes related medications. Discussed her desire to begin insulin with Dr. Carrera & she stated we need to see her BG results first. Patient stated she will report her BG later today when gets home  1800 calorie (CHO: 45-15-45-15-45-30) (PRO:2-1-3-1-3-2) meal plan consisting of 3 meals and 3 snacks daily including protein at each  Advised patient to continue current meal plan. Add protein to bedtime snack & eat bedtime snack later in the evening. Advised her to set her alarm for midnight after she goes to bed at 10 PM so she only has 10 hours between bedtime snack & FBS the next morning.  Unsure if patient will do this.   Avoid fasting for > 10 hours overnight  Continue SMBG 4 x per day (Fasting, 2 hour after start of each meal) with a Contour Next Gen glucose meter; according to patient.   Walks twice daily    Anthropometrics:  Ht Readings from Last 3 Encounters:   08/08/24 5' 1\" (1.549 m)   08/08/24 5' 1\" (1.549 m)   08/01/24 5' 1\" (1.549 m)     Wt Readings from Last 3 " "Encounters:   08/08/24 74.9 kg (165 lb 3.2 oz)   08/08/24 74.4 kg (164 lb)   08/01/24 73.3 kg (161 lb 9.6 oz)     Pre-gravid weight: 64 kg (141 lb)  Pre-gravid BMI: 26.66  Weight Change: 11 kg (24 lb 3.2 oz)  Weight gain recommendations: BMI (25-29.9) 15-25 lbs  Comments: needs to maintain her wieght for the remainder of the pregnancy    Lab Work:   Lab Results   Component Value Date    HGBA1C 5.3 06/06/2024      Re-order HgA1C today & encouraged patient to complete it soon.     Ultrasound:  Date:7/12/24  Fetal Growth:  AC-94% & EFW-95%  LENORE: Normal  Next US date: 8/15/24    *Maternal fetal testing per OB/MFM recommendations    Diabetes Self Management Support Plan outside of ongoing care: Spouse/Family   Patient Stated Goal: \"I will eat 3 meals and 3 snacks each day, including protein at each\"   Goal Assessment: Not on track  Barriers: Language, Financial & cultural    Date to report next: now for her most recent BG results to determine if needs to begin insulin  F/U Date: as needed    Begin Time: 11 AM  End Time: 11:45 AM    Mary Mccain, MS, RD, [facility]  Diabetes Educator  [facility]  Diabetes in Pregnancy Program  [address], [address]  [place] [address]    "

## 2024-08-13 PROBLEM — Z3A.32 32 WEEKS GESTATION OF PREGNANCY: Status: ACTIVE | Noted: 2024-02-29

## 2024-08-22 ENCOUNTER — TELEPHONE (OUTPATIENT)
Dept: PERINATAL CARE | Facility: CLINIC | Age: 44
End: 2024-08-22

## 2024-08-22 ENCOUNTER — PATIENT OUTREACH (OUTPATIENT)
Dept: OBGYN CLINIC | Facility: CLINIC | Age: 44
End: 2024-08-22

## 2024-08-22 PROBLEM — O09.529 AMA (ADVANCED MATERNAL AGE) MULTIGRAVIDA 35+: Status: ACTIVE | Noted: 2024-08-22

## 2024-08-23 ENCOUNTER — TELEPHONE (OUTPATIENT)
Age: 44
End: 2024-08-23

## 2024-08-23 NOTE — TELEPHONE ENCOUNTER
Pt called in with  on the line. Stating she is a current pt with [facility] and was told by the office they can switch practices to [facility]. Pt is 30 weeks pregnant and does not have insurance in her chart.  says [employer] told them they have insurance through [employer] and is covered to go anywhere within [facility] to be seen. They want a closer office like [place]. Informed pt I would forward information to practice and someone will reach out for them to get better clarification. Pt's  said they can pay any cost out of pocket. Please call pt back to further discuss  Tomás said to call him at [phone] so he can interpret for his wife she is Romansh speaking only per .

## 2024-08-28 PROBLEM — Z3A.34 34 WEEKS GESTATION OF PREGNANCY: Status: ACTIVE | Noted: 2024-02-29

## 2024-08-28 NOTE — ASSESSMENT & PLAN NOTE
Discussed birth plan and delivery location; patient is considering transferring to [place] due to preference of delivery at [facility]. Also discussed option to continue with LUISA-Be and delivery at [place] (patient not willing to deliver at [place]). Iris will consider her options and let us know.  Discussed high risk status with Xiang GRAY, A1FLORM, previous C/S - at this time recommend induction at 38w.  Discussed labor precautions and risks with TOLAC     NST today reactive and reassuring

## 2024-08-28 NOTE — PROGRESS NOTES
[facility] WOMEN'S HEALTH   PRENATAL VISIT  Rupa Crury  [id number]  1980        ASSESSMENT/PLAN:  Problem List Items Addressed This Visit       34 weeks gestation of pregnancy     Discussed birth plan and delivery location; patient is considering transferring to [place] due to preference of delivery at [facility]. Also discussed option to continue with LUISA-Be and delivery at [place] (patient not willing to deliver at [place]). Rupa will consider her options and let us know.  Discussed high risk status with Xiang GRAY, JERONIMO, previous C/S - at this time recommend induction at 38w.  Discussed labor precautions and risks with TOLAC     NST today reactive and reassuring         Chronic hypertension in pregnancy - Primary     Blood pressure today normal  Patient denies any symptoms of preeclampsia         Gestational diabetes       Lab Results   Component Value Date    HGBA1C 5.3 2024     Discussed home blood glucose; continue monitoring with M         Gastroesophageal reflux disease without esophagitis     Patient currently taking Pepcid and tums with little effect  Prescription sent for pantoprazole                  If you are experiencing painful contractions, loss of fluids, vaginal bleeding, or decreased fetal movement, please call ask to speak to OBGYN doctor on call prior to proceeding to Labor & Delivery ([facility] 2nd floor of [facility] [place] or [facility] 3rd floor of [facility]). We have a resident doctor on call at both hospitals 24 hours a day.     Subjective: 44 y.o.  at 34w3d here for prenatal visit. She denies contractions. She denies leakage of fluid and vaginal bleeding. She endorses good fetal movement. She reports increased pelvic pressure and occasional lower abdominal pain; spontaneously resolves. She additionally is having worse GERD, unrelieved with pepcid. She again expressed interest in delivering at [facility]; is considering transition of  care to [place].     Objective:  Pre-Amaris Vitals      Flowsheet Row Most Recent Value   Prenatal Assessment    Prenatal Vitals    Blood Pressure 117/60   Weight - Scale 76.7 kg (169 lb)   Urine Albumin/Glucose    Dilation/Effacement/Station    Vaginal Drainage    Edema              Pregnancy Plan:  Pregnancy: Rajan     Delivery Plans  Planned delivery location:  L&D  Planned anesthesia: None  Acceptable blood products: All     Post-Delivery Plans  Feeding intentions: Breast Milk and Non-human milk substitute      General: Well appearing, no distress  Respiratory: Unlabored breathing  Cardiovascular: Regular rate.  Abdomen: Soft, gravid, nontender  Fundal Height: Appropriate for gestational age.  Extremities: Warm and well perfused.  Non tender.      D/w  Episcopsonja Steel MD  PGY 2, Obstetrics and Gynecology  2024  10:54 AM

## 2024-08-29 ENCOUNTER — ROUTINE PRENATAL (OUTPATIENT)
Dept: OBGYN CLINIC | Facility: CLINIC | Age: 44
End: 2024-08-29

## 2024-08-29 ENCOUNTER — ROUTINE PRENATAL (OUTPATIENT)
Dept: OBGYN CLINIC | Facility: CLINIC | Age: 44
End: 2024-08-29
Payer: COMMERCIAL

## 2024-08-29 VITALS
HEIGHT: 61 IN | WEIGHT: 169 LBS | BODY MASS INDEX: 31.91 KG/M2 | DIASTOLIC BLOOD PRESSURE: 60 MMHG | HEART RATE: 78 BPM | SYSTOLIC BLOOD PRESSURE: 117 MMHG

## 2024-08-29 VITALS
SYSTOLIC BLOOD PRESSURE: 112 MMHG | WEIGHT: 171 LBS | HEIGHT: 61 IN | DIASTOLIC BLOOD PRESSURE: 62 MMHG | BODY MASS INDEX: 32.28 KG/M2

## 2024-08-29 DIAGNOSIS — K21.9 GASTROESOPHAGEAL REFLUX DISEASE WITHOUT ESOPHAGITIS: ICD-10-CM

## 2024-08-29 DIAGNOSIS — O24.410 DIET CONTROLLED GESTATIONAL DIABETES MELLITUS (GDM) IN THIRD TRIMESTER: ICD-10-CM

## 2024-08-29 DIAGNOSIS — O09.523 MULTIGRAVIDA OF ADVANCED MATERNAL AGE IN THIRD TRIMESTER: ICD-10-CM

## 2024-08-29 DIAGNOSIS — O10.919 CHRONIC HYPERTENSION IN PREGNANCY: Primary | ICD-10-CM

## 2024-08-29 DIAGNOSIS — Z3A.34 34 WEEKS GESTATION OF PREGNANCY: ICD-10-CM

## 2024-08-29 LAB
SL AMB  POCT GLUCOSE, UA: 500
SL AMB POCT URINE PROTEIN: ABNORMAL

## 2024-08-29 PROCEDURE — 81002 URINALYSIS NONAUTO W/O SCOPE: CPT | Performed by: OBSTETRICS & GYNECOLOGY

## 2024-08-29 PROCEDURE — 99213 OFFICE O/P EST LOW 20 MIN: CPT | Performed by: OBSTETRICS & GYNECOLOGY

## 2024-08-29 PROCEDURE — 59025 FETAL NON-STRESS TEST: CPT | Performed by: OBSTETRICS & GYNECOLOGY

## 2024-08-29 RX ORDER — PANTOPRAZOLE SODIUM 20 MG/1
20 TABLET, DELAYED RELEASE ORAL DAILY
Qty: 60 TABLET | Refills: 1 | Status: SHIPPED | OUTPATIENT
Start: 2024-08-29 | End: 2024-08-29 | Stop reason: ALTCHOICE

## 2024-08-29 NOTE — ASSESSMENT & PLAN NOTE
Lab Results   Component Value Date    HGBA1C 5.3 06/06/2024     Discussed home blood glucose; continue monitoring with MFM

## 2024-09-06 NOTE — ASSESSMENT & PLAN NOTE
Glucose well controlled with diet.   Lab Results   Component Value Date    HGBA1C 5.3 06/06/2024

## 2024-09-06 NOTE — PROGRESS NOTES
"Routine Prenatal Visit  [facility] [place]  [address] [address]    Assessment/Plan:  Rupa is a 44 y.o. year old  at 35w4d who presents for routine prenatal visit.     1. Chronic hypertension in pregnancy  Assessment & Plan:  BP remains normal without medication    2. Diet controlled gestational diabetes mellitus (GDM) in third trimester  Assessment & Plan:  Glucose well controlled with diet.   Lab Results   Component Value Date    HGBA1C 5.3 2024     3. Multigravida of advanced maternal age in third trimester  4. 34 weeks gestation of pregnancy  -     POCT urine dip        Subjective:     CC: Prenatal care    Rupa Curry is a 44 y.o.  female who presents for routine prenatal care at 35w4d.  Pregnancy ROS: no leakage of fluid, pelvic pain, or vaginal bleeding.  normal fetal movement.    The following portions of the patient's history were reviewed and updated as appropriate: allergies, current medications, past family history, past medical history, obstetric history, gynecologic history, past social history, past surgical history and problem list.      Objective:  /62 (BP Location: Left arm, Patient Position: Sitting, Cuff Size: Standard)   Ht 5' 1\" (1.549 m)   Wt 77.6 kg (171 lb)   BMI 32.31 kg/m²   Pregravid Weight/BMI: 64 kg (141 lb) (BMI 26.66)  Current Weight: 77.6 kg (171 lb)   Total Weight Gain: 13.6 kg (30 lb)   Pre-Amaris Vitals      Flowsheet Row Most Recent Value   Prenatal Assessment    Fetal Heart Rate 140   Fundal Height (cm) 34 cm   Movement Present   Presentation Vertex   Prenatal Vitals    Blood Pressure 112/62   Weight - Scale 77.6 kg (171 lb)   Urine Albumin/Glucose    Dilation/Effacement/Station    Vaginal Drainage    Edema              General: Well appearing, no distress  Respiratory: Unlabored breathing  Cardiovascular: Regular rate.  Abdomen: Soft, gravid, nontender  Fundal Height: Appropriate for gestational age.  Extremities: " Warm and well perfused.  Non tender.

## 2024-09-10 ENCOUNTER — TELEPHONE (OUTPATIENT)
Age: 44
End: 2024-09-10

## 2024-09-10 ENCOUNTER — HOSPITAL ENCOUNTER (EMERGENCY)
Facility: HOSPITAL | Age: 44
Discharge: HOME | End: 2024-09-10
Attending: EMERGENCY MEDICINE
Payer: COMMERCIAL

## 2024-09-10 VITALS
DIASTOLIC BLOOD PRESSURE: 92 MMHG | TEMPERATURE: 98.5 F | SYSTOLIC BLOOD PRESSURE: 133 MMHG | RESPIRATION RATE: 20 BRPM | BODY MASS INDEX: 31.28 KG/M2 | OXYGEN SATURATION: 99 % | HEIGHT: 62 IN | HEART RATE: 62 BPM | WEIGHT: 170 LBS

## 2024-09-10 DIAGNOSIS — T63.441A BEE STING: Primary | ICD-10-CM

## 2024-09-10 PROCEDURE — 96365 THER/PROPH/DIAG IV INF INIT: CPT

## 2024-09-10 PROCEDURE — 99283 EMERGENCY DEPT VISIT LOW MDM: CPT

## 2024-09-10 PROCEDURE — 96375 TX/PRO/DX INJ NEW DRUG ADDON: CPT

## 2024-09-10 PROCEDURE — 99282 EMERGENCY DEPT VISIT SF MDM: CPT

## 2024-09-10 RX ORDER — DIPHENHYDRAMINE HYDROCHLORIDE 50 MG/ML
50 INJECTION INTRAMUSCULAR; INTRAVENOUS ONCE
Status: COMPLETED | OUTPATIENT
Start: 2024-09-10 | End: 2024-09-10

## 2024-09-10 RX ORDER — EPINEPHRINE 0.3 MG/.3ML
0.3 INJECTION SUBCUTANEOUS ONCE
Qty: 0.6 ML | Refills: 0 | Status: SHIPPED | OUTPATIENT
Start: 2024-09-10 | End: 2024-09-10

## 2024-09-10 RX ORDER — ACETAMINOPHEN 10 MG/ML
1000 INJECTION, SOLUTION INTRAVENOUS ONCE
Status: COMPLETED | OUTPATIENT
Start: 2024-09-10 | End: 2024-09-10

## 2024-09-10 RX ADMIN — ACETAMINOPHEN 1000 MG: 10 INJECTION INTRAVENOUS at 15:10

## 2024-09-10 RX ADMIN — DIPHENHYDRAMINE HYDROCHLORIDE 50 MG: 50 INJECTION, SOLUTION INTRAMUSCULAR; INTRAVENOUS at 15:07

## 2024-09-10 NOTE — ED PROVIDER NOTES
1. Bee sting      ED Disposition       ED Disposition   Discharge    Condition   Stable    Date/Time   Tue Sep 10, 2024  4:07 PM    Comment   Rupa Curry discharge to home/self care.                   Assessment & Plan       Medical Decision Making  Patient is not in acute distress. She is not ill-appearing, toxic-appearing or diaphoretic. Speaking in full sentences without difficulty, tolerating oral secretions. No facial swelling. No neck pain or stiffness. Oropharynx visible with no obstruction, uvula is midline, no erythema or exudates. No change in voice, hoarseness or stridor.  No systemic signs of an allergic reaction or significant symptoms elsewhere suggests a localized response. Differential diagnosis includes but is not limited to, localized reaction to insect bite, insect bite or sting reaction, foreign body reaction, etc. Other additional interventions at this time include IV Tylenol and IV Benadryl.    Patient reports significant improvement in symptomology. Will prescribe EpiPen as a precaution. Also discussed Benadryl every 4-6 hours but patient was made aware to not exceed more than 12 tablets or 300 mg in 24 hours. Provided very strict return precautions.  Used the teach back method for discussion, and patient was able to verbalize expectations and when to return to the ED.  Also advised patient to follow-up with your primary care provider and OB/GYN as needed. All other questions answered at bedside.      Risk  Prescription drug management.              ED Course as of 09/10/24 1752   Tue Sep 10, 2024   [id number]       Medications   diphenhydrAMINE (BENADRYL) injection 50 mg (50 mg Intravenous Given 9/10/24 1507)   acetaminophen (Ofirmev) injection 1,000 mg (0 mg Intravenous Stopped 9/10/24 1622)       History of Present Illness       HPI    Rupa Curry is a 44 y.o. female who is currently 36 weeks and 1 day pregnant presenting to the Emergency Department for evaluation of left  fourth finger swelling and pain due to bee sting 9:30 am.  Patient describes the pain as a burning sensation that does radiate to her elbow.  Speaking in full sentences without difficulty, tolerating oral secretions.  She denies chest pain, shortness of breath, nausea, vomiting, palpitations.  No previous history of bee stings or allergic reactions.  No other complaints at this time. Certified  used for history, [id number].    Review of Systems   Constitutional:  Negative for chills and fever.   HENT:  Negative for drooling, facial swelling, rhinorrhea, sore throat, trouble swallowing and voice change.    Eyes:  Negative for visual disturbance.   Respiratory:  Negative for cough and shortness of breath.    Cardiovascular:  Negative for chest pain and palpitations.   Gastrointestinal:  Negative for abdominal pain, nausea and vomiting.   Musculoskeletal:  Positive for arthralgias (L 4th digit). Negative for back pain.   Skin:  Negative for color change and rash.   Neurological:  Negative for dizziness, tremors, syncope, weakness, light-headedness, numbness and headaches.   All other systems reviewed and are negative.          Objective     ED Triage Vitals [09/10/24 1327]   Temperature Pulse Blood Pressure Respirations SpO2 Patient Position - Orthostatic VS   98.5 °F (36.9 °C) 62 133/92 20 99 % --      Temp Source Heart Rate Source BP Location FiO2 (%) Pain Score    Oral -- -- -- --        Physical Exam  Vitals and nursing note reviewed.   Constitutional:       General: She is awake. She is not in acute distress.     Appearance: Normal appearance. She is well-developed. She is not ill-appearing, toxic-appearing or diaphoretic.   HENT:      Head: Normocephalic and atraumatic.      Nose: Nose normal.      Mouth/Throat:      Mouth: Mucous membranes are moist.      Pharynx: Oropharynx is clear. No posterior oropharyngeal erythema.      Comments: Speaking in full sentences without difficulty, tolerating oral  secretions. No facial swelling. No neck pain or stiffness. Oropharynx visible with no obstruction, uvula is midline, no erythema or exudates. No change in voice, hoarseness or stridor.    Eyes:      Pupils: Pupils are equal, round, and reactive to light.   Cardiovascular:      Rate and Rhythm: Normal rate and regular rhythm.   Pulmonary:      Effort: Pulmonary effort is normal. No respiratory distress.      Breath sounds: Normal breath sounds. No stridor. No wheezing, rhonchi or rales.   Chest:      Chest wall: No tenderness.   Musculoskeletal:         General: Swelling and tenderness present. Normal range of motion.      Cervical back: Normal range of motion and neck supple. No rigidity or tenderness.      Comments: There is mild swelling and tenderness noticed at the site of the sting with no visible stinger.  ROM full however with some discomfort. Please see media below, patient gave permission.   Skin:     General: Skin is warm and dry.      Capillary Refill: Capillary refill takes less than 2 seconds.   Neurological:      General: No focal deficit present.      Mental Status: She is alert and oriented to person, place, and time.   Psychiatric:         Mood and Affect: Mood normal.         Behavior: Behavior is cooperative.         Labs Reviewed - No data to display  No orders to display       Procedures       Priscilla Myers PA-C  09/10/24 [id number]

## 2024-09-10 NOTE — DISCHARGE INSTRUCTIONS
You have received 50 mg of Benadryl IV, the typical oral dose for mild allergic reaction is 25 to 50 mg every 4-6 hours however do not exceed more than 300 mg in 24 hours.  You can take 1 to 2 tablets every 4-6 hours but please do not exceed more than 12 tablets or 300 mg in 24 hours.    An EpiPen was also sent to your pharmacy.  An EpiPen contains epinephrine, which is a life-saving medication used to treat severe allergic reactions or anaphylaxis.  It works by reducing the airway swelling, improving breathing and raising blood pressure.  You can use EpiPen immediately if you experience signs of a severe allergic reaction including, difficulty breathing, wheezing, swelling of the face, lips, tongue, throat, hives, itching, widespread rash, with dizziness, fainting, or weak pulse, severe abdominal pain, vomiting, diarrhea.  Please asked the pharmacist on how to properly use the epipen.    Please return to the emergency department immediately if you are experiencing any of the symptoms listed above or any new or concerning symptoms.

## 2024-09-11 NOTE — TELEPHONE ENCOUNTER
Called and scheduled patient her detailed ultrasound, will call her back when we have availability for her nuchal between 7/6 - 7/14 
Plan: Discussed Keto shampoo if persists or worsens
Detail Level: Zone
Continue Regimen: OTC dandruff shampoo
Render In Strict Bullet Format?: No
Plan: - re-start clindamycin phosphate 1 % topical swab Qd\\nWipe face 1-2x daily.\\n\\nPatient d/c after a month of using them, recommend patient restarting.\\n.

## 2024-09-12 ENCOUNTER — ROUTINE PRENATAL (OUTPATIENT)
Dept: OBGYN CLINIC | Facility: CLINIC | Age: 44
End: 2024-09-12
Payer: COMMERCIAL

## 2024-09-12 ENCOUNTER — TELEPHONE (OUTPATIENT)
Age: 44
End: 2024-09-12

## 2024-09-12 VITALS
BODY MASS INDEX: 31.47 KG/M2 | HEIGHT: 62 IN | SYSTOLIC BLOOD PRESSURE: 130 MMHG | WEIGHT: 171 LBS | DIASTOLIC BLOOD PRESSURE: 82 MMHG

## 2024-09-12 DIAGNOSIS — O09.523 MULTIGRAVIDA OF ADVANCED MATERNAL AGE IN THIRD TRIMESTER: ICD-10-CM

## 2024-09-12 DIAGNOSIS — O24.410 DIET CONTROLLED GESTATIONAL DIABETES MELLITUS (GDM) IN THIRD TRIMESTER: Primary | ICD-10-CM

## 2024-09-12 DIAGNOSIS — Z3A.36 36 WEEKS GESTATION OF PREGNANCY: ICD-10-CM

## 2024-09-12 DIAGNOSIS — Z98.891 H/O CESAREAN SECTION: ICD-10-CM

## 2024-09-12 DIAGNOSIS — Z36.85 ENCOUNTER FOR ANTENATAL SCREENING FOR STREPTOCOCCUS B: ICD-10-CM

## 2024-09-12 DIAGNOSIS — O10.919 CHRONIC HYPERTENSION IN PREGNANCY: ICD-10-CM

## 2024-09-12 PROCEDURE — 99213 OFFICE O/P EST LOW 20 MIN: CPT | Performed by: OBSTETRICS & GYNECOLOGY

## 2024-09-12 PROCEDURE — 87150 DNA/RNA AMPLIFIED PROBE: CPT | Performed by: OBSTETRICS & GYNECOLOGY

## 2024-09-12 NOTE — TELEPHONE ENCOUNTER
OB Patient 35 weeks called , I brought  services on the line with us -the patient saw her ob/gyn today and stated she needs insulin and she needs to make an appt for fetal growth ultrasound please call patient back and use  service - she speaks Slovak

## 2024-09-12 NOTE — ASSESSMENT & PLAN NOTE
- Labor/Bleeding/ROM precautions given. Kick counts reviewed  - GBS swab collected today.  - Delivery consent reviewed and signed 24 with prior practice. Risks of delivery reviewed, including bleeding, infection, damage to surrounding organs/structures (specifically bowel, bladder, vessels, nerves, ureters), need for operative vaginal delivery or  delivery, hysterectomy, need for blood transfusion, need for further surgery.   - Problem list updated, results console reviewed and updated with pertinent prenatal labs.  - PMH, PSH, medications reviewed and updated as needed  - Return to office in 1 wk(s) for routine prenatal care

## 2024-09-12 NOTE — PROGRESS NOTES
Routine Prenatal Visit  [facility] - [place]  [address] [place] [address]    Assessment/Plan:  Rupa is a 44 y.o. year old  at 36w3d who presents for routine prenatal visit.     1. Diet controlled gestational diabetes mellitus (GDM) in third trimester  Assessment & Plan:  BS uncontrolled from few results she showed me. Has not been following with [facility]. I encouraged to call and give numbers. I discussed she also needs growth ultrasound ASAP as she is measuring 2 weeks ahead and may not be good TOLAC candidate. She reports she will call today  Lab Results   Component Value Date    HGBA1C 5.3 2024     2. Chronic hypertension in pregnancy  Assessment & Plan:  Bps wnl, needs delivery at 38 weeks    3. Multigravida of advanced maternal age in third trimester  4. 36 weeks gestation of pregnancy  Assessment & Plan:  - Labor/Bleeding/ROM precautions given. Kick counts reviewed  - GBS swab collected today.  - Delivery consent reviewed and signed 24 with prior practice. Risks of delivery reviewed, including bleeding, infection, damage to surrounding organs/structures (specifically bowel, bladder, vessels, nerves, ureters), need for operative vaginal delivery or  delivery, hysterectomy, need for blood transfusion, need for further surgery.   - Problem list updated, results console reviewed and updated with pertinent prenatal labs.  - PMH, PSH, medications reviewed and updated as needed  - Return to office in 1 wk(s) for routine prenatal care    5. Encounter for  screening for Streptococcus B  -     Strep B DNA probe, amplification  6. H/O  section        Subjective:   Rupa Curry is a 44 y.o.  who presents for routine prenatal care at 36w3d.  Complaints today: Denies. She is Uzbek speaking and declined . Prefers her son translated.   LOF: -; VB: -; Contractions: -; FM: +    Objective:  /82 (BP Location: Right arm, Patient Position:  "Sitting, Cuff Size: Standard)   Ht 5' 2\" (1.575 m)   Wt 77.6 kg (171 lb)   BMI 31.28 kg/m²     General: Well appearing, no distress  Respiratory: Unlabored breathing  Abdomen: Soft, gravid, nontender  Extremities: Warm and well perfused.  Non tender.    Pregravid Weight/BMI: 64 kg (141 lb) (BMI 25.78)  Current Weight: 77.6 kg (171 lb)   Total Weight Gain: 13.6 kg (30 lb)     Pre-Amaris Vitals      Flowsheet Row Most Recent Value   Prenatal Assessment    Fetal Heart Rate 134   Fundal Height (cm) 38 cm   Movement Present   Presentation Vertex   Prenatal Vitals    Blood Pressure 130/82   Weight - Scale 77.6 kg (171 lb)   Urine Albumin/Glucose    Dilation/Effacement/Station    Vaginal Drainage    Edema              Claudine Johnson DO  2024 11:15 AM     "

## 2024-09-12 NOTE — ASSESSMENT & PLAN NOTE
BS uncontrolled from few results she showed me. Has not been following with [facility]. I encouraged to call and give numbers. I discussed she also needs growth ultrasound ASAP as she is measuring 2 weeks ahead and may not be good TOLAC candidate. She reports she will call today  Lab Results   Component Value Date    HGBA1C 5.3 06/06/2024

## 2024-09-13 NOTE — PROGRESS NOTES
"Follow-Up Visit   (in-person office visit )    Thank you for referring your patient to [facility] Maternal Fetal Medicine Diabetes and Pregnancy Program.     Subjective:     Rupa Curry is a 44 y.o. female who presents today unaccompanied for Patient Education (Follow Up/Diet Review/) and Gestational Diabetes (37w1d). Patient declines [employer]  for Belarusian Translation; Significant other, Tomás, present for visit and provided translation per patient request.    Patient is at 37w1d gestation, Estimated Date of Delivery: 10/7/24.     Reviewed and updated the following from patients medical record:  Allergies, and Current Medications.     Visit Diagnosis:  Encounter Diagnosis     ICD-10-CM    1. Gestational diabetes mellitus (GDM) in third trimester, gestational diabetes method of control unspecified  O24.419       2. 37 weeks gestation of pregnancy  Z3A.37            Current Outpatient Medications  Current Outpatient Medications   Medication Instructions    EPINEPHrine (EPIPEN) 0.3 mg, Intramuscular, Once    famotidine (PEPCID) 20 mg, Oral, 2 times daily    Prenatal Vit-Fe Fumarate-FA (Prenatal Vitamin) 27-0.8 MG TABS 1 tablet, Oral, Daily        Anthropometrics:  Ht Readings from Last 1 Encounters:   09/12/24 5' 2\" (1.575 m)      Wt Readings from Last 3 Encounters:   09/17/24 77.6 kg (171 lb)   09/12/24 77.6 kg (171 lb)   09/10/24 77.1 kg (170 lb)        Pre-Gravid Wt Pre-Gravid BMI TWG   64 kg (141 lb) 25.78 13.6 kg (30 lb)     Total Pregnancy Weight Gain Recommendations: BMI (> 30) 11-20 lbs  Current Wt Status Compared to Recommendations: Exceeding -- Recommended to maintain wt for remainder of pregnancy    Most Recent Ultrasound Results:  Findings: AC: 94%  EFW 95% - 7/11/24  Further Fetal Surveillance: Yes, NST twice weekly and LENORE once weekly starting at 32wk GA d/t insulin controlled GDM - Plans to start patient on insulin per discussion with Dr. Yoo  Next US date:  9/17/24    BLOOD GLUCOSE " MONITORING:   Glucometer: Contour Next EZ     Reinforced at Today's Visit:   Timing/Frequency of SMB x per day (Fasting, 1 hour after start of each meal) - Patient is currently testing blood sugars 7 times daily per Glucose Flowsheet review  Goals: (Fasting) 60-90mg/dL // (1hr PP) <140mg/dL - Patient states that she has been testing blood sugars at times 2 hours after meals; Instructed patient to stick to 1 time frame (patient prefers to test 1 hour after meals)  Reporting Guidelines: Weekly via Phone: [phone] OR My Chart (Message with image attachment) OR Glucose Flowsheet  Method of Reporting: [employer] Glucose Flowsheet    BG LOG:       Wakes up at 7:30am during the week; 10am on weekends; Bedtime at 9-10pm      Review of Blood Glucose Log:   Indicates fair glycemic control  FBG = Not well controlled  Post-Prandial BG =  Few elevations noted after meals    MEDICATIONS:    Due to elevated Fasting Blood Sugars (>90mg/dL),  Start Lantus 16 units once daily at bedtime (9-10pm) - Reviewed glucose flowsheet with Dr. Yoo - Plan approved  Discussed options for GoodRX or Lantus savings program with patient/ to reduce cost of medications; Patient prefers insulin via vial/syringe as used during prior pregnancy. Patient declines insulin pen demo at this time.  Check nighttime glucose (2-3am) for the first 3 days after starting insulin, Record in [employer] Glucose Flowsheet and input insulin dose  Treat any hypoglycemia using the 15:15 rule; Call the office if blood sugars <60mg/dL or >160mg/dL (twice daily).  Alerted  to schedule NST 2 times weekly and LENORE weekly due to new insulin start.  Instructed patient to complete Hemoglobin A1c and CMP as ordered.    INSULIN EDUCATION    The patient was instructed on the following:  Side effects of hyperglycemia in pregnancy including macrosomia,  hypoglycemia, polyhydramnios, pre-term labor and stillbirth.  Insulin Administration Time: Once daily at  "bedtime  Insulin Action: Approx. 24hours   How to Inject Insulin  Injection Sites     Monitoring:  Hypoglycemia signs, symptoms and treatment.   Advised patient to test blood sugar at 3:00 am for first 3 mornings following insulin start to monitor for hypoglycemia  Input in \"Night-time Glucose\" on [employer] Glucose Flowsheet  Increase in maternal-fetal surveillance with insulin initiation.  Non-stress testing twice weekly and LENORE once weekly beginning at 32 weeks gestation.        Continue ultrasounds every 4 weeks at the [facility]  Continue to test blood sugars 4 time daily:   Fasting (goal 60 mg/dl to 90 mg/dl)  2hrs After the START of each meal (goal less than 120 mg/dl).  Labs: HbA1c every 6 to 8 weeks    Reporting Blood Sugars:   Diabetes team will continue to review blood sugars once weekly till delivery  Pt instructed to message diabetes team via [facility] Mobile message every 3-5 days for insulin adjustment if fasting blood sugars remain >90    MEAL PLAN (Patient was provided with a meal plan including 3 meals and 3 snacks at class 1)  *Continue Meal Plan: 1800 calorie (CHO: 45-15-45-15-45-30) (PRO:2-1-3-1-3-2)    Review of Patient's Current Diet:    Eats 3-4 meals/day; 3-4 snacks daily (such as Cookies/Crackers)    Breakfast (9am): Coffee with Whole Milk, 1 tsp sugar, Sausage Egg Cheese Breakfast Sayre on Roll (1/2)  AM Snack:(11:30am) \"Nutrition\" Bar (Patient instructed to send a picture of bar via [facility] Mobile message as an attachment)   Lunch (3pm): 2 cups Chicken Noodle Soup (Carrots, Zucchini, Potato - Broth)  PM Snack (4:30pm): Ice Cream, Protein Bar  Dinner (6:30-7pm): 1/2 Ham Sayre (2 slices White Bread + Ham + Lettuce)  Bedtime Snack: Skipped    Typically eats Meal #1:10am, Meal #2: 3pm, Meal #3: 7pm      Meal Plan Recommendations Compliant? Comments:    Consistent CHO Intake No     3 Meals and 3 Snacks Yes     Protein w/ Every Meal and Snack Yes     Eating every 2-3.5hrs while awake " " Yes     8-10hrs Fasting (from time of bedtime snack until first meal of the day) No  - Exceeding 10hrs Fasting Overnight: Reinforced recommended time frame of (8-10hrs) from time of bedtime snack - Educator discussed the importance of bedtime snack and to avoid fasting over 10 hours. Educator will send patient list of bedtime snack ideas including Chobani Complete     Overall Impression: Pt has a good compliance and understanding of diet recommendations at this time.    Physical Activity:  Currently physically active? Yes, Walking daily    Reviewed w/ Pt:   Benefits of physical activity to optimize blood glucose control, encouraged activity at patient is physically able.   Instructed pt to always consult a physician prior to starting an exercise program.   Recommend 20-30 minutes daily.    Patient Stated Goal: \"I will eat 3 meals and 3 snacks each day, including protein at each\"  Goal Assessment: On track    Diabetes Self Management Support Plan outside of ongoing care: Spouse/Family    Barriers to Learning/Change: No Barriers  Expected Compliance: good    Date to report blood sugars: Weekly   Follow up:  Return As needed pending review of weekly blood sugars.     Begin Time: 8:08am  End Time: 9:38am    It was a pleasure working with them today. Please feel free to call [phone]) with any questions or concerns.    Fozia Rascon   Diabetes Educator  [facility] Maternal Fetal Medicine  Diabetes and Pregnancy Program    "

## 2024-09-13 NOTE — TELEPHONE ENCOUNTER
Received patient via warm transfer with  Lynnette. Appointments scheduled for 9/17 in our [place] office, Diabetes at 8:30 am and growth at 11 am. Encouraged patient to upload sugars into her mychart prior to Tuesdays appointment.

## 2024-09-16 LAB — GP B STREP DNA SPEC QL NAA+PROBE: NEGATIVE

## 2024-09-16 NOTE — PATIENT INSTRUCTIONS
Thank you for choosing us for your  care today.  If you have any questions about your ultrasound or care, please do not hesitate to contact us or your primary obstetrician.      INSULIN EDUCATION    Thank you for attending our insulin education class.     For a quick recap on what you learned today you can review the Bas[employer] educational video for insulin pens. https://www.[employer].Likva/how-to-use-basaglar    Medications are being recommended to decrease the risk of side effects resulting from hyperglycemia in pregnancy including macrosomia,  hypoglycemia, polyhydramnios, pre-term labor and stillbirth.    Insulin Education:  You will receive 5 pens from the pharmacy  Each insulin pen has 300 units in one pen. Can deliver up to 80 units at one time.   When opening a new pen, remove one insulin pen 15 minutes up to 2 hours before administering to bring insulin to room temperature.   Once you open an insulin pen, it is only good for 28 days at room temperature.   Insulin can be titrated every 3-5 days as needed to control blood sugars.  Insulin doses vary as the pregnancy progresses depending on your weekly blood sugars.     Preparing your pen:  Remove pen cap.  Wipe rubber seal with alcohol wipe. Let dry.  Make sure insulin is clear and colorless. Check expiration date. (Do not use if it's cloudy, colored, or had particles or clumps in it.)  Select a new needle each time. Remove tab, connect and twist on top of insulin pen.   Remove outer and inner needle shields that help protect needle. Keep outer shield to recap your used needle once you are done.     Priming, dosing and injecting:  Perform a 2 unit test dose before each injection. Repeat priming steps until you see insulin at tip of needle. (Do not repeat more than 4 times. If after 4 times you do not see insulin at tip of needle, remove and insert a new needle)   Turn your dose knob to the prescribed units. (Do not count clicks, make sure the  number and line matches your prescribed dose)  Choose your site. Remember to rotate injection sites and stay away from stretch marks or scars.   Wipe injection site clean with alcohol and let dry completely.   Insert needle into your skin, press dose knob and inject slowly. Count to 10 before removing needle from skin.     Clean up:  Replace outer needle shield to cover the needle back up. Unscrew capped needle and throw it away in sharps container.  Make sure your sharp container is: heavy duty plastic, puncture-resistant lid, upright and stable, leak resistant, properly labeled.     Hypoglycemia:  Test your blood sugar at 3:00 am for first 3 mornings following insulin start to monitor for hypoglycemia.   Goal range for 2-3 am:  mg/dL.  Treat low blood sugars <60 or <80 using the 15-15 rule  Always have glucose available for hypoglycemia, use 15 by 15 rule. You can find the 15:15 rule in our GDM booklet.     Scheduling:  Non-stress testing two times weekly and LENORE testing beginning at 32 weeks gestation. Call [phone] to schedule if not already scheduled.   Ultrasounds every 4 weeks at the [facility]. Call [phone] to schedule if not already scheduled.     Reporting Blood Sugars:  When adding your blood sugar readings to your glucose flow sheet please ADD UNITS of insulin. This will allow our team see what day you started and when you made any possible adjustments. Important for our team to know in regards to making any changes since it takes 3-5 days to see a difference. We would appreciate this and thank you for all you do to communicate with our team.     Blood Sugar Ranges:  Fastin-90 mg/dL   Before meals:  mg/dL  1 hour after the start of each meal: 140 mg/dL or <   2 hours after start of each meal: 120 mg/dL or <  2-3 am:  mg/dL    Continue Recommendations:  Testing Blood Sugars: 4 times daily (fasting and 1 or 2hrs after the start of each meal) - as  instructed    Reporting Blood Sugars: via [facility] Glucose Flowsheet on a weekly basis until you deliver.  Meal Plan: 3 meals and 3 snacks daily.   Eating every 2 to 3.5 hours during the day.   Be sure to have bedtime snack that includes at least 15 grams of carbohydrates and 2 to 3 servings of protein.  Minimum of total carbohydrates of 175 grams daily.   Carbohydrates always need to be paired with protein.   Physical Activity: If ok by your OB try adding a 20-30 minute walk in evening after dinner to help keep fasting glucose within range.  Continue follow up with OB and MFM as recommended.  Stay in close contact with diabetes education team.   While sick or feeling ill, follow our sick day guidelines in our GDM booklet.   For travel and dining out tips refer to our GDM booklet.    If you have any questions or concerns, please do not hesitate to call us at [phone].      Fozia Rascon   Diabetes Educator  [facility]   at [facility]       Some general instructions for your pregnancy are:    Exercise: Aim for 150 minutes per week of regular exercise.  Walking is great!  Nutrition: Choose healthy sources of calcium, iron, and protein.  Avoid ultraprocessed foods and added sugar.  Learn about Preeclampsia: preeclampsia is a common, potentially serious high blood pressure complication in pregnancy.  A blood pressure of 140mmHg (systolic or top number) or 90mmHg (diastolic or bottom number) should be evaluated by your doctor.  Aspirin is sometimes prescribed in early pregnancy to prevent preeclampsia in women with risk factors - ask your obstetrician if you should be on this medication.  For more resources, visit:  https://www.highriskpregnancyinfo.org/preeclampsia  If you smoke, please try to quit completely but also try to reduce your smoking by as much as possible (as soon as possible).  Do not vape.  Please also avoid cannabis products.  Other  warning signs to watch out for in pregnancy or postpartum: chest pain, obstructed breathing or shortness of breath, seizures, thoughts of hurting yourself or your baby, bleeding, a painful or swollen leg, fever, or headache (see [facility] POST-BIRTH Warning Signs campaign).  If these happen call 911.  Itching is also not normal in pregnancy and if you experience this, especially over your hands and feet, potentially worse at night, notify your doctors.

## 2024-09-17 ENCOUNTER — OFFICE VISIT (OUTPATIENT)
Facility: HOSPITAL | Age: 44
End: 2024-09-17
Payer: COMMERCIAL

## 2024-09-17 ENCOUNTER — APPOINTMENT (OUTPATIENT)
Dept: LAB | Facility: CLINIC | Age: 44
End: 2024-09-17
Payer: COMMERCIAL

## 2024-09-17 ENCOUNTER — ULTRASOUND (OUTPATIENT)
Facility: HOSPITAL | Age: 44
End: 2024-09-17
Payer: COMMERCIAL

## 2024-09-17 VITALS
HEART RATE: 78 BPM | OXYGEN SATURATION: 99 % | BODY MASS INDEX: 30.48 KG/M2 | DIASTOLIC BLOOD PRESSURE: 68 MMHG | WEIGHT: 172 LBS | SYSTOLIC BLOOD PRESSURE: 120 MMHG | HEIGHT: 63 IN

## 2024-09-17 VITALS
DIASTOLIC BLOOD PRESSURE: 72 MMHG | HEART RATE: 66 BPM | WEIGHT: 171 LBS | BODY MASS INDEX: 31.28 KG/M2 | SYSTOLIC BLOOD PRESSURE: 122 MMHG

## 2024-09-17 DIAGNOSIS — O24.419 GESTATIONAL DIABETES MELLITUS (GDM) IN THIRD TRIMESTER, GESTATIONAL DIABETES METHOD OF CONTROL UNSPECIFIED: Primary | ICD-10-CM

## 2024-09-17 DIAGNOSIS — Z3A.37 37 WEEKS GESTATION OF PREGNANCY: ICD-10-CM

## 2024-09-17 DIAGNOSIS — O09.293 HX OF PREECLAMPSIA, PRIOR PREGNANCY, CURRENTLY PREGNANT, THIRD TRIMESTER: ICD-10-CM

## 2024-09-17 DIAGNOSIS — O24.414 INSULIN CONTROLLED GESTATIONAL DIABETES MELLITUS (GDM) IN THIRD TRIMESTER: Primary | ICD-10-CM

## 2024-09-17 DIAGNOSIS — Z36.89 ENCOUNTER FOR ULTRASOUND TO CHECK FETAL GROWTH: ICD-10-CM

## 2024-09-17 DIAGNOSIS — O24.410 DIET CONTROLLED GESTATIONAL DIABETES MELLITUS (GDM) IN THIRD TRIMESTER: ICD-10-CM

## 2024-09-17 DIAGNOSIS — O10.919 CHRONIC HYPERTENSION IN PREGNANCY: ICD-10-CM

## 2024-09-17 DIAGNOSIS — O24.414 INSULIN CONTROLLED GESTATIONAL DIABETES MELLITUS (GDM) IN THIRD TRIMESTER: ICD-10-CM

## 2024-09-17 DIAGNOSIS — O09.523 MULTIGRAVIDA OF ADVANCED MATERNAL AGE IN THIRD TRIMESTER: ICD-10-CM

## 2024-09-17 DIAGNOSIS — Z3A.37 37 WEEKS GESTATION OF PREGNANCY: Primary | ICD-10-CM

## 2024-09-17 LAB
ALBUMIN SERPL BCG-MCNC: 3.4 G/DL (ref 3.5–5)
ALP SERPL-CCNC: 105 U/L (ref 34–104)
ALT SERPL W P-5'-P-CCNC: 6 U/L (ref 7–52)
ANION GAP SERPL CALCULATED.3IONS-SCNC: 7 MMOL/L (ref 4–13)
AST SERPL W P-5'-P-CCNC: 9 U/L (ref 13–39)
BILIRUB SERPL-MCNC: 0.38 MG/DL (ref 0.2–1)
BUN SERPL-MCNC: 7 MG/DL (ref 5–25)
CALCIUM ALBUM COR SERPL-MCNC: 8.8 MG/DL (ref 8.3–10.1)
CALCIUM SERPL-MCNC: 8.3 MG/DL (ref 8.4–10.2)
CHLORIDE SERPL-SCNC: 104 MMOL/L (ref 96–108)
CO2 SERPL-SCNC: 23 MMOL/L (ref 21–32)
CREAT SERPL-MCNC: 0.44 MG/DL (ref 0.6–1.3)
EST. AVERAGE GLUCOSE BLD GHB EST-MCNC: 126 MG/DL
GFR SERPL CREATININE-BSD FRML MDRD: 124 ML/MIN/1.73SQ M
GLUCOSE P FAST SERPL-MCNC: 88 MG/DL (ref 65–99)
HBA1C MFR BLD: 6 %
POTASSIUM SERPL-SCNC: 3.8 MMOL/L (ref 3.5–5.3)
PROT SERPL-MCNC: 6.5 G/DL (ref 6.4–8.4)
SODIUM SERPL-SCNC: 134 MMOL/L (ref 135–147)

## 2024-09-17 PROCEDURE — 76816 OB US FOLLOW-UP PER FETUS: CPT | Performed by: STUDENT IN AN ORGANIZED HEALTH CARE EDUCATION/TRAINING PROGRAM

## 2024-09-17 PROCEDURE — 59025 FETAL NON-STRESS TEST: CPT | Performed by: STUDENT IN AN ORGANIZED HEALTH CARE EDUCATION/TRAINING PROGRAM

## 2024-09-17 PROCEDURE — G0108 DIAB MANAGE TRN  PER INDIV: HCPCS | Performed by: DIETITIAN, REGISTERED

## 2024-09-17 PROCEDURE — 99213 OFFICE O/P EST LOW 20 MIN: CPT | Performed by: STUDENT IN AN ORGANIZED HEALTH CARE EDUCATION/TRAINING PROGRAM

## 2024-09-17 PROCEDURE — 83036 HEMOGLOBIN GLYCOSYLATED A1C: CPT

## 2024-09-17 PROCEDURE — 36415 COLL VENOUS BLD VENIPUNCTURE: CPT

## 2024-09-17 PROCEDURE — 80053 COMPREHEN METABOLIC PANEL: CPT

## 2024-09-17 RX ORDER — INSULIN GLARGINE 100 [IU]/ML
16 INJECTION, SOLUTION SUBCUTANEOUS DAILY
Qty: 10 ML | Refills: 0 | Status: SHIPPED | OUTPATIENT
Start: 2024-09-17 | End: 2024-09-27

## 2024-09-17 RX ORDER — SYRINGE, DISPOSABLE, 1 ML
SYRINGE, EMPTY DISPOSABLE MISCELLANEOUS
Qty: 100 EACH | Refills: 3 | Status: SHIPPED | OUTPATIENT
Start: 2024-09-17

## 2024-09-17 NOTE — PROGRESS NOTES
Non-Stress Testing:    Non-Stress test, equipment, procedure, and expected outcomes explained. Reviewed fetal kick counts and when to call OB.Verified patient understanding of fetal kick counts with teach back method. Patient reports feeling daily fetal movements. Patient has no questions or concerns.     Reviewed non-stress test with Dr. Sierra.     Repeat Non-Stress Testing:    Patient verbalizes +FM. Pt denies ALL:               Leaking of fluid   Contractions   Vaginal bleeding   Decreased fetal movement

## 2024-09-17 NOTE — LETTER
NST sleeve cover sheet    Patient name: Rupa Curry  : 1980  MRN: [id number]    HILLARY: Estimated Date of Delivery: 10/7/24    Obstetrician: raquel    Reason(s) for testing: MARINA BROWN    Testing frequency:    _x__  2x/wk  ___  1x/wk  ___  Dopplers  ___  BPP?      Last growth scan: __________, _________, _________    Baby:      Male   /   Female   /   [place]             Baby Name: ___________________            IOL or  C/S: _____________________

## 2024-09-17 NOTE — PROGRESS NOTES
[facility]  Center: Ms. Oswald Curry was seen today for NST (found under the pregnancy episode) which I reviewed the RN assessment and agree, and fetal growth ultrasound (see ultrasound report under OB procedures tab).         MDM:   I. Diagnoses/Problems addressed:  GDMA2  II.  Data:  glucose log, diabetes note   III.  Risk of morbidity: Low    Please don't hesitate to contact our office with any concerns or questions.  -Alexandria Sierra MD

## 2024-09-19 ENCOUNTER — ROUTINE PRENATAL (OUTPATIENT)
Facility: HOSPITAL | Age: 44
End: 2024-09-19
Payer: COMMERCIAL

## 2024-09-19 ENCOUNTER — TELEPHONE (OUTPATIENT)
Dept: OBGYN CLINIC | Facility: CLINIC | Age: 44
End: 2024-09-19

## 2024-09-19 ENCOUNTER — ROUTINE PRENATAL (OUTPATIENT)
Dept: OBGYN CLINIC | Facility: CLINIC | Age: 44
End: 2024-09-19
Payer: COMMERCIAL

## 2024-09-19 VITALS
SYSTOLIC BLOOD PRESSURE: 118 MMHG | HEIGHT: 63 IN | DIASTOLIC BLOOD PRESSURE: 76 MMHG | BODY MASS INDEX: 30.3 KG/M2 | HEART RATE: 68 BPM | WEIGHT: 171 LBS

## 2024-09-19 VITALS
BODY MASS INDEX: 30.48 KG/M2 | WEIGHT: 172 LBS | SYSTOLIC BLOOD PRESSURE: 120 MMHG | DIASTOLIC BLOOD PRESSURE: 84 MMHG | HEIGHT: 63 IN

## 2024-09-19 DIAGNOSIS — O10.919 CHRONIC HYPERTENSION IN PREGNANCY: Primary | ICD-10-CM

## 2024-09-19 DIAGNOSIS — O09.523 MULTIGRAVIDA OF ADVANCED MATERNAL AGE IN THIRD TRIMESTER: ICD-10-CM

## 2024-09-19 DIAGNOSIS — K21.9 GASTROESOPHAGEAL REFLUX DISEASE, UNSPECIFIED WHETHER ESOPHAGITIS PRESENT: ICD-10-CM

## 2024-09-19 DIAGNOSIS — O24.410 DIET CONTROLLED GESTATIONAL DIABETES MELLITUS (GDM) IN THIRD TRIMESTER: ICD-10-CM

## 2024-09-19 DIAGNOSIS — Z98.891 H/O CESAREAN SECTION: ICD-10-CM

## 2024-09-19 DIAGNOSIS — Z3A.37 37 WEEKS GESTATION OF PREGNANCY: ICD-10-CM

## 2024-09-19 DIAGNOSIS — Z3A.37 37 WEEKS GESTATION OF PREGNANCY: Primary | ICD-10-CM

## 2024-09-19 DIAGNOSIS — O24.414 INSULIN CONTROLLED GESTATIONAL DIABETES MELLITUS (GDM) IN THIRD TRIMESTER: ICD-10-CM

## 2024-09-19 PROCEDURE — 99213 OFFICE O/P EST LOW 20 MIN: CPT | Performed by: OBSTETRICS & GYNECOLOGY

## 2024-09-19 PROCEDURE — 59025 FETAL NON-STRESS TEST: CPT | Performed by: OBSTETRICS & GYNECOLOGY

## 2024-09-19 RX ORDER — FAMOTIDINE 20 MG/1
20 TABLET, FILM COATED ORAL 2 TIMES DAILY
Qty: 60 TABLET | Refills: 5 | Status: SHIPPED | OUTPATIENT
Start: 2024-09-19

## 2024-09-19 NOTE — TELEPHONE ENCOUNTER
----- Message from Claudine Johnson DO sent at 9/19/2024 11:39 AM EDT -----  Maninder Garrisonele,     She needs a c section. She is uncontrolled diabetics, wanted to TOLAC but [facility] recommended against it. Can you call  484 number? She is [place] Island[place] speaking and her partner is Tomás who is on her HIPAA form. She needs  to be delivered between 38-39wks (which is sometime next week). Thanks!

## 2024-09-19 NOTE — TELEPHONE ENCOUNTER
Reason for call:   [x] Refill   [] Prior Auth  [] Other:     Office:   [] PCP/Provider -   [x] Specialty/Provider -    Medication: famotidine (PEPCID) 20 mg  Take 1 tablet (20 mg total) by mouth 2 (two) times a day       Pharmacy: [facility] [place]     Does the patient have enough for 3 days?   [x] Yes   [] No - Send as HP to POD

## 2024-09-19 NOTE — PATIENT INSTRUCTIONS
Thank you for choosing us for your  care today.  If you have any questions about your ultrasound or care, please do not hesitate to contact us or your primary obstetrician.        Some general instructions for your pregnancy are:    Exercise: Aim for 150 minutes per week of regular exercise.  Walking is great!  Nutrition: Choose healthy sources of calcium, iron, and protein.  Avoid ultraprocessed foods and added sugar.  Learn about Preeclampsia: preeclampsia is a common, potentially serious high blood pressure complication in pregnancy.  A blood pressure of 140mmHg (systolic or top number) or 90mmHg (diastolic or bottom number) should be evaluated by your doctor.  Aspirin is sometimes prescribed in early pregnancy to prevent preeclampsia in women with risk factors - ask your obstetrician if you should be on this medication.  For more resources, visit:  https://www.highriskpregnancyinfo.org/preeclampsia  If you smoke, please try to quit completely but also try to reduce your smoking by as much as possible (as soon as possible).  Do not vape.  Please also avoid cannabis products.  Other warning signs to watch out for in pregnancy or postpartum: chest pain, obstructed breathing or shortness of breath, seizures, thoughts of hurting yourself or your baby, bleeding, a painful or swollen leg, fever, or headache (see [facility] POST-BIRTH Warning Signs campaign).  If these happen call 911.  Itching is also not normal in pregnancy and if you experience this, especially over your hands and feet, potentially worse at night, notify your doctors.     Recuento de patadas valeri el embarazo    CUIDADO AMBULATORIO:   El conteo de patadas mide cuánto se mueve tu bebé en tu útero. Jaimie patada de garcia bebé se puede sentir joseph un giro, un giro, un chasquido, o un golpe. Es común sentir las patadas de garcia bebé entre las semanas 26 y 28 de embarazo. Es posible que sienta las patadas de garcia bebé a partir de las 20 semanas de embarazo.  Quizás quieras empezar a contar a las 28 semanas.     Comuníquese con garcia médico inmediatamente si:   Sientes un cambio en la cantidad de patadas o movimientos de tu bebé.    Sientes menos de 10 patadas en 2 horas.   Tiene preguntas o inquietudes sobre los movimientos de garcia bebé.   Por qué medir el número de patadas:  El movimiento de garcia bebé puede proporcionar información sobre garcia savannah. Es posible que se mueva menos o nada en absoluto si hay problemas. Es posible que garcia bebé se mueva menos si no recibe suficiente oxígeno o nutrición de la placenta. No fume mientras esté embarazada. Fumar disminuye la cantidad de oxígeno que llega a garcia bebé. Hable con garcia proveedor de atención médica si necesita ayuda para dejar de fumar. Informe a garcia proveedor de atención médica sanabria pronto joseph sienta un cambio en los movimientos de garcia bebé.    Cuándo medir el conteo de patadas:   Mida el conteo de patadas a la misma hora todos los días.    Mida el conteo de patadas cuando garcia bebé esté despierto y más activo. Garcia bebé puede estar más activo por la noche.  Cómo medir el conteo de patadas: verifique que garcia bebé esté despierto antes de medir el conteo de patadas. Puedes despertar a tu bebé empujándolo ligeramente sobre tu vientre, caminando o bebiendo algo frío. Garcia proveedor de atención médica puede indicarle diferentes formas de medir el conteo de patadas.   Es posible que le indiquen que bernice lo siguiente:  Utilice un gráfico o un reloj para realizar un seguimiento de la hora en que comienza y termina de contar.   Siéntate en jael silla o acuéstate sobre tu lado dalton.   Coloca tus ashley en la parte más christiano de tu vientre.   Cuenta hasta llegar a 10 patadas. Escribe cuánto tiempo lleva contar 10 patadas.   Puede llevar de 30 minutos a 2 horas contar 10 patadas. No debería karen más de 2 horas contar 10 patadas.  Bernice un seguimiento con garcia médico según las indicaciones: escriba antwon preguntas para recordar hacerlas valeri antwon  visitas.     © Copyright [employer] 2023 La información es para uso exclusivo del usuario final y no puede venderse, redistribuirse ni utilizarse de otro modo con fines comerciales.  La información arriba es solo ayuda educacional. No pretende ser un consejo médico para afecciones o tratamientos individuales. Hable con garcia médico, enfermera o farmacéutico antes de seguir cualquier régimen médico para pedro si es seguro y eficaz para usted.

## 2024-09-19 NOTE — PROGRESS NOTES
"Routine Prenatal Visit  [facility] [place]  [address] [place] [address]    Assessment/Plan:  Rupa is a 44 y.o. year old  at 37w3d who presents for routine prenatal visit.     1. Chronic hypertension in pregnancy  Assessment & Plan:  Delivery between 38-39.6wks  2. 37 weeks gestation of pregnancy  3. Diet controlled gestational diabetes mellitus (GDM) in third trimester  Assessment & Plan:  Needed to start insulin. Had not started as she did not see the point in starting. However discussed importance of being on it to help control sugars even if for a few days. EFW >99%, TOLAC not recommended. Message sent to  to schedule c section  Lab Results   Component Value Date    HGBA1C 6.0 (H) 2024     4. Multigravida of advanced maternal age in third trimester  5. H/O  section        Subjective:   Rupa Curry is a 44 y.o.  who presents for routine prenatal care at 37w3d.  Complaints today: Denies. Declined .   LOF: -; VB: -; Contractions: -; FM: +    Objective:  /84 (BP Location: Right arm, Patient Position: Sitting, Cuff Size: Standard)   Ht 5' 3\" (1.6 m)   Wt 78 kg (172 lb)   BMI 30.47 kg/m²     General: Well appearing, no distress  Respiratory: Unlabored breathing  Abdomen: Soft, gravid, nontender  Extremities: Warm and well perfused.  Non tender.    Pregravid Weight/BMI: 64 kg (141 lb) (BMI 24.98)  Current Weight: 78 kg (172 lb)   Total Weight Gain: 14.1 kg (31 lb)     Pre-Amaris Vitals      Flowsheet Row Most Recent Value   Prenatal Assessment    Fetal Heart Rate 154   Movement Present   Prenatal Vitals    Blood Pressure 120/84   Weight - Scale 78 kg (172 lb)   Urine Albumin/Glucose    Dilation/Effacement/Station    Vaginal Drainage    Edema              Claudine Johnson DO  2024 1:05 PM     "

## 2024-09-19 NOTE — ASSESSMENT & PLAN NOTE
Needed to start insulin. Had not started as she did not see the point in starting. However discussed importance of being on it to help control sugars even if for a few days. EFW >99%, TOLAC not recommended. Message sent to  to schedule c section  Lab Results   Component Value Date    HGBA1C 6.0 (H) 09/17/2024

## 2024-09-19 NOTE — PROGRESS NOTES
Repeat Non-Stress Testing:    Patient verbalizes +FM. Pt denies ALL:               Leaking of fluid   Contractions   Vaginal bleeding   Decreased fetal movement    Patient is performing daily kick counts. Patient has no questions or concerns.   NST strip reviewed by Dr. Yoo.

## 2024-09-20 ENCOUNTER — PATIENT OUTREACH (OUTPATIENT)
Dept: OBGYN CLINIC | Facility: CLINIC | Age: 44
End: 2024-09-20

## 2024-09-20 NOTE — PROGRESS NOTES
RAFA SOTO spoke with Pt for follow up. Per chart reviewed was noted that Pt changed practice and is in [facility] now. Pt stated she was told that in order to deliver at [facility] she needed to transferred care. RAFA SOTO informed Pt that due to the change she can not be her SW any longer. Pt verbalized understanding and thank RAFA SOTO for the support.

## 2024-09-24 ENCOUNTER — ULTRASOUND (OUTPATIENT)
Dept: PERINATAL CARE | Facility: OTHER | Age: 44
End: 2024-09-24
Payer: COMMERCIAL

## 2024-09-24 ENCOUNTER — TELEPHONE (OUTPATIENT)
Age: 44
End: 2024-09-24

## 2024-09-24 VITALS
SYSTOLIC BLOOD PRESSURE: 122 MMHG | BODY MASS INDEX: 30.41 KG/M2 | DIASTOLIC BLOOD PRESSURE: 78 MMHG | HEART RATE: 90 BPM | WEIGHT: 171.6 LBS | HEIGHT: 63 IN

## 2024-09-24 DIAGNOSIS — Z3A.38 38 WEEKS GESTATION OF PREGNANCY: ICD-10-CM

## 2024-09-24 DIAGNOSIS — O24.414 INSULIN CONTROLLED GESTATIONAL DIABETES MELLITUS (GDM) IN THIRD TRIMESTER: Primary | ICD-10-CM

## 2024-09-24 DIAGNOSIS — O09.523 MULTIGRAVIDA OF ADVANCED MATERNAL AGE IN THIRD TRIMESTER: ICD-10-CM

## 2024-09-24 DIAGNOSIS — O10.919 CHRONIC HYPERTENSION IN PREGNANCY: ICD-10-CM

## 2024-09-24 PROCEDURE — 59025 FETAL NON-STRESS TEST: CPT | Performed by: OBSTETRICS & GYNECOLOGY

## 2024-09-24 PROCEDURE — 76815 OB US LIMITED FETUS(S): CPT | Performed by: OBSTETRICS & GYNECOLOGY

## 2024-09-24 PROCEDURE — 99212 OFFICE O/P EST SF 10 MIN: CPT | Performed by: OBSTETRICS & GYNECOLOGY

## 2024-09-24 PROCEDURE — NC001 PR NO CHARGE: Performed by: OBSTETRICS & GYNECOLOGY

## 2024-09-24 NOTE — PROGRESS NOTES
For  tomorrow.  Started on insulin 4 days ago with improvement seen in her fasting blood sugar.  Monitoring only 1-2 sugars daily. NST is reactive.  She was increased to 20 units on 2024 to be taken before bedtime.  Since she is for a  tomorrow she will only take half of her dose.  This was explained using interpretation to her and she is also aware to be n.p.o. tomorrow morning.  She will need a 6-week 2-hour postprandial blood sugar scheduled for postpartum.    Pre visit time reviewing her records 5 minutes  Face to face time 5 minutes  Post visit time on documentation of note, updating her problem list, adding orders and prescriptions 5 minutes.  Procedures that were completed today were charged separately.   The level of decision making was straight forward    [place] Blunt

## 2024-09-24 NOTE — ASSESSMENT & PLAN NOTE
- considered cHTN, no medication in pregnancy  - h/o pre-eclampsia last pregnancy with delivery at 33.6wks  - monitor closely postpartum

## 2024-09-24 NOTE — ASSESSMENT & PLAN NOTE
- Started on insulin 4 days ago at 37 weeks with improvement seen in her fasting blood sugar. Monitoring only 1-2 sugars daily.    -  testing at [facility] reactive   - She was increased to 20 units on 2024 to be taken before bedtime.    - for a  tomorrow she will only take half of her dose.    - She will need a 6-week 2-hour postprandial blood sugar scheduled for postpartum.     Lab Results   Component Value Date    HGBA1C 6.0 (H) 2024

## 2024-09-24 NOTE — H&P
History & Physical - OB/GYN   Rupa DONIS Curry 44 y.o. female MRN: [id number]  Unit/Bed#:  Encounter: [id number]    44 y.o. yo  at 38w1d with HILLARY of 10/7/2024, by Ultrasound.  She will be 38w2d at the time of her scheduled .    She is a patient of [facility].    Chief complaint:  Scheduled     HPI:  44 y.o. yo  at 38w1d with Estimated Date of Delivery: 10/7/24 for  history prior LTCS, suspected macrosomia and chronic HTN .      Pregnancy Complications:  Pregnancy Problems (from 24 to present)       Problem Noted Resolved    H/O  section 2024 by Claudine MCKEON DO No    Overview Signed 2024 11:14 AM by Claudine MCKEON DO     Desires TOLAC         AMA (advanced maternal age) multigravida 35+ 2024 by ISIDRO Betancourt No    Overview Signed 2024  2:07 PM by ISIDRO Betancourt     Initiation of fetal monitoring with weekly NST/AFIs at 32 weeks for AMA and additional indication of hx severe preeclampsia with inidcated  birth.  IOL at [facility] if she is not yet delivered.            Insulin controlled gestational diabetes mellitus (GDM) in third trimester 2024 by Cherie Clinton MD No    Overview Addendum 2024 10:17 AM by Florida Blunt MD     Early 1hr  hgba1c 5.3 baseline  Started on insulin 4 days ago at 37 weeks with improvement seen in her fasting blood sugar.  Monitoring only 1-2 sugars daily. NST is reactive.  She was increased to 20 units on 2024 to be taken before bedtime.  Since she is for a  tomorrow she will only take half of her dose. She will need a 6-week 2-hour postprandial blood sugar scheduled for postpartum.           Chronic hypertension in pregnancy 3/28/2024 by Niurka Steel MD No    Overview Addendum 2024  9:09 AM by Cherie Clinton MD     Elevated BP on 24 129/91, on 24 /102  Suspect underlying chronic hypertension  Baseline preE labs   wnl, P:C 0.22         38 weeks gestation of pregnancy 2024 by Angelita Araya MD No    Overview Addendum 2024 10:27 AM by Ksenia Fajardo MD     Overview:  Labs  Pap smear NILM   and GC/CT neg  Prenatal panel wnl except bacteruria, repeat urine culture wnl   MSAFP screen negative  Genetic screening declines  28w labs - CBC/RPR wnl; met criteria for GDM with 1h  mg/dL  Vaccines:  Covid vaccine: 3 doses  Tdap vaccine: Given 24  Contraception: Mirena IUD in office  Feeding plan: breast   Birth plan: TOLAC, no epidural at [facility]  Iris prefers to deliver at [facility]. Discussed that this practice does not deliver there but will continue to care for her regardless of her delivery location. She had a bad experience at [facility] and will not report for care there.   Delivery consent: signed 24  Ultrasounds: 3/28/24: NT wnl, anterior placenta  24, 20w3d, nml fetal growth, cephalic, anterior placenta  24, 23w3d, nml fetal echo, variable presentation, anterior placenta  24, 27w3d, nml fetal growth, anterior placenta  Growth US scheduled for 8/15/24  RTO in 2w for routine PNV                     PMH:  Past Medical History:   Diagnosis Date    Elderly multigravida in third trimester 1/15/2020    Encounter for injection education 3/3/2020    Hyperglycemia during pregnancy 3/3/2020    Postpartum hemorrhage, history 2020    Prediabetes 2015    Preeclampsia, severe, third trimester 2022    Chronic HTN 2020       PSH:  Past Surgical History:   Procedure Laterality Date    [place]  DELIVERY ONLY N/A 2022    Procedure:  SECTION ();  Surgeon: Mena Anguiano MD;  Location: [facility];  Service: Obstetrics    TOOTH EXTRACTION         Social Hx:  Social History     Tobacco Use    Smoking status: Never    Smokeless tobacco: Never   Vaping Use    Vaping status: Never Used   Substance Use Topics    Alcohol use: No     Comment: pt reports  social drinking once per month prior to pregnancy    Drug use: No        OB Hx:  OB History    Para Term  AB Living   7 6 5 1 0 6   SAB IAB Ectopic Multiple Live Births   0 0 0 0 6      # Outcome Date GA Lbr Brandt/2nd Weight Sex Type Anes PTL Lv   7 Current            6  22 33w6d  1870 g (4 lb 2 oz) F CS-LTranv Spinal  CLEO      Birth Comments: Neonatologist present at birth, baby transfered to NICU      Name: SANDIP FRIEDMANBABY GIRL (IRIS)      Apgar1: 8  Apgar5: 8   5 Term 20 39w1d / 00:03 3525 g (7 lb 12.3 oz) F Vag-Spont None N CLEO      Complications: Other Excessive Bleeding      Name: SUGEYBABY GIRL (SCAR)      Apgar1: 9  Apgar5: 9   4 Term 14 40w0d   M Vag-Spont EPI N CLEO      Birth Comments: GDM noncompliant    3 Term 06/10/08 39w0d  3118 g (6 lb 14 oz) M Vag-Spont  N CLEO      Birth Comments: oligo induced   2 Term 06 40w0d  3374 g (7 lb 7 oz) F Vag-Spont None N CLEO   1 Term 00 40w0d   M Vag-Spont  N CLEO       Meds:  No current facility-administered medications on file prior to encounter.     Current Outpatient Medications on File Prior to Encounter   Medication Sig Dispense Refill    EPINEPHrine (EPIPEN) 0.3 mg/0.3 mL SOAJ Inject 0.3 mL (0.3 mg total) into a muscle once for 1 dose 0.6 mL 0    famotidine (PEPCID) 20 mg tablet Take 1 tablet (20 mg total) by mouth 2 (two) times a day 60 tablet 5    insulin glargine (Lantus) 100 units/mL subcutaneous injection Inject 16 Units under the skin daily (Patient not taking: Reported on 2024) 10 mL 0    Insulin Syringes, Disposable, (B-D INSULIN SYRINGE 1CC) U-100 1 ML MISC Use Daily at Bedtime (Patient not taking: Reported on 2024) 100 each 3    Prenatal Vit-Fe Fumarate-FA (Prenatal Vitamin) 27-0.8 MG TABS Take 1 tablet by mouth in the morning 90 tablet 4       Allergies:  Allergies   Allergen Reactions    Dog Epithelium Allergic Rhinitis    Pollen Extract Allergic Rhinitis       Labs:  ABO Grouping  "  Date Value Ref Range Status   03/11/2024 O  Final   08/21/2014 CANCELED - Canc (U)  Corrected     Comment:     CANCELED - Cancel notice received from [facility]      Rh Factor   Date Value Ref Range Status   03/11/2024 Positive  Final   08/21/2014 CANCELED - Canc (U)  Corrected     Comment:     CANCELED - Cancel notice received from [facility]      Rh Factor   Date Value Ref Range Status   03/11/2024 Positive  Final   08/21/2014 CANCELED - Canc (U)  Corrected     Comment:     CANCELED - Cancel notice received from [facility]     HIV-1/HIV-2 Ab   Date Value Ref Range Status   05/26/2022 Non-Reactive Non-Reactive Final     HEPATITIS B SURFACE ANTIGEN   Date Value Ref Range Status   03/01/2014 Non-Reactive (q Nonreactive (NR),Nonreactive- confirmed (NR) Final     Comment:     Non-Reactive (qualifier value)  The above 1 analytes were performed by [facility]  [address] [place] [address],[place] [address]       Hepatitis B Surface Ag   Date Value Ref Range Status   03/11/2024 Non-reactive Non-Reactive Final     Hepatitis C Ab   Date Value Ref Range Status   03/11/2024 Non-reactive Non-Reactive Final     RPR   Date Value Ref Range Status   10/27/2022 Non-Reactive Non-Reactive Final   08/21/2014 Non-Reactive (q Nonreactive Final     Comment:     Non-Reactive (qualifier value)  The above 1 analytes were performed by [facility]  [address] [place] [address],[place] [address]       No results found for: \"EXTRUBELIGGQ\"  GLUCOSE 1 HR 50 GM GLUC CHALLENGE-PREG PTS   Date Value Ref Range Status   06/16/2014 131 mg/dL Final     Comment:     <=134 Normal   135-179 Impaired fasting glucose- perform 3 hour Glucose Tolerance  >=180 Diagnosis Gestational Diabetes Mellitus  The above 1 analytes were performed by [facility]  [address] [place] [address],[place] [address]       Glucose   Date Value Ref Range Status   05/21/2024 219 (H) 70 - 134 mg/dL Final     Comment:     <=134 Normal   135-179 Impaired glucose fasting. Perform 3 Hour " "Glucose Tolerance   >=180 Diagnosis Gestational Diabetes Mellitus     No results found for: \"UCBYYGL4YT\"  Strep Grp B PCR   Date Value Ref Range Status   2024 Negative Negative Final     Comment:             Physical Exam:  Vitals and physical exam will be done day of admission      Assessment:   44 y.o.  at 38w1d weeks presents for repeat  due to  history prior , cHTN, suspected macrosomia.  She will be 38w2d at the time of her scheduled .    Plan:   1. Admit to L&D  2. Place IV and IV fluids  3. Labs: CBC, RPR, type and screen  4. Continue NPO  5. Fetal monitoring and toco  6. Anesthesia evaluation  7. Preop Antibiotics ordered    Insulin controlled gestational diabetes mellitus (GDM) in third trimester  - Started on insulin 4 days ago at 37 weeks with improvement seen in her fasting blood sugar. Monitoring only 1-2 sugars daily.    -  testing at [facility] reactive   - She was increased to 20 units on 2024 to be taken before bedtime.    - for a  tomorrow she will only take half of her dose.    - She will need a 6-week 2-hour postprandial blood sugar scheduled for postpartum.     Lab Results   Component Value Date    HGBA1C 6.0 (H) 2024       H/O  section  - h/o prior .  Was considering TOLAC but not recommended due to macrosomia on 37 week growth u/s - 2024 EFW 4159gr.  - scheduled at 38 weeks due to cHTN not on medication    Chronic hypertension in pregnancy  - considered cHTN, no medication in pregnancy  - h/o pre-eclampsia last pregnancy with delivery at 33.6wks  - monitor closely postpartum           Andria Cool MD  "

## 2024-09-24 NOTE — LETTER
NST sleeve cover sheet    Patient name: Rupa Curry  : 1980  MRN: [id number]    HILLARY: Estimated Date of Delivery: 10/7/24    Obstetrician: ***    Reason(s) for testing: A2GDM (poor control), cHTN (no meds)    Testing frequency:    _x_  2x/wk  ___  1x/wk  ___  Dopplers  ___  BPP?      Last growth scan: __, __, _________    Baby:      Male   /   Female   /   Hymera             Baby Name: ___________________            IOL or  C/S: _____________________

## 2024-09-24 NOTE — TELEPHONE ENCOUNTER
Pt's spouse, Tomás, called to communicate he is waiting for a phone call to confirm the time for his wife's scheduled induction tomorrow. Tomás communicated his phone is not currently receiving calls, but confirmed pt's phone is active ([phone]), however, she will require a . Tomás also provided his work phone number, if needed.    [phone]

## 2024-09-24 NOTE — ASSESSMENT & PLAN NOTE
- h/o prior .  Was considering TOLAC but not recommended due to macrosomia on 37 week growth u/s - 2024 EFW 4159gr.  - scheduled at 38 weeks due to cHTN not on medication

## 2024-09-24 NOTE — LETTER
2024     Angelita Araya MD  [address]  [address]  [place] [address]    Patient: Rupa Curry   YOB: 1980   Date of Visit: 2024       Dear Dr. Araya:    Thank you for referring Rupa Curry to me for evaluation. Below are my notes for this consultation.    If you have questions, please do not hesitate to call me. I look forward to following your patient along with you.         Sincerely,        Florida Blunt MD        CC: No Recipients    Florida Blunt MD  2024 10:18 AM  Sign when Signing Visit  For  tomorrow.  Started on insulin 4 days ago with improvement seen in her fasting blood sugar.  Monitoring only 1-2 sugars daily. NST is reactive.  She was increased to 20 units on 2024 to be taken before bedtime.  Since she is for a  tomorrow she will only take half of her dose.  This was explained using interpretation to her and she is also aware to be n.p.o. tomorrow morning.  She will need a 6-week 2-hour postprandial blood sugar scheduled for postpartum.    Pre visit time reviewing her records 5 minutes  Face to face time 5 minutes  Post visit time on documentation of note, updating her problem list, adding orders and prescriptions 5 minutes.  Procedures that were completed today were charged separately.   The level of decision making was straight forward    [place] Blunt

## 2024-09-25 ENCOUNTER — ANESTHESIA EVENT (INPATIENT)
Dept: LABOR AND DELIVERY | Facility: HOSPITAL | Age: 44
DRG: 540 | End: 2024-09-25
Payer: COMMERCIAL

## 2024-09-25 ENCOUNTER — HOSPITAL ENCOUNTER (INPATIENT)
Facility: HOSPITAL | Age: 44
LOS: 2 days | Discharge: HOME | DRG: 540 | End: 2024-09-27
Attending: OBSTETRICS & GYNECOLOGY | Admitting: OBSTETRICS & GYNECOLOGY
Payer: COMMERCIAL

## 2024-09-25 DIAGNOSIS — Z98.891 H/O CESAREAN SECTION: Primary | ICD-10-CM

## 2024-09-25 DIAGNOSIS — D64.9 ANEMIA: ICD-10-CM

## 2024-09-25 LAB
ALBUMIN SERPL BCG-MCNC: 3.3 G/DL (ref 3.5–5)
ALP SERPL-CCNC: 116 U/L (ref 34–104)
ALT SERPL W P-5'-P-CCNC: 5 U/L (ref 7–52)
ANION GAP SERPL CALCULATED.3IONS-SCNC: 9 MMOL/L (ref 4–13)
AST SERPL W P-5'-P-CCNC: 10 U/L (ref 13–39)
BASE EXCESS BLDCOA CALC-SCNC: -2.5 MMOL/L (ref 3–11)
BASE EXCESS BLDCOV CALC-SCNC: -3.3 MMOL/L (ref 1–9)
BILIRUB SERPL-MCNC: 0.35 MG/DL (ref 0.2–1)
BLD GP AB SCN SERPL QL: NEGATIVE
BUN SERPL-MCNC: 7 MG/DL (ref 5–25)
CALCIUM ALBUM COR SERPL-MCNC: 8.6 MG/DL (ref 8.3–10.1)
CALCIUM SERPL-MCNC: 8 MG/DL (ref 8.4–10.2)
CHLORIDE SERPL-SCNC: 107 MMOL/L (ref 96–108)
CO2 SERPL-SCNC: 20 MMOL/L (ref 21–32)
CREAT SERPL-MCNC: 0.4 MG/DL (ref 0.6–1.3)
DME PARACHUTE DELIVERY DATE REQUESTED: NORMAL
DME PARACHUTE ITEM DESCRIPTION: NORMAL
DME PARACHUTE ORDER STATUS: NORMAL
DME PARACHUTE SUPPLIER NAME: NORMAL
DME PARACHUTE SUPPLIER PHONE: NORMAL
ERYTHROCYTE [DISTWIDTH] IN BLOOD BY AUTOMATED COUNT: 14.6 % (ref 11.6–15.1)
GFR SERPL CREATININE-BSD FRML MDRD: 128 ML/MIN/1.73SQ M
GLUCOSE SERPL-MCNC: 102 MG/DL (ref 65–140)
HCO3 BLDCOA-SCNC: 25 MMOL/L (ref 17.3–27.3)
HCO3 BLDCOV-SCNC: 23.3 MMOL/L (ref 12.2–28.6)
HCT VFR BLD AUTO: 33.4 % (ref 34.8–46.1)
HGB BLD-MCNC: 10.7 G/DL (ref 11.5–15.4)
HOLD SPECIMEN: NORMAL
MCH RBC QN AUTO: 24.8 PG (ref 26.8–34.3)
MCHC RBC AUTO-ENTMCNC: 32 G/DL (ref 31.4–37.4)
MCV RBC AUTO: 77 FL (ref 82–98)
O2 CT VFR BLDCOA CALC: 9.7 ML/DL
OXYHGB MFR BLDCOA: 43 %
OXYHGB MFR BLDCOV: 50.2 %
PCO2 BLDCOA: 53.6 MM[HG] (ref 30–60)
PCO2 BLDCOV: 47.1 MM HG (ref 27–43)
PH BLDCOA: 7.29 [PH] (ref 7.23–7.43)
PH BLDCOV: 7.31 [PH] (ref 7.19–7.49)
PLATELET # BLD AUTO: 370 THOUSANDS/UL (ref 149–390)
PMV BLD AUTO: 9.3 FL (ref 8.9–12.7)
PO2 BLDCOA: 21.7 MM HG (ref 5–25)
PO2 BLDCOV: 20.5 MM HG (ref 15–45)
POTASSIUM SERPL-SCNC: 3.8 MMOL/L (ref 3.5–5.3)
PROT SERPL-MCNC: 6.5 G/DL (ref 6.4–8.4)
RBC # BLD AUTO: 4.32 MILLION/UL (ref 3.81–5.12)
SAO2 % BLDCOV: 11.1 ML/DL
SODIUM SERPL-SCNC: 136 MMOL/L (ref 135–147)
SPECIMEN EXPIRATION DATE: NORMAL
TREPONEMA PALLIDUM IGG+IGM AB [PRESENCE] IN SERUM OR PLASMA BY IMMUNOASSAY: NORMAL
WBC # BLD AUTO: 7.13 THOUSAND/UL (ref 4.31–10.16)

## 2024-09-25 PROCEDURE — 94762 N-INVAS EAR/PLS OXIMTRY CONT: CPT

## 2024-09-25 PROCEDURE — 86900 BLOOD TYPING SEROLOGIC ABO: CPT | Performed by: OBSTETRICS & GYNECOLOGY

## 2024-09-25 PROCEDURE — 86780 TREPONEMA PALLIDUM: CPT | Performed by: OBSTETRICS & GYNECOLOGY

## 2024-09-25 PROCEDURE — 4A1HXCZ MONITORING OF PRODUCTS OF CONCEPTION, CARDIAC RATE, EXTERNAL APPROACH: ICD-10-PCS | Performed by: OBSTETRICS & GYNECOLOGY

## 2024-09-25 PROCEDURE — 88307 TISSUE EXAM BY PATHOLOGIST: CPT | Performed by: PATHOLOGY

## 2024-09-25 PROCEDURE — 86850 RBC ANTIBODY SCREEN: CPT | Performed by: OBSTETRICS & GYNECOLOGY

## 2024-09-25 PROCEDURE — 85027 COMPLETE CBC AUTOMATED: CPT | Performed by: OBSTETRICS & GYNECOLOGY

## 2024-09-25 PROCEDURE — 59514 CESAREAN DELIVERY ONLY: CPT | Performed by: OBSTETRICS & GYNECOLOGY

## 2024-09-25 PROCEDURE — 86901 BLOOD TYPING SEROLOGIC RH(D): CPT | Performed by: OBSTETRICS & GYNECOLOGY

## 2024-09-25 PROCEDURE — 80053 COMPREHEN METABOLIC PANEL: CPT | Performed by: OBSTETRICS & GYNECOLOGY

## 2024-09-25 PROCEDURE — 82805 BLOOD GASES W/O2 SATURATION: CPT | Performed by: OBSTETRICS & GYNECOLOGY

## 2024-09-25 RX ORDER — KETOROLAC TROMETHAMINE 30 MG/ML
30 INJECTION, SOLUTION INTRAMUSCULAR; INTRAVENOUS EVERY 6 HOURS
Status: COMPLETED | OUTPATIENT
Start: 2024-09-25 | End: 2024-09-26

## 2024-09-25 RX ORDER — METOCLOPRAMIDE HYDROCHLORIDE 5 MG/ML
5 INJECTION INTRAMUSCULAR; INTRAVENOUS EVERY 6 HOURS PRN
Status: DISCONTINUED | OUTPATIENT
Start: 2024-09-25 | End: 2024-09-26

## 2024-09-25 RX ORDER — NALOXONE HYDROCHLORIDE 0.4 MG/ML
0.1 INJECTION, SOLUTION INTRAMUSCULAR; INTRAVENOUS; SUBCUTANEOUS
Status: DISCONTINUED | OUTPATIENT
Start: 2024-09-25 | End: 2024-09-26

## 2024-09-25 RX ORDER — FAMOTIDINE 20 MG/1
20 TABLET, FILM COATED ORAL 2 TIMES DAILY PRN
Status: DISCONTINUED | OUTPATIENT
Start: 2024-09-25 | End: 2024-09-27 | Stop reason: HOSPADM

## 2024-09-25 RX ORDER — SODIUM CHLORIDE, SODIUM LACTATE, POTASSIUM CHLORIDE, CALCIUM CHLORIDE 600; 310; 30; 20 MG/100ML; MG/100ML; MG/100ML; MG/100ML
125 INJECTION, SOLUTION INTRAVENOUS CONTINUOUS
Status: DISCONTINUED | OUTPATIENT
Start: 2024-09-25 | End: 2024-09-26

## 2024-09-25 RX ORDER — HYDROMORPHONE HCL IN WATER/PF 6 MG/30 ML
0.2 PATIENT CONTROLLED ANALGESIA SYRINGE INTRAVENOUS
Status: DISCONTINUED | OUTPATIENT
Start: 2024-09-25 | End: 2024-09-26

## 2024-09-25 RX ORDER — CEFAZOLIN SODIUM 2 G/50ML
2000 SOLUTION INTRAVENOUS ONCE
Status: COMPLETED | OUTPATIENT
Start: 2024-09-25 | End: 2024-09-25

## 2024-09-25 RX ORDER — FENTANYL CITRATE 50 UG/ML
INJECTION, SOLUTION INTRAMUSCULAR; INTRAVENOUS AS NEEDED
Status: DISCONTINUED | OUTPATIENT
Start: 2024-09-25 | End: 2024-09-25

## 2024-09-25 RX ORDER — SIMETHICONE 80 MG
80 TABLET,CHEWABLE ORAL 4 TIMES DAILY PRN
Status: DISCONTINUED | OUTPATIENT
Start: 2024-09-25 | End: 2024-09-27 | Stop reason: HOSPADM

## 2024-09-25 RX ORDER — OXYTOCIN/RINGER'S LACTATE 30/500 ML
PLASTIC BAG, INJECTION (ML) INTRAVENOUS AS NEEDED
Status: DISCONTINUED | OUTPATIENT
Start: 2024-09-25 | End: 2024-09-25

## 2024-09-25 RX ORDER — DOCUSATE SODIUM 100 MG/1
100 CAPSULE, LIQUID FILLED ORAL 2 TIMES DAILY
Status: DISCONTINUED | OUTPATIENT
Start: 2024-09-25 | End: 2024-09-27 | Stop reason: HOSPADM

## 2024-09-25 RX ORDER — HYDROMORPHONE HCL/PF 1 MG/ML
0.5 SYRINGE (ML) INJECTION EVERY 2 HOUR PRN
Status: DISCONTINUED | OUTPATIENT
Start: 2024-09-25 | End: 2024-09-26

## 2024-09-25 RX ORDER — ONDANSETRON 2 MG/ML
4 INJECTION INTRAMUSCULAR; INTRAVENOUS EVERY 6 HOURS PRN
Status: DISCONTINUED | OUTPATIENT
Start: 2024-09-25 | End: 2024-09-26

## 2024-09-25 RX ORDER — ONDANSETRON 2 MG/ML
4 INJECTION INTRAMUSCULAR; INTRAVENOUS EVERY 8 HOURS PRN
Status: DISCONTINUED | OUTPATIENT
Start: 2024-09-26 | End: 2024-09-27 | Stop reason: HOSPADM

## 2024-09-25 RX ORDER — ONDANSETRON 2 MG/ML
4 INJECTION INTRAMUSCULAR; INTRAVENOUS EVERY 8 HOURS PRN
Status: DISCONTINUED | OUTPATIENT
Start: 2024-09-25 | End: 2024-09-25

## 2024-09-25 RX ORDER — ONDANSETRON 2 MG/ML
4 INJECTION INTRAMUSCULAR; INTRAVENOUS ONCE AS NEEDED
Status: DISCONTINUED | OUTPATIENT
Start: 2024-09-25 | End: 2024-09-27 | Stop reason: HOSPADM

## 2024-09-25 RX ORDER — OXYTOCIN/RINGER'S LACTATE 30/500 ML
125 PLASTIC BAG, INJECTION (ML) INTRAVENOUS ONCE
Status: COMPLETED | OUTPATIENT
Start: 2024-09-25 | End: 2024-09-25

## 2024-09-25 RX ORDER — DEXAMETHASONE SODIUM PHOSPHATE 10 MG/ML
INJECTION, SOLUTION INTRAMUSCULAR; INTRAVENOUS AS NEEDED
Status: DISCONTINUED | OUTPATIENT
Start: 2024-09-25 | End: 2024-09-25

## 2024-09-25 RX ORDER — BISACODYL 10 MG
10 SUPPOSITORY, RECTAL RECTAL DAILY PRN
Status: DISCONTINUED | OUTPATIENT
Start: 2024-09-25 | End: 2024-09-27 | Stop reason: HOSPADM

## 2024-09-25 RX ORDER — ACETAMINOPHEN 325 MG/1
650 TABLET ORAL EVERY 6 HOURS SCHEDULED
Status: DISCONTINUED | OUTPATIENT
Start: 2024-09-25 | End: 2024-09-25

## 2024-09-25 RX ORDER — BUPIVACAINE HYDROCHLORIDE 7.5 MG/ML
INJECTION, SOLUTION INTRASPINAL AS NEEDED
Status: DISCONTINUED | OUTPATIENT
Start: 2024-09-25 | End: 2024-09-25

## 2024-09-25 RX ORDER — MAGNESIUM HYDROXIDE/ALUMINUM HYDROXICE/SIMETHICONE 120; 1200; 1200 MG/30ML; MG/30ML; MG/30ML
15 SUSPENSION ORAL EVERY 6 HOURS PRN
Status: DISCONTINUED | OUTPATIENT
Start: 2024-09-25 | End: 2024-09-27 | Stop reason: HOSPADM

## 2024-09-25 RX ORDER — DIPHENHYDRAMINE HYDROCHLORIDE 50 MG/ML
25 INJECTION INTRAMUSCULAR; INTRAVENOUS EVERY 6 HOURS PRN
Status: DISCONTINUED | OUTPATIENT
Start: 2024-09-25 | End: 2024-09-27 | Stop reason: HOSPADM

## 2024-09-25 RX ORDER — NALBUPHINE HYDROCHLORIDE 10 MG/ML
5 INJECTION, SOLUTION INTRAMUSCULAR; INTRAVENOUS; SUBCUTANEOUS
Status: DISCONTINUED | OUTPATIENT
Start: 2024-09-25 | End: 2024-09-26

## 2024-09-25 RX ORDER — MORPHINE SULFATE 0.5 MG/ML
INJECTION, SOLUTION EPIDURAL; INTRATHECAL; INTRAVENOUS AS NEEDED
Status: DISCONTINUED | OUTPATIENT
Start: 2024-09-25 | End: 2024-09-25

## 2024-09-25 RX ORDER — FENTANYL CITRATE/PF 50 MCG/ML
25 SYRINGE (ML) INJECTION
Status: DISCONTINUED | OUTPATIENT
Start: 2024-09-25 | End: 2024-09-27 | Stop reason: HOSPADM

## 2024-09-25 RX ORDER — TRANEXAMIC ACID 10 MG/ML
INJECTION, SOLUTION INTRAVENOUS AS NEEDED
Status: DISCONTINUED | OUTPATIENT
Start: 2024-09-25 | End: 2024-09-25

## 2024-09-25 RX ORDER — IBUPROFEN 600 MG/1
600 TABLET, FILM COATED ORAL EVERY 6 HOURS PRN
Qty: 90 TABLET | Refills: 0 | Status: SHIPPED | OUTPATIENT
Start: 2024-09-26

## 2024-09-25 RX ORDER — IBUPROFEN 600 MG/1
600 TABLET, FILM COATED ORAL EVERY 6 HOURS SCHEDULED
Status: DISCONTINUED | OUTPATIENT
Start: 2024-09-26 | End: 2024-09-27 | Stop reason: HOSPADM

## 2024-09-25 RX ORDER — ACETAMINOPHEN 325 MG/1
650 TABLET ORAL EVERY 6 HOURS SCHEDULED
Status: DISCONTINUED | OUTPATIENT
Start: 2024-09-25 | End: 2024-09-27 | Stop reason: HOSPADM

## 2024-09-25 RX ADMIN — SODIUM CHLORIDE, SODIUM LACTATE, POTASSIUM CHLORIDE, AND CALCIUM CHLORIDE 1000 ML: .6; .31; .03; .02 INJECTION, SOLUTION INTRAVENOUS at 06:56

## 2024-09-25 RX ADMIN — Medication 125 MILLI-UNITS/MIN: at 10:50

## 2024-09-25 RX ADMIN — CEFAZOLIN SODIUM 2000 MG: 2 SOLUTION INTRAVENOUS at 08:14

## 2024-09-25 RX ADMIN — BUPIVACAINE HYDROCHLORIDE IN DEXTROSE 1.4 ML: 7.5 INJECTION, SOLUTION SUBARACHNOID at 08:22

## 2024-09-25 RX ADMIN — ONDANSETRON 4 MG: 2 INJECTION INTRAMUSCULAR; INTRAVENOUS at 08:56

## 2024-09-25 RX ADMIN — Medication 250 MILLI-UNITS/MIN: at 08:43

## 2024-09-25 RX ADMIN — DEXAMETHASONE SODIUM PHOSPHATE 10 MG: 10 INJECTION, SOLUTION INTRAMUSCULAR; INTRAVENOUS at 08:56

## 2024-09-25 RX ADMIN — PHENYLEPHRINE HYDROCHLORIDE 30 MCG/MIN: 10 INJECTION INTRAVENOUS at 08:23

## 2024-09-25 RX ADMIN — SODIUM CHLORIDE, SODIUM LACTATE, POTASSIUM CHLORIDE, AND CALCIUM CHLORIDE 125 ML/HR: .6; .31; .03; .02 INJECTION, SOLUTION INTRAVENOUS at 07:57

## 2024-09-25 RX ADMIN — TRANEXAMIC ACID 1000 MG: 10 INJECTION, SOLUTION INTRAVENOUS at 08:44

## 2024-09-25 RX ADMIN — FENTANYL CITRATE 15 MCG: 50 INJECTION INTRAMUSCULAR; INTRAVENOUS at 08:27

## 2024-09-25 RX ADMIN — FENTANYL CITRATE 25 MCG: 50 INJECTION INTRAMUSCULAR; INTRAVENOUS at 08:46

## 2024-09-25 RX ADMIN — KETOROLAC TROMETHAMINE 30 MG: 30 INJECTION, SOLUTION INTRAMUSCULAR; INTRAVENOUS at 15:08

## 2024-09-25 RX ADMIN — ACETAMINOPHEN 650 MG: 325 TABLET ORAL at 18:00

## 2024-09-25 RX ADMIN — DOCUSATE SODIUM 100 MG: 100 CAPSULE, LIQUID FILLED ORAL at 18:00

## 2024-09-25 RX ADMIN — ACETAMINOPHEN 650 MG: 325 TABLET ORAL at 12:32

## 2024-09-25 RX ADMIN — MORPHINE SULFATE 0.15 MG: 0.5 INJECTION, SOLUTION EPIDURAL; INTRATHECAL; INTRAVENOUS at 08:22

## 2024-09-25 RX ADMIN — KETOROLAC TROMETHAMINE 30 MG: 30 INJECTION, SOLUTION INTRAMUSCULAR; INTRAVENOUS at 21:03

## 2024-09-25 NOTE — LACTATION NOTE
"This note was copied from a baby's chart.  Met with parents to discuss feeding plan. Mother discussed that she is planning to breastfeed her baby.     The Ready, Set, Baby Booklet was discussed. Discussed importance of skin to skin to help baby awaken for breastfeeding, to help with milk production as well as stabilize temperature, blood sugars, decrease pain, promote relaxation, and calm the baby as well as for bonding that father may do as well. Discussed tummy size progression as milk production increases to meet the nutritional/growing needs of the baby and risks associated with introducing early supplementation that is not medically indicated. Discussed alternative feeding methods as a manner to provide baby with additional colostrum/breast milk if baby is sleepy and/or unable to breastfeed directly to help protect the milk supply and preserve latching abilities at the breast. Mother was encouraged to hand express after feedings to provide \"dessert\" and when sleepy to hand express to provide \"snacks\" which could help baby to awaken for a feeding.    Discussed “Second Night Syndrome” explaining how baby’s cluster feeds to meet growing needs. Growth spurts periods were discussed within the first year and how cluster feeding helps boost milk supply.    Explained feeding cues and fullness cues as well as importance of obtaining a deep latch for effective milk removal and proper positioning (tummy to tummy, at level, nose to nipple, bring chin to breast first and bringing baby to breast) with ear, shoulder, and hip alignment.     Addressed breast pump needs and mother discussed that she would like a Spectra S2 breast pump for home use. Discussed insurance requirements. Mother discussed having medicaid. Case management consult will be placed.    Parents were made aware of how to communicate with lactation and encouraged to reach out for a latch assessment, continued support and/or questions that arise.    Education " and encounter occurred in [place]ian, primary language of parents.

## 2024-09-25 NOTE — PLAN OF CARE
Problem: BIRTH - VAGINAL/ SECTION  Goal: Fetal and maternal status remain reassuring during the birth process  Description: INTERVENTIONS:  - Monitor vital signs  - Monitor fetal heart rate  - Monitor uterine activity  - Monitor labor progression (vaginal delivery)  - DVT prophylaxis  - Antibiotic prophylaxis  Outcome: Progressing  Goal: Emotionally satisfying birthing experience for mother/fetus  Description: Interventions:  - Assess, plan, implement and evaluate the nursing care given to the patient in labor  - Advocate the philosophy that each childbirth experience is a unique experience and support the family's chosen level of involvement and control during the labor process   - Actively participate in both the patient's and family's teaching of the birth process  - Consider cultural, Anabaptist and age-specific factors and plan care for the patient in labor  Outcome: Progressing     Problem: PAIN - ADULT  Goal: Verbalizes/displays adequate comfort level or baseline comfort level  Description: Interventions:  - Encourage patient to monitor pain and request assistance  - Assess pain using appropriate pain scale  - Administer analgesics based on type and severity of pain and evaluate response  - Implement non-pharmacological measures as appropriate and evaluate response  - Consider cultural and social influences on pain and pain management  - Notify physician/advanced practitioner if interventions unsuccessful or patient reports new pain  Outcome: Progressing     Problem: INFECTION - ADULT  Goal: Absence or prevention of progression during hospitalization  Description: INTERVENTIONS:  - Assess and monitor for signs and symptoms of infection  - Monitor lab/diagnostic results  - Monitor all insertion sites, i.e. indwelling lines, tubes, and drains  - Monitor endotracheal if appropriate and nasal secretions for changes in amount and color  - [place] appropriate cooling/warming therapies per order  -  Administer medications as ordered  - Instruct and encourage patient and family to use good hand hygiene technique  - Identify and instruct in appropriate isolation precautions for identified infection/condition  Outcome: Progressing  Goal: Absence of fever/infection during neutropenic period  Description: INTERVENTIONS:  - Monitor WBC    Outcome: Progressing     Problem: SAFETY ADULT  Goal: Patient will remain free of falls  Description: INTERVENTIONS:  - Educate patient/family on patient safety including physical limitations  - Instruct patient to call for assistance with activity   - Consult OT/PT to assist with strengthening/mobility   - Keep Call bell within reach  - Keep bed low and locked with side rails adjusted as appropriate  - Keep care items and personal belongings within reach  - Initiate and maintain comfort rounds  - Make Fall Risk Sign visible to staff  - Offer Toileting every  Hours, in advance of need  - Initiate/Maintain alarm  - Obtain necessary fall risk management equipment:   - Apply yellow socks and bracelet for high fall risk patients  - Consider moving patient to room near nurses station  Outcome: Progressing  Goal: Maintain or return to baseline ADL function  Description: INTERVENTIONS:  -  Assess patient's ability to carry out ADLs; assess patient's baseline for ADL function and identify physical deficits which impact ability to perform ADLs (bathing, care of mouth/teeth, toileting, grooming, dressing, etc.)  - Assess/evaluate cause of self-care deficits   - Assess range of motion  - Assess patient's mobility; develop plan if impaired  - Assess patient's need for assistive devices and provide as appropriate  - Encourage maximum independence but intervene and supervise when necessary  - Involve family in performance of ADLs  - Assess for home care needs following discharge   - Consider OT consult to assist with ADL evaluation and planning for discharge  - Provide patient education as  appropriate  Outcome: Progressing  Goal: Maintains/Returns to pre admission functional level  Description: INTERVENTIONS:  - Perform AM-PAC 6 Click Basic Mobility/ Daily Activity assessment daily.  - Set and communicate daily mobility goal to care team and patient/family/caregiver.   - Collaborate with rehabilitation services on mobility goals if consulted  - Perform Range of Motion  times a day.  - Reposition patient every  hours.  - Dangle patient  times a day  - Stand patient  times a day  - Ambulate patient  times a day  - Out of bed to chair  times a day   - Out of bed for meals times a day  - Out of bed for toileting  - Record patient progress and toleration of activity level   Outcome: Progressing     Problem: Knowledge Deficit  Goal: Patient/family/caregiver demonstrates understanding of disease process, treatment plan, medications, and discharge instructions  Description: Complete learning assessment and assess knowledge base.  Interventions:  - Provide teaching at level of understanding  - Provide teaching via preferred learning methods  Outcome: Progressing     Problem: DISCHARGE PLANNING  Goal: Discharge to home or other facility with appropriate resources  Description: INTERVENTIONS:  - Identify barriers to discharge w/patient and caregiver  - Arrange for needed discharge resources and transportation as appropriate  - Identify discharge learning needs (meds, wound care, etc.)  - Arrange for interpretive services to assist at discharge as needed  - Refer to Case Management Department for coordinating discharge planning if the patient needs post-hospital services based on physician/advanced practitioner order or complex needs related to functional status, cognitive ability, or social support system  Outcome: Progressing

## 2024-09-25 NOTE — PLAN OF CARE
Problem: PAIN - ADULT  Goal: Verbalizes/displays adequate comfort level or baseline comfort level  Description: Interventions:  - Encourage patient to monitor pain and request assistance  - Assess pain using appropriate pain scale  - Administer analgesics based on type and severity of pain and evaluate response  - Implement non-pharmacological measures as appropriate and evaluate response  - Consider cultural and social influences on pain and pain management  - Notify physician/advanced practitioner if interventions unsuccessful or patient reports new pain  Outcome: Progressing     Problem: INFECTION - ADULT  Goal: Absence or prevention of progression during hospitalization  Description: INTERVENTIONS:  - Assess and monitor for signs and symptoms of infection  - Monitor lab/diagnostic results  - Monitor all insertion sites, i.e. indwelling lines, tubes, and drains  - Monitor endotracheal if appropriate and nasal secretions for changes in amount and color  - [place] appropriate cooling/warming therapies per order  - Administer medications as ordered  - Instruct and encourage patient and family to use good hand hygiene technique  - Identify and instruct in appropriate isolation precautions for identified infection/condition  Outcome: Progressing  Goal: Absence of fever/infection during neutropenic period  Description: INTERVENTIONS:  - Monitor WBC    Outcome: Progressing     Problem: SAFETY ADULT  Goal: Patient will remain free of falls  Description: INTERVENTIONS:  - Educate patient/family on patient safety including physical limitations  - Instruct patient to call for assistance with activity   - Consult OT/PT to assist with strengthening/mobility   - Keep Call bell within reach  - Keep bed low and locked with side rails adjusted as appropriate  - Keep care items and personal belongings within reach  - Initiate and maintain comfort rounds  - Make Fall Risk Sign visible to staff  - Apply yellow socks and bracelet  for high fall risk patients  - Consider moving patient to room near nurses station  Outcome: Progressing  Goal: Maintain or return to baseline ADL function  Description: INTERVENTIONS:  -  Assess patient's ability to carry out ADLs; assess patient's baseline for ADL function and identify physical deficits which impact ability to perform ADLs (bathing, care of mouth/teeth, toileting, grooming, dressing, etc.)  - Assess/evaluate cause of self-care deficits   - Assess range of motion  - Assess patient's mobility; develop plan if impaired  - Assess patient's need for assistive devices and provide as appropriate  - Encourage maximum independence but intervene and supervise when necessary  - Involve family in performance of ADLs  - Assess for home care needs following discharge   - Consider OT consult to assist with ADL evaluation and planning for discharge  - Provide patient education as appropriate  Outcome: Progressing  Goal: Maintains/Returns to pre admission functional level  Description: INTERVENTIONS:  - Perform AM-PAC 6 Click Basic Mobility/ Daily Activity assessment daily.  - Set and communicate daily mobility goal to care team and patient/family/caregiver.   - Collaborate with rehabilitation services on mobility goals if consulted    - Out of bed for toileting  - Record patient progress and toleration of activity level   Outcome: Progressing     Problem: Knowledge Deficit  Goal: Patient/family/caregiver demonstrates understanding of disease process, treatment plan, medications, and discharge instructions  Description: Complete learning assessment and assess knowledge base.  Interventions:  - Provide teaching at level of understanding  - Provide teaching via preferred learning methods  Outcome: Progressing     Problem: DISCHARGE PLANNING  Goal: Discharge to home or other facility with appropriate resources  Description: INTERVENTIONS:  - Identify barriers to discharge w/patient and caregiver  - Arrange for needed  discharge resources and transportation as appropriate  - Identify discharge learning needs (meds, wound care, etc.)  - Arrange for interpretive services to assist at discharge as needed  - Refer to Case Management Department for coordinating discharge planning if the patient needs post-hospital services based on physician/advanced practitioner order or complex needs related to functional status, cognitive ability, or social support system  Outcome: Progressing

## 2024-09-25 NOTE — ANESTHESIA PROCEDURE NOTES
Spinal Block    Patient location during procedure: OR  Reason for block: procedure for pain and at surgeon's request  Staffing  Performed by: Lien Comer CRNA  Authorized by: Jemma Lagos MD    Preanesthetic Checklist  Completed: patient identified, IV checked, site marked, risks and benefits discussed, surgical consent, monitors and equipment checked, pre-op evaluation and timeout performed  Spinal Block  Patient position: sitting  Prep: ChloraPrep and site prepped and draped  Patient monitoring: frequent blood pressure checks, continuous pulse ox and heart rate  Approach: midline  Location: L3-4  Needle  Needle type: Pencan   Needle gauge: 24 G  Needle length: 4 in  Assessment  Sensory level: T4  Injection Assessment:  negative aspiration for heme, no paresthesia on injection and positive aspiration for clear CSF.  Post-procedure:  site cleaned

## 2024-09-25 NOTE — ANESTHESIA POSTPROCEDURE EVALUATION
Post-Op Assessment Note    CV Status:  Stable  Pain Score: 0    Pain management: adequate       Mental Status:  Alert and awake   Hydration Status:  Euvolemic   PONV Controlled:  Controlled   Airway Patency:  Patent     Post Op Vitals Reviewed: Yes    No anethesia notable event occurred.    Staff: CRNA               BP   114/76   Temp   97.8   Pulse  63   Resp   16   SpO2   98 room air

## 2024-09-25 NOTE — CASE MANAGEMENT
Case Management Assessment    Patient name Rupa Curry  Location LD /LD  MRN [id number]  : 1980 Date 2024       Current Admission Date: 2024  Current Admission Diagnosis:H/O  section   Patient Active Problem List    Diagnosis Date Noted Date Diagnosed    H/O  section 2024     Gastroesophageal reflux disease without esophagitis 2024     AMA (advanced maternal age) multigravida 35+ 2024     Insulin controlled gestational diabetes mellitus (GDM) in third trimester 2024     Hx of preeclampsia, prior pregnancy, currently pregnant, third trimester 2024     Chronic hypertension in pregnancy 2024     History of postpartum hemorrhage, currently pregnant 2024     History of gestational diabetes 2024     37 weeks gestation of pregnancy 2024     Moderately severe depression 2024       LOS (days): 0  Geometric Mean LOS (GMLOS) (days):   Days to GMLOS:     OBJECTIVE:    Risk of Unplanned Readmission Score: 8.61         Current admission status: Inpatient       Preferred Pharmacy:   [id number] - [place] - [address]  [address]  [place] [address]  Phone: [phone] Fax: [phone]    [id number] - [place] - [address]  [address]  [place] [address]  Phone: [phone] Fax: [phone]    Primary Care Provider: Taylor Pickard PA-C    [place] Insurance[place] [employer]  Secondary Insurance:     ASSESSMENT:  Active Health Care Proxies    There are no active Health Care Proxies on file.           Social Determinants of Health (SDOH)      Flowsheet Row Most Recent Value   Housing Stability    In the last 12 months, was there a time when you were not able to pay the mortgage or rent on time? N   In the past 12 months, how many times have you moved where you were living? 0   At any time in the past 12 months, were you homeless  or living in a shelter (including now)? N   Transportation Needs    In the past 12 months, has lack of transportation kept you from medical appointments or from getting medications? no   In the past 12 months, has lack of transportation kept you from meetings, work, or from getting things needed for daily living? No   Food Insecurity    Within the past 12 months, you worried that your food would run out before you got the money to buy more. Never true   Within the past 12 months, the food you bought just didn't last and you didn't have money to get more. Never true   Utilities    In the past 12 months has the electric, gas, oil, or water company threatened to shut off services in your home? No          Consult: Mother would like a Spectra S2 for home use.    Order sent to [employer] via [facility]. CHARLES was informed by [place] with [employer] that Mom is not eligible for a breast pump.. she has emergency medicaid.  Met with pt and pt's spouse to inform them of same. CM also informed Makenzie with lactation of same.

## 2024-09-25 NOTE — ANESTHESIA PREPROCEDURE EVALUATION
Procedure:   SECTION () REPEAT (Uterus)    Relevant Problems   ANESTHESIA   (-) History of anesthesia complications      CARDIO   (+) Chronic hypertension in pregnancy      GI/HEPATIC   (+) Gastroesophageal reflux disease without esophagitis      GYN   (+) AMA (advanced maternal age) multigravida 35+      NEURO/PSYCH   (+) Moderately severe depression      PULMONARY   (-) Asthma   (-) Shortness of breath   (-) Sleep apnea   (-) URI (upper respiratory infection)      Obstetrics/Gynecology   (+) H/O  section   (+) Insulin controlled gestational diabetes mellitus (GDM) in third trimester      Lab Results   Component Value Date    WBC 7.13 2024    HGB 10.7 (L) 2024    HCT 33.4 (L) 2024    MCV 77 (L) 2024     2024       Physical Exam    Airway    Mallampati score: II  TM Distance: >3 FB  Neck ROM: full     Dental       Cardiovascular      Pulmonary      Other Findings  post-pubertal.      Anesthesia Plan  ASA Score- 2     Anesthesia Type- spinal with ASA Monitors.         Additional Monitors:     Airway Plan:            Plan Factors-Exercise tolerance (METS): >4 METS.    Chart reviewed. EKG reviewed.  Existing labs reviewed. Patient summary reviewed.                  Induction-     Postoperative Plan-     Perioperative Resuscitation Plan - Level 1 - Full Code.       Informed Consent- Anesthetic plan and risks discussed with patient and spouse ().  I personally reviewed this patient with the CRNA. Discussed and agreed on the Anesthesia Plan with the CRNA..

## 2024-09-25 NOTE — OP NOTE
Section Operative Report - OB/GYN   Rupa Curry 44 y.o. female MRN: [id number]  Unit/Bed#: LD PACU-03 Encounter: [id number]    Indications:  history prior     Pre-operative Diagnosis:   38w2d  cHTN  GDMA2  History prior  x 1    Post-operative Diagnosis: same, delivered    Procedure: repeat LTCS    Surgeon:  Andria Cool MD    Assistant(s): Joseline Toro MD  No qualified surgical resident was available to assist in the case.  The assistance of an additional surgeon was critical for completion of the case. The assistant surgeon provided assistance with exposure, hemostasis, delivery of the infant, tissue manipulation, and suturing. The assistant surgeon was present from the start of the procedure until fascial closure. I, the primary surgeon, was present and scrubbed throughout the entirety of the case and performed all key portions of the surgical procedure.    Fletcher Group Classification System:  No Multiple pregnancy, No Transverse or oblique lie, No Breech lie, Gestational age is > or =37 weeks, Nulliparous, Prelabor CD is FLETCHER GROUP 2b    Findings:  1. Delivery of viable male on 24 at 08:42, weight 8lbs 5.7oz;  Apgar scores of 8 at one minute and 9 at five minutes.   2. Normal appearing placenta with centrally-inserted 3 vessel cord  3. clear amniotic fluid  4. Grossly normal uterus, tubes, and ovaries  5. Adhesions: some adhesions of omentum to anterior abdominal wall    Specimens:   1. Arterial and venous cord gases  2. Cord blood  3. Placenta to pathology     Estimated Blood Loss:  359 cc           Total IV Fluids: 1300 cc    Drains: Howell catheter, draining clear yellow urine, 100 cc           Complications:  None; patient tolerated the procedure well.           Disposition:  PACU          Condition: stable    Procedure Details   Decision was made to proceed with  section due to history prior  and cHTN at 38 weeks. Patient was made aware of  these findings and the proposed plan. Risks, benefits, possible complications, alternate treatment options, and expected outcomes were discussed with the patient.  The patient agreed with the proposed plan and gave informed consent.      The patient was taken to the operating room where she was properly identified to the OR staff and attending physician. She received spinal anesthesia preoperatively.  Fetal heart tones were appreciated and found to be appropriate. A Howell catheter was aseptically inserted and SCDs were placed.  An intravaginal betadine prep was performed.  The abdomen was prepped with Chloraprep and following appropriate drying time, the patient was draped in the usual sterile manner for a Pfannenstiel incision.  The patient had received Ancef 2g IV pre-operatively for prophylaxis.  A Time Out was held and the above information confirmed.  The patient was identified as Rupa Curry and the procedure verified as  Delivery.    A Pfannenstiel incision was made and carried down through the underlying subcutaneous tissue to the fascia using a scalpel. Rectus fascia was then incised in the midline and extended laterally using curved Matthew scissors. The inferior edge of the fascia was grasped with Kocher clamps and the underlying muscle was dissected with a combination of blunt and sharp dissection. The superior edge was grasped with Kocher clamps and cleared in similar fashion.  All anatomic layers were well-demarcated.    The rectus muscles were  in the midline and the peritoneum was identified and subsequently entered and extended longitudinally with blunt dissection. The vesicouterine peritoneum was identified and a bladder flap was created using Metzenbaum scissors and developed bluntly. The bladder blade was inserted.      A low transverse uterine incision was made with the scalpel and extended laterally with blunt dissection. The amnion was ruptured.  The surgeons hand was  inserted through the hysterotomy and the fetal head was palpated, elevated, and delivered through the uterine incision with the assistance of fundal pressure.  Baby had spontaneous cry with good color and tone.  After 30 seconds of delayed cord clamping, the umbilical cord was clamped and cut.  The infant was handed off to the  providers.  Arterial and venous cord gases and cord blood were obtained for evaluation and promptly sent to the lab.  The placenta delivered spontaneously with uterine fundal massage and was noted to have a centrally inserted 3 vessel cord.  This was also sent to the lab for pathology.    The uterus was exteriorized and a lap sponge was used to clear the cavity of clots and products of conception. The uterine incision was closed with a running locked suture of 0 Vicryl. A second layer of the same suture was used to imbricate the first.  Good hemostasis was confirmed upon uterine closure.  The posterior culdesac was cleared of clots and debris and the uterus was returned to the abdomen.  The paracolic gutters were inspected and cleared of all clots and debris with moist lap sponges.  The uterine incision was examined again and hemostatic.  The fascia was closed with a running suture of 0 Vicryl.  The subcutaneous tissue was irrigated and reapproximated with 2-0 Plain Gut suture. The skin was closed with Insorb absorbable staples and the incision dressed with steri-strips.  Sterile dressing was applied.     At the conclusion of the procedure, all needle, sponge, and instrument counts were noted to be correct x3.  The patient tolerated the procedure well and was transferred to the PACU in stable condition.     Andria Cool MD

## 2024-09-25 NOTE — INTERVAL H&P NOTE
"H&P Addendum:  [email or web address]  #[id number]    Reviewed H&P in chart and confirmed no changes, confirmed procedure repeat LTCS.    Pt examined this morning with following findings.    Physical Exam:  /78 (BP Location: Right arm)   Pulse 69   Temp 97.7 °F (36.5 °C) (Oral)   Resp 17   Ht 5' 3\" (1.6 m)   Wt 77.6 kg (171 lb)   SpO2 98%   BMI 30.29 kg/m²     Gen: alert, no acute distress  Cards: regular rate  Resp: unlabored breathing  Abdomen: gravid, non-tender, non-distended  Extremities: non-tender, no edema    Estimated Fetal Weight: 9.5 lbs  Presentation: cephalic, confirmed via leopolds     SVE:     deferred    FHT:  Baseline Rate (FHR): 135 bpm  Variability: Moderate  Accelerations: 15 x 15 or greater  Decelerations: None  TOCO:   Contraction Frequency (minutes): n/a    Membranes: intact    Lab Results   Component Value Date    WBC 7.13 2024    HGB 10.7 (L) 2024    HCT 33.4 (L) 2024    MCV 77 (L) 2024     2024     No results found for: \"ABORH\"    Insulin controlled gestational diabetes mellitus (GDM) in third trimester  - Started on insulin 4 days ago at 37 weeks with improvement seen in her fasting blood sugar. Monitoring only 1-2 sugars daily.    -  testing at [facility] reactive   - She was increased to 20 units on 2024 to be taken before bedtime.    - for a  tomorrow she will only take half of her dose.    - She will need a 6-week 2-hour postprandial blood sugar scheduled for postpartum.     Lab Results   Component Value Date    HGBA1C 6.0 (H) 2024       H/O  section  - h/o prior .  Was considering TOLAC but not recommended due to macrosomia on 37 week growth u/s - 2024 EFW 4159gr.  - scheduled at 38 weeks due to cHTN not on medication    Chronic hypertension in pregnancy  - considered cHTN, no medication in pregnancy  - h/o pre-eclampsia last pregnancy with delivery at 33.6wks  - monitor closely postpartum  "

## 2024-09-26 LAB
ERYTHROCYTE [DISTWIDTH] IN BLOOD BY AUTOMATED COUNT: 14.9 % (ref 11.6–15.1)
HCT VFR BLD AUTO: 27.4 % (ref 34.8–46.1)
HGB BLD-MCNC: 8.7 G/DL (ref 11.5–15.4)
MCH RBC QN AUTO: 25.1 PG (ref 26.8–34.3)
MCHC RBC AUTO-ENTMCNC: 31.8 G/DL (ref 31.4–37.4)
MCV RBC AUTO: 79 FL (ref 82–98)
PLATELET # BLD AUTO: 305 THOUSANDS/UL (ref 149–390)
PMV BLD AUTO: 9.4 FL (ref 8.9–12.7)
RBC # BLD AUTO: 3.47 MILLION/UL (ref 3.81–5.12)
WBC # BLD AUTO: 10.49 THOUSAND/UL (ref 4.31–10.16)

## 2024-09-26 PROCEDURE — 99024 POSTOP FOLLOW-UP VISIT: CPT | Performed by: OBSTETRICS & GYNECOLOGY

## 2024-09-26 PROCEDURE — 85027 COMPLETE CBC AUTOMATED: CPT | Performed by: OBSTETRICS & GYNECOLOGY

## 2024-09-26 RX ORDER — OXYCODONE HYDROCHLORIDE 5 MG/1
5 TABLET ORAL EVERY 4 HOURS PRN
Status: DISCONTINUED | OUTPATIENT
Start: 2024-09-26 | End: 2024-09-27 | Stop reason: HOSPADM

## 2024-09-26 RX ADMIN — ACETAMINOPHEN 650 MG: 325 TABLET ORAL at 18:35

## 2024-09-26 RX ADMIN — ACETAMINOPHEN 650 MG: 325 TABLET ORAL at 00:18

## 2024-09-26 RX ADMIN — DOCUSATE SODIUM 100 MG: 100 CAPSULE, LIQUID FILLED ORAL at 09:02

## 2024-09-26 RX ADMIN — ACETAMINOPHEN 650 MG: 325 TABLET ORAL at 06:06

## 2024-09-26 RX ADMIN — KETOROLAC TROMETHAMINE 30 MG: 30 INJECTION, SOLUTION INTRAMUSCULAR; INTRAVENOUS at 09:03

## 2024-09-26 RX ADMIN — IBUPROFEN 600 MG: 600 TABLET, FILM COATED ORAL at 21:24

## 2024-09-26 RX ADMIN — IBUPROFEN 600 MG: 600 TABLET, FILM COATED ORAL at 15:02

## 2024-09-26 RX ADMIN — IRON SUCROSE 200 MG: 20 INJECTION, SOLUTION INTRAVENOUS at 12:02

## 2024-09-26 RX ADMIN — ACETAMINOPHEN 650 MG: 325 TABLET ORAL at 12:02

## 2024-09-26 RX ADMIN — OXYCODONE HYDROCHLORIDE 5 MG: 5 TABLET ORAL at 23:00

## 2024-09-26 RX ADMIN — DOCUSATE SODIUM 100 MG: 100 CAPSULE, LIQUID FILLED ORAL at 18:35

## 2024-09-26 RX ADMIN — KETOROLAC TROMETHAMINE 30 MG: 30 INJECTION, SOLUTION INTRAMUSCULAR; INTRAVENOUS at 03:30

## 2024-09-26 NOTE — LACTATION NOTE
This note was copied from a baby's chart.  Met with parents to follow up from last encounter. Mother discussed that she has been breastfeeding well. Parents decided to incorporate formula with feedings noticing baby clustering during the night.    Discussed importance to offer the breasts first before supplement and to keep volumes small.    Baby is currently at a 5.6% weight loss, having appropriate output for her age and 24 hour bilirubin will be checked soon.    Mother was provided with a manual hand pump as she is not eligible for a double breast pump. Usage was demonstrated and set up at sink with syringes for milk collection.    Parents were encouraged to call for further questions/concerns that arise.    Encounter and education occurred in [place].

## 2024-09-26 NOTE — PROGRESS NOTES
"Ob Postpartum/Postop Note  Rupa Curry 44 y.o. female MRN: [id number]  Unit/Bed#: - Encounter: [id number]    A/P.  44 y.o.  POD#1 s/p , Low Transverse at 38w2d of 3790 g (8 lb 5.7 oz) female , apgars 8 /9 .  (1) POD#1.  Delivered 2024  8:42 AM.  Doing well.  --> Routine postoperative care.    (2) CHTN.  BP normal here on no meds.  Laboratory studies without evidence of end-organ damage.  --> Follow BP/Sx.    (3) A2GDM.  --> For postpartum GTT.    (4) Postop anemia.  Hgb 8.7.  --> Juliana Velasquez MD  24  ----------------------------------------------------------------------------------------------    Subjective/    Feels well.  Tolerating po, ambulating, voiding without difficulty.  Happy.  bonding.  Pain controlled with oral medications.  (+)flatus.    Objective/  Blood pressure 106/63, pulse 62, temperature 97.6 °F (36.4 °C), temperature source Temporal, resp. rate 18, height 5' 3\" (1.6 m), weight 77.6 kg (171 lb), SpO2 98%, currently breastfeeding.    Patient Vitals for the past 24 hrs:   BP Temp Temp src Pulse Resp SpO2   24 0903 106/63 97.6 °F (36.4 °C) Temporal 62 18 98 %   24 0328 111/63 (!) 97.4 °F (36.3 °C) Temporal 55 16 99 %   24 2332 103/63 (!) 97.1 °F (36.2 °C) Temporal 61 18 99 %   24 116/65 98.4 °F (36.9 °C) Temporal 58 16 99 %   24 1500 114/61 97.7 °F (36.5 °C) Oral 63 16 98 %   24 1414 110/62 97.7 °F (36.5 °C) Oral 59 16 97 %   24 1145 102/66 -- -- 63 18 97 %   24 1115 118/76 -- -- 62 17 98 %         Physical Exam/      General:  Alert, comfortable, NAD      Cardiovascular: Regular rate and rhythm      Respiratory: Clear to auscultation bilaterally.      Abdomen: Soft, non-tender, non-distended.  Incision clean, dry, and intact.      Fundus: Firm, below umbilicus, nontender      Extremities: Warm, non-tender      Labs/      Blood type:  O+      Rubella: Immune      Lab Results "   Component Value Date    WBC 10.49 (H) 09/26/2024    HGB 8.7 (L) 09/26/2024    HCT 27.4 (L) 09/26/2024    MCV 79 (L) 09/26/2024     09/26/2024

## 2024-09-27 VITALS
TEMPERATURE: 97.8 F | RESPIRATION RATE: 16 BRPM | OXYGEN SATURATION: 98 % | BODY MASS INDEX: 30.3 KG/M2 | HEIGHT: 63 IN | DIASTOLIC BLOOD PRESSURE: 81 MMHG | HEART RATE: 65 BPM | SYSTOLIC BLOOD PRESSURE: 119 MMHG | WEIGHT: 171 LBS

## 2024-09-27 PROCEDURE — 99024 POSTOP FOLLOW-UP VISIT: CPT | Performed by: OBSTETRICS & GYNECOLOGY

## 2024-09-27 PROCEDURE — NC001 PR NO CHARGE: Performed by: OBSTETRICS & GYNECOLOGY

## 2024-09-27 RX ORDER — OXYCODONE HYDROCHLORIDE 5 MG/1
5 TABLET ORAL EVERY 4 HOURS PRN
Qty: 10 TABLET | Refills: 0 | Status: SHIPPED | OUTPATIENT
Start: 2024-09-27 | End: 2024-10-07

## 2024-09-27 RX ORDER — FERROUS SULFATE 324(65)MG
324 TABLET, DELAYED RELEASE (ENTERIC COATED) ORAL
Qty: 60 TABLET | Refills: 0 | Status: SHIPPED | OUTPATIENT
Start: 2024-09-27

## 2024-09-27 RX ADMIN — IBUPROFEN 600 MG: 600 TABLET, FILM COATED ORAL at 09:18

## 2024-09-27 RX ADMIN — ACETAMINOPHEN 650 MG: 325 TABLET ORAL at 00:14

## 2024-09-27 RX ADMIN — ACETAMINOPHEN 650 MG: 325 TABLET ORAL at 05:47

## 2024-09-27 RX ADMIN — OXYCODONE HYDROCHLORIDE 5 MG: 5 TABLET ORAL at 07:42

## 2024-09-27 RX ADMIN — ACETAMINOPHEN 650 MG: 325 TABLET ORAL at 12:14

## 2024-09-27 RX ADMIN — DOCUSATE SODIUM 100 MG: 100 CAPSULE, LIQUID FILLED ORAL at 09:18

## 2024-09-27 RX ADMIN — IBUPROFEN 600 MG: 600 TABLET, FILM COATED ORAL at 03:27

## 2024-09-27 RX ADMIN — SIMETHICONE 80 MG: 80 TABLET, CHEWABLE ORAL at 12:38

## 2024-09-27 NOTE — ASSESSMENT & PLAN NOTE
- She will need a 6-week 2-hour postprandial blood sugar scheduled for postpartum.     Lab Results   Component Value Date    HGBA1C 6.0 (H) 09/17/2024

## 2024-09-27 NOTE — DISCHARGE SUMMARY
Discharge Summary - OB/GYN   Name: Rupa Curry 44 y.o. female I MRN: [id number]  Unit/Bed#: -01 I Date of Admission: 2024   Date of Service: 2024 I Hospital Day: 2     ADMISSION  Patient of:  Tony Asif  Admission Date: 2024   Admitting Attending: Dr. Andria Cool MD  Admitting Diagnoses:   Patient Active Problem List   Diagnosis    Moderately severe depression    37 weeks gestation of pregnancy    History of gestational diabetes    Hx of preeclampsia, prior pregnancy, currently pregnant, third trimester    Chronic hypertension in pregnancy    History of postpartum hemorrhage, currently pregnant    Insulin controlled gestational diabetes mellitus (GDM) in third trimester    AMA (advanced maternal age) multigravida 35+    Gastroesophageal reflux disease without esophagitis    H/O  section     delivery delivered    Single live birth     DELIVERY  Delivery Method: , Low Transverse   Delivery Date and Time: 2024 8:42 AM  Delivery Attending: Andria Cool    DISCHARGE  Discharge Date: 24  Discharge Attending: Dr. Mena Anguiano  Discharge Diagnosis:   Same, Delivered    Clinical course: Admission to Delivery  Rupa Curry is a 44 y.o.  who was admitted at 38w2d for planned repeat  section.    Delivery  Route of Delivery: , Low Transverse  Reason for  delivery: Prior  x1    Anesthesia: Spinal,   QBL: 359    Delivery: , Low Transverse at 2024 8:42 AM    Baby's Weight: 3790 g (8 lb 5.7 oz); 133.69    Apgar scores: 8  and 9  at 1 and 5 minutes, respectively    Clinical Course: Post-Delivery:  The post delivery course was unremarkable.    On the day of discharge, the patient was ambulating, voiding spontaneously, tolerating oral intake, and hemodynamically stable. She was able to reasonably perform all ADLs. She had appropriate bowel function. Pain was well-controlled. She was discharged home on  postpartum/postop day #2 without complications. Patient was instructed to follow up with her OB as an outpatient and was given appropriate warnings to call her provider with problems or concerns.    Pertinent lab findings included:   Blood type O+.     Last three Hgb values:  Lab Results   Component Value Date    HGB 8.7 (L) 2024    HGB 10.7 (L) 2024    HGB 11.1 (L) 2024      Problem-specific follow-up plans included the following:  Assessment & Plan   delivery delivered    Chronic hypertension in pregnancy  F/u 1 week BP check  Insulin controlled gestational diabetes mellitus (GDM) in third trimester     - She will need a 6-week 2-hour postprandial blood sugar scheduled for postpartum.     Lab Results   Component Value Date    HGBA1C 6.0 (H) 2024     H/O  section    Single live birth      Discharge med list:       Medication List      START taking these medications     ferrous sulfate 324 (65 Fe) mg; Take 1 tablet (324 mg total) by mouth 2   (two) times a day before meals   ibuprofen 600 mg tablet; Commonly known as: MOTRIN; Take 1 tablet (600   mg total) by mouth every 6 (six) hours as needed for mild pain Do not   start before 2024.   oxyCODONE 5 immediate release tablet; Commonly known as: ROXICODONE;   Take 1 tablet (5 mg total) by mouth every 4 (four) hours as needed for   moderate pain for up to 10 days Max Daily Amount: 30 mg     CONTINUE taking these medications     B-D INSULIN SYRINGE 1CC U-100 1 ML Misc; Generic drug: Insulin Syringes   (Disposable); Use Daily at Bedtime   famotidine 20 mg tablet; Commonly known as: PEPCID; Take 1 tablet (20 mg   total) by mouth 2 (two) times a day   Prenatal Vitamin 27-0.8 MG Tabs; Take 1 tablet by mouth in the morning     STOP taking these medications     EPINEPHrine 0.3 mg/0.3 mL Soaj; Commonly known as: EPIPEN   insulin glargine 100 units/mL subcutaneous injection; Commonly known as:   Lantus       Condition at  discharge:   good     Disposition:   Home    Planned Readmission:   No    Discharge Statement:  I have spent a total time of 35 minutes in caring for this patient on the day of the visit/encounter. .

## 2024-09-27 NOTE — PROGRESS NOTES
Progress Note - OB/GYN   Name: Rupa Curry 44 y.o. female I MRN: [id number]  Unit/Bed#: -01 I Date of Admission: 2024   Date of Service: 2024 I Hospital Day: 2     Assessment & Plan   delivery delivered  Doing well. OK for discharge. She will go home on iron for anemia.     Chronic hypertension in pregnancy  Normotensive measurements. Return to office 1 week for BP check.     Insulin controlled gestational diabetes mellitus (GDM) in third trimester  6-week 2-hour postprandial blood sugar      Lab Results   Component Value Date    HGBA1C 6.0 (H) 2024     H/O  section    Single live birth      OB Post-Partum Progress Note  History of Present Illness    Post delivery. Patient is doing well. Lochia WNL. Pain well controlled.     Pain: yes, cramping, improved with meds  Tolerating PO: yes  Voiding: yes  Flatus: yes  Ambulating: yes  Breastfeeding:  yes  Chest pain: no  Shortness of breath: no  Leg pain: no  Lochia: WNL    Objective      Temp:  [97.7 °F (36.5 °C)-98.4 °F (36.9 °C)] 97.8 °F (36.6 °C)  HR:  [62-72] 65  Resp:  [16-18] 16  BP: (119-135)/(69-82) 119/81  O2 Device: None (Room air)          No intake/output data recorded.  Lines/Drains/Airways       Active Status       None                  Physical Exam  Vitals and nursing note reviewed.   Constitutional:       General: She is not in acute distress.     Appearance: She is well-developed.   HENT:      Head: Normocephalic and atraumatic.   Eyes:      Conjunctiva/sclera: Conjunctivae normal.   Pulmonary:      Effort: Pulmonary effort is normal. No respiratory distress.   Abdominal:      Palpations: Abdomen is soft.      Tenderness: There is no abdominal tenderness.      Comments: Inc c/d/i   Musculoskeletal:         General: No swelling.      Cervical back: Neck supple.   Skin:     General: Skin is warm and dry.      Capillary Refill: Capillary refill takes less than 2 seconds.   Neurological:      Mental Status: She  is alert.   Psychiatric:         Mood and Affect: Mood normal.          Lab Results: I have reviewed the following results:   Labs:   Lab Results   Component Value Date    WBC 10.49 (H) 09/26/2024    HGB 8.7 (L) 09/26/2024    HCT 27.4 (L) 09/26/2024    MCV 79 (L) 09/26/2024     09/26/2024

## 2024-09-27 NOTE — PLAN OF CARE
Problem: PAIN - ADULT  Goal: Verbalizes/displays adequate comfort level or baseline comfort level  Description: Interventions:  - Encourage patient to monitor pain and request assistance  - Assess pain using appropriate pain scale  - Administer analgesics based on type and severity of pain and evaluate response  - Implement non-pharmacological measures as appropriate and evaluate response  - Consider cultural and social influences on pain and pain management  - Notify physician/advanced practitioner if interventions unsuccessful or patient reports new pain  Outcome: Progressing     Problem: INFECTION - ADULT  Goal: Absence or prevention of progression during hospitalization  Description: INTERVENTIONS:  - Assess and monitor for signs and symptoms of infection  - Monitor lab/diagnostic results  - Monitor all insertion sites, i.e. indwelling lines, tubes, and drains  - Monitor endotracheal if appropriate and nasal secretions for changes in amount and color  - [place] appropriate cooling/warming therapies per order  - Administer medications as ordered  - Instruct and encourage patient and family to use good hand hygiene technique  - Identify and instruct in appropriate isolation precautions for identified infection/condition  Outcome: Progressing  Goal: Absence of fever/infection during neutropenic period  Description: INTERVENTIONS:  - Monitor WBC    Outcome: Progressing     Problem: SAFETY ADULT  Goal: Patient will remain free of falls  Description: INTERVENTIONS:  - Educate patient/family on patient safety including physical limitations  - Instruct patient to call for assistance with activity   - Consult OT/PT to assist with strengthening/mobility   - Keep Call bell within reach  - Keep bed low and locked with side rails adjusted as appropriate  - Keep care items and personal belongings within reach  - Initiate and maintain comfort rounds  - Make Fall Risk Sign visible to staff  - Apply yellow socks and bracelet  for high fall risk patients  - Consider moving patient to room near nurses station  Outcome: Progressing  Goal: Maintain or return to baseline ADL function  Description: INTERVENTIONS:  -  Assess patient's ability to carry out ADLs; assess patient's baseline for ADL function and identify physical deficits which impact ability to perform ADLs (bathing, care of mouth/teeth, toileting, grooming, dressing, etc.)  - Assess/evaluate cause of self-care deficits   - Assess range of motion  - Assess patient's mobility; develop plan if impaired  - Assess patient's need for assistive devices and provide as appropriate  - Encourage maximum independence but intervene and supervise when necessary  - Involve family in performance of ADLs  - Assess for home care needs following discharge   - Consider OT consult to assist with ADL evaluation and planning for discharge  - Provide patient education as appropriate  Outcome: Progressing  Goal: Maintains/Returns to pre admission functional level  Description: INTERVENTIONS:  - Perform AM-PAC 6 Click Basic Mobility/ Daily Activity assessment daily.  - Set and communicate daily mobility goal to care team and patient/family/caregiver.   - Collaborate with rehabilitation services on mobility goals if consulted  - Out of bed for toileting  - Record patient progress and toleration of activity level   Outcome: Progressing     Problem: Knowledge Deficit  Goal: Patient/family/caregiver demonstrates understanding of disease process, treatment plan, medications, and discharge instructions  Description: Complete learning assessment and assess knowledge base.  Interventions:  - Provide teaching at level of understanding  - Provide teaching via preferred learning methods  Outcome: Progressing     Problem: DISCHARGE PLANNING  Goal: Discharge to home or other facility with appropriate resources  Description: INTERVENTIONS:  - Identify barriers to discharge w/patient and caregiver  - Arrange for needed  discharge resources and transportation as appropriate  - Identify discharge learning needs (meds, wound care, etc.)  - Arrange for interpretive services to assist at discharge as needed  - Refer to Case Management Department for coordinating discharge planning if the patient needs post-hospital services based on physician/advanced practitioner order or complex needs related to functional status, cognitive ability, or social support system  Outcome: Progressing

## 2024-09-27 NOTE — PLAN OF CARE
Problem: PAIN - ADULT  Goal: Verbalizes/displays adequate comfort level or baseline comfort level  Description: Interventions:  - Encourage patient to monitor pain and request assistance  - Assess pain using appropriate pain scale  - Administer analgesics based on type and severity of pain and evaluate response  - Implement non-pharmacological measures as appropriate and evaluate response  - Consider cultural and social influences on pain and pain management  - Notify physician/advanced practitioner if interventions unsuccessful or patient reports new pain  Outcome: Progressing     Problem: INFECTION - ADULT  Goal: Absence or prevention of progression during hospitalization  Description: INTERVENTIONS:  - Assess and monitor for signs and symptoms of infection  - Monitor lab/diagnostic results  - Monitor all insertion sites, i.e. indwelling lines, tubes, and drains  - Monitor endotracheal if appropriate and nasal secretions for changes in amount and color  - [place] appropriate cooling/warming therapies per order  - Administer medications as ordered  - Instruct and encourage patient and family to use good hand hygiene technique  - Identify and instruct in appropriate isolation precautions for identified infection/condition  Outcome: Progressing  Goal: Absence of fever/infection during neutropenic period  Description: INTERVENTIONS:  - Monitor WBC    Outcome: Progressing     Problem: SAFETY ADULT  Goal: Patient will remain free of falls  Description: INTERVENTIONS:  - Educate patient/family on patient safety including physical limitations  - Instruct patient to call for assistance with activity   - Consult OT/PT to assist with strengthening/mobility   - Keep Call bell within reach  - Keep bed low and locked with side rails adjusted as appropriate  - Keep care items and personal belongings within reach  - Initiate and maintain comfort rounds  - Make Fall Risk Sign visible to staff    - Apply yellow socks and  bracelet for high fall risk patients  - Consider moving patient to room near nurses station  Outcome: Progressing  Goal: Maintain or return to baseline ADL function  Description: INTERVENTIONS:  -  Assess patient's ability to carry out ADLs; assess patient's baseline for ADL function and identify physical deficits which impact ability to perform ADLs (bathing, care of mouth/teeth, toileting, grooming, dressing, etc.)  - Assess/evaluate cause of self-care deficits   - Assess range of motion  - Assess patient's mobility; develop plan if impaired  - Assess patient's need for assistive devices and provide as appropriate  - Encourage maximum independence but intervene and supervise when necessary  - Involve family in performance of ADLs  - Assess for home care needs following discharge   - Consider OT consult to assist with ADL evaluation and planning for discharge  - Provide patient education as appropriate  Outcome: Progressing  Goal: Maintains/Returns to pre admission functional level  Description: INTERVENTIONS:  - Perform AM-PAC 6 Click Basic Mobility/ Daily Activity assessment daily.  - Set and communicate daily mobility goal to care team and patient/family/caregiver.   - Collaborate with rehabilitation services on mobility goals if consulted    - Out of bed for toileting  - Record patient progress and toleration of activity level   Outcome: Progressing     Problem: Knowledge Deficit  Goal: Patient/family/caregiver demonstrates understanding of disease process, treatment plan, medications, and discharge instructions  Description: Complete learning assessment and assess knowledge base.  Interventions:  - Provide teaching at level of understanding  - Provide teaching via preferred learning methods  Outcome: Progressing     Problem: DISCHARGE PLANNING  Goal: Discharge to home or other facility with appropriate resources  Description: INTERVENTIONS:  - Identify barriers to discharge w/patient and caregiver  - Arrange  for needed discharge resources and transportation as appropriate  - Identify discharge learning needs (meds, wound care, etc.)  - Arrange for interpretive services to assist at discharge as needed  - Refer to Case Management Department for coordinating discharge planning if the patient needs post-hospital services based on physician/advanced practitioner order or complex needs related to functional status, cognitive ability, or social support system  Outcome: Progressing

## 2024-09-27 NOTE — NURSING NOTE
RN reviewed d/c education with pt and FOB. Educated parents on safe sleep,  screenings, bath instructions, shaken baby, car seat safety, POST birth warning signs, and /postpartum care. All questions answered, no additional questions at this time. Educational paperwork given, parents verbalize understanding.

## 2024-09-27 NOTE — LACTATION NOTE
This note was copied from a baby's chart.  Met with parents to follow up and discuss the Breastfeeding Discharge Booklet with plans for discharge today.    Baby is currently at a 6.6% weight loss, having appropriate output for her age with stool transitioning in color (green/yellow) and repeat bilirubin increased slightly, but remains under threshold for treatment.    Mother is feeling her breast moore and heavier and baby has been eating more at the breast and parents are continuing to supplement with feedings until milk supply is establish.     Discussed the importance of ensuring that baby feeds 8-12x in 24 hours and that baby has 6 wet diapers or more that are becoming more dilute as well as soiled diapers that are transitioning demonstrated by color change from meconium to a yellow/gold seedy loose stool by day 5.    Mother was given resources to look up medications to ensure they are safe with breastfeeding, by communicating with the [facility], [facility], [facility] as well as using [employer] (assisted mother to pin to home screen on personal phone).    Mother is aware of engorgement time frame (when mature milk comes in) and management as well as how to deal with conditions that may occur while breastfeeding (plugged ducts, milk blebs and mastitis) and when is appropriate to communicate with her OB/GYN and/or a lactation consultant.    Mother is comfortable with how to set up a pump, how to cycle (stimulation vs expression phases during a pumping session), importance of flange fit and trying different sizes to ensure best fit, milk storage and how to properly clean parts. Discussed handouts for tips on pumping when returning to work and paced bottle feeding.    Discussed community resources for continued support in breastfeeding once discharged home.     Parents were encouraged to call for further questions that arise prior to discharge.    Education and encounter occurred in [place],  primary language of parents.

## 2024-10-01 DIAGNOSIS — Z13.1 DIABETES MELLITUS SCREENING: Primary | ICD-10-CM

## 2024-10-01 DIAGNOSIS — Z86.32 HISTORY OF GESTATIONAL DIABETES: ICD-10-CM

## 2024-10-01 PROCEDURE — 88307 TISSUE EXAM BY PATHOLOGIST: CPT | Performed by: PATHOLOGY

## 2024-10-07 ENCOUNTER — CLINICAL SUPPORT (OUTPATIENT)
Dept: OBGYN CLINIC | Facility: CLINIC | Age: 44
End: 2024-10-07

## 2024-10-07 VITALS
DIASTOLIC BLOOD PRESSURE: 86 MMHG | SYSTOLIC BLOOD PRESSURE: 124 MMHG | WEIGHT: 153.2 LBS | BODY MASS INDEX: 27.14 KG/M2 | HEIGHT: 63 IN

## 2024-10-07 DIAGNOSIS — Z01.30 BLOOD PRESSURE CHECK: Primary | ICD-10-CM

## 2024-10-07 NOTE — PROGRESS NOTES
Patient presented todayfor BP checking , at 124/86, screening score at 0, discuss BP with Dr. Ureña . Patient  is good to check out and scheduled for her 3 weeks PP visit.

## 2024-10-08 ENCOUNTER — PATIENT MESSAGE (OUTPATIENT)
Dept: OBGYN CLINIC | Facility: CLINIC | Age: 44
End: 2024-10-08

## 2024-10-08 ENCOUNTER — POSTPARTUM VISIT (OUTPATIENT)
Dept: OBGYN CLINIC | Facility: CLINIC | Age: 44
End: 2024-10-08
Payer: COMMERCIAL

## 2024-10-08 ENCOUNTER — TELEPHONE (OUTPATIENT)
Age: 44
End: 2024-10-08

## 2024-10-08 VITALS
WEIGHT: 156 LBS | BODY MASS INDEX: 27.64 KG/M2 | HEIGHT: 63 IN | SYSTOLIC BLOOD PRESSURE: 136 MMHG | DIASTOLIC BLOOD PRESSURE: 74 MMHG

## 2024-10-08 DIAGNOSIS — T81.49XA POSTOPERATIVE CELLULITIS OF SURGICAL WOUND: Primary | ICD-10-CM

## 2024-10-08 PROCEDURE — 99214 OFFICE O/P EST MOD 30 MIN: CPT | Performed by: STUDENT IN AN ORGANIZED HEALTH CARE EDUCATION/TRAINING PROGRAM

## 2024-10-08 RX ORDER — CEPHALEXIN 500 MG/1
500 CAPSULE ORAL EVERY 6 HOURS SCHEDULED
Qty: 40 CAPSULE | Refills: 0 | Status: SHIPPED | OUTPATIENT
Start: 2024-10-08 | End: 2024-10-18

## 2024-10-08 NOTE — PROGRESS NOTES
[facility] [place]  [address] [place] [address]    Assessment/Plan:  1. Postoperative cellulitis of surgical wound  Assessment & Plan:  - Exam consistent with early wound cellulitis. Will treat with Keflex 500 mg PO q6h x 10 days.   - Instructed patient on wound care, including cleansing with soap and water and keeping wound dry using loosely applied gauze or sanitary pad.   - Patient scheduled for follow up next week. Instructed to call office if she feels that her wound is worsening or if she has fever.   Orders:  -     cephalexin (KEFLEX) 500 mg capsule; Take 1 capsule (500 mg total) by mouth every 6 (six) hours for 10 days      Subjective:   Rupa Curry is a 44 y.o.  now POD#13 s/p repeat  delivery who presents for evaluation of drainage from incision.     CC:   Chief Complaint   Patient presents with    Postpartum Care     Pt states fluid starts to come out from her incision last night after she had shower , the steri strips started falling off.        Date of surgery: 2024  Procedure: Repeat low-transverse  section    HPI: Patient presents for wound evaluation after noting drainage of fluid from incision last night. She reports slight increase in incisional pain, but overall well controlled with ibuprofen. She denies fever, chills, nausea, vomiting, or changes in bowel.     ROS: Negative except as noted in HPI    The following portions of the patient's history were reviewed and updated as appropriate:   Past Medical History:   Diagnosis Date    39 weeks gestation of pregnancy 2020    [facility]  Flu vaccine 19    Advanced maternal age in multigravida, third trimester 10/26/2022    Did not complete aneuploidy screening this pregnancy Need AFS starting at 32 weeks, first NST perfomed today , 2x week    Anemia     Anemia during pregnancy in third trimester 1/15/2020    COVID-19 06/10/2020    COVID-19 virus detected  2020    Elderly multigravida in third trimester 1/15/2020    Encounter for injection education 3/3/2020    GBS bacteriuria 2019    Amoxicillin sent to patient's pharmacy  Will need PCN in labor DO NOT collect GBS swab at 36 weeks!!    Grand multiparity with current pregnancy in second trimester 2022    Hyperglycemia during pregnancy 3/3/2020    Iron deficiency anemia 2019    F/u Ferritin (Hb 8.4) Will likely need IV iron    Postpartum hemorrhage, delivered, current hospitalization 2020    Prediabetes 2015    Preeclampsia, severe, third trimester 2022     (spontaneous vaginal delivery) 2020     Past Surgical History:   Procedure Laterality Date    DE  DELIVERY ONLY N/A 2022    Procedure:  SECTION ();  Surgeon: Mena Anguiano MD;  Location: [facility];  Service: Obstetrics    DE  DELIVERY ONLY N/A 2024    Procedure:  SECTION () REPEAT;  Surgeon: Andria Cool MD;  Location: [facility];  Service: Obstetrics    TOOTH EXTRACTION       Family History   Problem Relation Age of Onset    Cancer Mother     No Known Problems Father     No Known Problems Brother     No Known Problems Son     No Known Problems Son     No Known Problems Daughter     No Known Problems Daughter     Scoliosis Daughter     Scoliosis Daughter     Diabetes Maternal Grandmother     Diabetes Paternal Grandmother      Social History     Socioeconomic History    Marital status: Single     Spouse name: Not on file    Number of children: 6    Years of education: 9    Highest education level: 9th grade   Occupational History    Occupation: Homemaker   Tobacco Use    Smoking status: Never    Smokeless tobacco: Never   Vaping Use    Vaping status: Never Used   Substance and Sexual Activity    Alcohol use: No     Comment: pt reports social drinking once per month prior to pregnancy    Drug use: No    Sexual activity: Yes     Partners: Male     Birth control/protection:  None   Other Topics Concern    Not on file   Social History Narrative    Not on file     Social Determinants of Health     Financial Resource Strain: High Risk (1/4/2024)    Overall Financial Resource Strain (CARDIA)     Difficulty of Paying Living Expenses: Hard   Food Insecurity: No Food Insecurity (9/25/2024)    Hunger Vital Sign     Worried About Running Out of Food in the Last Year: Never true     Ran Out of Food in the Last Year: Never true   Transportation Needs: No Transportation Needs (9/25/2024)    PRAPARE - Transportation     Lack of Transportation (Medical): No     Lack of Transportation (Non-Medical): No   Physical Activity: Unknown (1/8/2024)    Exercise Vital Sign     Days of Exercise per Week: Not on file     Minutes of Exercise per Session: 0 min   Stress: No Stress Concern Present (3/13/2024)    [employer] - Occupational Stress Questionnaire     Feeling of Stress : Only a little   Recent Concern: Stress - Stress Concern Present (1/8/2024)    [employer] - Occupational Stress Questionnaire     Feeling of Stress : To some extent   Social Connections: Moderately Integrated (1/8/2024)    Social Connection and Isolation Panel [NHANES]     Frequency of Communication with Friends and Family: Three times a week     Frequency of Social Gatherings with Friends and Family: Once a week     Attends Latter-day Services: 1 to 4 times per year     Active Member of Clubs or Organizations: No     Attends Club or Organization Meetings: 1 to 4 times per year     Marital Status: Never    Intimate Partner Violence: Not At Risk (1/8/2024)    Humiliation, Afraid, Rape, and Kick questionnaire     Fear of Current or Ex-Partner: No     Emotionally Abused: No     Physically Abused: No     Sexually Abused: No   Housing Stability: Low Risk  (9/25/2024)    Housing Stability Vital Sign     Unable to Pay for Housing in the Last Year: No     Number of Times Moved in  "the Last Year: 0     Homeless in the Last Year: No     Outpatient Medications Marked as Taking for the 10/8/24 encounter (Postpartum Visit) with Mike Ureña MD   Medication    cephalexin (KEFLEX) 500 mg capsule    famotidine (PEPCID) 20 mg tablet    ferrous sulfate 324 (65 Fe) mg    ibuprofen (MOTRIN) 600 mg tablet    Prenatal Vit-Fe Fumarate-FA (Prenatal Vitamin) 27-0.8 MG TABS     Allergies   Allergen Reactions    Dog Epithelium Allergic Rhinitis    Pollen Extract Allergic Rhinitis           Objective:  /74 (BP Location: Left arm, Patient Position: Sitting, Cuff Size: Standard)   Ht 5' 3\" (1.6 m)   Wt 70.8 kg (156 lb)   LMP  (LMP Unknown)   Breastfeeding Yes   BMI 27.63 kg/m²        General Appearance: alert and oriented, in no acute distress.   Abdomen: Soft, non-tender, non-distended, no masses, no rebound or guarding. Steri strips in place; removed on exam. Pfannenstiel incision with 3-4 cm region of very superficial skin separation with present granulation tissue from secondary intention. No separation of subcutaneous tissue. Mild dionicio-wound erythema and small amount of malodorous inflammatory exudate. No fluctuance, induration, ecchymosis, or drainage.   Extremities: Normal range of motion.   Skin: normal, no rash or abnormalities  Neurologic: alert, oriented x3  Psychiatric: Appropriate affect, mood stable, cooperative with exam.        Mike Ureña MD  10/8/2024 4:01 PM  "

## 2024-10-08 NOTE — ASSESSMENT & PLAN NOTE
- Exam consistent with early wound cellulitis. Will treat with Keflex 500 mg PO q6h x 10 days.   - Instructed patient on wound care, including cleansing with soap and water and keeping wound dry using loosely applied gauze or sanitary pad.   - Patient scheduled for follow up next week. Instructed to call office if she feels that her wound is worsening or if she has fever.

## 2024-10-08 NOTE — TELEPHONE ENCOUNTER
Incoming call from patient's . Postpartum patient -  24. Since yesterday incision has been having intermittent mild pain and has clear/yellow drainage from incision site. Also states that incision appears to have small opening in the middle. Drainage has caused a few steri-strips in the middle of incision to fall off. Denies odor to discharge and denies redness around incision, warmth or fever. Patient has sent photo through [employer].     Patient requesting call back at [phone].     ESC sent to Dr. Cool - should be evaluated in the office if having drainage     Outgoing call to patient at requested number. No answer. Left message for call back to schedule in office visit today for incision check.

## 2024-10-17 ENCOUNTER — POSTPARTUM VISIT (OUTPATIENT)
Dept: OBGYN CLINIC | Facility: CLINIC | Age: 44
End: 2024-10-17
Payer: COMMERCIAL

## 2024-10-17 ENCOUNTER — TELEPHONE (OUTPATIENT)
Dept: OBGYN CLINIC | Facility: CLINIC | Age: 44
End: 2024-10-17

## 2024-10-17 VITALS
HEIGHT: 63 IN | DIASTOLIC BLOOD PRESSURE: 76 MMHG | WEIGHT: 153.8 LBS | SYSTOLIC BLOOD PRESSURE: 122 MMHG | BODY MASS INDEX: 27.25 KG/M2

## 2024-10-17 DIAGNOSIS — Z30.09 ENCOUNTER FOR COUNSELING REGARDING CONTRACEPTION: ICD-10-CM

## 2024-10-17 DIAGNOSIS — K59.00 CONSTIPATION, UNSPECIFIED CONSTIPATION TYPE: ICD-10-CM

## 2024-10-17 DIAGNOSIS — O24.414 INSULIN CONTROLLED GESTATIONAL DIABETES MELLITUS (GDM) IN THIRD TRIMESTER: ICD-10-CM

## 2024-10-17 DIAGNOSIS — O10.919 CHRONIC HYPERTENSION IN PREGNANCY: ICD-10-CM

## 2024-10-17 DIAGNOSIS — T81.49XA POSTOPERATIVE CELLULITIS OF SURGICAL WOUND: ICD-10-CM

## 2024-10-17 RX ORDER — DOCUSATE SODIUM 100 MG/1
100 CAPSULE, LIQUID FILLED ORAL 2 TIMES DAILY
Qty: 60 CAPSULE | Refills: 1 | Status: SHIPPED | OUTPATIENT
Start: 2024-10-17

## 2024-10-17 RX ORDER — POLYETHYLENE GLYCOL 3350 17 G/17G
POWDER, FOR SOLUTION ORAL
Qty: 10 EACH | Refills: 1 | Status: SHIPPED | OUTPATIENT
Start: 2024-10-17

## 2024-10-17 NOTE — ASSESSMENT & PLAN NOTE
"- patient started on Keflex last week for early wound cellulitis.  Almost finished course.  Patient reports pain and redness has improved.  - two areas of incision she describes as \"wet\" - on exam two areas where granulation tissue formed in between incision edges.    - reviewed this is not infection.  Placed seristrips over areas to help healing - recom leave them on for 7-10 days, remove after 14 days if not already falling off.  - offered f/u exam in two weeks to examine.  Patient declines, states she will call if not healed in 2 weeks.  "

## 2024-10-17 NOTE — ASSESSMENT & PLAN NOTE
Postpartum 2hr GTT ordered for 8 weeks.  Sent to [facility]'s lab by [facility].  Reminded patient.

## 2024-10-17 NOTE — PROGRESS NOTES
"Routine Post Partum Visit  [facility] [place]  [address], [address], [place] [address]    Assessment/Plan:  Rupa is a 44 y.o. year old  who presents for postpartum visit.  [employer]  declined,  translating today.    Routine Postpartum Care  Normal postpartum exam  Contraception: IUD- previously had Mirena, wishes it again  Depression Screen: PPD screen score: 3; negative  Feeding:  She is breast feeding exclusively.  Psychosocial support: good  Cervical cancer screening Up to Date, next due 3-4 years  Follow up in: 3+ weeks for IUD placement, then 3 months for wellness visit, or as needed.    Additional Problems:  1. Postpartum care and examination  2. Chronic hypertension in pregnancy  Assessment & Plan:  No mediation during pregnancy.  Postpartum BP normal range.  3. Insulin controlled gestational diabetes mellitus (GDM) in third trimester  Assessment & Plan:  Postpartum 2hr GTT ordered for 8 weeks.  Sent to [facility] lab by [facility].  Reminded patient.    4. Postoperative cellulitis of surgical wound  Assessment & Plan:  - patient started on Keflex last week for early wound cellulitis.  Almost finished course.  Patient reports pain and redness has improved.  - two areas of incision she describes as \"wet\" - on exam two areas where granulation tissue formed in between incision edges.    - reviewed this is not infection.  Placed seristrips over areas to help healing - recom leave them on for 7-10 days, remove after 14 days if not already falling off.  - offered f/u exam in two weeks to examine.  Patient declines, states she will call if not healed in 2 weeks.  5. Constipation, unspecified constipation type  Comments:  no BM in 3 days.  Sent Miralax to pharmacy for daily use until daily BM.  Colace for BID use to prevent constipation for at least the next month  Orders:  -     docusate sodium (COLACE) 100 mg capsule; Take 1 capsule (100 mg total) by mouth 2 (two) times a " day  -     polyethylene glycol (MIRALAX) 17 g packet; Take packet daily, as directed, until has bowel movement regularly.  May take again daily if no bowel movement for 2-3 days.  6. Encounter for counseling regarding contraception  A/P:   Patient states she had wanted permanent sterilization at time of  but it was not performed.  I apologized as I did the  and there was no indication at the time in the chart or communicated to me that they desired sterilization.     [place] medical assistance and I told them they have paperwork that has to be completed 30 days prior to sterilization for the insurance to cover it and asked if they did that paperwork.  I don't see it in the chart.  They state they did not do it ahead but expressed at the time of the  to the nurse they wanted it done.    I apologized for the miscommunication and not addressing this at the time.  Discussed option for interval lap bilateral salpingectomy by laparoscopic surgery is an option after a full 6-8 weeks postpartum.  Again would need to sign MA-31 form 30 days prior (which we could do today).    Patient states she had IUD before and would like that again.  I reviewed chart - she had Mirena IUD that was removed 2023 in anticipation of this pregnancy.  She would like that again.  Discussed must be a full 6 weeks post delivery - so we can place it after another 3 full weeks.  They agree.  Will schedule on way out today.    Next visit: 3+ wks IUD placement    Subjective:     CC: Postpartum visit    Rupa Curry is a 44 y.o. y.o. female  who presents for a postpartum visit.     She is 3 weeks postpartum following a low cervical transverse  section on 2024 at 38 weeks.    Outcome: repeat  section, low transverse incision,   Anesthesia: spinal. Postpartum course has been complicated by h/o cHTN - BP normal postpartum, seen last week for mild cellulitis at incision . Baby's course has  "been uncomplicated. Baby is feeding by She is breast feeding exclusively..     Bleeding thin lochia. Bowel function is abnormal: constipation . Bladder function is normal. Patient is not sexually active since delivery. Contraception method is IUD. Postpartum depression screening: negative.    The following portions of the patient's history were reviewed and updated as appropriate: allergies, current medications, past family history, past medical history, obstetric history, gynecologic history, past social history, past surgical history and problem list.      Objective:  /76 (BP Location: Left arm, Patient Position: Sitting, Cuff Size: Standard)   Ht 5' 3\" (1.6 m)   Wt 69.8 kg (153 lb 12.8 oz)   LMP  (LMP Unknown)   Breastfeeding Yes Comment: Feedings are going well.  BMI 27.24 kg/m²   Pregravid Weight/BMI: 64 kg (141 lb) (BMI 24.98)  Current Weight: 69.8 kg (153 lb 12.8 oz)   Total Weight Gain: 13.6 kg (30 lb)     General: Well appearing, no distress.  Mood and affect: Appropriate.  Abdomen: Soft, nontender  Incision: no signs of infection - two areas granulation tissue at incision - steristrips placed to help in healing  Pelvic: deferred by pt due to bleeding  "

## 2024-10-17 NOTE — PATIENT INSTRUCTIONS
Incision   - keep steristrips on for 7-10 days - remove after 2 weeks if they haven't fallen off   - pat incision for shower and drying, no rubbing.    Constipation   - take Miralax daily until bowel movement   - start Colace twice and day and continue for at least 1 month    IUD placement    - return for placement of IUD after 6 full weeks following delivery   - no sex until after IUD placed    Postpartum   - bleeding may continue postpartum for up to 6-8 weeks, but overall should be decreasing.  Sometimes, even if breast feeding, your period may start about 6 weeks after delivery, some may take longer to return   - increase activity slowly as you feel ready.  No vigorous physical exercise, running/jogging, or aerobic activity until full 6 weeks postpartum.  As increasing activity - start slowly and listen to your body.     - no lifting more than the baby in car seat until 6 full weeks postpartum, especially if you had a  or vaginal stitches, unless instructed differently by your doctor.   - okay to resume intercourse after 6 full weeks following delivery, be sure to use contraception.  It is possible to get pregnant before your first period.     - for information on birth control please see website www.bedsider.org - for all things birth control   - if breast feeding/nursing, periods may not return right away.  They may return after some time or not until you completely stop nursing.  Everyone is different.    For additional information on postpartum period see the following resources below:   - [facility], ph: [phone], [email or web address]  Lactation and mental health support   - Fourth Trimester Project - www.[employer]  Resource for all things postpartum   - National Suicide and Crisis Lifeline - 983  If you or someone you know is experiencing emotional distress, crisis, or having suicidal thoughts, please contact the Suicide and Crisis Lifeline  Text  or call 988 from landline or cell phone for 24/7 support  Visit [email or web address] to chat, button in the top right-hand corner of the screen

## 2024-10-28 ENCOUNTER — POSTPARTUM VISIT (OUTPATIENT)
Dept: OBGYN CLINIC | Facility: CLINIC | Age: 44
End: 2024-10-28
Payer: COMMERCIAL

## 2024-10-28 VITALS
DIASTOLIC BLOOD PRESSURE: 60 MMHG | HEIGHT: 63 IN | BODY MASS INDEX: 27.11 KG/M2 | SYSTOLIC BLOOD PRESSURE: 100 MMHG | WEIGHT: 153 LBS

## 2024-10-28 DIAGNOSIS — Z51.89 VISIT FOR WOUND CHECK: Primary | ICD-10-CM

## 2024-10-28 PROCEDURE — 99213 OFFICE O/P EST LOW 20 MIN: CPT | Performed by: STUDENT IN AN ORGANIZED HEALTH CARE EDUCATION/TRAINING PROGRAM

## 2024-10-28 NOTE — PROGRESS NOTES
Ambulatory Visit  Name: Rupa Curry      : 1980      MRN: [id number]  Encounter Provider: Mike Ureña MD  Encounter Date: 10/28/2024   Encounter department: [facility] [place]    Assessment & Plan  Visit for wound check  - Two small regions of granulation tissue noted just left of midline along Pfannenstiel incision. Silver nitrate applied. Discussed wound care. Return to office in 1 week for re-evaluation.          History of Present Illness     Rupa Curry is a 44 y.o. female who presents for wound check. She reports a small amount of bleeding and redness. Completed Keflex course as previously prescribed. Denies fever, chills, nausea, vomiting, or worsening pain.     History obtained from : patient  Review of Systems   All other systems reviewed and are negative.    Past Medical History   Past Medical History:   Diagnosis Date    39 weeks gestation of pregnancy 2020    IOL - 2020 @8pm [facility]  Flu vaccine 19    Advanced maternal age in multigravida, third trimester 10/26/2022    Did not complete aneuploidy screening this pregnancy Need AFS starting at 32 weeks, first NST perfomed today , 2x week    Anemia     Anemia during pregnancy in third trimester 1/15/2020    COVID-19 06/10/2020    COVID-19 virus detected 2020    Elderly multigravida in third trimester 1/15/2020    Encounter for injection education 3/3/2020    GBS bacteriuria 2019    Amoxicillin sent to patient's pharmacy  Will need PCN in labor DO NOT collect GBS swab at 36 weeks!!    Grand multiparity with current pregnancy in second trimester 2022    Hyperglycemia during pregnancy 3/3/2020    Iron deficiency anemia 2019    F/u Ferritin (Hb 8.4) Will likely need IV iron    Postpartum hemorrhage, delivered, current hospitalization 2020    Prediabetes 2015    Preeclampsia, severe, third trimester 2022     (spontaneous vaginal delivery) 2020      Past Surgical History:   Procedure Laterality Date    NE  DELIVERY ONLY N/A 2022    Procedure:  SECTION ();  Surgeon: Mena Anguiano MD;  Location: [facility];  Service: Obstetrics    NE  DELIVERY ONLY N/A 2024    Procedure:  SECTION () REPEAT;  Surgeon: Andria Cool MD;  Location: [facility];  Service: Obstetrics    TOOTH EXTRACTION       Family History   Problem Relation Age of Onset    Cancer Mother     No Known Problems Father     No Known Problems Brother     No Known Problems Son     No Known Problems Son     No Known Problems Daughter     No Known Problems Daughter     Scoliosis Daughter     Scoliosis Daughter     Diabetes Maternal Grandmother     Diabetes Paternal Grandmother      Current Outpatient Medications on File Prior to Visit   Medication Sig Dispense Refill    docusate sodium (COLACE) 100 mg capsule Take 1 capsule (100 mg total) by mouth 2 (two) times a day 60 capsule 1    famotidine (PEPCID) 20 mg tablet Take 1 tablet (20 mg total) by mouth 2 (two) times a day 60 tablet 5    ferrous sulfate 324 (65 Fe) mg Take 1 tablet (324 mg total) by mouth 2 (two) times a day before meals 60 tablet 0    Prenatal Vit-Fe Fumarate-FA (Prenatal Vitamin) 27-0.8 MG TABS Take 1 tablet by mouth in the morning 90 tablet 4    ibuprofen (MOTRIN) 600 mg tablet Take 1 tablet (600 mg total) by mouth every 6 (six) hours as needed for mild pain Do not start before 2024. (Patient not taking: Reported on 10/28/2024) 90 tablet 0    polyethylene glycol (MIRALAX) 17 g packet Take packet daily, as directed, until has bowel movement regularly.  May take again daily if no bowel movement for 2-3 days. (Patient not taking: Reported on 10/28/2024) 10 each 1     No current facility-administered medications on file prior to visit.     Allergies   Allergen Reactions    Dog Epithelium Allergic Rhinitis    Pollen Extract Allergic Rhinitis      Current Outpatient Medications  "on File Prior to Visit   Medication Sig Dispense Refill    docusate sodium (COLACE) 100 mg capsule Take 1 capsule (100 mg total) by mouth 2 (two) times a day 60 capsule 1    famotidine (PEPCID) 20 mg tablet Take 1 tablet (20 mg total) by mouth 2 (two) times a day 60 tablet 5    ferrous sulfate 324 (65 Fe) mg Take 1 tablet (324 mg total) by mouth 2 (two) times a day before meals 60 tablet 0    Prenatal Vit-Fe Fumarate-FA (Prenatal Vitamin) 27-0.8 MG TABS Take 1 tablet by mouth in the morning 90 tablet 4    ibuprofen (MOTRIN) 600 mg tablet Take 1 tablet (600 mg total) by mouth every 6 (six) hours as needed for mild pain Do not start before September 26, 2024. (Patient not taking: Reported on 10/28/2024) 90 tablet 0    polyethylene glycol (MIRALAX) 17 g packet Take packet daily, as directed, until has bowel movement regularly.  May take again daily if no bowel movement for 2-3 days. (Patient not taking: Reported on 10/28/2024) 10 each 1     No current facility-administered medications on file prior to visit.      Social History     Tobacco Use    Smoking status: Never     Passive exposure: Never    Smokeless tobacco: Never   Vaping Use    Vaping status: Never Used   Substance and Sexual Activity    Alcohol use: No     Comment: pt reports social drinking once per month prior to pregnancy    Drug use: No    Sexual activity: Yes     Partners: Male     Birth control/protection: None         Objective     /60 (BP Location: Left arm, Patient Position: Sitting, Cuff Size: Standard)   Ht 5' 3\" (1.6 m)   Wt 69.4 kg (153 lb)   LMP  (LMP Unknown)   Breastfeeding Yes   BMI 27.10 kg/m²     Physical Exam  Constitutional:       Appearance: Normal appearance.   Abdominal:      General: Abdomen is flat.      Palpations: Abdomen is soft.      Tenderness: There is no abdominal tenderness.          Comments: No erythema, induration, fluctuance, or drainage. No skin separation   Skin:     General: Skin is warm and dry. "   Neurological:      General: No focal deficit present.      Mental Status: She is alert and oriented to person, place, and time.   Psychiatric:         Mood and Affect: Mood normal.         Behavior: Behavior normal.     Mike Ureña MD  10/28/2024 5:33 PM

## 2024-11-27 NOTE — ASSESSMENT & PLAN NOTE
6-week 2-hour postprandial blood sugar      Lab Results   Component Value Date    HGBA1C 6.0 (H) 09/17/2024      Standing/Walking/Toileting/Moving from bed to chair

## 2024-12-03 ENCOUNTER — TELEPHONE (OUTPATIENT)
Dept: INTERNAL MEDICINE CLINIC | Facility: CLINIC | Age: 44
End: 2024-12-03

## 2024-12-03 NOTE — TELEPHONE ENCOUNTER
----- Message from Taylor Ureña PA-C sent at 12/3/2024  7:58 AM EST -----  Please call patient to schedule annual physical due 1/4/24 or after. Thank you

## (undated) DEVICE — SUT PLAIN 2-0 CTX 27 IN 872H

## (undated) DEVICE — ABDOMINAL PAD: Brand: DERMACEA

## (undated) DEVICE — GLOVE INDICATOR PI UNDERGLOVE SZ 7.5 BLUE

## (undated) DEVICE — SKIN MARKER DUAL TIP WITH RULER CAP, FLEXIBLE RULER AND LABELS: Brand: DEVON

## (undated) DEVICE — 3M™ STERI-STRIP™ REINFORCED ADHESIVE SKIN CLOSURES, R1540, 1/8 IN X 3 IN (3 MM X 75 MM), 5 STRIPS/ENVELOPE: Brand: 3M™ STERI-STRIP™

## (undated) DEVICE — MEDI-VAC YANKAUER SUCTION HANDLE W/STRAIGHT TIP & CONTROL VENT: Brand: CARDINAL HEALTH

## (undated) DEVICE — TELFA NON-ADHERENT ABSORBENT DRESSING: Brand: TELFA

## (undated) DEVICE — DRESSING MEPILEX AG BORDER 4 X 12 IN

## (undated) DEVICE — CHLORAPREP HI-LITE 26ML ORANGE

## (undated) DEVICE — Device

## (undated) DEVICE — SUT VICRYL 0 CT-1 27 IN J260H

## (undated) DEVICE — PACK C-SECTION PBDS

## (undated) DEVICE — SUT VICRYL 2-0 SH 27 IN UNDYED J417H

## (undated) DEVICE — GLOVE PI ULTRA TOUCH SZ.7.5

## (undated) DEVICE — DRESSING MEPILEX AG BORDER 4 X 8 IN

## (undated) DEVICE — 3M™ STERI-STRIP™ REINFORCED ADHESIVE SKIN CLOSURES, R1547, 1/2 IN X 4 IN (12 MM X 100 MM), 6 STRIPS/ENVELOPE: Brand: 3M™ STERI-STRIP™